# Patient Record
Sex: MALE | Race: BLACK OR AFRICAN AMERICAN | Employment: PART TIME | URBAN - METROPOLITAN AREA
[De-identification: names, ages, dates, MRNs, and addresses within clinical notes are randomized per-mention and may not be internally consistent; named-entity substitution may affect disease eponyms.]

---

## 2020-06-10 ENCOUNTER — HOSPITAL ENCOUNTER (OUTPATIENT)
Dept: PHYSICAL THERAPY | Age: 50
Discharge: HOME | End: 2020-06-10
Payer: MEDICAID

## 2020-06-10 VITALS — HEIGHT: 75 IN | WEIGHT: 315 LBS | BODY MASS INDEX: 39.17 KG/M2

## 2020-06-10 PROCEDURE — 97140 MANUAL THERAPY 1/> REGIONS: CPT

## 2020-06-10 PROCEDURE — 97110 THERAPEUTIC EXERCISES: CPT

## 2020-06-10 PROCEDURE — 97162 PT EVAL MOD COMPLEX 30 MIN: CPT

## 2020-06-10 NOTE — PROGRESS NOTES
[facility]  Lymphedema Clinic  [address] [place] [address], [address], [place] [address].    EVALUATION    NAME: Bryan Rowe AGE: 54 y.o. GENDER: male  DATE: 6/10/2020  REFERRING PHYSICIAN: Dr. Makayla Szymanski:   Primary Diagnosis:  · Lymphedema, not elsewhere classified (L LE) (I89.0)  Other Treatment Diagnoses:  · Abnormality of gait (R26.9)  · Swelling not relieved by elevation (R60.9)  · Congestive heart failure (CHF) (HCC) (I50.9)  · Chronic kidney disease (N18.9)  · Morbid obesity (E66.9)  Date of Onset: 5/28/20  Present Symptoms and Functional Limitations: Patient reports noticing swelling in the L lower leg and ankle approximately one year ago. The swelling has progressed and he is now experiencing swelling in the R lower leg. He had venous dopplers in Feb and May 2020 which he reports were negative. Patient had a CVA in January 2020 with residual numbness and mild weakness in the R lower leg but he remains independent and continues to drive a motorcycle. He is followed by a Cardiologist on a regular basis and his next appointment is at the end of June. [facility] Lymphedema Assessment Scale: Patient scores a 11 out of 20    Past Medical History: Per patient, his PMH also includes: CVA 1/2020, CHF, cardiac arrhythmia, back issues, CKD, respiratory disease, SLE, NEELAM with CPAP. Past Medical History:   Diagnosis Date    CAD (coronary artery disease)     MI 09    Hypertension     Other ill-defined conditions      Past Surgical History:   Procedure Laterality Date    HX GYN      left hand, right shoulder, left ankle      Current Medications:    Per patient, his current medications include: Clonidine 0.2 mg, Coreg 25 mg, Furosemide 80 mg, spironolactone 25 mg, Prednisone 5 mg, Allopurinol 100 mg, Amlodipine 10 mg,   Allergies:    Allergies   Allergen Reactions    Lisinopril Swelling    Sulfa (Sulfonamide Antibiotics) Other (comments)     Had sores all over body        Prior Level of Function/Social/Work History/Personal factors and/or comorbidities impacting plan of care: Patient works in [email or web address] for the [employer]. He does not participate in an exercise program and has been rather sedentary for the past 3 months due to the COVID 19 quarantine. Co-morbidities, include:  CVA 1/2020, CHF, cardiac arrhythmia, back issues, CKD, respiratory disease, SLE, HTN, CKD  Living Situation: Patient lives in a townhouse with his significant other. His bedroom is on the second floor. Trainable Caregiver?: Yes  Mobility: Independent gait without any assistive device for community distances  Sleeping Arrangement:  in bed  Adaptive Equipment Owned: None   Other: Patient arrives to his visit wearing a compression sleeve on the L lower leg which is too tight and appears to have increased swelling in the ankle and foot. Recommended that patient defer use of the product at this time. Previous Therapy:  None    SUBJECTIVE:   Patient reports the onset of L LE swelling approximately one year ago and swelling in the R lower leg more recently. He purchased a compression sleeve for the L LE on [employer] but feels that it may be too tight. His pants and shoes do not fit well. He has a pressure ulcer on the R heel medially and is being followed by a Podiatrist. His sensation in the R lower leg is still abnormal following a CVA in January. Patient's goals for therapy: decrease swelling     OBJECTIVE DATA SUMMARY:   EXAMINATION/PRESENTATION/DECISION MAKING:   Pain:   Patient complains of chronic B lower leg pain. Pain is 7/10 usually with activity. Self-Care and ADLs: Patient is independent with bathing and dressing.      Skin and Tissue Assessment:  Dermal Status: Tenuous,  Dry, Flaky on the lower legs bilaterally          Texture/Consistency: Brawny R LE and Fibrotic/Woody L LE    Pigmentation/Color Change: Hyperpigmented, Hemosiderin and Fibrotic L LE    Anomalies: See pictures above. Circulatory: Vascular studies ruled out DVT in February and May 2020 per patient. Nails: Fungus and Discolored    Stemmers Sign: positive bilaterally  Temperature: 98.7 degrees  Height:  Height: 6' 3\" (190.5 cm)  Weight:  Weight: (!) 166.5 kg (367 lb)   BMI:  BMI (calculated): 45.9  (36 or greater: adversely affecting lymphedema)  Wound/Ulcer:  R medial heel. No drainage. Healing. See picture above. Wound Pain:   none  Volumetric Measurements:   Right:  13,210.43 mL Left:  15,251.95 mL   % Difference: 15.45% L LE > R LE Dominance: left   (See scanned graph)  Range of Motion: Futon [place] for bilateral lower extremities and WFL for bilateral upper extremities  Strength: WFL except R shoulder and R hip 4 to 4+/5  Sensation:  Impaired Patient able to feel light touch on the R foot and lower leg. Reports numbness R LE below the knee s/p CVA. Mobility:    Bed/Chair Mobility: Independent  Transfers: Independent  Gait: Independent for community distances  Endurance: Patient works and is able to ambulate community distances. Reports shortness of breath with prolonged activity. Safety:  Patient is alert and oriented: Yes  Safety awareness: appears intact  Fall risk?: yes due to previous CVA, impaired sensation R lower leg, and LE swelling. No recent falls. Patient given written fall prevention handout: Yes    Based on the above components, the patient evaluation is determined to be of the following complexity level: MEDIUM    Evaluation Time: 9:30 - 10:00 am    TREATMENT PROVIDED:   1. Treatment description:  Therapeutic Exercise and Procedure: Patient instructed in activity restrictions and exercise guidelines. Therapist demonstrated deep abdominal breathing and a remedial lymphedema range of motion exercise program to be done in sitting.   Patient was able to complete the routine times 5 reps and deep abdominal breathing with good performance. Patient has been asked to complete breathing exercises and the decongestive exercise routine daily. Patient does not participate in either a walking or other exercise program.   Treatment time:  10:50 - 11:00 am  Minutes: 10    2. Treatment description:  Manual Therapy: Patient instructed in skin care principles and anatomy of the lymphatic system. Therapist able to demonstrate manual lymphatic drainage techniques for the drainage of L LE > R LE and skin care protocol: Patient was educated in daily skin care with a low Ph lotion. MLD techniques were demonstrated during skin care in the clinic today which included washing with [facility] and applying Remedy lotion. Discussed drying between the toes with a tissue to prevent maceration. Educated patient in the prevention and treatment of skin injuries and signs and symptoms of cellulitis. Discussed the importance of lying in bed for 15 minute intervals throughout the day and elevating the legs for management of swelling. Discussed the role and benefit of multi-layer compression bandaging (MLB), the time commitment, and cost of bandaging supplies. Patient voiced interest in pursuing intensive treatment and was instructed to bring pants and full coverage tennis shoes that will accommodate the bandaging to the next appointment. Provided patient with samples and initiated discussion on  compression options, including flat-knit and velcro products for management of swelling during the restorative phase of care. Treatment time: 10:00 - 10:50 am   Minutes: 50  ASSESSMENT:   Socorro Hall is a 54 y.o. male who presents with L LE > R LE secondary lymphedema with skin changes and healing wound on the medial R heel which is being followed by his Podiatrist.  Patient is motivated to improve his condition. Patient's condition is complicated by multiple co-morbidities, obesity, and a sedentary lifestyle.   Patient will benefit from complete decongestive therapy (CDT) including: Manual lymphatic drainage (MLD) technique, Short-stretch textile bandages/compression system to decongest limb and Kinesiotaping as appropriate. This care is medically necessary due to the infection risk with lymphedema and to improve functional activities. CDT is necessary to resolve swelling to allow patient to return to wearing normal clothes/footwear and prevent worsening of symptoms, such as infections and/or hospitalizations. Patient will be independent with home program strategies to allow improved ADL ability and mobility and to allow patient to return to greatest functional independence. Rehabilitation potential is considered to be Fair. Factors which may influence rehabilitation potential include multiple co-morbidities, obesity, and sedentary lifestyle. Patient will benefit from 6 to 10 physical therapy visits over 12 weeks to optimize improvement in these areas. PLAN OF CARE:   Recommendations and Planned Interventions: Manual lymph drainage/decongestive therapy, Multi-layer compression bandaging (short-stretch), Compression garment fitting/provision, Lymphedema therapeutic exercise,  Education in skin care and lymphedema precautions, Self-MLD education per home program, Self-bandaging education per home program, Caregiver education as needed and Wound care as needed. GOALS  Short term goals  Time frame: to be met by 7/22/2020  1. Patient will demonstrate knowledge of signs/symptoms of infections/cellulitis and be independent in skin care to prevent cellulitis. 2.  Patient will demonstrate independence in lymphedema home program of therapeutic exercises to improve circulation and decongest limb to improve ADLs. 3.  Patient will tolerate multi-layer bandages (MLB) and show measureable decrease in limb volume to allow ordering of home compression system (daytime, nighttime garments and pump as needed). Long term goals  Time frame: to be met by 9/5/2020  1. Patient will be independent with don/doff of compression system and use in order to prevent reaccumulation of fluid at discharge. 3.  Pt will be independent in self-MLD and show stable limb volumes showing decongestion and pt. ready for transition to independent restorative phase of lymphedema therapy. Patient has participated in goal setting and agrees to work toward plan of care. Patient was instructed to call if questions or concerns arise. Thank you for this referral.  Miah Gould, PT, CLT   Time Calculation: 90 mins    TREATMENT PLAN EFFECTIVE DATES:   6/10/2020 TO 9/5/2020  I have read the above plan of care for Duarte Delarosa. I certify the above prescribed services are required by this patient and are medically necessary.   The above plan of care has been developed in conjunction with the lymphedema/physical therapist.       Physician Signature: ____________________________________Date:______________     Dr. Alla Feng

## 2020-06-24 ENCOUNTER — HOSPITAL ENCOUNTER (OUTPATIENT)
Dept: PHYSICAL THERAPY | Age: 50
Discharge: HOME | End: 2020-06-24
Payer: MEDICAID

## 2020-06-24 PROCEDURE — 97140 MANUAL THERAPY 1/> REGIONS: CPT

## 2020-06-24 PROCEDURE — 97110 THERAPEUTIC EXERCISES: CPT

## 2020-06-24 NOTE — PROGRESS NOTES
[address], [address] [place] [address]    LYMPHEDEMA THERAPY  VISIT: 2    [x]                  Daily note               []                 30 day/10th visit progress note    NAME: Marilu Perry  DATE: 6/24/2020    GOALS  Short term goals  Time frame: to be met by 7/22/2020  1. Patient will demonstrate knowledge of signs/symptoms of infections/cellulitis and be independent in skin care to prevent cellulitis. Continued education. Progressing toward goal.  2.  Patient will demonstrate independence in lymphedema home program of therapeutic exercises to improve circulation and decongest limb to improve ADLs. Continued education in basic range of motion routine. Progressing toward goal.  3.  Patient will tolerate multi-layer bandages (MLB) and show measureable decrease in limb volume to allow ordering of home compression system (daytime, nighttime garments and pump as needed). Patient able to receive first MLB to L LE below the knee today. Will try to maintain until next visit as tolerated and bring in a caregiver to learn how to assist and don bandage on his next visit. Progressing toward goals.      Long term goals  Time frame: to be met by 9/5/2020  1. Patient will be independent with don/doff of compression system and use in order to prevent reaccumulation of fluid at discharge. 3.  Pt will be independent in self-MLD and show stable limb volumes showing decongestion and pt. ready for transition to independent restorative phase of lymphedema therapy. SUBJECTIVE REPORT: Patient arrived and was ready to begin bandaging today. Patient did not have adequate foot wear for bandage so a cast shoe had to be issued to him for safe ambulation. Patient will go to [facility] to purchase a shoe today. Patient also will need to purchase wider width pant legs to accommodate bandaging for future visits.     Pain:0/10    Gait:  Independent gait without any assistive device for community distances    ADLs:  Independent for most, but will need to have assist for bandaging. Patient there is someone who can assist him. Treatment Response:  Patient reviewed packet received at evaluation. Patient did not obtain shoes to wear with bandaging as instructed. Patient was eager to begin bandaging today so able to issue a cast shoe as not to delay the start of his treatment as he did not have the requested foot wear to accommodate MLB. 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 [facility] Lymphedema Assessment Scale: Deferred   TREATMENT AND OBJECTIVE DATA SUMMARY:     Therapeutic Activity 0 minutes   Treatment time: 0  Functional Mobility: Transfers:   Independent    Gait:   Independent    Fall risk: moderate (hx CVA has some issues with R LE sensation at times) Bandaged     Therapeutic Exercise/Procedure 10 minutes    Treatment time: 11:05am-11:15am  Walking program N/A   Joyce ball exercise program: N/A   Free exercises/ROM: Reviewed remedial lymphedema home exercise program with patient. Patient requires cueing/assistance to complete remedial home exercise program.  Handout provided. Home program: Patient to perform daily to BID:  Skin care, Exercise routine, Rest in supine, Compression bandage and Purchase necessary supplies for bandaging (Patient able to obtain bandage supplies today through [employer], but needs to get adequate shoes and pants for upcoming visits. Rationale: Exercise will increase the lymph angiomotoricity and tissue pressure of the skin and thus decrease swelling. Modalities 0 minutes   Treatment time: 0  Vasopneumatic pump: N/A     Manual Lymphatic Drainage (MLD) 75 minutes   Treatment time: 9:50am-11:05am  Patient/family education provided in self MLD. Began education in basic Self MLD concepts with skin care.     Area to decongest: B LEs   Sequence used and effectiveness: Began education on basic techniques to use with skin care. Skin/wound care/debridement: Reviewed skin care principles   Skin care products  Hygiene  Prevention of cellulitis   Applied multi-layer compression bandaging to: left  lower extremity: below knee    The following multi-layer bandages were applied:  Tricofix      Padding layer:  Fleece    Foam:  15 cm roll    Short stretch bandages:   8 cm  10 cm  12 cm    Educated patient in multi-layer bandaging donning principles, precautions, supply ordering and types of bandaging. Handout provided. Patient purchased bandages today. Needs better foot wear to accommodate bandage. Left clinic in cast shoe and he plans to purchase shoes before next visit. Upper/Lower extremity compression: N/A     Kinesiotaping: Deferred    Girth/Volume measurement: Reviewed results of volumetric measurements taken on evaluation. Will take volumes of L LE next visit. ASSESSMENT:   Patient able to return for follow up today to begin intensive CDT treatments and focus today was on skin care, bandaging and exercise. Patient tolerated the application of bandage well. Patient will try to maintain the bandage until he returns on Friday for follow up. Patient remains motivated and agreeable with home program recommendations. Patient is eager to learn and improve on condition. Patient is making gains towards goals. PLAN OF CARE:   Continue with plan of care as established on evaluation. Changes to the plan of care are as follows: Began bandaging L LE below the knee today. Frequency: 2 times a week   Next session will address: Effectiveness of bandage and his home program progress and patient plan to bring in someone to learn MLB application that can assist in the home. Other: Vendor contacted. bandage supplies ordered.       TOTAL TREATMENT 85 mins     Meet [place], PTA, CLT

## 2020-06-26 ENCOUNTER — HOSPITAL ENCOUNTER (OUTPATIENT)
Dept: PHYSICAL THERAPY | Age: 50
Discharge: HOME | End: 2020-06-26
Payer: MEDICAID

## 2020-06-26 PROCEDURE — 97140 MANUAL THERAPY 1/> REGIONS: CPT

## 2020-06-26 NOTE — PROGRESS NOTES
[address], [address] [place] [address]    LYMPHEDEMA THERAPY  VISIT: 3    [x]                  Daily note               []                 30 day/10th visit progress note    NAME: Rebeca Holcomb  DATE: 6/26/2020    GOALS  Short term goals  Time frame: to be met by 7/22/2020  1. Patient will demonstrate knowledge of signs/symptoms of infections/cellulitis and be independent in skin care to prevent cellulitis. Continued education. Progressing toward goal.  2.  Patient will demonstrate independence in lymphedema home program of therapeutic exercises to improve circulation and decongest limb to improve ADLs. Continued education in basic range of motion routine. Progressing toward goal.  3.  Patient will tolerate multi-layer bandages (MLB) and show measureable decrease in limb volume to allow ordering of home compression system (daytime, nighttime garments and pump as needed). Patient tolerated  first MLB to L LE below the knee. Friend/Caregiver came today to learn MLB and did well and will work on donning MLB in the home prior to next visit so we can assess in the clinic how they did in the home setting. Also added Comperm G to the R LE as tolerated for day time use. Progressing toward goals.      Long term goals  Time frame: to be met by 9/5/2020  1. Patient will be independent with don/doff of compression system and use in order to prevent reaccumulation of fluid at discharge. 3.  Pt will be independent in self-MLD and show stable limb volumes showing decongestion and pt. ready for transition to independent restorative phase of lymphedema therapy. Volumes on the L LE have decreased 102 ml since evaluation on 6/10/2020. Will continue to monitor. SUBJECTIVE REPORT: Patient arrived today with his friend/cargiver who will assist him in the home setting.  Patient's only complaint about MLB was his leg felt \"Itchy\", so switched to silver liner today. Pain:0/10    Gait: Independent gait without any assistive device for community distances    ADLs:  Independent for most. Friend came in today for learning how to apply multi-layer bandage in the home setting. Treatment Response: Patient did not obtain shoes to wear with bandaging or clothing as instructed. Continues with cast shoe on the L LE and his sweat pants he asked to have cut to accommodate the bandage today. Patient brought in his friend to have education and hands on training in applying MLB and this was successful in clinic setting. Will have to reassess when he returns how they did in the home setting. 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 [facility] Lymphedema Assessment Scale: Deferred   TREATMENT AND OBJECTIVE DATA SUMMARY:     Therapeutic Activity 0 minutes   Treatment time: 0  Functional Mobility: Transfers:   Independent    Gait:   Independent    Fall risk: moderate (hx CVA,has some issues with R LE sensation at times) Bandaged     Therapeutic Exercise/Procedure 0 minutes    Treatment time: 0  Walking program N/A   [employer] exercise program: N/A   Free exercises/ROM: Reviewed remedial lymphedema home exercise program with patient. Patient requires cueing/assistance to complete remedial home exercise program.  Handout provided. Home program: Patient to perform daily to BID:  Skin care, Exercise routine, Rest in supine, Compression bandage and Purchase necessary supplies for bandaging (Patient able to obtain bandage suppliesthrough [employer], but needs to get adequate shoes and pants for upcoming visits. Rationale: Exercise will increase the lymph angiomotoricity and tissue pressure of the skin and thus decrease swelling.      Modalities 0 minutes   Treatment time: 0  Vasopneumatic pump: N/A     Manual Lymphatic Drainage (MLD) 75 minutes   Treatment time: 12:10pm-1:25pm  Patient/family education provided in self MLD. Began education in basic Self MLD concepts with skin care. Area to decongest: B LEs   Sequence used and effectiveness: Began education on basic techniques to use with skin care. Skin/wound care/debridement: Reviewed skin care principles   Skin care products  Hygiene  Prevention of cellulitis   Patient has area of sensitivity on the ankle crease so after cleansing and before lotion application a Mepilex foam pad was applied for protection. Applied multi-layer compression bandaging to: left  lower extremity: below knee    The following multi-layer bandages were applied:  Tricofix   changed to Silver liner today as he complained of itching with tricofix liner. Padding layer:  Fleece    Foam:  15 cm roll    Short stretch bandages:   8 cm  10 cm  12 cm     Added Comperm G to the R LE below the knee to use as tolerated during the day. Educated patient in multi-layer bandaging donning principles, precautions, supply ordering and types of bandaging. Handout provided. Caregiver able to watch MLB applied, have hands on training in application and then record process so that she can use this in the home for reference. Overall first bandage was adequate with cues as needed. Caregiver bandage removed and therapist bandage applied for patient to leave the clinic. Needs better foot wear to accommodate bandage. Left clinic in cast shoe. Encouraged to purchase proper foot wear and find clothing that better accommodates MLB, patient reports that he does not like to wear shorts which would be ideal.    Upper/Lower extremity compression: N/A   Kinesiotaping: Deferred    Girth/Volume measurement: Volumes on the L LE have decreased 102 ml since evaluation on 6/10/2020. ASSESSMENT:   Patient able to maintain MLB that was applied last visit and there is noticeable improvements in the L LE with skin texture and swelling.  Patient able to have a friend/caregiver come in today and learn MLB to assist between visits as needed. Patient to return next visit with MLB that friend applied so that we can see effectiveness of MLB donned in the home. Patient and caregiver are motivated to work towards goals together. Patient remains motivated and agreeable with home program recommendations. Patient is eager to learn and improve on condition. Patient is making gains towards goals. PLAN OF CARE:   Continue with plan of care as established on evaluation. Changes to the plan of care are as follows: Continue bandaging L LE below the knee today. Frequency: 2 times a week   Next session will address: Effectiveness of bandage that caregiver donned in the home and continue education in MLD.     Other:      TOTAL TREATMENT 75 mins     Meet C Patch, PTA, CLT

## 2020-06-30 ENCOUNTER — HOSPITAL ENCOUNTER (OUTPATIENT)
Dept: PHYSICAL THERAPY | Age: 50
Discharge: HOME | End: 2020-06-30
Payer: MEDICAID

## 2020-06-30 VITALS — BODY MASS INDEX: 39.17 KG/M2 | WEIGHT: 315 LBS | HEIGHT: 75 IN

## 2020-06-30 PROCEDURE — 97140 MANUAL THERAPY 1/> REGIONS: CPT

## 2020-06-30 NOTE — PROGRESS NOTES
[address], [address] [place] [address] 22.    LYMPHEDEMA THERAPY  VISIT: 4    [x]                  Daily note               []                 30 day/10th visit progress note    NAME: Sabina Doe  DATE: 6/30/2020    GOALS  Short term goals  Time frame: to be met by 7/22/2020  1. Patient will demonstrate knowledge of signs/symptoms of infections/cellulitis and be independent in skin care to prevent cellulitis. Continued education in skin care. Progressing toward goal.  2.  Patient will demonstrate independence in lymphedema home program of therapeutic exercises to improve circulation and decongest limb to improve ADLs. Continued education in basic range of motion routine. Progressing toward goal.  3.  Patient will tolerate multi-layer bandages (MLB) and show measureable decrease in limb volume to allow ordering of home compression system (daytime, nighttime garments and pump as needed). Patient tolerating MLB to L LE below the knee. Friend/Caregiver bandaged patient since last visit and overall the MLB was adequate. Should improve with more practice in the home setting. Tolerating Comperm G to the R LE as tolerated for day time use. Progressing toward goals.      Long term goals  Time frame: to be met by 9/5/2020  1. Patient will be independent with don/doff of compression system and use in order to prevent reaccumulation of fluid at discharge. 3.  Pt will be independent in self-MLD and show stable limb volumes showing decongestion and pt. ready for transition to independent restorative phase of lymphedema therapy. Volumes on the L LE have decreased 102 ml since evaluation on 6/10/2020. Will continue to monitor and reassess full volumes next visit. SUBJECTIVE REPORT: Patient arrived today with MLB to the L LE that was donned in the home setting.  Patient received phone call from MD during visit and patient reports that they will be setting him up to start dialysis soon. Patient expressed that he is hoping to get back to riding his motorcycle soon as that is his hobby and social network of friends. Pain:0/10 (reports soreness and heaviness in the L LE and soreness and numbness in the R LE. Patient has had issues with numbness in R LE since his CVA. Gait: Independent gait without any assistive device for community distances    ADLs:  Independent for most. Patient now has assistance for applying multi-layer bandage in the home setting. Treatment Response: Patient tolerating MLB to the L LE. Patient can see improvement in his swelling. 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 [facility] Lymphedema Assessment Scale: Deferred    Weight: 359.6 lbs (with bandage and clothing on) down from 367.0 lbs on evaluation. Temperature: 98.4  TREATMENT AND OBJECTIVE DATA SUMMARY:     Therapeutic Activity 0 minutes   Treatment time: 0  Functional Mobility: Transfers:   Independent    Gait:   Independent    Fall risk: moderate (hx CVA,has some issues with R LE sensation at times) Bandaged     Therapeutic Exercise/Procedure 0 minutes    Treatment time: 0  Walking program N/A   [employer] exercise program: N/A   Free exercises/ROM: Reviewed remedial lymphedema home exercise program with patient. Patient requires cueing/assistance to complete remedial home exercise program.  Handout provided. Home program: Patient to perform daily to BID:  Skin care, Exercise routine, Rest in supine, Compression bandage and Purchase necessary supplies for bandaging. Patient able to obtain bandage supplies through [employer]. Patient wore in appropriate pants for bandaging, but has not found adequate foot wear to date. Continues with cast shoe. Rationale: Exercise will increase the lymph angiomotoricity and tissue pressure of the skin and thus decrease swelling.      Modalities 0 minutes   Treatment time: 0  Vasopneumatic pump: N/A     Manual Lymphatic Drainage (MLD) 85 minutes   Treatment time: 10:10am-11:35am  Patient/family education provided in self MLD. Education in basic Self MLD and Self MLD packet issued. Area to decongest: B LEs and Trunk   Sequence used and effectiveness: Secondary sequence today with omission of lateral neck. Focus on trunk and L LE. Skin/wound care/debridement: Reviewed skin care principles   Skin care products  Hygiene  Prevention of cellulitis   Patient has area of sensitivity on the ankle crease so after cleansing and before lotion application a Mepilex foam pad was applied for protection as this helped after last treatment. Applied multi-layer compression bandaging to: left  lower extremity: below knee    The following multi-layer bandages were applied:  Silver liner    Padding layer:  Fleece    Foam:  15 cm roll    Short stretch bandages:   8 cm  10 cm  12 cm (added second 12 cm bandage)    Continued with Comperm G to the R LE below the knee to use as tolerated during the day. Educated patient in multi-layer bandaging donning principles, precautions, supply ordering and types of bandaging. Handout provided. Caregiver able to watch MLB applied, have hands on training in application and then record process so that she can use this in the home for reference. Needs better foot wear to accommodate bandage. Left clinic in cast shoe on the left foot. Upper/Lower extremity compression: Began discussions of the types of day and night compression garments that can be utilized. Will measure when appropriate. Kinesiotaping: Deferred    Girth/Volume measurement: Will assess full volumes B LE next visit. ASSESSMENT:   Patient continues to be compliant with maintaining MLB, skin care, exercises as instructed. Today focused on MLD treatment and education in self MLD.   Patient to continue to work on his home program and return on Thursday for follow up to assess volumes. Will need to begin thinking about Compression garment options when volumes are stable. Patient remains motivated and agreeable with home program recommendations. Patient is eager to learn and improve on condition. Patient is making gains towards goals. PLAN OF CARE:   Continue with plan of care as established on evaluation. Changes to the plan of care are as follows: Added MLD. Frequency: 2 times a week   Next session will address: Home program and assess volumes to prepare for ordering compression when appropriate.     Other:      TOTAL TREATMENT 85 mins     Meet Alston, PTA, CLT

## 2020-07-02 ENCOUNTER — HOSPITAL ENCOUNTER (OUTPATIENT)
Dept: PHYSICAL THERAPY | Age: 50
Discharge: HOME | End: 2020-07-02
Payer: MEDICAID

## 2020-07-02 PROCEDURE — 97140 MANUAL THERAPY 1/> REGIONS: CPT

## 2020-07-02 NOTE — PROGRESS NOTES
[address], [address] [place] [address]    LYMPHEDEMA THERAPY  VISIT: 5    [x]                  Daily note               []                 30 day/10th visit progress note    NAME: Rae Urbano  DATE: 7/2/2020    GOALS  Short term goals  Time frame: to be met by 7/22/2020  1. Patient will demonstrate knowledge of signs/symptoms of infections/cellulitis and be independent in skin care to prevent cellulitis. Continued education in skin care. Progressing toward goal.  2.  Patient will demonstrate independence in lymphedema home program of therapeutic exercises to improve circulation and decongest limb to improve ADLs. Continued education in basic range of motion routine. Progressing toward goal.  3.  Patient will tolerate multi-layer bandages (MLB) and show measureable decrease in limb volume to allow ordering of home compression system (daytime, nighttime garments and pump as needed). Patient tolerating MLB to L LE below the knee. Able to have assist with bandaging in the home as needed. Patient did reports some ankle discomfort on the R LE so will hold use of  Comperm G to the R LE for now. Volumes down to 7.85% compared to 15.45% on evaluation. Patient able to be measured for velcro compression lower leg garments today. Order will be sent today. Progressing toward goals.      Long term goals  Time frame: to be met by 9/5/2020  1. Patient will be independent with don/doff of compression system and use in order to prevent reaccumulation of fluid at discharge. Will assess when products arrive. 3.  Pt will be independent in self-MLD and show stable limb volumes showing decongestion and pt. ready for transition to independent restorative phase of lymphedema therapy. Patient has been educated in Self MLD packet and is following recommendations to perform daily in the home.  Today full volumes were taken to reveal that patient has lost 1758 ml on the L LE and 698 ml on the R LE since treatment began. Progressing towards goal.       SUBJECTIVE REPORT: Patient reports that he goes next Tuesday to begin dialysis. Discussed that he would need to let the clinic know how he is feeling post dialysis as we may need change his treatment visit dates if needed. Patient understood that if he could not make it in to the clinic to continue to stay in [facility] with frequent changes until he returned to the clinic for follow up as tolerated. Pain:0/10 Patient has issues with numbness in R LE since his CVA. Today reports he has had soreness with R ankle. (He was unsure if it was from walking with MLB and cast shoe on the L LE, comperm on the R LE or something else. He will monitor. Gait: Independent gait without any assistive device for community distances    ADLs:  Independent for most. Patient now has assistance for applying multi-layer bandage in the home setting. Treatment Response: Patient tolerating MLB to the L LE and today swelling greatly reduced and patient able to be measured for velcro compression garments. Patient can see the  Improvement in how his legs look and feel. 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 [facility] Lymphedema Assessment Scale: Deferred    Weight: 352.6 lbs today compared to last visit weight of  359.6 lbs (with bandage and clothing on) down from 367.0 lbs on evaluation. Temperature: 98.6  TREATMENT AND OBJECTIVE DATA SUMMARY:     Therapeutic Activity 0 minutes   Treatment time: 0  Functional Mobility: Transfers:   Independent    Gait:   Independent    Fall risk: moderate (hx CVA,has some issues with R LE sensation at times) Bandaged     Therapeutic Exercise/Procedure 0 minutes    Treatment time: 0  Walking program N/A   [employer]ce ball exercise program: N/A   Free exercises/ROM: Reviewed remedial lymphedema home exercise program with patient.   Patient requires cueing/assistance to complete remedial home exercise program.  Handout provided. Home program: Patient to perform daily to BID:  Skin care, Exercise routine, Rest in supine, Compression bandage and Purchase necessary supplies for bandaging. Patient able to obtain bandage supplies through [employer]. Patient wore in appropriate pants for bandaging, but has not found adequate foot wear to date. Continues with cast shoe on the L LE. Discussed getting proper shoes to wear when his velcro products arrive. Rationale: Exercise will increase the lymph angiomotoricity and tissue pressure of the skin and thus decrease swelling. Modalities 0 minutes   Treatment time: 0  Vasopneumatic pump: N/A     Manual Lymphatic Drainage (MLD) 75 minutes   Treatment time: 75:68MR-08:79GM  Patient/family education provided in self MLD. Education in basic Self MLD and Self MLD packet issued. Area to decongest: B LEs and Trunk   Sequence used and effectiveness: Secondary sequence today with omission of lateral neck. Focus on trunk and L LE. Skin/wound care/debridement: Reviewed skin care principles   Skin care products  Hygiene  Prevention of cellulitis   Patient has area of sensitivity on the L ankle crease so after cleansing and before lotion application a Mepilex foam pad was applied for protection as this helped after last treatment. Applied multi-layer compression bandaging to: left  lower extremity: below knee    The following multi-layer bandages were applied:  Silver liner    Padding layer:  Fleece    Foam:  15 cm roll    Short stretch bandages:   8 cm  10 cm  12 cm (added second 12 cm bandage last visit and this improved reduction in swelling and patient tolerated so continued with second 12 cm)    Patient tried Comperm G to the R LE below the knee to use as tolerated during the day, but has had some ankle soreness so will hold for now.      Educated patient in multi-layer bandaging donning principles, precautions, supply ordering and types of bandaging. Handout provided. Caregiver able to watch MLB applied, have hands on training in application and then record process so that she can use this in the home for reference. Needs better foot wear to accommodate bandage. Left clinic in cast shoe on the left foot. Upper/Lower extremity compression: Swelling is much improved and with patient scheduled to begin dialysis next week proceeded with ordering his compression garments. Patient choose velcro options for lower leg/foot/ankle with additional compression liner socks to use as needed. Patient was measured for JuxtaFit Premium lower leg garments in XL Tall for bilateral legs, Farrow Classic Foot Pieces in Medium/ Long, additional silver closed toe liner socks and also Circaid compression liner socks. Patient will do best in velcro systems as this time as they will be easier for him to manage and allow for fluctuations in swelling that may occur because of changes that may occur from starting dialysis. Educated patient on how the garments worked and will continue education when he receives his products. Kinesiotaping: Deferred    Girth/Volume measurement: L LE 13,493.90 ml today compared to 15,251.95 ml on evaluation 6/10/2020    R LE 12,512.30 ml today compared to 13,210.43 ml on evaluation. Percent difference today 7.85% compared to 15.45% on evaluation. ASSESSMENT:   Patient continues to be compliant with maintaining MLB, skin care, self MLD,and exercises as instructed. Volumes are down bilaterally with percent difference in legs now 7.85% compared to evaluation percentage of 15.45%. Skin texture improving and patient was able to be measured for his compression garments today. Patient to continue to work on his home program and return next week for follow up.  Patient is concerned of how he may feel next week with starting dialysis so he will contact the clinic if he is having any concerns and needs to change his appointments. Patient remains motivated and agreeable with home program recommendations. Patient is eager to learn and improve on condition. Patient is making gains towards goals. PLAN OF CARE:   Continue with plan of care as established on evaluation. Changes to the plan of care are as follows: Ordered compression garments and will assess them for fit when they arrive. Frequency: Once a week and caregiver will bandage in between sessions. Next session will address: Assess home program and progression and assess garments when they arrive.     Other: Compression order sent to [facility]     TOTAL TREATMENT 75 mins     Meet Alston, PTA, CLT

## 2020-07-08 ENCOUNTER — HOSPITAL ENCOUNTER (OUTPATIENT)
Dept: PHYSICAL THERAPY | Age: 50
Discharge: HOME | End: 2020-07-08
Payer: MEDICAID

## 2020-07-08 VITALS — HEIGHT: 75 IN | BODY MASS INDEX: 39.17 KG/M2 | WEIGHT: 315 LBS

## 2020-07-08 PROCEDURE — 97140 MANUAL THERAPY 1/> REGIONS: CPT

## 2020-07-08 NOTE — PROGRESS NOTES
[facility]  Lymphedema Clinic  [address] [facility], [address] [place] [address] 22.    LYMPHEDEMA THERAPY  VISIT: 6    []                  Daily note               [x]                 30 day Reassessment/Progress note    NAME: Malik Cooper  DATE: 7/8/2020    GOALS  Short term goals  Time frame: to be met by 7/22/2020  1. Patient will demonstrate knowledge of signs/symptoms of infections/cellulitis and be independent in skin care to prevent cellulitis. Continued education in skin care. Progressing toward goal.  2.  Patient will demonstrate independence in lymphedema home program of therapeutic exercises to improve circulation and decongest limb to improve ADLs. Continued education in basic range of motion routine. Progressing toward goal.  3.  Patient will tolerate multi-layer bandages (MLB) and show measureable decrease in limb volume to allow ordering of home compression system (daytime, nighttime garments and pump as needed). Patient tolerating MLB to L LE below the knee. Able to have assist with bandaging in the home as needed. Using Comperm G to R LE as tolerated. No longer with reports of discomfort. Patient able to be measured for velcro compression lower leg garments last visit. Order send and being processed. Patient to continue with MLB to the L LE as tolerated until garments arrive. Progressing toward goals.      Long term goals  Time frame: to be met by 9/5/2020  1. Patient will be independent with don/doff of compression system and use in order to prevent reaccumulation of fluid at discharge. Will assess when products arrive. 3.  Pt will be independent in self-MLD and show stable limb volumes showing decongestion and pt. ready for transition to independent restorative phase of lymphedema therapy. Patient has been educated in Self MLD packet and is following recommendations to perform daily in the home.  Last visit full volumes were taken to reveal that patient has lost 1758 ml on the L LE and 698 ml on the R LE since treatment began 6/10/2020. Progressing towards goal.       SUBJECTIVE REPORT: Patient started dialysis yesterday and reports that he was fatigued, but overall tolerated it well. He does have some soreness from port that was placed yesterday. Patient states the he will be on a Tuesday and Thursday schedule for his dialysis. Pain:0/10 Soreness from having port placed for dialysis yesterday. Gait: Independent gait without any assistive device for community distances    ADLs:  Independent for most. Patient now has assistance for applying multi-layer bandage in the home setting. Treatment Response: Patient tolerating MLB to the L LE and was measured for velcro compression garments last visit. Awaiting authorization of the products. Patient can see the  improvement in how his legs look and feel. 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 [facility] Lymphedema Assessment Scale: Deferred    Weight: 352.2 lbs today down from 352.6 lbs last visit and down from 367.0 lbs on evaluation    Temperature: Thermometer was not reading accurately today. Patient with no Covid 19 symptoms. TREATMENT AND OBJECTIVE DATA SUMMARY:     Therapeutic Activity 0 minutes   Treatment time: 0  Functional Mobility: Transfers:   Independent    Gait:   Independent    Fall risk: moderate (hx CVA,has some issues with R LE sensation at times) Bandaged     Therapeutic Exercise/Procedure 0 minutes    Treatment time: 0  Walking program N/A   Joyce ball exercise program: N/A   Free exercises/ROM: Patient reports performing his exercises daily as tolerated. Reviewed remedial lymphedema home exercise program with patient. Patient requires cueing/assistance to complete remedial home exercise program.  Handout provided.     Home program: Patient to perform daily to BID:  Skin care, Exercise routine, Rest in supine, Compression bandage and Purchase necessary supplies for bandaging. Patient able to obtain bandage supplies through [employer]. Patient wore in appropriate pants for bandaging, but has not found adequate foot wear to date. Continues with cast shoe on the L LE. Discussed getting proper shoes to wear when his velcro products arrive. Rationale: Exercise will increase the lymph angiomotoricity and tissue pressure of the skin and thus decrease swelling. Modalities 0 minutes   Treatment time: 0  Vasopneumatic pump: N/A     Manual Lymphatic Drainage (MLD) 80 minutes   Treatment time: 10:00am-11:20am  Patient/family education provided in self MLD. Education in basic Self MLD and Self MLD packet issued. Area to decongest: B LEs and Trunk   Sequence used and effectiveness: Secondary sequence today with omission of lateral neck and avoided all techniques on the R side of trunk and he had port placed yesterday. Focus on Left lateral trunk and L LE. Fibrotic techniques performed to left lower leg. Skin/wound care/debridement: Reviewed skin care principles   Skin care products  Hygiene  Prevention of cellulitis   Dove, Aquaphor and Eucrin used during treatment. Skin texture improving. Continues with dryness L > R. L LE with fibrotic/woody texture from mid calf down to ankle. Patient has R medial heel wound that is closed, but very dry and has hardened skin around perimeter. Patient also with some slight open areas from maceration between toes so recommended antifungal foot spray to use in between toes daily. Applied multi-layer compression bandaging to: Arrived wearing MLB to the L LE, but only wearing 3 of the 4 bandages and the fleece and foam were applied out of order. Patient reports that it was late when they re-bandaged. Patient understood the importance of layering and will have caregiver refer back to recording if needed for reminder.      Left  lower extremity: below knee    The following multi-layer bandages were applied:  Silver liner    Padding layer:  Fleece    Foam:  15 cm roll    Short stretch bandages:   8 cm  10 cm  12 cm x 2    Has Comperm G to use as needed on the R LE as tolerated. Educated patient in multi-layer bandaging donning principles, precautions, supply ordering and types of bandaging. Handout provided. Caregiver able to watch MLB applied, have hands on training in application and then record process so that she can use this in the home for reference. Needs better foot wear to accommodate bandage. Left clinic in cast shoe on the left foot. Upper/Lower extremity compression: Patient choose and was measured for velcro garments for lower leg/foot/ankle with additional compression liner socks to use as needed last visit. Patient was measured for JuxtaFit Premium lower leg garments in XL Tall for bilateral legs, [employer]arrow Classic Foot Pieces in Medium/ Long, additional silver closed toe liner socks and also Circaid compression liner socks. Patient will do best in velcro systems as this time as they will be easier for him to manage and allow for fluctuations in swelling that may occur because of changes. Educated patient on how the garments worked and will continue education when he receives his products. Kinesiotaping: Deferred    Girth/Volume measurement: L LE 13,493.90 ml last visit compared to 15,251.95 ml on evaluation 6/10/2020    R LE 12,512.30 ml last visit compared to 13,210.43 ml on evaluation 6/10/2020     Percent difference now 7.85% compared to 15.45% on evaluation. Girths taken today:  Affected Side: B LE ( L > R)   Left (cm) Right (cm)   5th Tuberosity 28.2    27.4      Ankle 33.6    30.5      Calf 51    47      Mid Knee 49.2    47.5             ASSESSMENT:   Patient continues to be compliant with maintaining MLB, skin care, self MLD,and exercises as instructed.  Volumes are down bilaterally with percent difference in legs now 7.85% compared to evaluation percentage of 15.45%. Skin texture improving, but continues with fibrotic areas on lower leg. Patient was able to be measured for his compression garments last visit. These items will be fitted for patient when they arrive. Patient began dialysis yesterday and he will be on a Tuesday/Thursday schedule. Patient will follow up on next Wednesday for assessment. Patient remains motivated and agreeable with home program recommendations. Patient is eager to learn and improve on condition. Patient is making gains towards goals. PLAN OF CARE:   Continue with plan of care as established on evaluation. Changes to the plan of care are as follows: Continue with current plan of care   Frequency: Once a week and caregiver will bandage in between sessions. Next session will address: Assess home program and progression and assess garments when they arrive.     Other:      TOTAL TREATMENT 80 mins     Meet Alston, PTA, CLT  Deya Carrillo, PT, CLT

## 2020-07-15 ENCOUNTER — HOSPITAL ENCOUNTER (OUTPATIENT)
Dept: PHYSICAL THERAPY | Age: 50
Discharge: HOME | End: 2020-07-15
Payer: MEDICAID

## 2020-07-15 PROCEDURE — 97140 MANUAL THERAPY 1/> REGIONS: CPT

## 2020-07-15 PROCEDURE — 97116 GAIT TRAINING THERAPY: CPT

## 2020-07-15 NOTE — PROGRESS NOTES
[facility]  Lymphedema Clinic  [address] [place] [address], [address].    LYMPHEDEMA THERAPY  VISIT: 7    [x]                  Daily note               []                 30 day Reassessment/Progress note    NAME: Orlin Musa  DATE: 7/15/2020    GOALS  Short term goals  Time frame: to be met by 7/22/2020  1. Patient will demonstrate knowledge of signs/symptoms of infections/cellulitis and be independent in skin care to prevent cellulitis. Continued education in daily skin care with a low ph lotion and demonstrated MLD techniques this visit. Progressing toward goal.  2.  Patient will demonstrate independence in lymphedema home program of therapeutic exercises to improve circulation and decongest limb to improve ADLs. Patient has been educated in the basic range of motion routine and was educated in the [employer] routine this visit. Progressing toward goal.   3.  Patient will tolerate multi-layer bandages (MLB) and show measureable decrease in limb volume to allow ordering of home compression system (daytime, nighttime garments and pump as needed). Patient tolerating MLB to L LE below the knee. Able to have assist with bandaging in the home as needed. Using Comperm G to R LE as tolerated. Velcro compression lower leg garments have been ordered and patient will bring the garments to the clinic for fitting. Patient to continue with MLB to the L LE as tolerated until garments arrive. Progressing toward goal.      Long term goals  Time frame: to be met by 9/5/2020  1. Patient will be independent with don/doff of compression system and use in order to prevent reaccumulation of fluid at discharge. Will assess when products arrive. 3.  Pt will be independent in self-MLD and show stable limb volumes showing decongestion and pt. ready for transition to independent restorative phase of lymphedema therapy.   Patient has been educated in Self MLD packet and is following recommendations to perform daily in the home. Full LE volumetric measurements were taken today. See measurements below. Progressing towards goal.       SUBJECTIVE REPORT: Patient's dialysis schedule has been changed to M-W-F and he is tolerating dialysis well, except for fatigue. His L LE has not been bandaged since Friday since his girlfriend injured her back in a car accident and was unable to provide assistance. She should be ready to help with MLB at home later this week. Pain: No complaints of pain this visit. Gait: Independent gait without any assistive device for community distances    ADLs:  Independent for most. Patient has assistance for applying multi-layer bandage in the home setting. Treatment Response: Patient continues to lose volume in both legs despite the L LE not being bandaged since Friday due to lack of caregiver assistance. His girlfriend should be able to assist with bandaging this week. Patient continues to walk daily and performs at least part of an exercise routine each day. 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 [facility] Lymphedema Assessment Scale: Deferred    Weight: 349.8 lbs today down from 352.2 lbs last visit and down from 367.0 lbs on evaluation    Temperature: 98.6 degrees. Patient with no Covid 19 symptoms. TREATMENT AND OBJECTIVE DATA SUMMARY:     Therapeutic Activity 0 minutes   Treatment time: N/A  Functional Mobility: Transfers:   Independent    Gait:   Independent    Fall risk: moderate (hx CVA,has some issues with R LE sensation at times) Bandaged     Therapeutic Exercise/Procedure 15 minutes    Treatment time: 11:30 - 11:45 am  Walking program N/A   Joyce ball exercise program: Patient was able to perform 5 reps of the supine and sitting Ej Mccauley routine in the clinic today. Free exercises/ROM: Patient reports performing his exercises daily as tolerated.    Reviewed remedial lymphedema home exercise program with patient. Patient requires cueing/assistance to complete remedial home exercise program.  Handout provided. Home program: Patient to perform daily to BID:  Skin care, Exercise routine, Rest in supine, Compression bandage and Purchase necessary supplies for bandaging. Patient able to obtain bandage supplies through [employer]. Patient has not found adequate foot wear to date. Continues with cast shoe on the L LE. Discussed getting proper shoes to wear when his velcro products arrive. Rationale: Exercise will increase the lymph angiomotoricity and tissue pressure of the skin and thus decrease swelling. Modalities 0 minutes   Treatment time: 0  Vasopneumatic pump: N/A     Manual Lymphatic Drainage (MLD) 60 minutes   Treatment time: 10:30 am -11:30 am  Patient/family education provided in self MLD. Continued education in self MLD with bathing and skin care. Patient has the written instructions to follow. Area to decongest: B LEs and Trunk   Sequence used and effectiveness: Deferred   Skin/wound care/debridement: Reviewed skin care principles   Skin care products  Hygiene  Prevention of cellulitis   Dove, Aquaphor and Eucerin used during treatment. Skin texture improving. Continues with dryness L > R. L LE with fibrotic/woody texture from mid calf down to ankle. Patient has R medial heel wound that is closed, but very dry and has hardened skin around perimeter. Patient also with some slight open areas from maceration between toes so recommended antifungal foot spray to use in between toes daily. Applied multi-layer compression bandaging to: Arrived to the clinic without MLB in place. Left  lower extremity: below knee    The following multi-layer bandages were applied:  Silver liner    Padding layer:  Fleece    Foam:  15 cm roll    Short stretch bandages:   8 cm  10 cm  12 cm x 2    Has Comperm G to use as needed on the R LE as tolerated. Educated patient in multi-layer bandaging donning principles, precautions, supply ordering and types of bandaging. Handout provided. Caregiver has been educated in [facility] technique, including hands on training in application. Caregiver has recorded the process to use in the home for reference. Needs better foot wear to accommodate bandage. Left clinic in cast shoe on the left foot. Upper/Lower extremity compression: Patient has selected and been measured for velcro garments for lower leg/foot/ankle with additional compression liner socks to use as needed last visit. Patient was measured for JuxtaFit Premium lower leg garments in XL Tall for bilateral legs, Farrow Classic Foot Pieces in Medium/ Long, additional silver closed toe liner socks and also Circaid compression liner socks. Patient will do best in velcro systems as this time as they will be easier for him to manage and allow for fluctuations in swelling that may occur because of changes. Educated patient on how the garments worked and will continue education when he receives his products. Kinesiotaping: Deferred    Girth/Volume measurement: L LE 13,422.65 ml this visit compared to 15,251.95 ml on evaluation 6/10/2020    R LE 11,431. 54 ml this visit compared to 13,210.43 ml on evaluation 6/10/2020     Percent difference now 17% compared to 15.45% on evaluation. The L lower leg has not been bandaged for the past 5 days due to lack of caregiver assistance. Patient's caregiver should be able to provide assistance this week. ASSESSMENT:   Full LE volumetric measurements taken today reveal a loss of volume bilaterally despite patient not having the L LE bandaged for the past 5 days. His girlfriend was unable to assist with MLB application last week but will provide assistance this week.  Velcro products have been ordered for patient but insurance authorization is still pending and may take another 2 to 3 weeks for delivery per the vendor, [employer].   Patient's dialysis schedule has been changed to Mon/Wed/Fri. Patient prefers to keep his appointments in the Lymphedema Clinic on Wednesday mornings for convenience despite having dialysis in the afternoons. Patient remains motivated and agreeable with home program recommendations. Patient is eager to learn and improve on condition. Patient is making gains towards goals. PLAN OF CARE:   Continue with plan of care as established on evaluation. Changes to the plan of care are as follows: Continue with current plan of care   Frequency: Once a week and caregiver will bandage in between sessions. Next session will address: Assess home program and progression and assess garments when they arrive.     Other: Vendor: [employer]      TOTAL TREATMENT 75 mins     Mercedez Harkins PT, CLT

## 2020-07-23 ENCOUNTER — HOSPITAL ENCOUNTER (OUTPATIENT)
Dept: PHYSICAL THERAPY | Age: 50
Discharge: HOME | End: 2020-07-23
Payer: MEDICAID

## 2020-07-23 VITALS — HEIGHT: 75 IN | BODY MASS INDEX: 39.17 KG/M2 | WEIGHT: 315 LBS

## 2020-07-23 PROCEDURE — 97140 MANUAL THERAPY 1/> REGIONS: CPT

## 2020-07-23 NOTE — PROGRESS NOTES
[facility]  Lymphedema Clinic  [address] [place] [address], [address] [place] [address], [facility] [address].    LYMPHEDEMA THERAPY  VISIT: 8    [x]                  Daily note               []                 30 day Reassessment/Progress note    NAME: Rae Urbano  DATE: 7/23/2020     GOALS  Short term goals  Time frame: to be met by 7/22/2020  1. Patient will demonstrate knowledge of signs/symptoms of infections/cellulitis and be independent in skin care to prevent cellulitis. Patient has been educated on signs and symptoms of infection/cellulitis and has been performing skin care recommendations in the home setting. Goal Met 7/23/2020  2. Patient will demonstrate independence in lymphedema home program of therapeutic exercises to improve circulation and decongest limb to improve ADLs. Patient has been educated in the basic range of motion routine and Asenath Rajas routines to date. Patient is independent with handouts. Goal Met 7/23/2020  3. Patient will tolerate multi-layer bandages (MLB) and show measureable decrease in limb volume to allow ordering of home compression system (daytime, nighttime garments and pump as needed). Patient received his compression order since last visit. Patient was able to be fitted with all products and will begin using his Juxta-fit products up to 23 hours a day as tolerated. Will continue to assess for any other compression needs that he may need prior to discharge to restorative phase of care. Extend goal to 9/5/2020     Long term goals  Time frame: to be met by 9/5/2020  1. Patient will be independent with don/doff of compression system and use in order to prevent reaccumulation of fluid at discharge. Patient able to show today in the clinic that he could don/doff items with verbal cues. Patient does have assistance in the home setting as well. Patient to return next visit with his products in place to assess how he did in the home setting.  Patient has handouts to follow as well. Progressing towards goal.  3.  Pt will be independent in self-MLD and show stable limb volumes showing decongestion and pt. ready for transition to independent restorative phase of lymphedema therapy. Patient has been educated in Self MLD packet and is following recommendations to perform daily in the home. Will assess full LE volumetric measurements next visit after he has been able to work with his new products. Progressing towards goal.       SUBJECTIVE REPORT: Patient received his new compression products and brought them in today for assessment. Pain: No complaints of pain this visit. Gait: Independent gait without any assistive device for community distances    ADLs:  Independent for most. Patient has assistance for applying multi-layer bandage in the home setting. Treatment Response: Patient continues to lose volume in both legs and weight. Patient has received new compression garments and they were assessed today and patient likes the fit of the garments and eager to begin use. 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 [facility] Lymphedema Assessment Scale: Deferred    Weight: 346.6 lbs today down from 349.8 lbs last visit and down from 367.0 lbs on evaluation    Temperature: 97.0 degrees. Patient with no Covid 19 symptoms. TREATMENT AND OBJECTIVE DATA SUMMARY:     Therapeutic Activity 0 minutes   Treatment time: N/A  Functional Mobility: Transfers:   Independent    Gait:   Independent    Fall risk: moderate (hx CVA,has some issues with R LE sensation at times) Bandaged     Therapeutic Exercise/Procedure 0minutes    Treatment time: 0  Walking program N/A   [employer] ball exercise program: Patient previously educated in the [employer] routine. Free exercises/ROM: Patient reports performing his exercises daily as tolerated. Reviewed remedial lymphedema home exercise program with patient.   Patient requires cueing/assistance to complete remedial home exercise program.  Handout provided. Home program: Patient to perform daily to BID:  Skin care, Deep abdominal breathing, Exercise routine, Rest in supine, Compression bandage, Compression garments, Self manual lymph draining (MLD) and Bring supplies to each therapy visit. Patient has not found adequate foot wear to date. Wearing CROCS currently. Discussed getting proper shoes to wear now that his velcro products have arrived. Rationale: Exercise will increase the lymph angiomotoricity and tissue pressure of the skin and thus decrease swelling. Modalities 0 minutes   Treatment time: 0  Vasopneumatic pump: N/A     Manual Lymphatic Drainage (MLD) 55  minutes   Treatment time: 10:45am-11:40am  Patient/family education provided in self MLD. Continued education in self MLD with bathing and skin care. Patient has the written instructions to follow. Area to decongest: B LEs and Trunk   Sequence used and effectiveness: Deferred   Skin/wound care/debridement: Reviewed skin care principles   Skin care products  Hygiene  Prevention of cellulitis   Skin texture improving. Continues with dryness L > R. L LE with fibrotic/woody texture from mid calf down to ankle. Patient has R medial heel wound that is closed, but very dry and has hardened skin around perimeter. Patient also with some slight open areas from maceration between toes so recommended antifungal foot spray to use in between toes daily. Applied multi-layer compression bandaging to: Patient has all bandage supplies and can continue to bandage in the home setting as needed. Upper/Lower extremity compression: Patient received his JuxtaFit Premium lower leg garments in XL Tall for bilateral legs, Farrow Classic Foot Pieces in Medium/ Long, additional silver closed toe liner socks and also Circaid compression liner socks.  Patient will do best in velcro systems as this time as they will be easier for him to manage and allow for fluctuations in swelling that may occur because of changes. Patient was educated in how to properly use all of his products. Patient educated in the wear and care of all of his products and verbalized and demonstrated understanding of all prior to leaving the clinic today. Patient to remain at the 20 mmHg for his Juxtafit products until he returns next week for follow up. Patient able to leave the clinic with his compression liner socks, Juxtafit Lower leg garments, and his Farrow Classic Foot pieces. Patient able to don his CROCS, but encouraged him to find more supportive tie up shoe that will be more supportive. Kinesiotaping: Deferred    Girth/Volume measurement: Deferred today will reassess next visit after he has worked with his products consistently over the next week. ASSESSMENT:   Patient was able to receive his compression products and brought them in today for fitting and to learn how to use all of the items. Patient is pleased with the fit and feel of his products and will work with them in the home as recommended and return next week for follow up. Expect that if his volumes are stabilizing and patient returns with no concerns that we can reduce his frequency. Patient is aware to contact the clinic if he has any questions prior to returning next week for follow up. Patient currently going to dialysis three days a week so plan is to reduce visits here in the clinic if he returns next week and is doing well with his new products. Patient remains motivated and agreeable with home program recommendations and to begin use of his new products. Patient is making gains towards goals and should be ready to work towards transition to the restorative phase of care once volumes stabilize and all needed compression products have been obtained. PLAN OF CARE:   Continue with plan of care as established on evaluation.  Changes to the plan of care are as follows: Continue with current plan of care   Frequency: Once a week and reduce frequency next week if appropriate. Next session will address: Assess how patient did with his new products and assess volumes.     Other: Vendor: [employer]      TOTAL TREATMENT 55 mins     Meet ARROYO Patch, PTA, CLT

## 2020-07-29 ENCOUNTER — HOSPITAL ENCOUNTER (OUTPATIENT)
Dept: PHYSICAL THERAPY | Age: 50
Discharge: HOME | End: 2020-07-29
Payer: MEDICAID

## 2020-07-29 PROCEDURE — 97140 MANUAL THERAPY 1/> REGIONS: CPT

## 2020-07-29 NOTE — PROGRESS NOTES
[facility]  Lymphedema Clinic  [address], [address] [address]    LYMPHEDEMA THERAPY  VISIT: 9    [x]                  Daily note               []                 30 day Reassessment/Progress note    NAME: Cira Costello  DATE: 7/29/2020     GOALS  Short term goals  Time frame: to be met by 7/22/2020  1. Patient will demonstrate knowledge of signs/symptoms of infections/cellulitis and be independent in skin care to prevent cellulitis. Patient has been educated on signs and symptoms of infection/cellulitis and has been performing skin care recommendations in the home setting. Goal Met 7/23/2020  2. Patient will demonstrate independence in lymphedema home program of therapeutic exercises to improve circulation and decongest limb to improve ADLs. Patient has been educated in the basic range of motion routine and [employer] routines to date. Patient is independent with handouts. Goal Met 7/23/2020    Short term goal-Extended to 9/5/2020  3. Patient will tolerate multi-layer bandages (MLB) and show measureable decrease in limb volume to allow ordering of home compression system (daytime, nighttime garments and pump as needed). Patient has been making gains with his use of his compression products Juxta-fit/Farrow products up to 23 hours a day as tolerated. Will continue to assess for any other compression needs that he may need prior to discharge to restorative phase of care. Patient would benefit from assessment of custom flat knit knee highs prior to discharge in order to have all of the needed products for the restorative phase of care. Will continue to hold use of vaso-pneumatic pump at this time as patient is on dialysis and has chronic kidney disease and history of Congestive Heart Failure. Progressing towards goal.     Long term goals  Time frame: to be met by 9/5/2020  1.   Patient will be independent with don/doff of compression system and use in order to prevent reaccumulation of fluid at discharge. Patient is independent with the donning and doffing of his compression garments and has assist in the home if needed. Goal Met 7/29/2020. 3.  Pt will be independent in self-MLD and show stable limb volumes showing decongestion and pt. ready for transition to independent restorative phase of lymphedema therapy. Patient has been educated in Self MLD packet and is following recommendations to perform daily in the home. Full volumes assessed today to reveal that patient has lost significant volumes bilaterally since last volumes taken on 7/15/2020 prior to use of new compression products. Patient also has lost considerable volume since evaluation. Will continue to monitor and measure for custom flat knit knee highs when appropriate to prepare for discharge to the restorative phase of care. Progressing towards goal.       SUBJECTIVE REPORT:Patient arrived today with his compression in place that he had donned himself in the home setting. Patient reports that he has been doing well with his home program and consistently using his compression garments as recommended. Pain: No complaints of pain this visit. Gait: Independent gait without any assistive device for community distances    ADLs:  Independent for most. Patient has assistance for applying multi-layer bandage in the home setting. Treatment Response:  Patient reports that he has been doing well with his home program and consistently using his compression garments as recommended. Patient is pleased with the progress that he has made so far with treatment. 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 [facility] Lymphedema Assessment Scale: Deferred    Weight: 352.4 lbs up today from last visit  and down from 367.0 lbs on evaluation    Temperature: 98.6 degrees. Patient with no Covid 19 symptoms.     TREATMENT AND OBJECTIVE DATA SUMMARY: Therapeutic Activity 0 minutes   Treatment time: N/A  Functional Mobility: Transfers:   Independent    Gait:   Independent    Fall risk: low (hx CVA,has some issues with R LE sensation at times)      Therapeutic Exercise/Procedure 0minutes    Treatment time: 0  Walking program N/A   Joyce ball exercise program: Patient previously educated in the [employer] routine. Free exercises/ROM: Patient reports performing his exercises daily as tolerated. Reviewed remedial lymphedema home exercise program with patient. Handout provided. Home program: Patient to perform daily to BID:  Skin care, Deep abdominal breathing, Exercise routine, Rest in supine, Compression bandage, Compression garments, Self manual lymph draining (MLD) and Bring supplies to each therapy visit. Patient has not found adequate foot wear to date. Wearing CROCS currently. Discussed getting proper shoes to wear now that he is wearing his  velcro products    Rationale: Exercise will increase the lymph angiomotoricity and tissue pressure of the skin and thus decrease swelling. Modalities 0 minutes   Treatment time: 0  Vasopneumatic pump: N/A     Manual Lymphatic Drainage (MLD) 40 minutes   Treatment time: 10:50am-11:30am  Patient/family education provided in self MLD. Continued education in self MLD with bathing and skin care. Patient has the written instructions to follow. Area to decongest: B LEs and Trunk   Sequence used and effectiveness: Patient is working on Self MLD in the home setting    Skin/wound care/debridement: Reviewed skin care principles   Skin care products  Hygiene  Prevention of cellulitis   Skin texture improving. Continues with dryness L > R. L LE with fibrotic/woody texture from mid calf down to ankle. Patient has R medial heel wound that is closed, but very dry and has hardened skin around perimeter.     Pictures:  B LEs      L LE        L LE      R LE      R heel      Applied multi-layer compression bandaging to: Patient has all bandage supplies and can continue to bandage in the home setting as needed. Upper/Lower extremity compression: Patient wearing his JuxtaFit Premium lower leg garments in XL Tall for bilateral legs, Farrow Classic Foot Pieces in Medium/ Long, additional silver closed toe liner socks and also Circaid compression liner socks. Patient reports that he has not had any issues with use of the products and will continue to use daily as instructed until follow up. Patient will be reassessed and when appropriate and volumes stabilized will be measured for custom flat knit knee highs. Patient was educated in how to properly use all of his products. Patient educated in the wear and care of all of his products and verbalized and demonstrated understanding of all prior to leaving the clinic today. Patient to remain at the 20 mmHg for his Juxtafit products and can increase L LE to 30 mmHg if needed/tolerated. Patient able to leave the clinic with his compression liner socks, Juxtafit Lower leg garments, and his Farrow Classic Foot pieces. Patient able to don his CROCS, but encouraged him to find more supportive tie up shoe that will be more supportive. Kinesiotaping: Deferred    Girth/Volume measurement: Full volumes taken today to reveal that patient has lost 3244 ml on the L LE and 2444 ml on the R LE since evaluation on 6/10/2020. 500 ml = 1 pound. L LE 12,007.96 ml  today compared to 15,251.95 ml 7/15/2020  R LE 10,766. 59 ml today compared to 13,210.43 ml 7/15/2020  Percent difference today is 11.53% compared to 15.45% on evaluation. ASSESSMENT:   Patient is doing well adjusting to his new velcro products from [facility]. All products are fitting well and comfortable to patient. Patient's volumes are continuing to decrease and patient is close to meeting all of his goals.    Patient will continue with his home program and follow up in one month to reassess his volumes and to see if he is ready to be measured for flat knit custom knee highs prior to discharge to the restorative phase of care. Patient is aware to contact the clinic if he has any questions prior to returning follow up. PLAN OF CARE:   Continue with plan of care as established on evaluation. Changes to the plan of care are as follows: Continue with current plan of care   Frequency: Follow up in one month.     Next session will address: Assess volumes and prepare for discharge    Other: Vendor: [facility]      TOTAL TREATMENT 40 mins     Meet ARROYO Patch, PTA, CLT

## 2020-08-05 ENCOUNTER — APPOINTMENT (OUTPATIENT)
Dept: PHYSICAL THERAPY | Age: 50
End: 2020-08-05
Payer: MEDICAID

## 2020-08-19 ENCOUNTER — HOSPITAL ENCOUNTER (OUTPATIENT)
Dept: PHYSICAL THERAPY | Age: 50
Discharge: HOME | End: 2020-08-19
Payer: MEDICAID

## 2020-08-19 PROCEDURE — 97140 MANUAL THERAPY 1/> REGIONS: CPT

## 2020-08-19 NOTE — PROGRESS NOTES
[facility]  Lymphedema Clinic  [address], [address], [place]  [address].    LYMPHEDEMA THERAPY  VISIT: 10    []                  Daily note               [x]                 90 day Reassessment with Updated Plan of Care/Progress note    NAME: Eleonora Torres  DATE: 8/19/2020     GOALS  Short term goals  Time frame: to be met by 7/22/2020  1. Patient will demonstrate knowledge of signs/symptoms of infections/cellulitis and be independent in skin care to prevent cellulitis. Patient has been educated on signs and symptoms of infection/cellulitis and has been performing skin care recommendations in the home setting. Goal Met 7/23/2020  2. Patient will demonstrate independence in lymphedema home program of therapeutic exercises to improve circulation and decongest limb to improve ADLs. Patient has been educated in the basic range of motion routine and [employer] routines to date. Patient is independent with handouts. Goal Met 7/23/2020    Short term goal-Extended to 9/5/2020  3. Patient will tolerate multi-layer bandages (MLB) and show measureable decrease in limb volume to allow ordering of home compression system (daytime, nighttime garments and pump as needed). Patient continues with daily use of his compression products Juxta-fit/Farrow products. He was encouraged to use up to 23 hours a day as tolerated. Vendor notified us that he will not be able to receive more compression for 6 months so will have patient measured for custom flat knit knee highs at that time if volumes are stable. Will continue to hold use of vaso-pneumatic pump at this time as patient is on dialysis and has chronic kidney disease and history of Congestive Heart Failure. Goal Met 8/19/2020     Long term goals  Time frame: to be met by 9/5/2020  1. Patient will be independent with don/doff of compression system and use in order to prevent reaccumulation of fluid at discharge. Patient is independent with the donning and doffing of his compression garments and has assist in the home if needed. Goal Met 7/29/2020. 3.  Pt will be independent in self-MLD and show stable limb volumes showing decongestion and pt. ready for transition to independent restorative phase of lymphedema therapy. Patient has been educated in Self MLD packet and is following recommendations to perform daily in the home. Full volumes assessed today to reveal that patient has gained 967 ml on the L LE and lost 72 ml on the R LE since volumes were last taken on 7/29/2020. Since evaluation 6/10/2020 he has lost 2277 ml on the L LE and 2561 ml on the R LE. This totals approximately 10 lb loss in B LEs since evaluation. Will continue to monitor and assess as patient continues with use of her products prior to discharge. Progressing towards goal.       Idris Ricardo continues to wear his compression as recommended. Patient stated that his caregiver is not able to bandage his R LE due to back issues so he has been noticing increased swelling of the R lower leg/ankle recently. Patient reports that he is still going to dialysis several days a week. Pain: No complaints of pain this visit. Gait: Independent gait without any assistive device for community distances    ADLs:  Independent for most.     Treatment Response:  Patient reports that he has been doing well with his home program and consistently using his compression garments as recommended. Patient reports that he has had some additional swelling in the R LE since he is not able to bandage regularly. Educated on techniques to improve swelling in R ankle and lower leg today with improved time spent in compression garments. Also encouraged to get more appropriate foot wear which will assist with compression in the ankle and foot. 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      [facility] Lymphedema Assessment Scale: Deferred    Weight: 355.4 lbs today up from  352.4 lbs last visit and down from 367.0 lbs on evaluation    Temperature: 97.5 degrees. Patient with no Covid 19 symptoms. TREATMENT AND OBJECTIVE DATA SUMMARY:     Therapeutic Activity 0 minutes   Treatment time: N/A  Functional Mobility: Transfers:   Independent    Gait:   Independent    Fall risk: low (hx CVA,has some issues with R LE sensation at times)      Therapeutic Exercise/Procedure 0 minutes    Treatment time: 0  Walking program N/A   [place] exercise program: Patient previously educated in the [employer] routine. Free exercises/ROM: Patient reports performing his exercises daily as tolerated. Reviewed remedial lymphedema home exercise program with patient. Handout provided. Home program: Patient to perform daily to BID:  Skin care, Deep abdominal breathing, Exercise routine, Rest in supine, Compression bandage, Compression garments, Self manual lymph draining (MLD) and Bring supplies to each therapy visit. Patient has not found adequate foot wear to date. Wearing CROCS currently. Discussed getting proper shoes to wear now that he is wearing his  velcro products    Rationale: Exercise will increase the lymph angiomotoricity and tissue pressure of the skin and thus decrease swelling. Modalities 0 minutes   Treatment time: 0  Vasopneumatic pump: N/A     Manual Lymphatic Drainage (MLD) 75 minutes   Treatment time: 10:45m-12:00pm   Patient/family education provided in self MLD. Continued education in self MLD with bathing and skin care. Patient has the written instructions to follow. Area to decongest: B LEs and Trunk   Sequence used and effectiveness: Secondary sequence performed today for trunk and R LE while patient was in supine. Focus on lower leg and ankle.    Patient is working on Self MLD in the home setting    Skin/wound care/debridement: Reviewed skin care principles   Skin care products  Hygiene  Prevention of cellulitis   Cleansed with Dove soap and Eucerin lotion applied using MLD principles. Skin texture improving. Continues with dryness L > R. L LE with fibrotic/woody texture from mid calf down to ankle. Patient has R medial heel improved with less dryness noted today. Applied multi-layer compression bandaging to: Patient has all bandage supplies and can continue to bandage in the home setting as needed once caregiver is able to assist.     Upper/Lower extremity compression: Patient wearing his JuxtaFit Premium lower leg garments in XL Tall for bilateral legs, Farrow Classic Foot Pieces in Medium/ Long, additional silver closed toe liner socks and also Circaid compression liner socks. Patient reports that he has not had any issues with use of the products and will continue to use daily as instructed until follow up. Patient will be reassessed and when appropriate and volumes stabilized will be measured for custom flat knit knee highs and vendor reports that insurance would cover them in 6 months from last visit. Patient requested to send order to [employer] to be able to obtain more compression liner socks as he would like to order some on his own to have more available to use. Patient was educated in how to properly use all of his products. Patient educated in the wear and care of all of his products and verbalized and demonstrated understanding of all prior to leaving the clinic today. Patient to remain at the 20 mmHg for his Juxtafit products and encouraged him on L LE to be at 30 mmHg as tolerated. Patient able to leave the clinic with his liner socks, Juxtafit Lower leg garments, and his Farrow Classic Foot pieces. Patient able to don his CROCS, but encouraged him to find more supportive tie up shoe that will be more supportive. Kinesiotaping: Placed a piece of tape \"Y \" from medial ankle to medial upper calf.    Educated on tape and safe removal.    Girth/Volume measurement: Reviewed results of volumetric measurements taken on evaluation. -left lower 12,975.36 ml today compared to 12,007.96  ml on last visit 7/29/2020. On evaluation (6/10/2020) volumes in L LE were 15,251.95 ml     -right lower  10,694.18 ml today compared to 10,766. 59 ml on last visit 7/29/2020. On evaluation (6/10/2020) volumes in R LE were 13,210.43       ASSESSMENT:   Patient continues to follow his home program and is using his products daily as recommended. He has not been able to bandage his R LE, due to caregiver's back injury. Planned to bandage today, but patient did not have appropriate foot wear/cast shoe to use. Patient encouraged to wear his Juxtafit products up to 23 hours a day if needed when his swelling appears to be worsening. Patient had increase in the R LE volumes  today so will continue to monitor him over time and work towards goal of stabilizing volumes to be measured for custom flat knit knee highs when appropriate. Patient will continue with his home program and follow up in November or sooner if needed  to reassess his volumes and to see if he is ready to be discharged to the restorative phase of care or need to extend treatments. Patient is aware to contact the clinic if he has any questions prior to returning follow up. PLAN OF CARE:   Continue with plan of care as established on evaluation. Changes to the plan of care are as follows: Patient will need extended plan of care to continue to work towards goal of stable volumes in B LEs. Current plan of care will end on 9/5/2020 and patient does not plan to return to the clinic until November    Frequency: Follow up in November or sooner if needed. Next session will address: Assess volumes and prepare for discharge or extend treatments to be able to order custom flat knit garments. Other: Vendor: [employer] for insurance covered products  [employer] for self pay items.        TOTAL TREATMENT 75 mins     Meet ARROYO Gamaliel, PTA, CLT  Morelia Dodd, PT, CLT    TREATMENT PLAN EFFECTIVE DATES:   8/19/2020 TO 11/14/2020  I have read the above plan of care for Anthony Craig. I certify the above prescribed services are required by this patient and are medically necessary.   The above plan of care has been developed in conjunction with the lymphedema/physical therapist.   Physician Signature: ____________________________________________________      Dr. Jerry Wilson   Date: _____________

## 2020-09-04 PROCEDURE — 96366 THER/PROPH/DIAG IV INF ADDON: CPT

## 2020-09-04 PROCEDURE — 99283 EMERGENCY DEPT VISIT LOW MDM: CPT

## 2020-09-04 PROCEDURE — 96365 THER/PROPH/DIAG IV INF INIT: CPT

## 2020-09-05 ENCOUNTER — HOSPITAL ENCOUNTER (EMERGENCY)
Age: 50
Discharge: HOME | End: 2020-09-05
Attending: EMERGENCY MEDICINE | Admitting: EMERGENCY MEDICINE
Payer: MEDICAID

## 2020-09-05 VITALS
TEMPERATURE: 98.9 F | OXYGEN SATURATION: 99 % | HEIGHT: 76 IN | HEART RATE: 78 BPM | WEIGHT: 315 LBS | RESPIRATION RATE: 19 BRPM | SYSTOLIC BLOOD PRESSURE: 152 MMHG | DIASTOLIC BLOOD PRESSURE: 88 MMHG | BODY MASS INDEX: 38.36 KG/M2

## 2020-09-05 DIAGNOSIS — R50.9 FEVER, UNSPECIFIED FEVER CAUSE: ICD-10-CM

## 2020-09-05 DIAGNOSIS — Z99.2 DIALYSIS PATIENT (HCC): Primary | ICD-10-CM

## 2020-09-05 LAB
ALBUMIN SERPL-MCNC: 2.9 G/DL (ref 3.5–5)
ALBUMIN/GLOB SERPL: 0.5 {RATIO} (ref 1.1–2.2)
ALP SERPL-CCNC: 46 U/L (ref 45–117)
ALT SERPL-CCNC: 21 U/L (ref 12–78)
ANION GAP SERPL CALC-SCNC: 7 MMOL/L (ref 5–15)
AST SERPL-CCNC: 34 U/L (ref 15–37)
BASOPHILS # BLD: 0.1 K/UL (ref 0–0.1)
BASOPHILS NFR BLD: 1 % (ref 0–1)
BILIRUB SERPL-MCNC: 0.4 MG/DL (ref 0.2–1)
BUN SERPL-MCNC: 31 MG/DL (ref 6–20)
BUN/CREAT SERPL: 4 (ref 12–20)
CALCIUM SERPL-MCNC: 8.7 MG/DL (ref 8.5–10.1)
CHLORIDE SERPL-SCNC: 103 MMOL/L (ref 97–108)
CO2 SERPL-SCNC: 26 MMOL/L (ref 21–32)
COMMENT, HOLDF: NORMAL
CREAT SERPL-MCNC: 7.58 MG/DL (ref 0.7–1.3)
DIFFERENTIAL METHOD BLD: ABNORMAL
EOSINOPHIL # BLD: 0.2 K/UL (ref 0–0.4)
EOSINOPHIL NFR BLD: 3 % (ref 0–7)
ERYTHROCYTE [DISTWIDTH] IN BLOOD BY AUTOMATED COUNT: 16 % (ref 11.5–14.5)
GLOBULIN SER CALC-MCNC: 6.2 G/DL (ref 2–4)
GLUCOSE SERPL-MCNC: 103 MG/DL (ref 65–100)
HCT VFR BLD AUTO: 34.3 % (ref 36.6–50.3)
HGB BLD-MCNC: 10.7 G/DL (ref 12.1–17)
IMM GRANULOCYTES # BLD AUTO: 0 K/UL
IMM GRANULOCYTES NFR BLD AUTO: 0 %
LYMPHOCYTES # BLD: 1.2 K/UL (ref 0.8–3.5)
LYMPHOCYTES NFR BLD: 17 % (ref 12–49)
MCH RBC QN AUTO: 29.1 PG (ref 26–34)
MCHC RBC AUTO-ENTMCNC: 31.2 G/DL (ref 30–36.5)
MCV RBC AUTO: 93.2 FL (ref 80–99)
MONOCYTES # BLD: 0.8 K/UL (ref 0–1)
MONOCYTES NFR BLD: 12 % (ref 5–13)
MYELOCYTES NFR BLD MANUAL: 2 %
NEUTS BAND NFR BLD MANUAL: 1 % (ref 0–6)
NEUTS SEG # BLD: 4.4 K/UL (ref 1.8–8)
NEUTS SEG NFR BLD: 64 % (ref 32–75)
NRBC # BLD: 0.05 K/UL (ref 0–0.01)
NRBC BLD-RTO: 0.7 PER 100 WBC
PLATELET # BLD AUTO: 221 K/UL (ref 150–400)
PMV BLD AUTO: 10.9 FL (ref 8.9–12.9)
POTASSIUM SERPL-SCNC: 4.7 MMOL/L (ref 3.5–5.1)
PROT SERPL-MCNC: 9.1 G/DL (ref 6.4–8.2)
RBC # BLD AUTO: 3.68 M/UL (ref 4.1–5.7)
RBC MORPH BLD: ABNORMAL
SAMPLES BEING HELD,HOLD: NORMAL
SODIUM SERPL-SCNC: 136 MMOL/L (ref 136–145)
WBC # BLD AUTO: 6.8 K/UL (ref 4.1–11.1)

## 2020-09-05 PROCEDURE — 80053 COMPREHEN METABOLIC PANEL: CPT

## 2020-09-05 PROCEDURE — 36415 COLL VENOUS BLD VENIPUNCTURE: CPT

## 2020-09-05 PROCEDURE — 74011250636 HC RX REV CODE- 250/636: Performed by: EMERGENCY MEDICINE

## 2020-09-05 PROCEDURE — 85025 COMPLETE CBC W/AUTO DIFF WBC: CPT

## 2020-09-05 RX ORDER — VANCOMYCIN 2 GRAM/500 ML IN 0.9 % SODIUM CHLORIDE INTRAVENOUS
2000 ONCE
Status: COMPLETED | OUTPATIENT
Start: 2020-09-05 | End: 2020-09-05

## 2020-09-05 RX ADMIN — VANCOMYCIN HYDROCHLORIDE 2000 MG: 10 INJECTION, POWDER, LYOPHILIZED, FOR SOLUTION INTRAVENOUS at 03:39

## 2020-09-05 NOTE — ED PROVIDER NOTES
Date of Service:  9/4/2020    Patient:  Nafisa Kiran    Chief Complaint:  Fever       HPI:  Nafisa Kiran is a 54 y.o.  male who presents for evaluation of fever. Was at [facility] last week following dialysis and chills. He was found to have a port infection. The catheter was removed, placed on vancomycin, had a new catheter placed and went to dialysis 3 days ago but did not get vancomycin afterwards. The same thing happened today. When he went home, he took his temperature and it read 100F and at the time you felt tired. No cp/sob/abdominal. Voiding well. Past Medical History:   Diagnosis Date    CAD (coronary artery disease)     MI 09    Hypertension     Other ill-defined conditions(859.89)        Past Surgical History:   Procedure Laterality Date    HX GYN      left hand, right shoulder, left ankle          History reviewed. No pertinent family history.     Social History     Socioeconomic History    Marital status: SINGLE     Spouse name: Not on file    Number of children: Not on file    Years of education: Not on file    Highest education level: Not on file   Occupational History    Not on file   Social Needs    Financial resource strain: Not on file    Food insecurity     Worry: Not on file     Inability: Not on file    Transportation needs     Medical: Not on file     Non-medical: Not on file   Tobacco Use    Smoking status: Never Smoker    Smokeless tobacco: Never Used   Substance and Sexual Activity    Alcohol use: Not Currently     Comment: pt states occassionally    Drug use: No    Sexual activity: Not on file   Lifestyle    Physical activity     Days per week: Not on file     Minutes per session: Not on file    Stress: Not on file   Relationships    Social connections     Talks on phone: Not on file     Gets together: Not on file     Attends [employer] service: Not on file     Active member of club or organization: Not on file     Attends meetings of clubs or organizations: Not on file     Relationship status: Not on file    Intimate partner violence     Fear of current or ex partner: Not on file     Emotionally abused: Not on file     Physically abused: Not on file     Forced sexual activity: Not on file   Other Topics Concern    Not on file   Social History Narrative    Not on file         ALLERGIES: Lisinopril and Sulfa (sulfonamide antibiotics)    Review of Systems   Constitutional: Positive for fatigue. Negative for fever. HENT: Negative for hearing loss. Eyes: Negative for visual disturbance. Respiratory: Negative for shortness of breath. Cardiovascular: Negative for chest pain. Gastrointestinal: Negative for abdominal pain. Genitourinary: Negative for flank pain. Musculoskeletal: Negative for back pain. Skin: Negative for rash. Neurological: Negative for dizziness and light-headedness. Psychiatric/Behavioral: Negative for confusion. Vitals:    09/04/20 2209   BP: (!) 208/124   Pulse: (!) 101   Resp: 16   Temp: 99 °F (37.2 °C)   SpO2: 99%            Physical Exam  Vitals signs and nursing note reviewed. Constitutional:       Appearance: Normal appearance. HENT:      Head: Normocephalic and atraumatic. Nose: Nose normal.      Mouth/Throat:      Mouth: Mucous membranes are moist.   Eyes:      General: No scleral icterus. Conjunctiva/sclera: Conjunctivae normal.   Cardiovascular:      Rate and Rhythm: Normal rate. Heart sounds: No murmur. Pulmonary:      Effort: Pulmonary effort is normal. No respiratory distress. Abdominal:      General: Abdomen is flat. There is no distension. Musculoskeletal: Normal range of motion. Skin:     General: Skin is warm. Capillary Refill: Capillary refill takes less than 2 seconds. Neurological:      Mental Status: He is alert and oriented to person, place, and time.    Psychiatric:         Mood and Affect: Mood normal.          MDM  Number of Diagnoses or Management Options  Dialysis patient [facility]):   Fever, unspecified fever cause:        VITAL SIGNS:  Patient Vitals for the past 4 hrs:   BP SpO2   09/05/20 0407 -- 99 %   09/05/20 0200 (!) 165/96 --         LABS:  Recent Results (from the past 6 hour(s))   SAMPLES BEING HELD    Collection Time: 09/05/20 12:12 AM   Result Value Ref Range    SAMPLES BEING HELD 1RED,1BLU,DAVID AND PURPLE BC     COMMENT        Add-on orders for these samples will be processed based on acceptable specimen integrity and analyte stability, which may vary by analyte. CBC WITH AUTOMATED DIFF    Collection Time: 09/05/20 12:12 AM   Result Value Ref Range    WBC 6.8 4.1 - 11.1 K/uL    RBC 3.68 (L) 4.10 - 5.70 M/uL    HGB 10.7 (L) 12.1 - 17.0 g/dL    HCT 34.3 (L) 36.6 - 50.3 %    MCV 93.2 80.0 - 99.0 FL    MCH 29.1 26.0 - 34.0 PG    MCHC 31.2 30.0 - 36.5 g/dL    RDW 16.0 (H) 11.5 - 14.5 %    PLATELET 902 272 - 091 K/uL    MPV 10.9 8.9 - 12.9 FL    NRBC 0.7 (H) 0  WBC    ABSOLUTE NRBC 0.05 (H) 0.00 - 0.01 K/uL    NEUTROPHILS 64 32 - 75 %    BAND NEUTROPHILS 1 0 - 6 %    LYMPHOCYTES 17 12 - 49 %    MONOCYTES 12 5 - 13 %    EOSINOPHILS 3 0 - 7 %    BASOPHILS 1 0 - 1 %    MYELOCYTES 2 (H) 0 %    IMMATURE GRANULOCYTES 0 %    ABS. NEUTROPHILS 4.4 1.8 - 8.0 K/UL    ABS. LYMPHOCYTES 1.2 0.8 - 3.5 K/UL    ABS. MONOCYTES 0.8 0.0 - 1.0 K/UL    ABS. EOSINOPHILS 0.2 0.0 - 0.4 K/UL    ABS. BASOPHILS 0.1 0.0 - 0.1 K/UL    ABS. IMM.  GRANS. 0.0 K/UL    DF MANUAL      RBC COMMENTS ANISOCYTOSIS  1+        RBC COMMENTS POLYCHROMASIA  1+        RBC COMMENTS OVALOCYTES  PRESENT        RBC COMMENTS TEARDROP CELLS  1+       METABOLIC PANEL, COMPREHENSIVE    Collection Time: 09/05/20 12:12 AM   Result Value Ref Range    Sodium 136 136 - 145 mmol/L    Potassium 4.7 3.5 - 5.1 mmol/L    Chloride 103 97 - 108 mmol/L    CO2 26 21 - 32 mmol/L    Anion gap 7 5 - 15 mmol/L    Glucose 103 (H) 65 - 100 mg/dL    BUN 31 (H) 6 - 20 MG/DL    Creatinine 7.58 (H) 0.70 - 1.30 MG/DL    BUN/Creatinine ratio 4 (L) 12 - 20      GFR est AA 9 (L) >60 ml/min/1.73m2    GFR est non-AA 8 (L) >60 ml/min/1.73m2    Calcium 8.7 8.5 - 10.1 MG/DL    Bilirubin, total 0.4 0.2 - 1.0 MG/DL    ALT (SGPT) 21 12 - 78 U/L    AST (SGOT) 34 15 - 37 U/L    Alk. phosphatase 46 45 - 117 U/L    Protein, total 9.1 (H) 6.4 - 8.2 g/dL    Albumin 2.9 (L) 3.5 - 5.0 g/dL    Globulin 6.2 (H) 2.0 - 4.0 g/dL    A-G Ratio 0.5 (L) 1.1 - 2.2          IMAGING:  No orders to display         Medications During Visit:  Medications   vancomycin (VANCOCIN) 2000 mg in  ml infusion (2,000 mg IntraVENous New Bag 9/5/20 0339)         DECISION MAKING:  Vanessa Coleman is a 54 y.o. male who comes in as above. Ear, patient has a reported fever but does not have one here despite not taking Tylenol Motrin. His main concern is the missed doses of vancomycin over the last week. At this time vancomycin was ordered provided and the patient is to be discharged home. He shows no signs of systemic infection. Patient states that he will be calling his provider first thing Tuesday morning for orders for vancomycin to be given as directed previously. Patient feels comfortable with being discharged home. All questions answered. IMPRESSION:  1. Dialysis patient ([address]    2. Fever, unspecified fever cause        DISPOSITION:  Discharged      Current Discharge Medication List           Follow-up Information     Follow up With Specialties Details Why Contact Info    Maria Luz Triplett MD Internal Medicine Schedule an appointment as soon as possible for a visit   [address] [place] [address] [address] [id number] [phone]      Your Specialist  Schedule an appointment as soon as possible for a visit               The patient is asked to follow-up with their primary care provider in the next several days. They are to call tomorrow for an appointment. The patient is asked to return promptly for any increased concerns or worsening of symptoms.   They can return to this emergency department or any other emergency department.     Procedures

## 2020-09-05 NOTE — ED TRIAGE NOTES
Arrives from home, reporting fever, and generalized weakness. Pt reports having known infection in his dialysis port, was seen at [facility] and also had his port replaced. Pt reports consistent fever, 100.3.     Pt dialysis M,W,F    Hypertensive upon arrival. Denies SOB

## 2020-09-08 VITALS
TEMPERATURE: 98.6 F | OXYGEN SATURATION: 98 % | BODY MASS INDEX: 39.17 KG/M2 | SYSTOLIC BLOOD PRESSURE: 142 MMHG | WEIGHT: 315 LBS | HEART RATE: 101 BPM | HEIGHT: 75 IN | DIASTOLIC BLOOD PRESSURE: 102 MMHG

## 2020-09-08 RX ORDER — DICLOFENAC SODIUM 10 MG/G
GEL TOPICAL 4 TIMES DAILY
COMMUNITY
End: 2021-09-28

## 2020-09-08 RX ORDER — LANOLIN ALCOHOL/MO/W.PET/CERES
CREAM (GRAM) TOPICAL
COMMUNITY
End: 2020-11-05

## 2020-09-08 RX ORDER — TORSEMIDE 100 MG/1
TABLET ORAL DAILY
COMMUNITY
End: 2020-11-05

## 2020-09-08 RX ORDER — TERBINAFINE HYDROCHLORIDE 250 MG/1
250 TABLET ORAL DAILY
COMMUNITY
End: 2020-11-05

## 2020-09-08 RX ORDER — SPIRONOLACTONE 25 MG/1
TABLET ORAL DAILY
COMMUNITY
End: 2022-09-22

## 2020-09-08 RX ORDER — PREDNISONE 5 MG/1
TABLET ORAL EVERY OTHER DAY
COMMUNITY

## 2020-09-08 RX ORDER — CLONIDINE 0.2 MG/24H
1 PATCH, EXTENDED RELEASE TRANSDERMAL
COMMUNITY
End: 2020-10-14 | Stop reason: SDUPTHER

## 2020-09-08 RX ORDER — AMLODIPINE BESYLATE 10 MG/1
TABLET ORAL DAILY
COMMUNITY
End: 2020-10-14 | Stop reason: SDUPTHER

## 2020-09-08 RX ORDER — CARVEDILOL 25 MG/1
25 TABLET ORAL 2 TIMES DAILY WITH MEALS
COMMUNITY
End: 2022-09-22

## 2020-09-08 RX ORDER — GABAPENTIN 100 MG/1
CAPSULE ORAL DAILY
COMMUNITY
End: 2021-09-28

## 2020-09-08 RX ORDER — CYCLOBENZAPRINE HCL 5 MG
5 TABLET ORAL
COMMUNITY
End: 2021-09-28

## 2020-09-08 RX ORDER — CLONIDINE 0.3 MG/24H
1 PATCH, EXTENDED RELEASE TRANSDERMAL
COMMUNITY
End: 2020-10-14 | Stop reason: SDUPTHER

## 2020-09-08 RX ORDER — FUROSEMIDE 80 MG/1
TABLET ORAL DAILY
COMMUNITY
End: 2020-11-05

## 2020-09-08 RX ORDER — ALLOPURINOL 100 MG/1
100 TABLET ORAL
COMMUNITY

## 2020-10-14 ENCOUNTER — OFFICE VISIT (OUTPATIENT)
Dept: PODIATRY | Age: 50
End: 2020-10-14
Payer: MEDICAID

## 2020-10-14 VITALS
BODY MASS INDEX: 39.17 KG/M2 | TEMPERATURE: 97.5 F | WEIGHT: 315 LBS | SYSTOLIC BLOOD PRESSURE: 153 MMHG | DIASTOLIC BLOOD PRESSURE: 83 MMHG | HEIGHT: 75 IN | OXYGEN SATURATION: 98 % | HEART RATE: 83 BPM

## 2020-10-14 DIAGNOSIS — B35.1 ONYCHOMYCOSIS: Primary | ICD-10-CM

## 2020-10-14 PROCEDURE — 99213 OFFICE O/P EST LOW 20 MIN: CPT | Performed by: PODIATRIST

## 2020-10-14 RX ORDER — CICLOPIROX 80 MG/ML
1 SOLUTION TOPICAL
Qty: 6.6 ML | Refills: 3 | Status: SHIPPED | OUTPATIENT
Start: 2020-10-14 | End: 2020-11-05

## 2020-10-21 NOTE — PROGRESS NOTES
[facility]    Subjective:         Patient is a 54 y.o. male who is being seen as a returning patient with continued pain to both of his big toes. Patient states he was previously diagnosed with onychomycosis and has been utilizing topical antifungal nail lacquer as instructed. States the nails continue to be thick/discolored and he also states he needs a prescription refill for the topical antifungal lacquer. Denies any recent trauma. Denies any systemic signs of infection. He states the pedal pain rises to the level of 5 out of 10. He denies any other lower extremity complaints    Past Medical History:   Diagnosis Date    CAD (coronary artery disease)     MI 09    [facility]     Hypertension     Other ill-defined conditions(799.89)     St[facility] [address]      Past Surgical History:   Procedure Laterality Date    CARDIAC SURG PROCEDURE UNLIST      HX GYN      left hand, right shoulder, left ankle        No family history on file. Social History     Tobacco Use    Smoking status: Current Every Day Smoker    Smokeless tobacco: Never Used   Substance Use Topics    Alcohol use: Not Currently     Comment: unknown      Allergies   Allergen Reactions    Lisinopril Swelling    Sulfa (Sulfonamide Antibiotics) Other (comments)     Had sores all over body    Sulfa (Sulfonamide Antibiotics) Hives     Prior to Admission medications    Medication Sig Start Date End Date Taking? Authorizing Provider   ciclopirox (PENLAC) 8 % solution Apply 1 mL to affected area nightly for 90 days. 10/14/20 1/12/21 Yes Natalia Tadeo DPM   allopurinoL (ZYLOPRIM) 100 mg tablet Take  by mouth daily. Yes Provider, Historical   carvediloL (Coreg) 25 mg tablet Take 25 mg by mouth two (2) times daily (with meals). Yes Provider, Historical   cyclobenzaprine (FLEXERIL) 5 mg tablet Take 5 mg by mouth.    Yes Provider, Historical   cyanocobalamin/salcaprozat sod (ELIGEN B12 PO) Take by mouth. Yes Provider, Historical   ERGOCALCIFEROL, VITAMIN D2, PO Take  by mouth. Yes Provider, Historical   ferrous sulfate 325 mg (65 mg iron) tablet Take  by mouth Daily (before breakfast). Yes Provider, Historical   furosemide (LASIX) 80 mg tablet Take  by mouth daily. Yes Provider, Historical   gabapentin (NEURONTIN) 100 mg capsule Take  by mouth three (3) times daily. Yes Provider, Historical   spironolactone (ALDACTONE) 25 mg tablet Take  by mouth daily. Yes Provider, Historical   torsemide (DEMADEX) 100 mg tablet Take  by mouth daily. Yes Provider, Historical   diclofenac (Voltaren) 1 % gel Apply  to affected area four (4) times daily. Yes Provider, Historical   apixaban (Eliquis) 5 mg tablet Take 5 mg by mouth two (2) times a day. Started 30 days ago. Yes Other, MD Lea   amLODIPine (NORVASC) 10 mg tablet Take 1 Tab by mouth daily. 11/16/11  Yes Elvi Monet MD   aspirin 81 mg chewable tablet Take 1 Tab by mouth daily. 11/16/11  Yes Elvi Monet MD   hydrochlorothiazide (HYDRODIURIL) 25 mg tablet Take 25 mg by mouth daily. Yes Other, MD Lea   clonidine (CATAPRESS) 0.2 mg tablet Take 1 Tab by mouth two (2) times a day. 6/14/11  Yes Napoleon Burciaga MD   predniSONE (DELTASONE) 5 mg tablet Take  by mouth. Provider, Historical   terbinafine HCL (LAMISIL) 250 mg tablet Take 250 mg by mouth daily. Provider, Historical       Review of Systems   Constitutional: Negative. Eyes: Negative. Respiratory: Negative. Cardiovascular: Positive for leg swelling. Endocrine: Negative. Genitourinary: Negative. Musculoskeletal: Positive for myalgias. Allergic/Immunologic: Positive for immunocompromised state. Neurological: Positive for numbness. Hematological: Bruises/bleeds easily. Psychiatric/Behavioral: Negative. All other systems reviewed and are negative.       Objective:     Visit Vitals  BP (!) 153/83 (BP 1 Location: Left arm, BP Patient Position: Sitting) Pulse 83   Temp 97.5 °F (36.4 °C) (Temporal)   Ht 6' 3\" (1.905 m)   Wt 347 lb 9.6 oz (157.7 kg)   SpO2 98%   BMI 43.45 kg/m²       Physical Exam  Vitals signs reviewed. Constitutional:       Appearance: He is morbidly obese. Cardiovascular:      Pulses:           Dorsalis pedis pulses are 2+ on the right side and 2+ on the left side. Posterior tibial pulses are 2+ on the right side and 2+ on the left side. Pulmonary:      Effort: Pulmonary effort is normal.   Musculoskeletal:      Right lower leg: Edema present. Left lower leg: Edema present. Right foot: Decreased range of motion. No deformity or bunion. Left foot: Decreased range of motion. No deformity or bunion. Feet:      Right foot:      Protective Sensation: 10 sites tested. 0 sites sensed. Skin integrity: Skin integrity normal.      Toenail Condition: Right toenails are abnormally thick. Fungal disease present. Left foot:      Protective Sensation: 10 sites tested. 0 sites sensed. Skin integrity: Skin integrity normal.      Toenail Condition: Left toenails are abnormally thick. Fungal disease present. Lymphadenopathy:      Lower Body: No right inguinal adenopathy. No left inguinal adenopathy. Skin:     General: Skin is warm. Capillary Refill: Capillary refill takes 2 to 3 seconds. Neurological:      Mental Status: He is alert and oriented to person, place, and time. Psychiatric:         Mood and Affect: Mood and affect normal.         Behavior: Behavior is cooperative. Data Review: No results found for this or any previous visit (from the past 24 hour(s)). Impression:       ICD-10-CM ICD-9-CM    1. Onychomycosis  B35.1 110.1          Recommendation:     Patient seen and evaluated in the office  Discussed and educated patient regarding his current medical condition  Gave a refill prescription for the Penlac 8% topical antifungal lacquer.   Patient to apply as directed to all affected toenails

## 2020-11-03 ENCOUNTER — TRANSCRIBE ORDER (OUTPATIENT)
Dept: REGISTRATION | Age: 50
End: 2020-11-03

## 2020-11-03 DIAGNOSIS — Z01.812 PRE-PROCEDURE LAB EXAM: Primary | ICD-10-CM

## 2020-11-04 ENCOUNTER — APPOINTMENT (OUTPATIENT)
Dept: PHYSICAL THERAPY | Age: 50
End: 2020-11-04
Payer: MEDICAID

## 2020-11-05 ENCOUNTER — HOSPITAL ENCOUNTER (OUTPATIENT)
Dept: PREADMISSION TESTING | Age: 50
Discharge: HOME | End: 2020-11-05
Payer: MEDICAID

## 2020-11-05 ENCOUNTER — HOSPITAL ENCOUNTER (OUTPATIENT)
Dept: GENERAL RADIOLOGY | Age: 50
Discharge: HOME | End: 2020-11-05
Payer: MEDICAID

## 2020-11-05 VITALS
SYSTOLIC BLOOD PRESSURE: 125 MMHG | BODY MASS INDEX: 39.17 KG/M2 | DIASTOLIC BLOOD PRESSURE: 90 MMHG | TEMPERATURE: 97.8 F | HEART RATE: 89 BPM | WEIGHT: 315 LBS | HEIGHT: 75 IN

## 2020-11-05 LAB
ALBUMIN SERPL-MCNC: 3.3 G/DL (ref 3.5–5)
ALBUMIN/GLOB SERPL: 0.7 {RATIO} (ref 1.1–2.2)
ALP SERPL-CCNC: 40 U/L (ref 45–117)
ALT SERPL-CCNC: 31 U/L (ref 12–78)
ANION GAP SERPL CALC-SCNC: 5 MMOL/L (ref 5–15)
APTT PPP: 30.2 SEC (ref 22.1–32)
AST SERPL-CCNC: 23 U/L (ref 15–37)
BASOPHILS # BLD: 0.1 K/UL (ref 0–0.1)
BASOPHILS NFR BLD: 1 % (ref 0–1)
BILIRUB SERPL-MCNC: 0.3 MG/DL (ref 0.2–1)
BUN SERPL-MCNC: 40 MG/DL (ref 6–20)
BUN/CREAT SERPL: 4 (ref 12–20)
CALCIUM SERPL-MCNC: 8.6 MG/DL (ref 8.5–10.1)
CHLORIDE SERPL-SCNC: 105 MMOL/L (ref 97–108)
CO2 SERPL-SCNC: 29 MMOL/L (ref 21–32)
CREAT SERPL-MCNC: 9.09 MG/DL (ref 0.7–1.3)
DIFFERENTIAL METHOD BLD: ABNORMAL
EOSINOPHIL # BLD: 0.3 K/UL (ref 0–0.4)
EOSINOPHIL NFR BLD: 5 % (ref 0–7)
ERYTHROCYTE [DISTWIDTH] IN BLOOD BY AUTOMATED COUNT: 16 % (ref 11.5–14.5)
GLOBULIN SER CALC-MCNC: 4.5 G/DL (ref 2–4)
GLUCOSE SERPL-MCNC: 107 MG/DL (ref 65–100)
HCT VFR BLD AUTO: 38.4 % (ref 36.6–50.3)
HGB BLD-MCNC: 11.6 G/DL (ref 12.1–17)
IMM GRANULOCYTES # BLD AUTO: 0 K/UL (ref 0–0.04)
IMM GRANULOCYTES NFR BLD AUTO: 1 % (ref 0–0.5)
INR PPP: 1 (ref 0.9–1.1)
LYMPHOCYTES # BLD: 0.9 K/UL (ref 0.8–3.5)
LYMPHOCYTES NFR BLD: 17 % (ref 12–49)
MCH RBC QN AUTO: 29.6 PG (ref 26–34)
MCHC RBC AUTO-ENTMCNC: 30.2 G/DL (ref 30–36.5)
MCV RBC AUTO: 98 FL (ref 80–99)
MONOCYTES # BLD: 0.6 K/UL (ref 0–1)
MONOCYTES NFR BLD: 11 % (ref 5–13)
NEUTS SEG # BLD: 3.2 K/UL (ref 1.8–8)
NEUTS SEG NFR BLD: 65 % (ref 32–75)
NRBC # BLD: 0 K/UL (ref 0–0.01)
NRBC BLD-RTO: 0 PER 100 WBC
PLATELET # BLD AUTO: 141 K/UL (ref 150–400)
PMV BLD AUTO: 11.4 FL (ref 8.9–12.9)
POTASSIUM SERPL-SCNC: 4 MMOL/L (ref 3.5–5.1)
PROT SERPL-MCNC: 7.8 G/DL (ref 6.4–8.2)
PROTHROMBIN TIME: 10.9 SEC (ref 9–11.1)
RBC # BLD AUTO: 3.92 M/UL (ref 4.1–5.7)
SODIUM SERPL-SCNC: 139 MMOL/L (ref 136–145)
THERAPEUTIC RANGE,PTTT: NORMAL SECS (ref 58–77)
WBC # BLD AUTO: 4.9 K/UL (ref 4.1–11.1)

## 2020-11-05 PROCEDURE — 85025 COMPLETE CBC W/AUTO DIFF WBC: CPT

## 2020-11-05 PROCEDURE — 71046 X-RAY EXAM CHEST 2 VIEWS: CPT

## 2020-11-05 PROCEDURE — 85610 PROTHROMBIN TIME: CPT

## 2020-11-05 PROCEDURE — 93005 ELECTROCARDIOGRAM TRACING: CPT

## 2020-11-05 PROCEDURE — 80053 COMPREHEN METABOLIC PANEL: CPT

## 2020-11-05 PROCEDURE — 85730 THROMBOPLASTIN TIME PARTIAL: CPT

## 2020-11-05 PROCEDURE — 36415 COLL VENOUS BLD VENIPUNCTURE: CPT

## 2020-11-05 RX ORDER — CLONIDINE 0.1 MG/24H
1 PATCH, EXTENDED RELEASE TRANSDERMAL
COMMUNITY
End: 2022-09-22

## 2020-11-05 NOTE — PERIOP NOTES
PATIENT GIVEN WRITTEN INSTRUCTIONS TO GO TO THE IMAGING CENTER/CANCER CENTER [address] [facility] [place] [address] TO HAVE CHEST XRAY COMPLETED. PATIENT MADE AWARE OF NEED FOR COVID-19 TESTING WITHIN 96 HOURS OF SURGERY. PATIENT INSTRUCTED TO EXPECT A CALL TO SCHEDULE APPT FOR TEST. PATIENT INSTRUCTED TO SELF QUARANTINE BETWEEN TESTING AND ARRIVAL TIME DAY OF SURGERY. Patient verbalizes understanding of preoperative instructions:  Given skin prep chlorhexidine wipes-given written and verbal instructions on use. Pre-Operative Instructions    DO NOT EAT OR DRINK ANYTHING AFTER MIDNIGHT THE NIGHT BEFORE SURGERY. Patient given surgical site infection FAQs handout and hand hygiene tips sheet. Pre-operative instructions reviewed and patient verbalizes understanding of instructions. Patient has been given the opportunity to ask additional questions.     OR NOTIFIED OF WEIGHT 348LBS

## 2020-11-06 NOTE — PERIOP NOTES
11/6/20 @ 6225 - LEFT MESSAGE FOR CHRISTIANO CAREY NURSE FOR DR. CARVER TO RETURN CALL REGARDING TO PT'S ABNORMAL LABS. LAB FAXED TO PT'S PCP.

## 2020-11-08 ENCOUNTER — HOSPITAL ENCOUNTER (OUTPATIENT)
Dept: PREADMISSION TESTING | Age: 50
Discharge: HOME | End: 2020-11-08
Payer: MEDICAID

## 2020-11-08 DIAGNOSIS — Z01.812 PRE-PROCEDURE LAB EXAM: ICD-10-CM

## 2020-11-08 LAB
ATRIAL RATE: 73 BPM
CALCULATED R AXIS, ECG10: 61 DEGREES
CALCULATED T AXIS, ECG11: -141 DEGREES
DIAGNOSIS, 93000: NORMAL
Q-T INTERVAL, ECG07: 438 MS
QRS DURATION, ECG06: 76 MS
QTC CALCULATION (BEZET), ECG08: 492 MS
VENTRICULAR RATE, ECG03: 76 BPM

## 2020-11-08 PROCEDURE — 87635 SARS-COV-2 COVID-19 AMP PRB: CPT

## 2020-11-09 LAB — SARS-COV-2, COV2NT: NOT DETECTED

## 2020-11-11 ENCOUNTER — ANESTHESIA EVENT (OUTPATIENT)
Dept: SURGERY | Age: 50
End: 2020-11-11
Payer: MEDICAID

## 2020-11-12 ENCOUNTER — HOSPITAL ENCOUNTER (OUTPATIENT)
Age: 50
Setting detail: OUTPATIENT SURGERY
Discharge: HOME | End: 2020-11-12
Payer: MEDICAID

## 2020-11-12 ENCOUNTER — ANESTHESIA (OUTPATIENT)
Dept: SURGERY | Age: 50
End: 2020-11-12
Payer: MEDICAID

## 2020-11-12 VITALS
TEMPERATURE: 97.6 F | WEIGHT: 315 LBS | BODY MASS INDEX: 39.17 KG/M2 | HEIGHT: 75 IN | RESPIRATION RATE: 18 BRPM | HEART RATE: 64 BPM | DIASTOLIC BLOOD PRESSURE: 79 MMHG | SYSTOLIC BLOOD PRESSURE: 115 MMHG | OXYGEN SATURATION: 96 %

## 2020-11-12 DIAGNOSIS — Z99.2 ESRD (END STAGE RENAL DISEASE) ON DIALYSIS (HCC): Primary | ICD-10-CM

## 2020-11-12 DIAGNOSIS — N18.6 ESRD (END STAGE RENAL DISEASE) ON DIALYSIS (HCC): Primary | ICD-10-CM

## 2020-11-12 PROCEDURE — 76210000016 HC OR PH I REC 1 TO 1.5 HR

## 2020-11-12 PROCEDURE — 2709999900 HC NON-CHARGEABLE SUPPLY

## 2020-11-12 PROCEDURE — 77030002986 HC SUT PROL J&J -A

## 2020-11-12 PROCEDURE — 74011250636 HC RX REV CODE- 250/636: Performed by: ANESTHESIOLOGY

## 2020-11-12 PROCEDURE — 77030010507 HC ADH SKN DERMBND J&J -B

## 2020-11-12 PROCEDURE — 74011250636 HC RX REV CODE- 250/636

## 2020-11-12 PROCEDURE — 76010000149 HC OR TIME 1 TO 1.5 HR

## 2020-11-12 PROCEDURE — 77030008684 HC TU ET CUF COVD -B: Performed by: ANESTHESIOLOGY

## 2020-11-12 PROCEDURE — 77030026438 HC STYL ET INTUB CARD -A: Performed by: ANESTHESIOLOGY

## 2020-11-12 PROCEDURE — 77030013079 HC BLNKT BAIR HGGR 3M -A: Performed by: ANESTHESIOLOGY

## 2020-11-12 PROCEDURE — 74011000250 HC RX REV CODE- 250

## 2020-11-12 PROCEDURE — 74011250636 HC RX REV CODE- 250/636: Performed by: NURSE ANESTHETIST, CERTIFIED REGISTERED

## 2020-11-12 PROCEDURE — 77030031139 HC SUT VCRL2 J&J -A

## 2020-11-12 PROCEDURE — 77030040361 HC SLV COMPR DVT MDII -B

## 2020-11-12 PROCEDURE — 77030004495 HC ADPT PERI DLYS BAXT -C

## 2020-11-12 PROCEDURE — 74011250637 HC RX REV CODE- 250/637: Performed by: ANESTHESIOLOGY

## 2020-11-12 PROCEDURE — 77030040922 HC BLNKT HYPOTHRM STRY -A

## 2020-11-12 PROCEDURE — 74011000250 HC RX REV CODE- 250: Performed by: NURSE ANESTHETIST, CERTIFIED REGISTERED

## 2020-11-12 PROCEDURE — 76210000021 HC REC RM PH II 0.5 TO 1 HR

## 2020-11-12 PROCEDURE — 76060000033 HC ANESTHESIA 1 TO 1.5 HR

## 2020-11-12 PROCEDURE — 77030003578 HC NDL INSUF VERES AMR -B

## 2020-11-12 PROCEDURE — C1750 CATH, HEMODIALYSIS,LONG-TERM: HCPCS

## 2020-11-12 RX ORDER — PHENYLEPHRINE HCL IN 0.9% NACL 0.4MG/10ML
SYRINGE (ML) INTRAVENOUS AS NEEDED
Status: DISCONTINUED | OUTPATIENT
Start: 2020-11-12 | End: 2020-11-12 | Stop reason: HOSPADM

## 2020-11-12 RX ORDER — HYDROCODONE BITARTRATE AND ACETAMINOPHEN 7.5; 325 MG/1; MG/1
1 TABLET ORAL
Qty: 30 TAB | Refills: 0 | Status: SHIPPED | OUTPATIENT
Start: 2020-11-12 | End: 2020-11-17

## 2020-11-12 RX ORDER — LIDOCAINE HYDROCHLORIDE 10 MG/ML
0.5 INJECTION, SOLUTION EPIDURAL; INFILTRATION; INTRACAUDAL; PERINEURAL AS NEEDED
Status: DISCONTINUED | OUTPATIENT
Start: 2020-11-12 | End: 2020-11-12 | Stop reason: HOSPADM

## 2020-11-12 RX ORDER — DEXTROSE, SODIUM CHLORIDE, SODIUM LACTATE, POTASSIUM CHLORIDE, AND CALCIUM CHLORIDE 5; .6; .31; .03; .02 G/100ML; G/100ML; G/100ML; G/100ML; G/100ML
125 INJECTION, SOLUTION INTRAVENOUS CONTINUOUS
Status: DISCONTINUED | OUTPATIENT
Start: 2020-11-12 | End: 2020-11-12 | Stop reason: HOSPADM

## 2020-11-12 RX ORDER — MIDAZOLAM HYDROCHLORIDE 1 MG/ML
1 INJECTION, SOLUTION INTRAMUSCULAR; INTRAVENOUS AS NEEDED
Status: DISCONTINUED | OUTPATIENT
Start: 2020-11-12 | End: 2020-11-12 | Stop reason: HOSPADM

## 2020-11-12 RX ORDER — LIDOCAINE HYDROCHLORIDE 20 MG/ML
INJECTION, SOLUTION EPIDURAL; INFILTRATION; INTRACAUDAL; PERINEURAL AS NEEDED
Status: DISCONTINUED | OUTPATIENT
Start: 2020-11-12 | End: 2020-11-12 | Stop reason: HOSPADM

## 2020-11-12 RX ORDER — SODIUM CHLORIDE 0.9 % (FLUSH) 0.9 %
5-40 SYRINGE (ML) INJECTION AS NEEDED
Status: DISCONTINUED | OUTPATIENT
Start: 2020-11-12 | End: 2020-11-12 | Stop reason: HOSPADM

## 2020-11-12 RX ORDER — BUPIVACAINE HYDROCHLORIDE AND EPINEPHRINE 5; 5 MG/ML; UG/ML
INJECTION, SOLUTION EPIDURAL; INTRACAUDAL; PERINEURAL AS NEEDED
Status: DISCONTINUED | OUTPATIENT
Start: 2020-11-12 | End: 2020-11-12 | Stop reason: HOSPADM

## 2020-11-12 RX ORDER — ONDANSETRON 2 MG/ML
4 INJECTION INTRAMUSCULAR; INTRAVENOUS AS NEEDED
Status: DISCONTINUED | OUTPATIENT
Start: 2020-11-12 | End: 2020-11-12 | Stop reason: HOSPADM

## 2020-11-12 RX ORDER — FENTANYL CITRATE 50 UG/ML
INJECTION, SOLUTION INTRAMUSCULAR; INTRAVENOUS
Status: COMPLETED
Start: 2020-11-12 | End: 2020-11-12

## 2020-11-12 RX ORDER — ROCURONIUM BROMIDE 10 MG/ML
INJECTION, SOLUTION INTRAVENOUS AS NEEDED
Status: DISCONTINUED | OUTPATIENT
Start: 2020-11-12 | End: 2020-11-12 | Stop reason: HOSPADM

## 2020-11-12 RX ORDER — CEFAZOLIN SODIUM/WATER 2 G/20 ML
2 SYRINGE (ML) INTRAVENOUS
Status: COMPLETED | OUTPATIENT
Start: 2020-11-12 | End: 2020-11-12

## 2020-11-12 RX ORDER — SODIUM CHLORIDE, SODIUM LACTATE, POTASSIUM CHLORIDE, CALCIUM CHLORIDE 600; 310; 30; 20 MG/100ML; MG/100ML; MG/100ML; MG/100ML
125 INJECTION, SOLUTION INTRAVENOUS CONTINUOUS
Status: DISCONTINUED | OUTPATIENT
Start: 2020-11-12 | End: 2020-11-12 | Stop reason: HOSPADM

## 2020-11-12 RX ORDER — MIDAZOLAM HYDROCHLORIDE 1 MG/ML
1 INJECTION, SOLUTION INTRAMUSCULAR; INTRAVENOUS
Status: DISCONTINUED | OUTPATIENT
Start: 2020-11-12 | End: 2020-11-12 | Stop reason: HOSPADM

## 2020-11-12 RX ORDER — FENTANYL CITRATE 50 UG/ML
25 INJECTION, SOLUTION INTRAMUSCULAR; INTRAVENOUS
Status: COMPLETED | OUTPATIENT
Start: 2020-11-12 | End: 2020-11-12

## 2020-11-12 RX ORDER — MIDAZOLAM HYDROCHLORIDE 1 MG/ML
INJECTION, SOLUTION INTRAMUSCULAR; INTRAVENOUS AS NEEDED
Status: DISCONTINUED | OUTPATIENT
Start: 2020-11-12 | End: 2020-11-12 | Stop reason: HOSPADM

## 2020-11-12 RX ORDER — DIPHENHYDRAMINE HYDROCHLORIDE 50 MG/ML
12.5 INJECTION, SOLUTION INTRAMUSCULAR; INTRAVENOUS AS NEEDED
Status: DISCONTINUED | OUTPATIENT
Start: 2020-11-12 | End: 2020-11-12 | Stop reason: HOSPADM

## 2020-11-12 RX ORDER — GLYCOPYRROLATE 0.2 MG/ML
INJECTION INTRAMUSCULAR; INTRAVENOUS AS NEEDED
Status: DISCONTINUED | OUTPATIENT
Start: 2020-11-12 | End: 2020-11-12 | Stop reason: HOSPADM

## 2020-11-12 RX ORDER — SODIUM CHLORIDE 9 MG/ML
INJECTION, SOLUTION INTRAVENOUS
Status: DISCONTINUED | OUTPATIENT
Start: 2020-11-12 | End: 2020-11-12 | Stop reason: HOSPADM

## 2020-11-12 RX ORDER — SODIUM CHLORIDE 0.9 % (FLUSH) 0.9 %
5-40 SYRINGE (ML) INJECTION EVERY 8 HOURS
Status: DISCONTINUED | OUTPATIENT
Start: 2020-11-12 | End: 2020-11-12 | Stop reason: HOSPADM

## 2020-11-12 RX ORDER — FENTANYL CITRATE 50 UG/ML
50 INJECTION, SOLUTION INTRAMUSCULAR; INTRAVENOUS AS NEEDED
Status: DISCONTINUED | OUTPATIENT
Start: 2020-11-12 | End: 2020-11-12 | Stop reason: HOSPADM

## 2020-11-12 RX ORDER — ONDANSETRON 2 MG/ML
INJECTION INTRAMUSCULAR; INTRAVENOUS AS NEEDED
Status: DISCONTINUED | OUTPATIENT
Start: 2020-11-12 | End: 2020-11-12 | Stop reason: HOSPADM

## 2020-11-12 RX ORDER — ACETAMINOPHEN 325 MG/1
650 TABLET ORAL ONCE
Status: COMPLETED | OUTPATIENT
Start: 2020-11-12 | End: 2020-11-12

## 2020-11-12 RX ORDER — OXYCODONE HYDROCHLORIDE 5 MG/1
5 TABLET ORAL AS NEEDED
Status: DISCONTINUED | OUTPATIENT
Start: 2020-11-12 | End: 2020-11-12 | Stop reason: HOSPADM

## 2020-11-12 RX ORDER — NEOSTIGMINE METHYLSULFATE 1 MG/ML
INJECTION, SOLUTION INTRAVENOUS AS NEEDED
Status: DISCONTINUED | OUTPATIENT
Start: 2020-11-12 | End: 2020-11-12 | Stop reason: HOSPADM

## 2020-11-12 RX ORDER — SUCCINYLCHOLINE CHLORIDE 20 MG/ML
INJECTION INTRAMUSCULAR; INTRAVENOUS AS NEEDED
Status: DISCONTINUED | OUTPATIENT
Start: 2020-11-12 | End: 2020-11-12 | Stop reason: HOSPADM

## 2020-11-12 RX ORDER — PROPOFOL 10 MG/ML
INJECTION, EMULSION INTRAVENOUS AS NEEDED
Status: DISCONTINUED | OUTPATIENT
Start: 2020-11-12 | End: 2020-11-12 | Stop reason: HOSPADM

## 2020-11-12 RX ORDER — FENTANYL CITRATE 50 UG/ML
INJECTION, SOLUTION INTRAMUSCULAR; INTRAVENOUS AS NEEDED
Status: DISCONTINUED | OUTPATIENT
Start: 2020-11-12 | End: 2020-11-12 | Stop reason: HOSPADM

## 2020-11-12 RX ORDER — DEXMEDETOMIDINE HYDROCHLORIDE 100 UG/ML
INJECTION, SOLUTION INTRAVENOUS AS NEEDED
Status: DISCONTINUED | OUTPATIENT
Start: 2020-11-12 | End: 2020-11-12 | Stop reason: HOSPADM

## 2020-11-12 RX ORDER — DEXAMETHASONE SODIUM PHOSPHATE 4 MG/ML
INJECTION, SOLUTION INTRA-ARTICULAR; INTRALESIONAL; INTRAMUSCULAR; INTRAVENOUS; SOFT TISSUE AS NEEDED
Status: DISCONTINUED | OUTPATIENT
Start: 2020-11-12 | End: 2020-11-12 | Stop reason: HOSPADM

## 2020-11-12 RX ORDER — MORPHINE SULFATE 10 MG/ML
2 INJECTION, SOLUTION INTRAMUSCULAR; INTRAVENOUS
Status: DISCONTINUED | OUTPATIENT
Start: 2020-11-12 | End: 2020-11-12 | Stop reason: HOSPADM

## 2020-11-12 RX ADMIN — Medication 2 G: at 08:00

## 2020-11-12 RX ADMIN — MORPHINE SULFATE 2 MG: 10 INJECTION INTRAVENOUS at 10:00

## 2020-11-12 RX ADMIN — PROPOFOL 150 MG: 10 INJECTION, EMULSION INTRAVENOUS at 07:51

## 2020-11-12 RX ADMIN — ROCURONIUM BROMIDE 10 MG: 10 SOLUTION INTRAVENOUS at 07:51

## 2020-11-12 RX ADMIN — MORPHINE SULFATE 2 MG: 10 INJECTION INTRAVENOUS at 09:39

## 2020-11-12 RX ADMIN — Medication 3 MG: at 08:21

## 2020-11-12 RX ADMIN — MORPHINE SULFATE 2 MG: 10 INJECTION INTRAVENOUS at 09:29

## 2020-11-12 RX ADMIN — FENTANYL CITRATE 25 MCG: 50 INJECTION, SOLUTION INTRAMUSCULAR; INTRAVENOUS at 09:00

## 2020-11-12 RX ADMIN — Medication 80 MCG: at 07:51

## 2020-11-12 RX ADMIN — ACETAMINOPHEN 650 MG: 325 TABLET ORAL at 06:39

## 2020-11-12 RX ADMIN — Medication 80 MCG: at 08:09

## 2020-11-12 RX ADMIN — ROCURONIUM BROMIDE 30 MG: 10 SOLUTION INTRAVENOUS at 07:56

## 2020-11-12 RX ADMIN — Medication 80 MCG: at 08:27

## 2020-11-12 RX ADMIN — DEXMEDETOMIDINE HYDROCHLORIDE 10 MCG: 100 INJECTION, SOLUTION, CONCENTRATE INTRAVENOUS at 07:48

## 2020-11-12 RX ADMIN — MORPHINE SULFATE 2 MG: 10 INJECTION INTRAVENOUS at 09:34

## 2020-11-12 RX ADMIN — SUCCINYLCHOLINE CHLORIDE 140 MG: 20 INJECTION, SOLUTION INTRAMUSCULAR; INTRAVENOUS at 07:52

## 2020-11-12 RX ADMIN — ONDANSETRON HYDROCHLORIDE 4 MG: 2 INJECTION, SOLUTION INTRAMUSCULAR; INTRAVENOUS at 08:00

## 2020-11-12 RX ADMIN — FENTANYL CITRATE 25 MCG: 50 INJECTION, SOLUTION INTRAMUSCULAR; INTRAVENOUS at 09:15

## 2020-11-12 RX ADMIN — LIDOCAINE HYDROCHLORIDE 100 MG: 20 INJECTION, SOLUTION EPIDURAL; INFILTRATION; INTRACAUDAL; PERINEURAL at 07:51

## 2020-11-12 RX ADMIN — FENTANYL CITRATE 25 MCG: 50 INJECTION, SOLUTION INTRAMUSCULAR; INTRAVENOUS at 09:10

## 2020-11-12 RX ADMIN — Medication 80 MCG: at 08:24

## 2020-11-12 RX ADMIN — OXYCODONE 5 MG: 5 TABLET ORAL at 09:54

## 2020-11-12 RX ADMIN — FENTANYL CITRATE 50 MCG: 50 INJECTION, SOLUTION INTRAMUSCULAR; INTRAVENOUS at 07:50

## 2020-11-12 RX ADMIN — SODIUM CHLORIDE: 900 INJECTION, SOLUTION INTRAVENOUS at 07:05

## 2020-11-12 RX ADMIN — FENTANYL CITRATE 25 MCG: 50 INJECTION, SOLUTION INTRAMUSCULAR; INTRAVENOUS at 08:47

## 2020-11-12 RX ADMIN — GLYCOPYRROLATE 0.4 MG: 0.2 INJECTION, SOLUTION INTRAMUSCULAR; INTRAVENOUS at 08:21

## 2020-11-12 RX ADMIN — FENTANYL CITRATE 25 MCG: 50 INJECTION, SOLUTION INTRAMUSCULAR; INTRAVENOUS at 09:05

## 2020-11-12 RX ADMIN — MORPHINE SULFATE 2 MG: 10 INJECTION INTRAVENOUS at 09:44

## 2020-11-12 RX ADMIN — MIDAZOLAM 2 MG: 1 INJECTION INTRAMUSCULAR; INTRAVENOUS at 07:26

## 2020-11-12 RX ADMIN — DEXAMETHASONE SODIUM PHOSPHATE 4 MG: 4 INJECTION, SOLUTION INTRAMUSCULAR; INTRAVENOUS at 08:00

## 2020-11-12 NOTE — DISCHARGE INSTRUCTIONS
Patient Discharge Instructions    Bhavana Paramjit / [id number] : 1970    Admitted 2020 Discharged: 2020     Take Home Medications     . · It is important that you take the medication exactly as they are prescribed. · Keep your medication in the bottles provided by the pharmacist and keep a list of the medication names, dosages, and times to be taken in your wallet. · Do not take other medications without consulting your doctor. What to do at Home    Recommended diet: Renal Diet,     Recommended activity: Activity as tolerated,     Additional Instructions: leave dressing dry and intact, arrrange follow up with peritoneal dialysis nurse through Dr Palomo Cardona with Dr Louis Reyes in 1 week, call [phone] for appt        Information obtained by :  I understand that if any problems occur once I am at home I am to contact my physician. I understand and acknowledge receipt of the instructions indicated above. Physician's or R.N.'s Signature                                                                  Date/Time                                                                                                                                              Patient or Representative Signature                                                          Date/Time        ROXICODONE 5MG GIVEN AT 9:54 AM FOR PAIN          DISCHARGE SUMMARY from Nurse    PATIENT INSTRUCTIONS:    After general anesthesia or intravenous sedation, for 24 hours or while taking prescription Narcotics:  · Limit your activities  · Do not drive and operate hazardous machinery  · Do not make important personal or business decisions  · Do  not drink alcoholic beverages  · If you have not urinated within 8 hours after discharge, please contact your surgeon on call.     Report the following to your surgeon:  · Excessive pain, swelling, redness or odor of or around the surgical area  · Temperature over 100.5  · Nausea and vomiting lasting longer than 4 hours or if unable to take medications  · Any signs of decreased circulation or nerve impairment to extremity: change in color, persistent  numbness, tingling, coldness or increase pain  · Any question    Patient Education        Peritoneal Dialysis Catheter Care: Care Instructions  Your Care Instructions     Dialysis does the work of your kidneys when you have kidney failure. It filters wastes and removes extra fluid. It also keeps the right balance of chemicals in your blood. Peritoneal dialysis uses the lining of your belly to filter your blood. Before you start dialysis, your doctor creates a dialysis access. This is the place where the dialysis solution can flow into and out of your body. To make the access, the doctor places a soft tube in your belly. This tube is called a catheter. When you do dialysis, the solution flows into your belly and stays there for several hours. Then you remove it through the catheter. It is important to take care of the catheter and the access area to prevent infection. Follow-up care is a key part of your treatment and safety. Be sure to make and go to all appointments, and call your doctor if you are having problems. It's also a good idea to know your test results and keep a list of the medicines you take. How can you care for yourself at home? Care of the catheter and access  · After the doctor creates your access, keep the bandage dry and clean. Change a dirty or bloody bandage. · Keep your access area clean and dry. Check it every day for signs of infection. · Always clean and dry your catheter and access area right away after you get wet. · Always wash your hands before you touch the catheter. · Fasten or tape the catheter to your body to keep it from catching on your clothes.   · Never use scissors or other sharp objects around your catheter. · Do not use unapproved clamps on your catheter. · Store your dialysis supplies in a cool, dry place. Activity when you have an access  · Do not lift heavy objects. · Do not swim or take a bath unless your doctor tells you it is okay. When should you call for help? Call your doctor now or seek immediate medical care if:    · You have signs of infection, such as:  ? Increased pain, swelling, warmth, or redness. ? Red streaks leading from the access area. ? Pus draining from the access area. ? A fever.     · You have belly pain.     · You have nausea or vomiting.     · The dialysis fluid looks cloudy or is a different color.     · Fluid does not flow through the catheter. Watch closely for changes in your health, and be sure to contact your doctor if you have any problems. Where can you learn more? Go to http://www.gray.com/  Enter R310 in the search box to learn more about \"Peritoneal Dialysis Catheter Care: Care Instructions. \"  Current as of: April 15, 2020               Content Version: 12.6  © 0393-1581 [employer]. Care instructions adapted under license by [employer] (which disclaims liability or warranty for this information). If you have questions about a medical condition or this instruction, always ask your healthcare professional. [address] any warranty or liability for your use of this information.

## 2020-11-12 NOTE — OP NOTES
[address]   OPERATIVE REPORT    Name:  Sudarshan Snider  MR#:  [id number]  :  1970  ACCOUNT #:  [de-identified]  DATE OF SERVICE:  2020      PREOPERATIVE DIAGNOSIS:  Renal failure. POSTOPERATIVE DIAGNOSIS:  Renal failure. PROCEDURE PERFORMED:  Laparoscopic insertion of peritoneal dialysis catheter. SURGEON:  Sintia Barboza MD    ASSISTANT:  Carlie Wells SA    ANESTHESIA:  General.    COMPLICATIONS:  None. SPECIMENS REMOVED:  None. IMPLANTS:  Peritoneal dialysis catheter. ESTIMATED BLOOD LOSS:  Minimal.    INDICATIONS:  The patient is a 63-year-old male with renal failure, on dialysis. He will have a PD catheter placed for peritoneal dialysis access. PROCEDURE:  The patient's abdomen was prepped and draped. A small puncture incision was made in the left upper quadrant and an insufflation needle was inserted. Pneumoperitoneum was created. A 5-mm trocar was then inserted followed by laparoscopic camera which was confirmed to be in intraperitoneal position. There were no significant intraperitoneal adhesions noted. A transverse incision was made just the left of the umbilicus. The incision was carried down to the anterior rectus sheath which was opened transversely. The rectus muscle was split and a 2-0 Prolene pursestring suture placed in the posterior rectus sheath. The peritoneal cavity was entered and a swan-neck curl tip double cuff catheter was inserted over a stylet and directed into the pelvis. The pursestring suture was secured and the catheter was brought through a small opening in the rectus sheath superior to the previous opening. The rectus sheath was closed with running 0 Prolene. The catheter was brought out through an inferior skin exit site. Laparoscopic camera was re-inserted. The catheter was confirmed to be in good position in the midline pelvis. The pneumoperitoneum was then released and the trocar removed.   The incisions were closed with Vicryl subcutaneous suture and Vicryl subcuticular skin closure. Dressings were applied and the patient was returned to the recovery room in stable condition.         Kwesi Neil MD      [id number]  D:  11/12/2020 8:38  T:  11/12/2020 14:58  JOB #:  [id number]

## 2020-11-12 NOTE — ANESTHESIA POSTPROCEDURE EVALUATION
Procedure(s):  LAPAROSCOPIC INSERTION PERITONEAL DIALYSIS CATHETER. general    Anesthesia Post Evaluation        Patient location during evaluation: PACU  Patient participation: complete - patient participated  Level of consciousness: awake and alert  Pain management: adequate  Airway patency: patent  Anesthetic complications: no  Cardiovascular status: acceptable  Respiratory status: acceptable  Hydration status: acceptable  Comments: I have seen and evaluated the patient and is ready for discharge. Shanelle Siegel MD    Post anesthesia nausea and vomiting:  none      INITIAL Post-op Vital signs:   Vitals Value Taken Time   /103 11/12/2020  9:15 AM   Temp 36.6 °C (97.8 °F) 11/12/2020  8:49 AM   Pulse 68 11/12/2020  9:27 AM   Resp 6 11/12/2020  9:26 AM   SpO2 96 % 11/12/2020  9:27 AM   Vitals shown include unvalidated device data.

## 2020-11-12 NOTE — ANESTHESIA PREPROCEDURE EVALUATION
Relevant Problems   No relevant active problems       Anesthetic History   No history of anesthetic complications            Review of Systems / Medical History  Patient summary reviewed, nursing notes reviewed and pertinent labs reviewed    Pulmonary  Within defined limits                 Neuro/Psych   Within defined limits    CVA       Cardiovascular  Within defined limits  Hypertension        Dysrhythmias : atrial fibrillation  CAD         GI/Hepatic/Renal  Within defined limits       Renal disease: ESRD       Endo/Other  Within defined limits           Other Findings              Physical Exam    Airway  Mallampati: II  TM Distance: > 6 cm  Neck ROM: normal range of motion   Mouth opening: Normal     Cardiovascular  Regular rate and rhythm,  S1 and S2 normal,  no murmur, click, rub, or gallop             Dental  No notable dental hx       Pulmonary  Breath sounds clear to auscultation               Abdominal  GI exam deferred       Other Findings            Anesthetic Plan    ASA: 3

## 2020-11-12 NOTE — BRIEF OP NOTE
Brief Postoperative Note    Patient: Mady Klein  YOB: 1970  MRN: [id number]    Date of Procedure: 11/12/2020     Pre-Op Diagnosis: ESRD    Post-Op Diagnosis: Same as preoperative diagnosis.       Procedure(s):  LAPAROSCOPIC INSERTION PERITONEAL DIALYSIS CATHETER    Surgeon(s):  Joshua Gomez MD    Surgical Assistant: Surg Asst-1: Qing Garcia    Anesthesia: General     Estimated Blood Loss (mL): Minimal    Complications: None    Specimens: * No specimens in log *     Implants: * No implants in log *    Drains: * No LDAs found *    Findings: none    Electronically Signed by Jes Palafox MD on 11/12/2020 at 8:34 AM

## 2020-11-12 NOTE — ROUTINE PROCESS
Patient: Mady Klein MRN: [id number]  SSN: [id number]   YOB: 1970  Age: 54 y.o. Sex: male     Patient is status post Procedure(s):  LAPAROSCOPIC INSERTION PERITONEAL DIALYSIS CATHETER.     Surgeon(s) and Role:     * Joshua Gomez MD - Primary    Local/Dose/Irrigation:  SEE MAR                                         Dressing/Packing:       Splint/Cast:  ]    Other:

## 2020-11-19 ENCOUNTER — APPOINTMENT (OUTPATIENT)
Dept: PHYSICAL THERAPY | Age: 50
End: 2020-11-19
Payer: MEDICAID

## 2020-11-24 ENCOUNTER — HOSPITAL ENCOUNTER (OUTPATIENT)
Dept: PHYSICAL THERAPY | Age: 50
Discharge: HOME | End: 2020-11-24
Payer: MEDICAID

## 2020-11-24 PROCEDURE — 97140 MANUAL THERAPY 1/> REGIONS: CPT

## 2020-11-24 NOTE — PROGRESS NOTES
[facility]  Lymphedema Clinic  [address] [place], [address], [place]  [address].    LYMPHEDEMA THERAPY  VISIT: 11    []                  Daily note               [x]                 90 day Reassessment with Updated Plan of Care/Progress note    NAME: Arsenio Flowers  DATE: 11/24/2020     GOALS  Short term goals  Time frame: to be met by 7/22/2020  1. Patient will demonstrate knowledge of signs/symptoms of infections/cellulitis and be independent in skin care to prevent cellulitis. Patient has been educated on signs and symptoms of infection/cellulitis and has been performing skin care recommendations in the home setting. Goal Met 7/23/2020  2. Patient will demonstrate independence in lymphedema home program of therapeutic exercises to improve circulation and decongest limb to improve ADLs. Patient has been educated in the basic range of motion routine and Laurell Peguero routines to date. Patient is independent with handouts. Goal Met 7/23/2020    Short term goal-Extended to 9/5/2020  3. Patient will tolerate multi-layer bandages (MLB) and show measureable decrease in limb volume to allow ordering of home compression system (daytime, nighttime garments and pump as needed). Patient continues with daily use of his compression products Juxta-fit/Farrow products. He was encouraged to use up to 23 hours a day as tolerated. Vendor notified us that he will not be able to receive more compression for 6 months so will have patient measured for custom flat knit knee highs at that time if volumes are stable. Will continue to hold use of vaso-pneumatic pump at this time as patient is on dialysis and has chronic kidney disease and history of Congestive Heart Failure. Goal Met 8/19/2020     Long term goals  Time frame: to be met by 9/5/2020, Goals will be extended to be met by 2/19/2021  1.   Patient will be independent with don/doff of compression system and use in order to prevent reaccumulation of fluid at discharge. Patient is independent with the donning and doffing of his compression garments and has assist in the home if needed. Goal Met 7/29/2020.  2.  Pt will be independent in self-MLD and show stable limb volumes showing decongestion and pt. ready for transition to independent restorative phase of lymphedema therapy. Patient has been educated in Self MLD packet and is following recommendations to perform daily in the home. Full volumes assessed today revealed that patient has gained 216 ml in the R LE and lost 915 ml in the L LE since volumes were last taken on 8/19/2020. Progressing toward goal.     New Goal to be met by 2/19/2021  3. Patient will be measured and fitted for custom knee highs and the fit and comfort will be deemed good prior to discharge to the restorative phase of care. SUBJECTIVE REPORT: Patient is pleased with the velcro products for the lower legs and is wearing the velcro product on the L lower leg each day and the R lower leg every other day. Patient has noticed an improvement in swelling in the legs. Patient reports having a diaylsis port infection in September and a surgical procedure for placement of a peritoneal dialysis catheter in November. Pain: No complaints of pain this visit. Gait: Independent gait without any assistive device for community distances    ADLs:  Independent for most.     Treatment Response:  Patient reports that he is doing well with his home program and is wearing his compression products for the L LE each day and the R LE every other day. Patient is interested in obtaining an additional set of compression products for the lower legs prior to discharge to the restorative phase of care. Called the vendor and patient will have insurance coverage for an additional set of garments in January.   Patient has purchased a pair of full coverage and supportive tennis shoes for safe ambulation and compression on the foot    Function: Independent for most activities. Drives. Works in [employer] system. [address] Lymphedema Assessment Scale: Deferred    Weight: 354.8 lb today - down from 367.0 lbs on evaluation    Temperature: 97.5 degrees. Patient with no Covid 19 symptoms. TREATMENT AND OBJECTIVE DATA SUMMARY:     Therapeutic Activity 0 minutes   Treatment time: N/A  Functional Mobility: Transfers:   Independent    Gait:   Independent    Fall risk: low (hx CVA,has some issues with R LE sensation at times)      Therapeutic Exercise/Procedure 0 minutes    Treatment time: N/A  Walking program Patient is participating in a daily walking program.    Aerospike exercise program: Patient previously educated in the [employer] routine. Free exercises/ROM: Patient has been educated in the Active ROM routine and has the written instructions to follow. Patient is performing a daily exercise routine. Home program: Patient to perform daily to BID:  Skin care, Deep abdominal breathing, Exercise routine, Rest in supine, Compression bandage, Compression garments, Self manual lymph draining (MLD) and Bring supplies to each therapy visit. Patient has purchased adequate foot wear to wear with his lower leg velcro products. Rationale: Exercise will increase the lymph angiomotoricity and tissue pressure of the skin and thus decrease swelling. Modalities 0 minutes   Treatment time: 0  Vasopneumatic pump: N/A     Manual Lymphatic Drainage (MLD) 60 minutes   Treatment time: 11:00am-12:00pm   Patient/family education provided in self MLD. Continued education in self MLD with bathing and skin care. Patient has the written instructions to follow. Area to decongest: B LEs and Trunk   Sequence used and effectiveness: Secondary sequence for trunk and R LE - deferred today   Patient is working on Self MLD in the home setting.    Skin/wound care/debridement: Reviewed skin care principles   Skin care products  Hygiene  Prevention of cellulitis   Patient continues with L > R dryness of the legs and feet. L LE with fibrotic/woody texture and hemosiderin discoloration from mid calf down to ankle. Applied Remedy lotion to the legs bilaterally with Remedy antifundal lotion applied to the feet bilaterally with continued education in MLD techniques. Applied multi-layer compression bandaging to: Patient has all bandage supplies and can continue to bandage in the home setting as needed once caregiver is able to assist.     Upper/Lower extremity compression: Patient wearing his JuxtaFit Premium lower leg garments in XL Tall for bilateral legs (L LE daily and the R LE every other day), Farrow Classic Foot Pieces in Medium/ Long, additional silver closed toe liner socks and also Circaid compression liner socks. Patient reports that he has not had any issues with use of the products and was instructed to wear the garments daily as instructed until follow up. Patient should be ready to be measured for custom flat knit knee highs In January since the vendor reports that insurance should cover the cost of the garments at that time. Patient was educated in how to properly use all of his products. Patient educated in the laundering instructions of all of his products as his velcro products have yet to be washed and are covered in pet hair. Patient verbalized and demonstrated understanding of all prior to leaving the clinic today. Patient to remain at the 20 mmHg for his Juxtafit products and encouraged him on L LE to be at 30 mmHg as tolerated. Kinesiotaping: Deferred   Girth/Volume measurement: Reviewed results of volumetric measurements taken on evaluation. -left lower extremity: 12,060.47 ml today compared to 12,975.36  ml on last visit 8/19/2020. On evaluation (6/10/2020) volumes in L LE were 15,251.95 ml     -right lower extremity: 10,910.62 ml today compared to 10,694.18 ml on last visit 8/19/2020.  On evaluation (6/10/2020) volumes in R LE were 13,210.43 ml. ASSESSMENT:   Patient continues to follow his home program. The skin on the lower legs is intact but remain dry and flaky. Full volumes taken today reveal a loss of 915 ml in the involved (L LE) extremity and a gain of 216 ml in the uninvolved (R LE) extremity since last visit in August. Continued education in the role and benefit of daily use of compression garments as he is wearing the compression garments on the L LE each day but the R LE every other day. Patient will continue with his home program and follow up in January or sooner if needed  to reassess his volumes and to obtain an additional set of garments prior to discharge to the restorative phase of care. Patient is aware to contact the clinic if he has any questions prior to returning follow up. PLAN OF CARE:   Continue with plan of care as established on evaluation. Changes to the plan of care are as follows: Plan of care will need to be extended to 2/19/2021 for patient to return to the clinic to be measured for custom knee highs for management of swelling during the restorative phase of care. Frequency: Follow up in January or sooner if needed. Next session will address: Assess volumes  Continue education on self MLD  Measure for custom knee highs  Kinesiotape   Other: Vendor: [employer] for insurance covered products  [employer] for self pay items. TOTAL TREATMENT 60 mins     Gina Burnette PT, CLT    TREATMENT PLAN EFFECTIVE DATES:   11/24/2020 to 2/19/2021  I have read the above plan of care for Carey Bolden. I certify the above prescribed services are required by this patient and are medically necessary.   The above plan of care has been developed in conjunction with the lymphedema/physical therapist.   Physician Signature: ____________________________________________________      Dr. Fátima Ambrose   Date: _____________

## 2021-01-12 ENCOUNTER — APPOINTMENT (OUTPATIENT)
Dept: PHYSICAL THERAPY | Age: 51
End: 2021-01-12

## 2021-03-09 ENCOUNTER — HOSPITAL ENCOUNTER (OUTPATIENT)
Dept: PHYSICAL THERAPY | Age: 51
Discharge: HOME | End: 2021-03-09
Payer: MEDICAID

## 2021-03-09 VITALS — HEIGHT: 75 IN | BODY MASS INDEX: 39.17 KG/M2 | WEIGHT: 315 LBS

## 2021-03-09 PROCEDURE — 97140 MANUAL THERAPY 1/> REGIONS: CPT

## 2021-05-05 ENCOUNTER — HOSPITAL ENCOUNTER (OUTPATIENT)
Dept: PHYSICAL THERAPY | Age: 51
Discharge: HOME | End: 2021-05-05
Payer: MEDICAID

## 2021-05-05 VITALS — BODY MASS INDEX: 39.17 KG/M2 | HEIGHT: 75 IN | WEIGHT: 315 LBS

## 2021-05-05 PROCEDURE — 97140 MANUAL THERAPY 1/> REGIONS: CPT

## 2021-06-02 ENCOUNTER — APPOINTMENT (OUTPATIENT)
Dept: PHYSICAL THERAPY | Age: 51
End: 2021-06-02

## 2021-09-28 ENCOUNTER — OFFICE VISIT (OUTPATIENT)
Dept: PODIATRY | Age: 51
End: 2021-09-28
Payer: MEDICAID

## 2021-09-28 VITALS
SYSTOLIC BLOOD PRESSURE: 191 MMHG | OXYGEN SATURATION: 98 % | BODY MASS INDEX: 39.17 KG/M2 | WEIGHT: 315 LBS | DIASTOLIC BLOOD PRESSURE: 105 MMHG | TEMPERATURE: 98 F | HEART RATE: 71 BPM | HEIGHT: 75 IN

## 2021-09-28 DIAGNOSIS — B35.1 ONYCHOMYCOSIS: Primary | ICD-10-CM

## 2021-09-28 PROCEDURE — 99213 OFFICE O/P EST LOW 20 MIN: CPT | Performed by: PODIATRIST

## 2021-09-28 RX ORDER — AMITRIPTYLINE HYDROCHLORIDE 25 MG/1
25 TABLET, FILM COATED ORAL
Qty: 30 TABLET | Refills: 1 | Status: SHIPPED | OUTPATIENT
Start: 2021-09-28 | End: 2021-11-23 | Stop reason: SDUPTHER

## 2021-09-28 NOTE — PROGRESS NOTES
Chief Complaint   Patient presents with    Foot Pain     pt states he had a stroke on his R side staates pain statrts at knee down to foot but on L side it starts from calf; states his joints ache     1. Have you been to the ER, urgent care clinic since your last visit? Hospitalized since your last visit? No    2. Have you seen or consulted any other health care providers outside of the [address] [place] [address] since your last visit? Include any pap smears or colon screening.  No  PCP-Dr Paresh Powell

## 2021-09-29 NOTE — PROGRESS NOTES
[facility]    Subjective:         Patient is a 52 y.o. male who is being seen as a returning patient with complaint of burning/tingling in both of his feet. Patient states he has tried gabapentin 100 mg in the past without relief. He states the pain was level of 6 out of 10 and is worse in the evenings. He describes the pain as an intense ache with burning/tingling in his feet and legs. He denies any recent trauma. He denies any breaks in the skin. He denies any systemic signs of infection. He denies any changes in his activity level or shoe gear.   He denies any other pedal complaints    Past Medical History:   Diagnosis Date    Arrhythmia     A-FIB    CAD (coronary artery disease)     MI 28    CH[facility]congestive heart failure) ([facility])     Chronic kidney disease     END STAGE KIDNEY DISEASE    Hypertension     Other ill-defined conditions(799.89)     Sleep apnea     WEARS CPAP    Stroke ([facility] [address] 01/2020    NUMBNESS IN RIGHT LEG AND FOOT    Thromboembolus ([facility] [address] 2018    PE     Past Surgical History:   Procedure Laterality Date    CARDIAC SURG PROCEDURE UNLIST      CARDIAC CATHX2-NO STENTS    HX ORTHOPAEDIC      LEFT HAND, RIGHT ANKLE, AND RIGHT SHOULDER    HX TONSILLECTOMY      HX VASCULAR ACCESS      UPPER RIGHT CHEST       Family History   Problem Relation Age of Onset    Stroke Mother     Heart Disease Mother     Diabetes Mother     Kidney Disease Mother     Heart Disease Sister     Lung Disease Brother     No Known Problems Sister     Deep Vein Thrombosis Brother     Anesth Problems Neg Hx       Social History     Tobacco Use    Smoking status: Former Smoker    Smokeless tobacco: Never Used    Tobacco comment: CIGARS ONCE PER MONTH-NOTHING SINCE 2018   Substance Use Topics    Alcohol use: Never     Comment: NOT FOR 3 YEARS     Allergies   Allergen Reactions    Lisinopril Swelling    Sulfa (Sulfonamide Antibiotics) Other (comments)     Had sores all over body    Sulfa (Sulfonamide Antibiotics) Hives     Prior to Admission medications    Medication Sig Start Date End Date Taking? Authorizing Provider   amitriptyline (ELAVIL) 25 mg tablet Take 1 Tablet by mouth nightly. 9/28/21  Yes Amber Tadeo DPM   cloNIDine (CATAPRES) 0.1 mg/24 hr ptwk 1 Patch by TransDERmal route every seven (7) days. Indications: CHANGES ON SUNDAY    Provider, Historical   allopurinoL (ZYLOPRIM) 100 mg tablet Take 50 mg by mouth. TAKES ON Monday AND Thursday    Provider, Historical   carvediloL (Coreg) 25 mg tablet Take 25 mg by mouth two (2) times daily (with meals). Provider, Historical   cyanocobalamin/salcaprozat sod (ELIGEN B12 PO) Take  by mouth every fourty-eight (48) hours. Provider, Historical   ERGOCALCIFEROL, VITAMIN D2, PO Take  by mouth every seven (7) days. Indications: ON SUNDAY    Provider, Historical   predniSONE (DELTASONE) 5 mg tablet Take  by mouth. Provider, Historical   spironolactone (ALDACTONE) 25 mg tablet Take  by mouth daily. Provider, Historical   apixaban (Eliquis) 5 mg tablet Take 5 mg by mouth two (2) times a day. Started 30 days ago. Other, MD Lea   amLODIPine (NORVASC) 10 mg tablet Take 1 Tab by mouth daily. 11/16/11   Darby Knott MD       Review of Systems   Constitutional: Negative. Eyes: Negative. Respiratory: Negative. Cardiovascular: Positive for leg swelling. Endocrine: Negative. Genitourinary: Negative. Musculoskeletal: Positive for myalgias. Allergic/Immunologic: Positive for immunocompromised state. Neurological: Positive for numbness. Hematological: Bruises/bleeds easily. Psychiatric/Behavioral: Negative. All other systems reviewed and are negative.       Objective:     Visit Vitals  BP (!) 191/105 (BP 1 Location: Left lower arm, BP Patient Position: Sitting, BP Cuff Size: Large adult)   Pulse 71   Temp 98 °F (36.7 °C) (Temporal)   Ht 6' 3\" (1.905 m)   Wt (!) 359 lb (162.8 kg)   SpO2 98%   BMI 44.87 kg/m² Physical Exam  Vitals reviewed. Constitutional:       Appearance: He is morbidly obese. Cardiovascular:      Pulses:           Dorsalis pedis pulses are 2+ on the right side and 2+ on the left side. Posterior tibial pulses are 2+ on the right side and 2+ on the left side. Pulmonary:      Effort: Pulmonary effort is normal.   Musculoskeletal:      Right lower leg: Edema present. Left lower leg: Edema present. Right foot: Decreased range of motion. No deformity or bunion. Left foot: Decreased range of motion. No deformity or bunion. Feet:      Right foot:      Protective Sensation: 10 sites tested. 0 sites sensed. Skin integrity: Skin integrity normal.      Toenail Condition: Right toenails are abnormally thick. Fungal disease present. Left foot:      Protective Sensation: 10 sites tested. 0 sites sensed. Skin integrity: Skin integrity normal.      Toenail Condition: Left toenails are abnormally thick. Fungal disease present. Lymphadenopathy:      Lower Body: No right inguinal adenopathy. No left inguinal adenopathy. Skin:     General: Skin is warm. Capillary Refill: Capillary refill takes 2 to 3 seconds. Neurological:      Mental Status: He is alert and oriented to person, place, and time. Comments: Pain/tingling noted to the bilateral lower extremities   Psychiatric:         Mood and Affect: Mood and affect normal.         Behavior: Behavior is cooperative. Data Review: No results found for this or any previous visit (from the past 24 hour(s)). Impression:       ICD-10-CM ICD-9-CM    1. Onychomycosis  B35.1 110.1        Recommendation:     Patient seen and evaluated in the office  Discussed and educated patient regarding his current medical condition  Patient was given for amitriptyline 25 mg to be taken nightly for symptomatic relief. Instructed patient that dosing changes may be needed in the future to achieve full symptomatic relief. Patient verbalized understanding        Sarai Villa.  Gerline Ormond, [address] [facility], [address] [place] [address] and [facility]  [address] [place] [address]  O: [phone]  F: [phone]  C: [phone]

## 2021-10-07 ENCOUNTER — APPOINTMENT (OUTPATIENT)
Dept: GENERAL RADIOLOGY | Age: 51
DRG: 194 | End: 2021-10-07
Attending: EMERGENCY MEDICINE
Payer: MEDICAID

## 2021-10-07 ENCOUNTER — HOSPITAL ENCOUNTER (INPATIENT)
Age: 51
LOS: 6 days | Discharge: HOME | DRG: 194 | End: 2021-10-13
Attending: EMERGENCY MEDICINE | Admitting: INTERNAL MEDICINE
Payer: MEDICAID

## 2021-10-07 DIAGNOSIS — I48.91 ATRIAL FIBRILLATION WITH RVR (HCC): Primary | ICD-10-CM

## 2021-10-07 DIAGNOSIS — M79.671 PAIN IN BOTH FEET: ICD-10-CM

## 2021-10-07 DIAGNOSIS — N18.9 CHRONIC RENAL FAILURE, UNSPECIFIED CKD STAGE: ICD-10-CM

## 2021-10-07 DIAGNOSIS — M79.672 PAIN IN BOTH FEET: ICD-10-CM

## 2021-10-07 LAB
ALBUMIN SERPL-MCNC: 2.6 G/DL (ref 3.5–5)
ALBUMIN/GLOB SERPL: 0.4 {RATIO} (ref 1.1–2.2)
ALP SERPL-CCNC: 59 U/L (ref 45–117)
ALT SERPL-CCNC: 32 U/L (ref 12–78)
ANION GAP SERPL CALC-SCNC: 13 MMOL/L (ref 5–15)
APTT PPP: 39.1 SEC (ref 21.2–34.1)
APTT PPP: 41.9 SEC (ref 21.2–34.1)
AST SERPL W P-5'-P-CCNC: 32 U/L (ref 15–37)
ATRIAL RATE: 156 BPM
BASOPHILS # BLD: 0.1 K/UL (ref 0–0.1)
BASOPHILS NFR BLD: 1 % (ref 0–1)
BILIRUB SERPL-MCNC: 0.4 MG/DL (ref 0.2–1)
BNP SERPL-MCNC: ABNORMAL PG/ML
BUN SERPL-MCNC: 61 MG/DL (ref 6–20)
BUN/CREAT SERPL: 3 (ref 12–20)
CA-I BLD-MCNC: 7.9 MG/DL (ref 8.5–10.1)
CALCULATED R AXIS, ECG10: 40 DEGREES
CALCULATED T AXIS, ECG11: -178 DEGREES
CHLORIDE SERPL-SCNC: 98 MMOL/L (ref 97–108)
CO2 SERPL-SCNC: 28 MMOL/L (ref 21–32)
CREAT SERPL-MCNC: 24 MG/DL (ref 0.7–1.3)
DIAGNOSIS, 93000: NORMAL
DIFFERENTIAL METHOD BLD: ABNORMAL
EOSINOPHIL # BLD: 0.3 K/UL (ref 0–0.4)
EOSINOPHIL NFR BLD: 4 % (ref 0–7)
ERYTHROCYTE [DISTWIDTH] IN BLOOD BY AUTOMATED COUNT: 16.2 % (ref 11.5–14.5)
GLOBULIN SER CALC-MCNC: 5.9 G/DL (ref 2–4)
GLUCOSE SERPL-MCNC: 95 MG/DL (ref 65–100)
HCT VFR BLD AUTO: 31.3 % (ref 36.6–50.3)
HGB BLD-MCNC: 9.6 G/DL (ref 12.1–17)
IMM GRANULOCYTES # BLD AUTO: 0.1 K/UL (ref 0–0.04)
IMM GRANULOCYTES NFR BLD AUTO: 1 % (ref 0–0.5)
INR PPP: 1.4 (ref 0.9–1.1)
LYMPHOCYTES # BLD: 0.7 K/UL (ref 0.8–3.5)
LYMPHOCYTES NFR BLD: 8 % (ref 12–49)
MCH RBC QN AUTO: 31.5 PG (ref 26–34)
MCHC RBC AUTO-ENTMCNC: 30.7 G/DL (ref 30–36.5)
MCV RBC AUTO: 102.6 FL (ref 80–99)
MONOCYTES # BLD: 1.1 K/UL (ref 0–1)
MONOCYTES NFR BLD: 13 % (ref 5–13)
NEUTS SEG # BLD: 6.1 K/UL (ref 1.8–8)
NEUTS SEG NFR BLD: 73 % (ref 32–75)
NRBC # BLD: 0 K/UL (ref 0–0.01)
NRBC BLD-RTO: 0 PER 100 WBC
PLATELET # BLD AUTO: 246 K/UL (ref 150–400)
PMV BLD AUTO: 11.4 FL (ref 8.9–12.9)
POTASSIUM SERPL-SCNC: 3.6 MMOL/L (ref 3.5–5.1)
PROT SERPL-MCNC: 8.5 G/DL (ref 6.4–8.2)
PROTHROMBIN TIME: 16.9 SEC (ref 11.9–14.7)
Q-T INTERVAL, ECG07: 318 MS
QRS DURATION, ECG06: 78 MS
QTC CALCULATION (BEZET), ECG08: 495 MS
RBC # BLD AUTO: 3.05 M/UL (ref 4.1–5.7)
SODIUM SERPL-SCNC: 139 MMOL/L (ref 136–145)
THERAPEUTIC RANGE,PTTT: ABNORMAL SEC (ref 82–109)
THERAPEUTIC RANGE,PTTT: ABNORMAL SEC (ref 82–109)
TROPONIN-HIGH SENSITIVITY: 112 NG/L (ref 0–76)
TROPONIN-HIGH SENSITIVITY: 91 NG/L (ref 0–76)
VENTRICULAR RATE, ECG03: 146 BPM
WBC # BLD AUTO: 8.3 K/UL (ref 4.1–11.1)

## 2021-10-07 PROCEDURE — 83880 ASSAY OF NATRIURETIC PEPTIDE: CPT

## 2021-10-07 PROCEDURE — 93005 ELECTROCARDIOGRAM TRACING: CPT

## 2021-10-07 PROCEDURE — 74011250636 HC RX REV CODE- 250/636: Performed by: INTERNAL MEDICINE

## 2021-10-07 PROCEDURE — 74011250637 HC RX REV CODE- 250/637: Performed by: INTERNAL MEDICINE

## 2021-10-07 PROCEDURE — 84484 ASSAY OF TROPONIN QUANT: CPT

## 2021-10-07 PROCEDURE — 85025 COMPLETE CBC W/AUTO DIFF WBC: CPT

## 2021-10-07 PROCEDURE — 80053 COMPREHEN METABOLIC PANEL: CPT

## 2021-10-07 PROCEDURE — 85610 PROTHROMBIN TIME: CPT

## 2021-10-07 PROCEDURE — 65270000029 HC RM PRIVATE

## 2021-10-07 PROCEDURE — 74011000250 HC RX REV CODE- 250: Performed by: EMERGENCY MEDICINE

## 2021-10-07 PROCEDURE — 85730 THROMBOPLASTIN TIME PARTIAL: CPT

## 2021-10-07 PROCEDURE — 74011250636 HC RX REV CODE- 250/636: Performed by: EMERGENCY MEDICINE

## 2021-10-07 PROCEDURE — 99285 EMERGENCY DEPT VISIT HI MDM: CPT

## 2021-10-07 PROCEDURE — 96374 THER/PROPH/DIAG INJ IV PUSH: CPT

## 2021-10-07 PROCEDURE — 71045 X-RAY EXAM CHEST 1 VIEW: CPT

## 2021-10-07 PROCEDURE — 36415 COLL VENOUS BLD VENIPUNCTURE: CPT

## 2021-10-07 PROCEDURE — 96375 TX/PRO/DX INJ NEW DRUG ADDON: CPT

## 2021-10-07 RX ORDER — BUMETANIDE 1 MG/1
2 TABLET ORAL 3 TIMES DAILY
Status: DISCONTINUED | OUTPATIENT
Start: 2021-10-07 | End: 2021-10-07

## 2021-10-07 RX ORDER — DILTIAZEM HCL/D5W 125 MG/125
0-15 PLASTIC BAG, INJECTION (ML) INTRAVENOUS
Status: DISCONTINUED | OUTPATIENT
Start: 2021-10-07 | End: 2021-10-10

## 2021-10-07 RX ORDER — SPIRONOLACTONE 25 MG/1
25 TABLET ORAL DAILY
Status: DISCONTINUED | OUTPATIENT
Start: 2021-10-08 | End: 2021-10-13 | Stop reason: HOSPADM

## 2021-10-07 RX ORDER — HEPARIN SODIUM 10000 [USP'U]/100ML
12-25 INJECTION, SOLUTION INTRAVENOUS
Status: DISCONTINUED | OUTPATIENT
Start: 2021-10-07 | End: 2021-10-07 | Stop reason: SDUPTHER

## 2021-10-07 RX ORDER — ACETAMINOPHEN 650 MG/1
650 SUPPOSITORY RECTAL
Status: DISCONTINUED | OUTPATIENT
Start: 2021-10-07 | End: 2021-10-13 | Stop reason: HOSPADM

## 2021-10-07 RX ORDER — SEVELAMER CARBONATE 800 MG/1
800 TABLET, FILM COATED ORAL
COMMUNITY

## 2021-10-07 RX ORDER — DILTIAZEM HYDROCHLORIDE 5 MG/ML
10 INJECTION INTRAVENOUS
Status: COMPLETED | OUTPATIENT
Start: 2021-10-07 | End: 2021-10-07

## 2021-10-07 RX ORDER — AMITRIPTYLINE HYDROCHLORIDE 50 MG/1
25 TABLET, FILM COATED ORAL
Status: DISCONTINUED | OUTPATIENT
Start: 2021-10-07 | End: 2021-10-13 | Stop reason: HOSPADM

## 2021-10-07 RX ORDER — PREDNISONE 5 MG/1
5 TABLET ORAL
Status: DISCONTINUED | OUTPATIENT
Start: 2021-10-08 | End: 2021-10-13 | Stop reason: HOSPADM

## 2021-10-07 RX ORDER — HEPARIN SODIUM 1000 [USP'U]/ML
4000 INJECTION, SOLUTION INTRAVENOUS; SUBCUTANEOUS AS NEEDED
Status: DISCONTINUED | OUTPATIENT
Start: 2021-10-07 | End: 2021-10-13 | Stop reason: ALTCHOICE

## 2021-10-07 RX ORDER — ACETAMINOPHEN 325 MG/1
650 TABLET ORAL
Status: DISCONTINUED | OUTPATIENT
Start: 2021-10-07 | End: 2021-10-13 | Stop reason: HOSPADM

## 2021-10-07 RX ORDER — MONTELUKAST SODIUM 10 MG/1
10 TABLET ORAL DAILY
COMMUNITY
Start: 2020-11-25

## 2021-10-07 RX ORDER — HEPARIN SODIUM 1000 [USP'U]/ML
2000 INJECTION, SOLUTION INTRAVENOUS; SUBCUTANEOUS AS NEEDED
Status: DISCONTINUED | OUTPATIENT
Start: 2021-10-07 | End: 2021-10-13 | Stop reason: ALTCHOICE

## 2021-10-07 RX ORDER — CLONIDINE 0.1 MG/24H
1 PATCH, EXTENDED RELEASE TRANSDERMAL
Status: DISCONTINUED | OUTPATIENT
Start: 2021-10-07 | End: 2021-10-10

## 2021-10-07 RX ORDER — HYDROMORPHONE HYDROCHLORIDE 1 MG/ML
1 INJECTION, SOLUTION INTRAMUSCULAR; INTRAVENOUS; SUBCUTANEOUS ONCE
Status: COMPLETED | OUTPATIENT
Start: 2021-10-07 | End: 2021-10-07

## 2021-10-07 RX ORDER — SEVELAMER CARBONATE 800 MG/1
800 TABLET, FILM COATED ORAL
Status: DISCONTINUED | OUTPATIENT
Start: 2021-10-07 | End: 2021-10-13 | Stop reason: HOSPADM

## 2021-10-07 RX ORDER — BUMETANIDE 2 MG/1
2 TABLET ORAL 3 TIMES DAILY
COMMUNITY

## 2021-10-07 RX ORDER — SODIUM CHLORIDE 0.9 % (FLUSH) 0.9 %
5-40 SYRINGE (ML) INJECTION AS NEEDED
Status: DISCONTINUED | OUTPATIENT
Start: 2021-10-07 | End: 2021-10-13

## 2021-10-07 RX ORDER — CALCIUM ACETATE 667 MG/1
CAPSULE ORAL
COMMUNITY
Start: 2021-07-23 | End: 2021-10-07

## 2021-10-07 RX ORDER — ONDANSETRON 2 MG/ML
4 INJECTION INTRAMUSCULAR; INTRAVENOUS
Status: DISCONTINUED | OUTPATIENT
Start: 2021-10-07 | End: 2021-10-08

## 2021-10-07 RX ORDER — AMLODIPINE BESYLATE 5 MG/1
10 TABLET ORAL DAILY
Status: DISCONTINUED | OUTPATIENT
Start: 2021-10-08 | End: 2021-10-10

## 2021-10-07 RX ORDER — SODIUM CHLORIDE 0.9 % (FLUSH) 0.9 %
5-40 SYRINGE (ML) INJECTION EVERY 8 HOURS
Status: DISCONTINUED | OUTPATIENT
Start: 2021-10-07 | End: 2021-10-13

## 2021-10-07 RX ORDER — WARFARIN SODIUM 5 MG/1
5 TABLET ORAL DAILY
COMMUNITY
Start: 2021-03-25 | End: 2021-10-07

## 2021-10-07 RX ORDER — CARVEDILOL 12.5 MG/1
25 TABLET ORAL 2 TIMES DAILY WITH MEALS
Status: DISCONTINUED | OUTPATIENT
Start: 2021-10-07 | End: 2021-10-13 | Stop reason: HOSPADM

## 2021-10-07 RX ORDER — HEPARIN SODIUM 10000 [USP'U]/100ML
12-25 INJECTION, SOLUTION INTRAVENOUS
Status: DISCONTINUED | OUTPATIENT
Start: 2021-10-07 | End: 2021-10-12

## 2021-10-07 RX ORDER — ONDANSETRON 4 MG/1
4 TABLET, ORALLY DISINTEGRATING ORAL
Status: DISCONTINUED | OUTPATIENT
Start: 2021-10-07 | End: 2021-10-13 | Stop reason: HOSPADM

## 2021-10-07 RX ORDER — HYDROMORPHONE HYDROCHLORIDE 1 MG/ML
1 INJECTION, SOLUTION INTRAMUSCULAR; INTRAVENOUS; SUBCUTANEOUS
Status: DISCONTINUED | OUTPATIENT
Start: 2021-10-07 | End: 2021-10-13 | Stop reason: HOSPADM

## 2021-10-07 RX ORDER — POLYETHYLENE GLYCOL 3350 17 G/17G
17 POWDER, FOR SOLUTION ORAL DAILY PRN
Status: DISCONTINUED | OUTPATIENT
Start: 2021-10-07 | End: 2021-10-13 | Stop reason: HOSPADM

## 2021-10-07 RX ORDER — WARFARIN SODIUM 5 MG/1
5 TABLET ORAL DAILY
Status: DISCONTINUED | OUTPATIENT
Start: 2021-10-08 | End: 2021-10-13 | Stop reason: HOSPADM

## 2021-10-07 RX ORDER — HEPARIN SODIUM 5000 [USP'U]/ML
5000 INJECTION, SOLUTION INTRAVENOUS; SUBCUTANEOUS EVERY 8 HOURS
Status: DISCONTINUED | OUTPATIENT
Start: 2021-10-07 | End: 2021-10-07

## 2021-10-07 RX ORDER — FUROSEMIDE 10 MG/ML
80 INJECTION INTRAMUSCULAR; INTRAVENOUS EVERY 12 HOURS
Status: DISCONTINUED | OUTPATIENT
Start: 2021-10-07 | End: 2021-10-12

## 2021-10-07 RX ORDER — HYDROCODONE BITARTRATE AND ACETAMINOPHEN 5; 325 MG/1; MG/1
1 TABLET ORAL
Status: DISCONTINUED | OUTPATIENT
Start: 2021-10-07 | End: 2021-10-13 | Stop reason: HOSPADM

## 2021-10-07 RX ADMIN — DILTIAZEM HYDROCHLORIDE 10 MG: 5 INJECTION INTRAVENOUS at 12:09

## 2021-10-07 RX ADMIN — HEPARIN SODIUM AND DEXTROSE 12 UNITS/KG/HR: 10000; 5 INJECTION INTRAVENOUS at 16:02

## 2021-10-07 RX ADMIN — HYDROMORPHONE HYDROCHLORIDE 1 MG: 1 INJECTION, SOLUTION INTRAMUSCULAR; INTRAVENOUS; SUBCUTANEOUS at 12:09

## 2021-10-07 RX ADMIN — SEVELAMER CARBONATE 800 MG: 800 TABLET, FILM COATED ORAL at 15:51

## 2021-10-07 RX ADMIN — HYDROCODONE BITARTRATE AND ACETAMINOPHEN 1 TABLET: 5; 325 TABLET ORAL at 20:21

## 2021-10-07 RX ADMIN — HYDROCODONE BITARTRATE AND ACETAMINOPHEN 1 TABLET: 5; 325 TABLET ORAL at 16:05

## 2021-10-07 RX ADMIN — AMITRIPTYLINE HYDROCHLORIDE 25 MG: 50 TABLET, FILM COATED ORAL at 21:57

## 2021-10-07 RX ADMIN — CARVEDILOL 25 MG: 12.5 TABLET, FILM COATED ORAL at 15:44

## 2021-10-07 RX ADMIN — FUROSEMIDE 80 MG: 10 INJECTION, SOLUTION INTRAMUSCULAR; INTRAVENOUS at 15:44

## 2021-10-07 RX ADMIN — HEPARIN SODIUM 2000 UNITS: 1000 INJECTION, SOLUTION INTRAVENOUS; SUBCUTANEOUS at 23:41

## 2021-10-07 RX ADMIN — HEPARIN SODIUM AND DEXTROSE 14 UNITS/KG/HR: 10000; 5 INJECTION INTRAVENOUS at 23:42

## 2021-10-07 RX ADMIN — Medication 10 MG/HR: at 15:43

## 2021-10-07 RX ADMIN — Medication 5 MG/HR: at 12:10

## 2021-10-07 NOTE — ED NOTES
Bedside and Verbal shift change report given to Anabel Mccall RN (oncoming nurse) by Iron Ibarra RN (offgoing nurse). Report included the following information SBAR, Kardex, ED Summary, MAR, Recent Results and Cardiac Rhythm afib. 1930: pt placed in hospital bed, lights dim    2252: hospitalist paged at this time    2306: Verbal shift change report given to Shira Forman RN (oncoming nurse) by Anabel Mccall RN (offgoing nurse). Report included the following information SBAR, Kardex, ED Summary, MAR, Recent Results and Cardiac Rhythm Afib RVR.

## 2021-10-07 NOTE — PROGRESS NOTES
Reason for Admission:  Afib                     RUR Score:  18%                   Plan for utilizing home health: None/uses no DME. PCP: First and Last name:  Taylor Andre MD     Name of Practice:    Are you a current patient: Yes/No: Yes   Approximate date of last visit: 2 mos ago. Can you participate in a virtual visit with your PCP: yes/Call/has cell phone. Current Advanced Directive/Advance Care Plan: Full Code      Healthcare Decision Maker:                Primary Decision Maker: Antonia Richard - Girlfriend - [phone]                  Transition of Care Plan: D/C Plan is home with (GF) & a family member will transport home upon discharge.

## 2021-10-07 NOTE — H&P
History & Physical    Primary Care Provider: Vineet Ivy MD  Source of Information: Patient     History of Presenting Illness:   Monae Elizabeth is a 52 y.o. male who presents with complaints of bilateral leg swelling for the last several days with increasing shortness of breath at home. At baseline, he has end-stage renal disease on peritoneal dialysis at home. Nephrologist has been trying to switch him to hemodialysis. Follows up with Dr. Quentin Daley of [facility] doctors. Denies chest pain, cough or productive sputum. No fevers or chills. Noted to be in atrial fibrillation with rapid ventricular response and started on Cardizem drip. Potassium noted to be 3.6 with a troponin of 112 and BNP of greater than 35,000. Will be admitted for further evaluation and treatment. Review of Systems:  A comprehensive review of systems was negative except for that written in the History of Present Illness. Past Medical History:   Diagnosis Date    Arrhythmia     A-FIB    CAD (coronary artery disease)     MI 26    CHF (congestive heart failure) (HCC)     Chronic kidney disease     END STAGE KIDNEY DISEASE    Hypertension     Other ill-defined conditions(799.89)     Sleep apnea     WEARS CPAP    Stroke ([facility] [address] 01/2020    NUMBNESS IN RIGHT LEG AND FOOT    Thromboembolus ([facility] [address] 2018    PE      Past Surgical History:   Procedure Laterality Date    CARDIAC SURG PROCEDURE UNLIST      CARDIAC CATHX2-NO STENTS    HX ORTHOPAEDIC      LEFT HAND, RIGHT ANKLE, AND RIGHT SHOULDER    HX TONSILLECTOMY      HX VASCULAR ACCESS      UPPER RIGHT CHEST     Prior to Admission medications    Medication Sig Start Date End Date Taking? Authorizing Provider   warfarin (Jantoven) 5 mg tablet Take 5 mg by mouth daily. 3/25/21  Yes Other, MD Lea   montelukast (SINGULAIR) 10 mg tablet Take 10 mg by mouth daily.  11/25/20  Yes Other, MD Lea   amitriptyline (ELAVIL) 25 mg tablet Take 1 Tablet by mouth nightly. 9/28/21  Yes Mason Tadeo DPM   cloNIDine (CATAPRES) 0.1 mg/24 hr ptwk 1 Patch by TransDERmal route every seven (7) days. Indications: CHANGES ON SUNDAY   Yes Provider, Historical   allopurinoL (ZYLOPRIM) 100 mg tablet Take 50 mg by mouth. TAKES ON Monday AND Thursday   Yes Provider, Historical   carvediloL (Coreg) 25 mg tablet Take 25 mg by mouth two (2) times daily (with meals). Yes Provider, Historical   cyanocobalamin/salcaprozat sod (ELIGEN B12 PO) Take  by mouth every fourty-eight (48) hours. Yes Provider, Historical   ERGOCALCIFEROL, VITAMIN D2, PO Take  by mouth every seven (7) days. Indications: ON SUNDAY   Yes Provider, Historical   predniSONE (DELTASONE) 5 mg tablet Take  by mouth. Yes Provider, Historical   amLODIPine (NORVASC) 10 mg tablet Take 1 Tab by mouth daily. 11/16/11  Yes Eugenio Valerio MD   sevelamer carbonate (RENVELA) 800 mg tab tab Take 800 mg by mouth Before breakfast, lunch, and dinner. Other, MD Lea   bumetanide (BUMEX) 2 mg tablet Take 2 mg by mouth three (3) times daily. Other, MD Lea   spironolactone (ALDACTONE) 25 mg tablet Take  by mouth daily.   Patient not taking: Reported on 10/7/2021    Provider, Historical     Allergies   Allergen Reactions    Lisinopril Swelling    Sulfa (Sulfonamide Antibiotics) Other (comments)     Had sores all over body    Sulfa (Sulfonamide Antibiotics) Hives      Family History   Problem Relation Age of Onset    Stroke Mother     Heart Disease Mother     Diabetes Mother     Kidney Disease Mother     Heart Disease Sister     Lung Disease Brother     No Known Problems Sister     Deep Vein Thrombosis Brother     Anesth Problems Neg Hx         SOCIAL HISTORY:  Patient resides:  Independently    Assisted Living    SNF    With family care x      Smoking history:   None x   Former    Chronic      Alcohol history:   None x   Social    Chronic      Ambulates:   Independently x   w/cane    w/walker    w/wc CODE STATUS:  DNR    Full x   Other      Objective:     Physical Exam:     Visit Vitals  BP (!) 145/110   Pulse (!) 124   Temp 99 °F (37.2 °C)   Resp 13   Ht 6' 3\" (1.905 m)   Wt 158.8 kg (350 lb)   SpO2 99%   BMI 43.75 kg/m²      O2 Device: None (Room air)    General:  Alert, cooperative, no distress, appears stated age. Head:  Normocephalic, without obvious abnormality, atraumatic. Eyes:  Conjunctivae/corneas clear. PERRL, EOMs intact. Nose: Nares normal. Septum midline. Mucosa normal. No drainage or sinus tenderness. Throat: Lips, mucosa, and tongue normal. Teeth and gums normal.   Neck: Supple, symmetrical, trachea midline, no adenopathy, thyroid: no enlargement/tenderness/nodules, no carotid bruit and no JVD. Back:   Symmetric, no curvature. ROM normal. No CVA tenderness. Lungs:   Clear to auscultation bilaterally. Chest wall:  No tenderness or deformity. Heart:  IRRegular rate and rhythm, S1, S2 normal, no murmur, click, rub or gallop. Abdomen:   Soft, non-tender. Bowel sounds normal. No masses,  No organomegaly. Extremities: Extremities normal, +3 pitting edema   Pulses: 2+ and symmetric all extremities. Skin: Skin color, texture, turgor normal. No rashes or lesions   Neurologic: CNII-XII intact. No motor or sensory deficits. EKG:  nonspecific ST and T waves changes. Data Review:     Recent Days:  Recent Labs     10/07/21  1115   WBC 8.3   HGB 9.6*   HCT 31.3*        Recent Labs     10/07/21  1130 10/07/21  1115   NA  --  139   K  --  3.6   CL  --  98   CO2  --  28   GLU  --  95   BUN  --  61*   CREA  --  24.00*   CA  --  7.9*   ALB  --  2.6*   TBILI  --  0.4   ALT  --  32   INR 1.4*  --      No results for input(s): PH, PCO2, PO2, HCO3, FIO2 in the last 72 hours.     24 Hour Results:  Recent Results (from the past 24 hour(s))   CBC WITH AUTOMATED DIFF    Collection Time: 10/07/21 11:15 AM   Result Value Ref Range    WBC 8.3 4.1 - 11.1 K/uL    RBC 3.05 (L) 4.10 - 5.70 M/uL    HGB 9.6 (L) 12.1 - 17.0 g/dL    HCT 31.3 (L) 36.6 - 50.3 %    .6 (H) 80.0 - 99.0 FL    MCH 31.5 26.0 - 34.0 PG    MCHC 30.7 30.0 - 36.5 g/dL    RDW 16.2 (H) 11.5 - 14.5 %    PLATELET 246 364 - 302 K/uL    MPV 11.4 8.9 - 12.9 FL    NRBC 0.0 0.0  WBC    ABSOLUTE NRBC 0.00 0.00 - 0.01 K/uL    NEUTROPHILS 73 32 - 75 %    LYMPHOCYTES 8 (L) 12 - 49 %    MONOCYTES 13 5 - 13 %    EOSINOPHILS 4 0 - 7 %    BASOPHILS 1 0 - 1 %    IMMATURE GRANULOCYTES 1 (H) 0 - 0.5 %    ABS. NEUTROPHILS 6.1 1.8 - 8.0 K/UL    ABS. LYMPHOCYTES 0.7 (L) 0.8 - 3.5 K/UL    ABS. MONOCYTES 1.1 (H) 0.0 - 1.0 K/UL    ABS. EOSINOPHILS 0.3 0.0 - 0.4 K/UL    ABS. BASOPHILS 0.1 0.0 - 0.1 K/UL    ABS. IMM. GRANS. 0.1 (H) 0.00 - 0.04 K/UL    DF AUTOMATED     METABOLIC PANEL, COMPREHENSIVE    Collection Time: 10/07/21 11:15 AM   Result Value Ref Range    Sodium 139 136 - 145 mmol/L    Potassium 3.6 3.5 - 5.1 mmol/L    Chloride 98 97 - 108 mmol/L    CO2 28 21 - 32 mmol/L    Anion gap 13 5 - 15 mmol/L    Glucose 95 65 - 100 mg/dL    BUN 61 (H) 6 - 20 mg/dL    Creatinine 24.00 (H) 0.70 - 1.30 mg/dL    BUN/Creatinine ratio 3 (L) 12 - 20      GFR est AA 2 (L) >60 ml/min/1.73m2    GFR est non-AA 2 (L) >60 ml/min/1.73m2    Calcium 7.9 (L) 8.5 - 10.1 mg/dL    Bilirubin, total 0.4 0.2 - 1.0 mg/dL    AST (SGOT) 32 15 - 37 U/L    ALT (SGPT) 32 12 - 78 U/L    Alk.  phosphatase 59 45 - 117 U/L    Protein, total 8.5 (H) 6.4 - 8.2 g/dL    Albumin 2.6 (L) 3.5 - 5.0 g/dL    Globulin 5.9 (H) 2.0 - 4.0 g/dL    A-G Ratio 0.4 (L) 1.1 - 2.2     TROPONIN-HIGH SENSITIVITY    Collection Time: 10/07/21 11:15 AM   Result Value Ref Range    Troponin-High Sensitivity 112 (HH) 0 - 76 ng/L   BNP    Collection Time: 10/07/21 11:15 AM   Result Value Ref Range    NT pro-BNP >35,000 (H) <125 pg/mL   EKG, 12 LEAD, INITIAL    Collection Time: 10/07/21 11:15 AM   Result Value Ref Range    Ventricular Rate 146 BPM    Atrial Rate 156 BPM    QRS Duration 78 ms    Q-T Interval 318 ms    QTC Calculation (Bezet) 495 ms    Calculated R Axis 40 degrees    Calculated T Axis -178 degrees    Diagnosis       Atrial fibrillation with rapid ventricular response  T wave abnormality, consider lateral ischemia  Abnormal ECG  No previous ECGs available  Confirmed by Kennedy Mi MD, Colt Pandya ([id number]) on 10/7/2021 11:36:39 AM     PROTHROMBIN TIME + INR    Collection Time: 10/07/21 11:30 AM   Result Value Ref Range    Prothrombin time 16.9 (H) 11.9 - 14.7 sec    INR 1.4 (H) 0.9 - 1.1           Imaging:   XR CHEST PORT   Final Result            Assessment:     Atrial fibrillation with rapid ventricular response  Non-ST elevation myocardial infarction  End-stage renal disease on peritoneal dialysis  Marked morbid obesity  Essential hypertension  Chronic anticoagulation for atrial fibrillation  Fluid overload      Plan:     Admit to medical telemetry floor inpatient  In IV Cardizem/IV heparin for ACS protocol  Troponin every 3 hours x 2 set  Consult cardiologist/nephrologist  Obtain 2D echocardiogram  Case discussed with nephrologist.  Patient will need a permacatheter for initiation of hemodialysis  Patient has failed peritoneal dialysis.  D/W Dr Glenard Romberg at [facility] doctors who is primary on patient  Hold Coumadin for now  GI prophylaxis  He is full code      Signed By: Carissa Manrique MD     October 7, 2021

## 2021-10-07 NOTE — ACP (ADVANCE CARE PLANNING)
Advance Care Planning   Healthcare Decision Maker:       Primary Decision Maker: Chilo Hunt - Girlfriend - [phone]

## 2021-10-07 NOTE — Clinical Note
Venogram completed and permacath placed in right IJ, biopatch and tegaderm applied to site.  Pt tolerated procedure well

## 2021-10-07 NOTE — ED TRIAGE NOTES
Bilateral foot pain. Reports being dx with neuropathy last week. No pain improvement. Pt also reports needing permacath placed due to inadequate dialysis with PD at home. Sent by dialysis for labs. Pt denies CP or SOB during triage.

## 2021-10-07 NOTE — ED PROVIDER NOTES
EMERGENCY DEPARTMENT HISTORY AND PHYSICAL EXAM        Date: 10/7/2021  Patient Name: Xu Hensley    History of Presenting Illness     Chief Complaint   Patient presents with    Foot Pain       History Provided By: Patient    HPI: Xu Hensley, 52 y.o. male with history of atrial fibrillation, hypertension, end-stage chronic kidney disease on peritoneal dialysis, CVA, pulmonary embolism, and coronary artery disease who presents with feet pain. States symptoms have been present for about 10 days. Pain has been constant, severe, bilateral, and radiate up his legs into his middle back. Denies any fevers, saddle anesthesia, weakness, bowel or bladder problems, IV drug use. He does do peritoneal dialysis at home. PCP: Elliot Krishna MD    Current Facility-Administered Medications   Medication Dose Route Frequency Provider Last Rate Last Admin    dilTIAZem (CARDIZEM) 125 mg/125 mL (1 mg/mL) dextrose 5% infusion  0-15 mg/hr IntraVENous TITRATE Kin Artemio ARROYO, DO 5 mL/hr at 10/07/21 1210 5 mg/hr at 10/07/21 1210     Current Outpatient Medications   Medication Sig Dispense Refill    warfarin (Jantoven) 5 mg tablet Take 5 mg by mouth daily.  montelukast (SINGULAIR) 10 mg tablet Take 10 mg by mouth daily.  amitriptyline (ELAVIL) 25 mg tablet Take 1 Tablet by mouth nightly. 30 Tablet 1    cloNIDine (CATAPRES) 0.1 mg/24 hr ptwk 1 Patch by TransDERmal route every seven (7) days. Indications: CHANGES ON SUNDAY      allopurinoL (ZYLOPRIM) 100 mg tablet Take 50 mg by mouth. TAKES ON Monday AND Thursday      carvediloL (Coreg) 25 mg tablet Take 25 mg by mouth two (2) times daily (with meals).  cyanocobalamin/salcaprozat sod (ELIGEN B12 PO) Take  by mouth every fourty-eight (48) hours.  ERGOCALCIFEROL, VITAMIN D2, PO Take  by mouth every seven (7) days. Indications: ON SUNDAY      predniSONE (DELTASONE) 5 mg tablet Take  by mouth.       amLODIPine (NORVASC) 10 mg tablet Take 1 Tab by mouth daily. 30 Tab 12    sevelamer carbonate (RENVELA) 800 mg tab tab Take 800 mg by mouth Before breakfast, lunch, and dinner.  bumetanide (BUMEX) 2 mg tablet Take 2 mg by mouth three (3) times daily.  spironolactone (ALDACTONE) 25 mg tablet Take  by mouth daily. (Patient not taking: Reported on 10/7/2021)         Past History     Past Medical History:  Past Medical History:   Diagnosis Date    Arrhythmia     A-FIB    CAD (coronary artery disease)     MI 26    CHF (congestive heart failure) (HCC)     Chronic kidney disease     END STAGE KIDNEY DISEASE    Hypertension     Other ill-defined conditions(799.89)     Sleep apnea     WEARS CPAP    Stroke ([facility] [address] 01/2020    NUMBNESS IN RIGHT LEG AND FOOT    Thromboembolus ([facility] [address] 2018    PE       Past Surgical History:  Past Surgical History:   Procedure Laterality Date    CARDIAC SURG PROCEDURE UNLIST      CARDIAC CATHX2-NO STENTS    HX ORTHOPAEDIC      LEFT HAND, RIGHT ANKLE, AND RIGHT SHOULDER    HX TONSILLECTOMY      HX VASCULAR ACCESS      UPPER RIGHT CHEST       Family History:  Family History   Problem Relation Age of Onset    Stroke Mother     Heart Disease Mother     Diabetes Mother     Kidney Disease Mother     Heart Disease Sister     Lung Disease Brother     No Known Problems Sister     Deep Vein Thrombosis Brother     Anesth Problems Neg Hx        Social History:  Social History     Tobacco Use    Smoking status: Former Smoker    Smokeless tobacco: Never Used    Tobacco comment: CIGARS ONCE PER MONTH-NOTHING SINCE 2018   Substance Use Topics    Alcohol use: Never     Comment: NOT FOR 3 YEARS    Drug use: No       Allergies: Allergies   Allergen Reactions    Lisinopril Swelling    Sulfa (Sulfonamide Antibiotics) Other (comments)     Had sores all over body    Sulfa (Sulfonamide Antibiotics) Hives         Review of Systems   Review of Systems   Constitutional: Negative for fever. HENT: Negative for congestion.     Eyes: Negative for visual disturbance. Respiratory: Negative for shortness of breath. Cardiovascular: Negative for chest pain. Gastrointestinal: Negative for abdominal pain. Genitourinary: Negative for dysuria. Musculoskeletal: Negative for arthralgias. Positive for back pain and feet pain   Skin: Negative for rash. Neurological: Negative for headaches. Physical Exam   Constitutional: No acute distress. Well-nourished. Skin: No rash. ENT: No rhinorrhea. No cough. Head is normocephalic and atraumatic. Eye: No proptosis or conjunctival injections. Respiratory: No apparent respiratory distress. Lung sounds are clear. Cardiovascular: Tachycardic with irregular rhythm. No murmurs. Gastrointestinal: Nondistended. Abdomen is nontender. Musculoskeletal: No obvious bony deformities. Tenderness to the feet bilaterally. Psychiatric: Cooperative. Appropriate mood and affect. Diagnostic Study Results     Labs -     Recent Results (from the past 24 hour(s))   CBC WITH AUTOMATED DIFF    Collection Time: 10/07/21 11:15 AM   Result Value Ref Range    WBC 8.3 4.1 - 11.1 K/uL    RBC 3.05 (L) 4.10 - 5.70 M/uL    HGB 9.6 (L) 12.1 - 17.0 g/dL    HCT 31.3 (L) 36.6 - 50.3 %    .6 (H) 80.0 - 99.0 FL    MCH 31.5 26.0 - 34.0 PG    MCHC 30.7 30.0 - 36.5 g/dL    RDW 16.2 (H) 11.5 - 14.5 %    PLATELET 101 849 - 954 K/uL    MPV 11.4 8.9 - 12.9 FL    NRBC 0.0 0.0  WBC    ABSOLUTE NRBC 0.00 0.00 - 0.01 K/uL    NEUTROPHILS 73 32 - 75 %    LYMPHOCYTES 8 (L) 12 - 49 %    MONOCYTES 13 5 - 13 %    EOSINOPHILS 4 0 - 7 %    BASOPHILS 1 0 - 1 %    IMMATURE GRANULOCYTES 1 (H) 0 - 0.5 %    ABS. NEUTROPHILS 6.1 1.8 - 8.0 K/UL    ABS. LYMPHOCYTES 0.7 (L) 0.8 - 3.5 K/UL    ABS. MONOCYTES 1.1 (H) 0.0 - 1.0 K/UL    ABS. EOSINOPHILS 0.3 0.0 - 0.4 K/UL    ABS. BASOPHILS 0.1 0.0 - 0.1 K/UL    ABS. IMM.  GRANS. 0.1 (H) 0.00 - 0.04 K/UL    DF AUTOMATED     METABOLIC PANEL, COMPREHENSIVE    Collection Time: 10/07/21 11:15 AM   Result Value Ref Range    Sodium 139 136 - 145 mmol/L    Potassium 3.6 3.5 - 5.1 mmol/L    Chloride 98 97 - 108 mmol/L    CO2 28 21 - 32 mmol/L    Anion gap 13 5 - 15 mmol/L    Glucose 95 65 - 100 mg/dL    BUN 61 (H) 6 - 20 mg/dL    Creatinine 24.00 (H) 0.70 - 1.30 mg/dL    BUN/Creatinine ratio 3 (L) 12 - 20      GFR est AA 2 (L) >60 ml/min/1.73m2    GFR est non-AA 2 (L) >60 ml/min/1.73m2    Calcium 7.9 (L) 8.5 - 10.1 mg/dL    Bilirubin, total 0.4 0.2 - 1.0 mg/dL    AST (SGOT) 32 15 - 37 U/L    ALT (SGPT) 32 12 - 78 U/L    Alk. phosphatase 59 45 - 117 U/L    Protein, total 8.5 (H) 6.4 - 8.2 g/dL    Albumin 2.6 (L) 3.5 - 5.0 g/dL    Globulin 5.9 (H) 2.0 - 4.0 g/dL    A-G Ratio 0.4 (L) 1.1 - 2.2     TROPONIN-HIGH SENSITIVITY    Collection Time: 10/07/21 11:15 AM   Result Value Ref Range    Troponin-High Sensitivity 112 (HH) 0 - 76 ng/L   BNP    Collection Time: 10/07/21 11:15 AM   Result Value Ref Range    NT pro-BNP >35,000 (H) <125 pg/mL   EKG, 12 LEAD, INITIAL    Collection Time: 10/07/21 11:15 AM   Result Value Ref Range    Ventricular Rate 146 BPM    Atrial Rate 156 BPM    QRS Duration 78 ms    Q-T Interval 318 ms    QTC Calculation (Bezet) 495 ms    Calculated R Axis 40 degrees    Calculated T Axis -178 degrees    Diagnosis       Atrial fibrillation with rapid ventricular response  T wave abnormality, consider lateral ischemia  Abnormal ECG  No previous ECGs available  Confirmed by Dallas Bowling MD ([id number]) on 10/7/2021 11:36:39 AM     PROTHROMBIN TIME + INR    Collection Time: 10/07/21 11:30 AM   Result Value Ref Range    Prothrombin time 16.9 (H) 11.9 - 14.7 sec    INR 1.4 (H) 0.9 - 1.1         Radiologic Studies -   XR CHEST PORT   Final Result        CT Results  (Last 48 hours)    None        CXR Results  (Last 48 hours)               10/07/21 1147  XR CHEST PORT Final result    Narrative:  Chest single view. Comparison chest series November 5, 2020.    Right-sided permacath has been removed in the interval between comparison   studies. Unchanged appearance for the lungs; no gross interstitial or alveolar pulmonary   edema. Cardiac and mediastinal structures unchanged. No pneumothorax or pleural   effusion. Medical Decision Making and ED Course     I reviewed the available vital signs, nursing notes, past medical history, past surgical history, family history, and social history. Vital Signs - Reviewed the patient's vital signs. Patient Vitals for the past 12 hrs:   Temp Pulse Resp BP SpO2   10/07/21 1222 -- (!) 124 -- (!) 145/110 --   10/07/21 1215 -- (!) 139 13 (!) 153/125 99 %   10/07/21 1112 99 °F (37.2 °C) -- -- -- --   10/07/21 1111 99 °F (37.2 °C) 77 16 (!) 201/135 98 %       EKG interpretation: Obtained on 10/7/2021 at 1115. Read at 1120. Atrial fibrillation with RVR at rate of 146 bpm.  OR interval indistinguishable. Normal QRS duration and QTc interval.  Normal axis. No ST segment abnormalities. Medical Decision Making:   Presented with foot pain. The differential diagnosis is arrhythmia, SVT, atrial fibrillation, atrial flutter, electrolyte abnormality, acute on chronic renal failure, neuropathy, nerve impingement, herniated disc. Patient has atrial fibrillation with RVR. He was started on diltiazem infusion for this. He was given a bolus as well. He does have what appears to be worsening renal failure. Will admit for renal failure and atrial fibrillation with RVR. Procedures   Critical Care Note:   11:31 AM  Amount of critical care time: 45 minutes  Impending deterioration: Cardiovascular  Associated risk factors: Dysrhythmia  Management: Bedside Assessment and Supervision of Care  Interpretation: ECG  Interventions: Diltiazem infusion  Case review: hospitalist  Treatment response: improving  Performed by: Self  Notes: I have spent critical care time involved in lab review, decision making, bedside attention, and documentation.  This time excludes time spent in any separate billed procedures. During this entire length of time I was immediately available to the patient. Layne Nieves DO    Disposition     Admitted      Diagnosis     Clinical impression:   1. Atrial fibrillation with RVR (HCC)    2. Pain in both feet    3. Chronic renal failure, unspecified CKD stage           Attestation:  Please note that this dictation was completed with [employer], the computer voice recognition software. Quite often unanticipated grammatical, syntax, homophones, and other interpretive errors are inadvertently transcribed by the computer software. Please disregard these errors. Please excuse any errors that have escaped final proofreading. Thank you.   Layne Nieves DO

## 2021-10-07 NOTE — PROGRESS NOTES
10/7/21. PCP is Dr. Mau Diallo. D/C Plan is home with (GF) Matt Rivers @ [phone] & a family member will transport pt home upon discharge. Pt uses no DME/no home health.

## 2021-10-08 ENCOUNTER — APPOINTMENT (OUTPATIENT)
Dept: NON INVASIVE DIAGNOSTICS | Age: 51
DRG: 194 | End: 2021-10-08
Attending: INTERNAL MEDICINE
Payer: MEDICAID

## 2021-10-08 ENCOUNTER — APPOINTMENT (OUTPATIENT)
Dept: INTERVENTIONAL RADIOLOGY/VASCULAR | Age: 51
DRG: 194 | End: 2021-10-08
Attending: INTERNAL MEDICINE
Payer: MEDICAID

## 2021-10-08 LAB
ANION GAP SERPL CALC-SCNC: 13 MMOL/L (ref 5–15)
APTT PPP: 106.6 SEC (ref 21.2–34.1)
APTT PPP: 135.4 SEC (ref 21.2–34.1)
APTT PPP: 60.9 SEC (ref 21.2–34.1)
APTT PPP: 93.2 SEC (ref 21.2–34.1)
BUN SERPL-MCNC: 68 MG/DL (ref 6–20)
BUN/CREAT SERPL: 3 (ref 12–20)
CA-I BLD-MCNC: 8.1 MG/DL (ref 8.5–10.1)
CHLORIDE SERPL-SCNC: 99 MMOL/L (ref 97–108)
CO2 SERPL-SCNC: 28 MMOL/L (ref 21–32)
CREAT SERPL-MCNC: 25.2 MG/DL (ref 0.7–1.3)
ECHO AV AREA PEAK VELOCITY: 2.98 CM2
ECHO AV AREA VTI: 3.35 CM2
ECHO AV AREA/BSA PEAK VELOCITY: 1.1 CM2/M2
ECHO AV AREA/BSA VTI: 1.2 CM2/M2
ECHO AV MEAN GRADIENT: 4 MMHG
ECHO AV MEAN VELOCITY: 92.2 CM/S
ECHO AV PEAK GRADIENT: 7 MMHG
ECHO AV PEAK VELOCITY: 130 CM/S
ECHO AV VTI: 20.9 CM
ECHO EST RA PRESSURE: 8 MMHG
ECHO LA AREA 4C: 31.9 CM2
ECHO LA MAJOR AXIS: 5 CM
ECHO LA MINOR AXIS: 1.8 CM
ECHO LV E' SEPTAL VELOCITY: 10.3 CM/S
ECHO LV EDV A2C: 90.5 CM3
ECHO LV EDV A4C: 179 CM3
ECHO LV ESV A2C: 88.7 CM3
ECHO LV ESV A4C: 85 CM3
ECHO LV INTERNAL DIMENSION DIASTOLIC: 4.49 CM (ref 4.2–5.9)
ECHO LV INTERNAL DIMENSION SYSTOLIC: 4.46 CM
ECHO LV IVSD: 1.77 CM (ref 0.6–1)
ECHO LV MASS 2D: 290.5 G (ref 88–224)
ECHO LV MASS INDEX 2D: 104.5 G/M2 (ref 49–115)
ECHO LV POSTERIOR WALL DIASTOLIC: 1.34 CM (ref 0.6–1)
ECHO LVOT DIAM: 2.6 CM
ECHO LVOT PEAK GRADIENT: 2 MMHG
ECHO LVOT PEAK VELOCITY: 72.9 CM/S
ECHO LVOT SV: 70 CM3
ECHO LVOT VTI: 12.4 CM
ECHO MV A VELOCITY: 59.8 CM/S
ECHO MV AREA PHT: 8.8 CM2
ECHO MV E VELOCITY: 104 CM/S
ECHO MV E/A RATIO: 1.74
ECHO MV E/E' SEPTAL: 10.1
ECHO MV PRESSURE HALF TIME (PHT): 25 MS
ECHO MV REGURGITANT VTIA: 90.8 CM
ECHO PV MAX VELOCITY: 85.2 CM/S
ECHO PV MEAN GRADIENT: 1 MMHG
ECHO PV PEAK INSTANTANEOUS GRADIENT SYSTOLIC: 3 MMHG
ECHO PV VTI: 15.3 CM
ECHO RIGHT VENTRICULAR SYSTOLIC PRESSURE (RVSP): 31 MMHG
ECHO TV MAX VELOCITY: 242 CM/S
ECHO TV MEAN GRADIENT: 25 MMHG
ECHO TV REGURGITANT PEAK GRADIENT: 23 MMHG
ERYTHROCYTE [DISTWIDTH] IN BLOOD BY AUTOMATED COUNT: 15.9 % (ref 11.5–14.5)
GLUCOSE SERPL-MCNC: 92 MG/DL (ref 65–100)
HCT VFR BLD AUTO: 27.5 % (ref 36.6–50.3)
HGB BLD-MCNC: 8.4 G/DL (ref 12.1–17)
LA VOL DISK BP: 129 CM3 (ref 18–58)
LVOT MG: 1 MMHG
MCH RBC QN AUTO: 31.5 PG (ref 26–34)
MCHC RBC AUTO-ENTMCNC: 30.5 G/DL (ref 30–36.5)
MCV RBC AUTO: 103 FL (ref 80–99)
MV DEC SLOPE: 7890 MM/S2
MV DEC SLOPE: ABNORMAL MM/S2
MV DEC SLOPE: ABNORMAL MM/S2
NRBC # BLD: 0.02 K/UL (ref 0–0.01)
NRBC BLD-RTO: 0.3 PER 100 WBC
PLATELET # BLD AUTO: 212 K/UL (ref 150–400)
PMV BLD AUTO: 11.3 FL (ref 8.9–12.9)
POTASSIUM SERPL-SCNC: 3.7 MMOL/L (ref 3.5–5.1)
RBC # BLD AUTO: 2.67 M/UL (ref 4.1–5.7)
SODIUM SERPL-SCNC: 140 MMOL/L (ref 136–145)
THERAPEUTIC RANGE,PTTT: ABNORMAL SEC
THERAPEUTIC RANGE,PTTT: ABNORMAL SEC (ref 82–109)
TROPONIN-HIGH SENSITIVITY: 60 NG/L (ref 0–76)
TROPONIN-HIGH SENSITIVITY: 74 NG/L (ref 0–76)
TROPONIN-HIGH SENSITIVITY: 92 NG/L (ref 0–76)
WBC # BLD AUTO: 7.3 K/UL (ref 4.1–11.1)

## 2021-10-08 PROCEDURE — 80048 BASIC METABOLIC PNL TOTAL CA: CPT

## 2021-10-08 PROCEDURE — 86803 HEPATITIS C AB TEST: CPT

## 2021-10-08 PROCEDURE — 74011000250 HC RX REV CODE- 250: Performed by: EMERGENCY MEDICINE

## 2021-10-08 PROCEDURE — 90935 HEMODIALYSIS ONE EVALUATION: CPT

## 2021-10-08 PROCEDURE — 5A1D70Z PERFORMANCE OF URINARY FILTRATION, INTERMITTENT, LESS THAN 6 HOURS PER DAY: ICD-10-PCS | Performed by: INTERNAL MEDICINE

## 2021-10-08 PROCEDURE — 87340 HEPATITIS B SURFACE AG IA: CPT

## 2021-10-08 PROCEDURE — 76937 US GUIDE VASCULAR ACCESS: CPT

## 2021-10-08 PROCEDURE — 74011636637 HC RX REV CODE- 636/637: Performed by: INTERNAL MEDICINE

## 2021-10-08 PROCEDURE — 74011250636 HC RX REV CODE- 250/636: Performed by: INTERNAL MEDICINE

## 2021-10-08 PROCEDURE — 77002 NEEDLE LOCALIZATION BY XRAY: CPT

## 2021-10-08 PROCEDURE — 36415 COLL VENOUS BLD VENIPUNCTURE: CPT

## 2021-10-08 PROCEDURE — 85027 COMPLETE CBC AUTOMATED: CPT

## 2021-10-08 PROCEDURE — 86704 HEP B CORE ANTIBODY TOTAL: CPT

## 2021-10-08 PROCEDURE — 84484 ASSAY OF TROPONIN QUANT: CPT

## 2021-10-08 PROCEDURE — 02H633Z INSERTION OF INFUSION DEVICE INTO RIGHT ATRIUM, PERCUTANEOUS APPROACH: ICD-10-PCS | Performed by: INTERNAL MEDICINE

## 2021-10-08 PROCEDURE — 74011250637 HC RX REV CODE- 250/637: Performed by: INTERNAL MEDICINE

## 2021-10-08 PROCEDURE — 74011250636 HC RX REV CODE- 250/636: Performed by: RADIOLOGY

## 2021-10-08 PROCEDURE — C8929 TTE W OR WO FOL WCON,DOPPLER: HCPCS

## 2021-10-08 PROCEDURE — 86706 HEP B SURFACE ANTIBODY: CPT

## 2021-10-08 PROCEDURE — 85730 THROMBOPLASTIN TIME PARTIAL: CPT

## 2021-10-08 PROCEDURE — C1769 GUIDE WIRE: HCPCS

## 2021-10-08 PROCEDURE — 36005 INJECTION EXT VENOGRAPHY: CPT

## 2021-10-08 PROCEDURE — 74011000636 HC RX REV CODE- 636: Performed by: INTERNAL MEDICINE

## 2021-10-08 PROCEDURE — 65270000029 HC RM PRIVATE

## 2021-10-08 RX ORDER — HEPARIN SODIUM 1000 [USP'U]/ML
3600 INJECTION, SOLUTION INTRAVENOUS; SUBCUTANEOUS
Status: DISCONTINUED | OUTPATIENT
Start: 2021-10-08 | End: 2021-10-13

## 2021-10-08 RX ORDER — CEFAZOLIN SODIUM 1 G/3ML
2 INJECTION, POWDER, FOR SOLUTION INTRAMUSCULAR; INTRAVENOUS ONCE
Status: COMPLETED | OUTPATIENT
Start: 2021-10-08 | End: 2021-10-08

## 2021-10-08 RX ORDER — MIDAZOLAM HYDROCHLORIDE 1 MG/ML
.5-2 INJECTION, SOLUTION INTRAMUSCULAR; INTRAVENOUS
Status: DISCONTINUED | OUTPATIENT
Start: 2021-10-08 | End: 2021-10-08

## 2021-10-08 RX ORDER — HEPARIN SODIUM 1000 [USP'U]/ML
INJECTION, SOLUTION INTRAVENOUS; SUBCUTANEOUS
Status: DISPENSED
Start: 2021-10-08 | End: 2021-10-09

## 2021-10-08 RX ORDER — FENTANYL CITRATE 50 UG/ML
25-100 INJECTION, SOLUTION INTRAMUSCULAR; INTRAVENOUS
Status: DISCONTINUED | OUTPATIENT
Start: 2021-10-08 | End: 2021-10-08

## 2021-10-08 RX ADMIN — Medication 10 ML: at 08:23

## 2021-10-08 RX ADMIN — SEVELAMER CARBONATE 800 MG: 800 TABLET, FILM COATED ORAL at 08:18

## 2021-10-08 RX ADMIN — AMITRIPTYLINE HYDROCHLORIDE 25 MG: 50 TABLET, FILM COATED ORAL at 21:51

## 2021-10-08 RX ADMIN — CARVEDILOL 25 MG: 12.5 TABLET, FILM COATED ORAL at 08:19

## 2021-10-08 RX ADMIN — Medication 15 MG/HR: at 11:32

## 2021-10-08 RX ADMIN — SEVELAMER CARBONATE 800 MG: 800 TABLET, FILM COATED ORAL at 17:57

## 2021-10-08 RX ADMIN — MIDAZOLAM HYDROCHLORIDE 1 MG: 1 INJECTION, SOLUTION INTRAMUSCULAR; INTRAVENOUS at 12:43

## 2021-10-08 RX ADMIN — Medication 15 MG/HR: at 18:05

## 2021-10-08 RX ADMIN — HYDROCODONE BITARTRATE AND ACETAMINOPHEN 1 TABLET: 5; 325 TABLET ORAL at 21:51

## 2021-10-08 RX ADMIN — HEPARIN SODIUM AND DEXTROSE 16 UNITS/KG/HR: 10000; 5 INJECTION INTRAVENOUS at 18:20

## 2021-10-08 RX ADMIN — PREDNISONE 5 MG: 5 TABLET ORAL at 08:19

## 2021-10-08 RX ADMIN — HYDROCODONE BITARTRATE AND ACETAMINOPHEN 1 TABLET: 5; 325 TABLET ORAL at 08:22

## 2021-10-08 RX ADMIN — AMLODIPINE BESYLATE 10 MG: 5 TABLET ORAL at 08:18

## 2021-10-08 RX ADMIN — FUROSEMIDE 80 MG: 10 INJECTION, SOLUTION INTRAMUSCULAR; INTRAVENOUS at 17:57

## 2021-10-08 RX ADMIN — CEFAZOLIN 2 G: 1 INJECTION, POWDER, FOR SOLUTION INTRAMUSCULAR; INTRAVENOUS at 12:50

## 2021-10-08 RX ADMIN — FENTANYL CITRATE 50 MCG: 0.05 INJECTION, SOLUTION INTRAMUSCULAR; INTRAVENOUS at 12:43

## 2021-10-08 RX ADMIN — HYDROMORPHONE HYDROCHLORIDE 1 MG: 1 INJECTION, SOLUTION INTRAMUSCULAR; INTRAVENOUS; SUBCUTANEOUS at 18:12

## 2021-10-08 RX ADMIN — IOPAMIDOL 75 ML: 755 INJECTION, SOLUTION INTRAVENOUS at 14:06

## 2021-10-08 RX ADMIN — CARVEDILOL 25 MG: 12.5 TABLET, FILM COATED ORAL at 17:58

## 2021-10-08 RX ADMIN — Medication 10 ML: at 18:08

## 2021-10-08 RX ADMIN — HEPARIN SODIUM 2000 UNITS: 1000 INJECTION, SOLUTION INTRAVENOUS; SUBCUTANEOUS at 09:52

## 2021-10-08 RX ADMIN — HEPARIN SODIUM 3600 UNITS: 1000 INJECTION, SOLUTION INTRAVENOUS; SUBCUTANEOUS at 16:15

## 2021-10-08 NOTE — PROGRESS NOTES
Hospitalist Progress Note    Subjective:   Daily Progress Note: 10/8/2021 1:48 PM    Hospital Course:  72-year-old -American man with a history of chronic atrial fibrillation, coronary artery disease, status post myocardial infarction in 2009, congestive heart failure, hypertension, PE and end-stage renal disease on PD presented to emergency room with complaints of increasing bilateral leg edema, shortness of breath and increasing weight gain of past few days duration. Nephrologist has been trying to switch him to hemodialysis. Patient was found to be in Afib with RVR. Plan is to initiate HD on this admission. Subjective:  Patient reports that he is feeling better. All questions and concern answered.      Current Facility-Administered Medications   Medication Dose Route Frequency    perflutren lipid microspheres (DEFINITY) 1.1 mg/mL contrast injection        peritoneal dialysis DEXTROSE 2.5% (2.5 mEq/L low calcium) 2,000 mL with heparin (porcine) 4,000 Units   IntraPERitoneal ONCE    dilTIAZem (CARDIZEM) 125 mg/125 mL (1 mg/mL) dextrose 5% infusion  0-15 mg/hr IntraVENous TITRATE    sodium chloride (NS) flush 5-40 mL  5-40 mL IntraVENous Q8H    sodium chloride (NS) flush 5-40 mL  5-40 mL IntraVENous PRN    acetaminophen (TYLENOL) tablet 650 mg  650 mg Oral Q6H PRN    Or    acetaminophen (TYLENOL) suppository 650 mg  650 mg Rectal Q6H PRN    polyethylene glycol (MIRALAX) packet 17 g  17 g Oral DAILY PRN    ondansetron (ZOFRAN ODT) tablet 4 mg  4 mg Oral Q8H PRN    Or    ondansetron (ZOFRAN) injection 4 mg  4 mg IntraVENous Q6H PRN    amitriptyline (ELAVIL) tablet 25 mg  25 mg Oral QHS    amLODIPine (NORVASC) tablet 10 mg  10 mg Oral DAILY    carvediloL (COREG) tablet 25 mg  25 mg Oral BID WITH MEALS    cloNIDine (CATAPRES) 0.1 mg/24 hr patch 1 Patch  1 Patch TransDERmal Q7D    predniSONE (DELTASONE) tablet 5 mg  5 mg Oral DAILY WITH BREAKFAST    sevelamer carbonate (RENVELA) tab 800 mg  800 mg Oral TIDAC    spironolactone (ALDACTONE) tablet 25 mg  25 mg Oral DAILY    [Held by provider] warfarin (COUMADIN) tablet 5 mg  5 mg Oral DAILY    furosemide (LASIX) injection 80 mg  80 mg IntraVENous Q12H    HYDROmorphone (DILAUDID) injection 1 mg  1 mg IntraVENous Q6H PRN    HYDROcodone-acetaminophen (NORCO) 5-325 mg per tablet 1 Tablet  1 Tablet Oral Q4H PRN    heparin 25,000 units in D5W 250 ml infusion  12-25 Units/kg/hr (Adjusted) IntraVENous TITRATE    heparin (porcine) 1,000 unit/mL injection 4,000 Units  4,000 Units IntraVENous PRN    Or    heparin (porcine) 1,000 unit/mL injection 2,000 Units  2,000 Units IntraVENous PRN     Current Outpatient Medications   Medication Sig    montelukast (SINGULAIR) 10 mg tablet Take 10 mg by mouth daily.  amitriptyline (ELAVIL) 25 mg tablet Take 1 Tablet by mouth nightly.  cloNIDine (CATAPRES) 0.1 mg/24 hr ptwk 1 Patch by TransDERmal route every seven (7) days. Indications: CHANGES ON SUNDAY    allopurinoL (ZYLOPRIM) 100 mg tablet Take 50 mg by mouth. TAKES ON Monday AND Thursday    carvediloL (Coreg) 25 mg tablet Take 25 mg by mouth two (2) times daily (with meals).  cyanocobalamin/salcaprozat sod (ELIGEN B12 PO) Take  by mouth every fourty-eight (48) hours.  ERGOCALCIFEROL, VITAMIN D2, PO Take  by mouth every seven (7) days. Indications: ON SUNDAY    predniSONE (DELTASONE) 5 mg tablet Take  by mouth.  amLODIPine (NORVASC) 10 mg tablet Take 1 Tab by mouth daily.  sevelamer carbonate (RENVELA) 800 mg tab tab Take 800 mg by mouth Before breakfast, lunch, and dinner.  bumetanide (BUMEX) 2 mg tablet Take 2 mg by mouth three (3) times daily.  spironolactone (ALDACTONE) 25 mg tablet Take  by mouth daily.  (Patient not taking: Reported on 10/7/2021)        Review of Systems  Constitutional: No fevers, No chills, No sweats, No fatigue, No Weakness  Eyes: No redness  Ears, nose, mouth, throat, and face: No nasal congestion, No sore throat, No voice change  Respiratory: No Shortness of Breath, No cough, No wheezing  Cardiovascular: No chest pain, No palpitations, No extremity edema  Gastrointestinal: No nausea, No vomiting, No diarrhea, No abdominal pain  Genitourinary: No frequency, No dysuria, No hematuria  Integument/breast: No skin lesion(s)   Neurological: No Confusion, No headaches, No dizziness      Objective:     Visit Vitals  /89   Pulse (!) 108   Temp 98.7 °F (37.1 °C)   Resp 20   Ht 6' 3\" (1.905 m)   Wt 158.8 kg (350 lb)   SpO2 99%   BMI 43.75 kg/m²      O2 Device: None (Room air)    Temp (24hrs), Av.4 °F (36.9 °C), Min:98.1 °F (36.7 °C), Max:98.7 °F (37.1 °C)      No intake/output data recorded. No intake/output data recorded. PHYSICAL EXAM:  Constitutional: No acute distress  Skin: Extremities and face reveal no rashes. HEENT: Sclerae anicteric. Extra-occular muscles are intact. No oral ulcers. The neck is supple and no masses. Cardiovascular: Regular rate and rhythm. Respiratory:  Clear breath sounds bilaterally with no wheezes, rales, or rhonchi. GI: Abdomen nondistended, soft, and nontender. Normal active bowel sounds. Musculoskeletal: No pitting edema of the lower legs. Able to move all ext  Neurological:  Patient is alert and oriented.  Cranial nerves II-XII grossly intact  Psychiatric: Mood appears appropriate       Data Review    Recent Results (from the past 24 hour(s))   PTT    Collection Time: 10/07/21  4:00 PM   Result Value Ref Range    aPTT 39.1 (H) 21.2 - 34.1 sec    aPTT, therapeutic range   82 - 109 sec   TROPONIN-HIGH SENSITIVITY    Collection Time: 10/07/21 10:00 PM   Result Value Ref Range    Troponin-High Sensitivity 91 (HH) 0 - 76 ng/L   PTT    Collection Time: 10/07/21 10:00 PM   Result Value Ref Range    aPTT 41.9 (H) 21.2 - 34.1 sec    aPTT, therapeutic range   82 - 109 sec   PTT    Collection Time: 10/08/21  7:02 AM   Result Value Ref Range    aPTT 135.4 (HH) 21.2 - 34.1 sec    aPTT, therapeutic range   sec   METABOLIC PANEL, BASIC    Collection Time: 10/08/21  8:00 AM   Result Value Ref Range    Sodium 140 136 - 145 mmol/L    Potassium 3.7 3.5 - 5.1 mmol/L    Chloride 99 97 - 108 mmol/L    CO2 28 21 - 32 mmol/L    Anion gap 13 5 - 15 mmol/L    Glucose 92 65 - 100 mg/dL    BUN 68 (H) 6 - 20 mg/dL    Creatinine 25.20 (H) 0.70 - 1.30 mg/dL    BUN/Creatinine ratio 3 (L) 12 - 20      GFR est AA 2 (L) >60 ml/min/1.73m2    GFR est non-AA 2 (L) >60 ml/min/1.73m2    Calcium 8.1 (L) 8.5 - 10.1 mg/dL   CBC W/O DIFF    Collection Time: 10/08/21  8:00 AM   Result Value Ref Range    WBC 7.3 4.1 - 11.1 K/uL    RBC 2.67 (L) 4.10 - 5.70 M/uL    HGB 8.4 (L) 12.1 - 17.0 g/dL    HCT 27.5 (L) 36.6 - 50.3 %    .0 (H) 80.0 - 99.0 FL    MCH 31.5 26.0 - 34.0 PG    MCHC 30.5 30.0 - 36.5 g/dL    RDW 15.9 (H) 11.5 - 14.5 %    PLATELET 403 917 - 564 K/uL    MPV 11.3 8.9 - 12.9 FL    NRBC 0.3 (H) 0.0  WBC    ABSOLUTE NRBC 0.02 (H) 0.00 - 0.01 K/uL   TROPONIN-HIGH SENSITIVITY    Collection Time: 10/08/21  8:00 AM   Result Value Ref Range    Troponin-High Sensitivity 92 (HH) 0 - 76 ng/L   PTT    Collection Time: 10/08/21  8:45 AM   Result Value Ref Range    aPTT 60.9 (H) 21.2 - 34.1 sec    aPTT, therapeutic range   82 - 109 sec   ECHO ADULT COMPLETE    Collection Time: 10/08/21 12:03 PM   Result Value Ref Range    LV ED Vol A4C 179.00 cm3    LV ED Vol A2C 90.50 cm3    LV ES Vol A4C 85.00 cm3    LV ES Vol A2C 88.70 cm3    IVSd 1.77 (A) 0.60 - 1.00 cm    LVIDd 4.49 4.20 - 5.90 cm    LVIDs 4.46 cm    LVOT d 2.60 cm    Left Ventricular Outflow Tract Mean Gradient 1.00 mmHg    LVOT VTI 12.40 cm    LVOT Peak Velocity 72.90 cm/s    LVOT Peak Gradient 2.00 mmHg    LVPWd 1.34 (A) 0.60 - 1.00 cm    LV E' Septal Velocity 10.30 cm/s    E/E' septal 10.10     LVOT SV 70.0 cm3    Left Atrium Major [place] 5.00 cm    LA Area 4C 31.90 cm2    LA Volume DISK .00 18.0 - 58.0 cm3    Aortic Valve Systolic Mean Gradient 5.39 mmHg    AoV VTI 20.90 cm    Aortic valve mean velocity 92.20 cm/s    Aortic Valve Systolic Peak Velocity 607.81 cm/s    AoV PG 7.00 mmHg    Aortic Valve Area by Continuity of VTI 3.35 cm2    Aortic Valve Area by Continuity of Peak Velocity 2.98 cm2    TV MG 25.00 mmHg    Mitral Regurgitant Velocity Time Integral 90.80 cm    Mitral Valve Deceleration [place] 7,890.00 mm/s2    Mitral Valve Deceleration [place] 21,430.00 mm/s2    Mitral Valve Deceleration [place] 14,660.00 mm/s2    Mitral Valve Pressure Half-time 25.00 ms    MV A Berry 59.80 cm/s    MV E Berry 104.00 cm/s    MVA (PHT) 8.80 cm2    MV E/A 1.74     Pulmonic Valve Systolic Mean Gradient 6.37 mmHg    Pulmonic Valve Systolic Velocity Time Integral 15.30 cm    Pulmonic Valve Max Velocity 85.20 cm/s    Pulmonic Valve Systolic Peak Instantaneous Gradient 3.00 mmHg    Tricuspid Valve Max Velocity 242.00 cm/s    Triscuspid Valve Regurgitation Peak Gradient 23.00 mmHg    LV Mass .5 88.0 - 224.0 g    LV Mass AL Index 104.5 49.0 - 115.0 g/m2    RVSP 31.0 mmHg    Est. RA Pressure 8.0 mmHg    Left Atrium Minor Axis 1.80 cm    KUSH/BSA Pk Berry 1.1 cm2/m2    KUSH/BSA VTI 1.2 cm2/m2       Radiology review: y    Assessment / Plan:    53-year-old -American man with a history of chronic atrial fibrillation, coronary artery disease, status post myocardial infarction in 2009, congestive heart failure, hypertension, PE and end-stage renal disease on PD presented to emergency room with complaints of increasing bilateral leg edema, shortness of breath and increasing weight gain of past few days duration. Nephrologist has been trying to switch him to hemodialysis. Patient was found to be in Afib with RVR. Plan is to initiate HD on this admission. Afib with RVR  Continue diltiazem drip  Continue heparin drip  Hold coumadin in anticipation of multiple procedure while he is in hospital.    ESRD: has failed PD  Plan is to start patient on HD on this admission.   Plan for perm cath placement today  HD as per nephrology  Appreciate nephrology consult    NSTEMI:LIkely type II MI      HTN:  Continue clonidine, spironolactone, coreg, and amlodipine      40 or above Morbid obesity / Body mass index is 43.75 kg/m². Code status: Full  Prophylaxis: heparin  Recommended Disposition: Home w/Family     Care Plan discussed with: Patient/Family/RN/Case Management        Total time spent with patient: 35 minutes.

## 2021-10-08 NOTE — CONSULTS
53 yo man with ESRD due to hypertension. Was on PD but clearances inadequate and needs to switch to hemodialysis. He is left handed. He had a left permacath for a few months. No hx of right catheters or pacemakers. On exam has good pulses bilat. I would like to put fistula in right side. Will get right central venogram and have permacath placed on left if right central veins are okay. Then can work up left arm for a fistula as an outpatient. I have him on the OR schedule for Monday at noon to remove the PD cath. Please cont to hold the coumadin until after surgery. He can restart it on Monday. Thank you.

## 2021-10-08 NOTE — CONSULTS
Consult Date: 10/8/2021    IP CONSULT TO NEPHROLOGY  Consult performed by: Meche Knight MD  Consult ordered by: Jayda Bolden MD          Subjective   HISTORY OF PRESENTING ILLNESS :  Patient is a 57-year-old -American male is a history of ESRD on hemodialysis, hypertension, NEELMA on CPAP, morbid obesity, chronic A. fib, coronary artery disease who is being on peritoneal dialysis for the past 8 months. His nephrologist is Dr. Richard Rm at [facility]. Apparently he has not been tolerating peritoneal dialysis and is a plan to switch him to hemodialysis. He already has a chair at [address] [place] [address] for Wednesday today. His leg swelling has been progressively getting worse. He states he did an x-ray yesterday and fluid was not cloudy  He denies abdominal pain, nausea and vomiting.       Past Medical History:   Diagnosis Date    Arrhythmia     A-FIB    CAD (coronary artery disease)     MI 26    CHF (congestive heart failure) (HCC)     Chronic kidney disease     END STAGE KIDNEY DISEASE    Hypertension     Other ill-defined conditions(799.89)     Sleep apnea     WEARS CPAP    Stroke ([facility] 01/2020    NUMBNESS IN RIGHT LEG AND FOOT    Thromboembolus ([facility] [address] 2018    PE      Past Surgical History:   Procedure Laterality Date    CARDIAC SURG PROCEDURE UNLIST      CARDIAC CATHX2-NO STENTS    HX ORTHOPAEDIC      LEFT HAND, RIGHT ANKLE, AND RIGHT SHOULDER    HX TONSILLECTOMY      HX VASCULAR ACCESS      UPPER RIGHT CHEST     Family History   Problem Relation Age of Onset    Stroke Mother     Heart Disease Mother     Diabetes Mother     Kidney Disease Mother     Heart Disease Sister     Lung Disease Brother     No Known Problems Sister     Deep Vein Thrombosis Brother     Anesth Problems Neg Hx       Social History     Tobacco Use    Smoking status: Former Smoker    Smokeless tobacco: Never Used    Tobacco comment: CIGARS ONCE PER MONTH-NOTHING SINCE 2018   Substance Use Topics    Alcohol use: Never Comment: NOT FOR 3 YEARS       Current Facility-Administered Medications   Medication Dose Route Frequency Provider Last Rate Last Admin    perflutren lipid microspheres (DEFINITY) 1.1 mg/mL contrast injection             dilTIAZem (CARDIZEM) 125 mg/125 mL (1 mg/mL) dextrose 5% infusion  0-15 mg/hr IntraVENous TITRATE Guille Browning DO 15 mL/hr at 10/07/21 2200 15 mg/hr at 10/07/21 2200    sodium chloride (NS) flush 5-40 mL  5-40 mL IntraVENous Q8H Jayda Bolden MD   10 mL at 10/08/21 0823    sodium chloride (NS) flush 5-40 mL  5-40 mL IntraVENous PRN Jayda Bolden MD        acetaminophen (TYLENOL) tablet 650 mg  650 mg Oral Q6H PRN Jayda Bolden MD        Or   [facility] acetaminophen (TYLENOL) suppository 650 mg  650 mg Rectal Q6H PRN Jayda Bolden MD        polyethylene glycol (MIRALAX) packet 17 g  17 g Oral DAILY PRN Jayda Bolden MD        ondansetron (ZOFRAN ODT) tablet 4 mg  4 mg Oral Q8H PRN Jayda Bolden MD        Or    [facility]ron [facility]) injection 4 mg  4 mg IntraVENous Q6H PRN Jayda Bolden MD        amitriptyline (ELAVIL) tablet 25 mg  25 mg Oral QHS Jayda Bolden MD   25 mg at 10/07/21 2157    amLODIPine (NORVASC) tablet 10 mg  10 mg Oral DAILY Jayda Bolden MD   10 mg at 10/08/21 0818    carvediloL (COREG) tablet 25 mg  25 mg Oral BID WITH MEALS Jayda Bolden MD   25 mg at 10/08/21 6342    cloNIDine (CATAPRES) 0.1 mg/24 hr patch 1 Patch  1 Patch TransDERmal Q7D Jayda Bolden MD        predniSONE (DELTASONE) tablet 5 mg  5 mg Oral DAILY WITH BREAKFAST Jayda Bolden MD   5 mg at 10/08/21 2110    sevelamer carbonate (RENVELA) tab 800 mg  800 mg Oral TIDAC Jayda Bolden MD   800 mg at 10/08/21 0818    spironolactone (ALDACTONE) tablet 25 mg  25 mg Oral DAILY Jayda Bolden MD        [Held by provider] warfarin (COUMADIN) tablet 5 mg  5 mg Oral DAILY Jayda Bolden MD        furosemide (LASIX) injection 80 mg  80 mg IntraVENous Q12H Tamala Dakins, MD   80 mg at 10/07/21 1544    HYDROmorphone (DILAUDID) injection 1 mg  1 mg IntraVENous Q6H PRN Tamala Dakins, MD        HYDROcodone-acetaminophen [facility]) 5-325 mg per tablet 1 Tablet  1 Tablet Oral Q4H PRN Tamala Dakins, MD   1 Tablet at 10/08/21 7082    heparin 25,000 units in D5W 250 ml infusion  12-25 Units/kg/hr (Adjusted) IntraVENous TITRATE Tamala Dakins, MD 18.3 mL/hr at 10/08/21 0951 16 Units/kg/hr at 10/08/21 0951    heparin (porcine) 1,000 unit/mL injection 4,000 Units  4,000 Units IntraVENous PRN Tamala Dakins, MD        Or    heparin (porcine) 1,000 unit/mL injection 2,000 Units  2,000 Units IntraVENous PRN Tamala Dakins, MD   2,000 Units at 10/08/21 0451     Current Outpatient Medications   Medication Sig Dispense Refill    montelukast (SINGULAIR) 10 mg tablet Take 10 mg by mouth daily.  amitriptyline (ELAVIL) 25 mg tablet Take 1 Tablet by mouth nightly. 30 Tablet 1    cloNIDine (CATAPRES) 0.1 mg/24 hr ptwk 1 Patch by TransDERmal route every seven (7) days. Indications: CHANGES ON SUNDAY      allopurinoL (ZYLOPRIM) 100 mg tablet Take 50 mg by mouth. TAKES ON Monday AND Thursday      carvediloL (Coreg) 25 mg tablet Take 25 mg by mouth two (2) times daily (with meals).  cyanocobalamin/salcaprozat sod (ELIGEN B12 PO) Take  by mouth every fourty-eight (48) hours.  ERGOCALCIFEROL, VITAMIN D2, PO Take  by mouth every seven (7) days. Indications: ON SUNDAY      predniSONE (DELTASONE) 5 mg tablet Take  by mouth.  amLODIPine (NORVASC) 10 mg tablet Take 1 Tab by mouth daily. 30 Tab 12    sevelamer carbonate (RENVELA) 800 mg tab tab Take 800 mg by mouth Before breakfast, lunch, and dinner.  bumetanide (BUMEX) 2 mg tablet Take 2 mg by mouth three (3) times daily.  spironolactone (ALDACTONE) 25 mg tablet Take  by mouth daily.  (Patient not taking: Reported on 10/7/2021)          Review of Systems   Constitutional: Positive for appetite change and fatigue. Negative for activity change, diaphoresis, fever and unexpected weight change. HENT: Negative for dental problem, ear discharge, ear pain, hearing loss, mouth sores and nosebleeds. Eyes: Negative for pain, discharge and redness. Respiratory: Positive for shortness of breath. Negative for cough, choking and chest tightness. Cardiovascular: Negative for chest pain, palpitations and leg swelling. Gastrointestinal: Positive for nausea and vomiting. Negative for abdominal distention, abdominal pain, blood in stool, constipation and diarrhea. Endocrine: Negative for cold intolerance and heat intolerance. Genitourinary: Positive for decreased urine volume. Negative for dysuria. Musculoskeletal: Negative for arthralgias, back pain and joint swelling. Skin: Negative for color change and pallor. Allergic/Immunologic: Negative for environmental allergies, food allergies and immunocompromised state. Neurological: Negative for dizziness, tremors, syncope and speech difficulty. Psychiatric/Behavioral: Negative for agitation, confusion, decreased concentration and dysphoric mood. All other systems reviewed and are negative. Objective     Vital signs for last 24 hours:  Visit Vitals  /86   Pulse 97   Temp 98.7 °F (37.1 °C)   Resp 16   Ht 6' 3\" (1.905 m)   Wt 158.8 kg (350 lb)   SpO2 100%   BMI 43.75 kg/m²         General-Well Developed, Well Nourished,   No Acute Distress,   Alert & Oriented x 3, appropriate affect  HEENT - atraumatic normocephalic  No pallor or icterus  Neck- supple, no JVD  CV- regular rate and rhythm   no MRG  Lung- clear bilaterally  Abd- soft, nontender, nondistended  Ext- no edema bilaterally.   Skin- warm and dry; rash  Access-  Urine apperance  Recent Results (from the past 24 hour(s))   CBC WITH AUTOMATED DIFF    Collection Time: 10/07/21 11:15 AM   Result Value Ref Range    WBC 8.3 4.1 - 11.1 K/uL    RBC 3.05 (L) 4.10 - 5.70 M/uL    HGB 9.6 (L) 12.1 - 17.0 g/dL    HCT 31.3 (L) 36.6 - 50.3 %    .6 (H) 80.0 - 99.0 FL    MCH 31.5 26.0 - 34.0 PG    MCHC 30.7 30.0 - 36.5 g/dL    RDW 16.2 (H) 11.5 - 14.5 %    PLATELET 574 663 - 236 K/uL    MPV 11.4 8.9 - 12.9 FL    NRBC 0.0 0.0  WBC    ABSOLUTE NRBC 0.00 0.00 - 0.01 K/uL    NEUTROPHILS 73 32 - 75 %    LYMPHOCYTES 8 (L) 12 - 49 %    MONOCYTES 13 5 - 13 %    EOSINOPHILS 4 0 - 7 %    BASOPHILS 1 0 - 1 %    IMMATURE GRANULOCYTES 1 (H) 0 - 0.5 %    ABS. NEUTROPHILS 6.1 1.8 - 8.0 K/UL    ABS. LYMPHOCYTES 0.7 (L) 0.8 - 3.5 K/UL    ABS. MONOCYTES 1.1 (H) 0.0 - 1.0 K/UL    ABS. EOSINOPHILS 0.3 0.0 - 0.4 K/UL    ABS. BASOPHILS 0.1 0.0 - 0.1 K/UL    ABS. IMM. GRANS. 0.1 (H) 0.00 - 0.04 K/UL    DF AUTOMATED     METABOLIC PANEL, COMPREHENSIVE    Collection Time: 10/07/21 11:15 AM   Result Value Ref Range    Sodium 139 136 - 145 mmol/L    Potassium 3.6 3.5 - 5.1 mmol/L    Chloride 98 97 - 108 mmol/L    CO2 28 21 - 32 mmol/L    Anion gap 13 5 - 15 mmol/L    Glucose 95 65 - 100 mg/dL    BUN 61 (H) 6 - 20 mg/dL    Creatinine 24.00 (H) 0.70 - 1.30 mg/dL    BUN/Creatinine ratio 3 (L) 12 - 20      GFR est AA 2 (L) >60 ml/min/1.73m2    GFR est non-AA 2 (L) >60 ml/min/1.73m2    Calcium 7.9 (L) 8.5 - 10.1 mg/dL    Bilirubin, total 0.4 0.2 - 1.0 mg/dL    AST (SGOT) 32 15 - 37 U/L    ALT (SGPT) 32 12 - 78 U/L    Alk.  phosphatase 59 45 - 117 U/L    Protein, total 8.5 (H) 6.4 - 8.2 g/dL    Albumin 2.6 (L) 3.5 - 5.0 g/dL    Globulin 5.9 (H) 2.0 - 4.0 g/dL    A-G Ratio 0.4 (L) 1.1 - 2.2     TROPONIN-HIGH SENSITIVITY    Collection Time: 10/07/21 11:15 AM   Result Value Ref Range    Troponin-High Sensitivity 112 (HH) 0 - 76 ng/L   BNP    Collection Time: 10/07/21 11:15 AM   Result Value Ref Range    NT pro-BNP >35,000 (H) <125 pg/mL   EKG, 12 LEAD, INITIAL    Collection Time: 10/07/21 11:15 AM   Result Value Ref Range    Ventricular Rate 146 BPM    Atrial Rate 156 BPM    QRS Duration 78 ms    Q-T Interval 318 ms    QTC Calculation (Bezet) 495 ms    Calculated R Axis 40 degrees    Calculated T Axis -178 degrees    Diagnosis       Atrial fibrillation with rapid ventricular response  T wave abnormality, consider lateral ischemia  Abnormal ECG  No previous ECGs available  Confirmed by Thompson Brennan MD, Jair Person ([id number]) on 10/7/2021 11:36:39 AM     PROTHROMBIN TIME + INR    Collection Time: 10/07/21 11:30 AM   Result Value Ref Range    Prothrombin time 16.9 (H) 11.9 - 14.7 sec    INR 1.4 (H) 0.9 - 1.1     PTT    Collection Time: 10/07/21  4:00 PM   Result Value Ref Range    aPTT 39.1 (H) 21.2 - 34.1 sec    aPTT, therapeutic range   82 - 109 sec   TROPONIN-HIGH SENSITIVITY    Collection Time: 10/07/21 10:00 PM   Result Value Ref Range    Troponin-High Sensitivity 91 (HH) 0 - 76 ng/L   PTT    Collection Time: 10/07/21 10:00 PM   Result Value Ref Range    aPTT 41.9 (H) 21.2 - 34.1 sec    aPTT, therapeutic range   82 - 109 sec   PTT    Collection Time: 10/08/21  7:02 AM   Result Value Ref Range    aPTT 135.4 (HH) 21.2 - 34.1 sec    aPTT, therapeutic range   sec   METABOLIC PANEL, BASIC    Collection Time: 10/08/21  8:00 AM   Result Value Ref Range    Sodium 140 136 - 145 mmol/L    Potassium 3.7 3.5 - 5.1 mmol/L    Chloride 99 97 - 108 mmol/L    CO2 28 21 - 32 mmol/L    Anion gap 13 5 - 15 mmol/L    Glucose 92 65 - 100 mg/dL    BUN 68 (H) 6 - 20 mg/dL    Creatinine 25.20 (H) 0.70 - 1.30 mg/dL    BUN/Creatinine ratio 3 (L) 12 - 20      GFR est AA 2 (L) >60 ml/min/1.73m2    GFR est non-AA 2 (L) >60 ml/min/1.73m2    Calcium 8.1 (L) 8.5 - 10.1 mg/dL   CBC W/O DIFF    Collection Time: 10/08/21  8:00 AM   Result Value Ref Range    WBC 7.3 4.1 - 11.1 K/uL    RBC 2.67 (L) 4.10 - 5.70 M/uL    HGB 8.4 (L) 12.1 - 17.0 g/dL    HCT 27.5 (L) 36.6 - 50.3 %    .0 (H) 80.0 - 99.0 FL    MCH 31.5 26.0 - 34.0 PG    MCHC 30.5 30.0 - 36.5 g/dL    RDW 15.9 (H) 11.5 - 14.5 %    PLATELET 236 850 - 523 K/uL    MPV 11.3 8.9 - 12.9 FL    NRBC 0.3 (H) 0.0 PER 100 WBC    ABSOLUTE NRBC 0.02 (H) 0.00 - 0.01 K/uL   TROPONIN-HIGH SENSITIVITY    Collection Time: 10/08/21  8:00 AM   Result Value Ref Range    Troponin-High Sensitivity 92 (HH) 0 - 76 ng/L   PTT    Collection Time: 10/08/21  8:45 AM   Result Value Ref Range    aPTT 60.9 (H) 21.2 - 34.1 sec    aPTT, therapeutic range   82 - 109 sec        No intake or output data in the 24 hours ending 10/08/21 1044   Current Shift: No intake/output data recorded. Last 3 Shifts: No intake/output data recorded. Physical Exam  Constitutional:       Appearance: He is obese. HENT:      Head: Normocephalic and atraumatic. Cardiovascular:      Rate and Rhythm: Normal rate. Rhythm irregular. Pulses: Normal pulses. Pulmonary:      Effort: Pulmonary effort is normal. No respiratory distress. Breath sounds: Normal breath sounds. Abdominal:      General: There is distension. Tenderness: There is no abdominal tenderness. Musculoskeletal:      Cervical back: No rigidity. Right lower leg: Edema present. Left lower leg: Edema present. Skin:     Findings: Bruising, lesion and rash present. Neurological:      Mental Status: He is alert.         Peritoneal dialysis catheter exit site with no discharge or trauma-but there is no dressing and exit site is exposed  Data Review:   Recent Results (from the past 24 hour(s))   CBC WITH AUTOMATED DIFF    Collection Time: 10/07/21 11:15 AM   Result Value Ref Range    WBC 8.3 4.1 - 11.1 K/uL    RBC 3.05 (L) 4.10 - 5.70 M/uL    HGB 9.6 (L) 12.1 - 17.0 g/dL    HCT 31.3 (L) 36.6 - 50.3 %    .6 (H) 80.0 - 99.0 FL    MCH 31.5 26.0 - 34.0 PG    MCHC 30.7 30.0 - 36.5 g/dL    RDW 16.2 (H) 11.5 - 14.5 %    PLATELET 672 838 - 446 K/uL    MPV 11.4 8.9 - 12.9 FL    NRBC 0.0 0.0  WBC    ABSOLUTE NRBC 0.00 0.00 - 0.01 K/uL    NEUTROPHILS 73 32 - 75 %    LYMPHOCYTES 8 (L) 12 - 49 %    MONOCYTES 13 5 - 13 %    EOSINOPHILS 4 0 - 7 %    BASOPHILS 1 0 - 1 %    IMMATURE GRANULOCYTES 1 (H) 0 - 0.5 %    ABS. NEUTROPHILS 6.1 1.8 - 8.0 K/UL    ABS. LYMPHOCYTES 0.7 (L) 0.8 - 3.5 K/UL    ABS. MONOCYTES 1.1 (H) 0.0 - 1.0 K/UL    ABS. EOSINOPHILS 0.3 0.0 - 0.4 K/UL    ABS. BASOPHILS 0.1 0.0 - 0.1 K/UL    ABS. IMM. GRANS. 0.1 (H) 0.00 - 0.04 K/UL    DF AUTOMATED     METABOLIC PANEL, COMPREHENSIVE    Collection Time: 10/07/21 11:15 AM   Result Value Ref Range    Sodium 139 136 - 145 mmol/L    Potassium 3.6 3.5 - 5.1 mmol/L    Chloride 98 97 - 108 mmol/L    CO2 28 21 - 32 mmol/L    Anion gap 13 5 - 15 mmol/L    Glucose 95 65 - 100 mg/dL    BUN 61 (H) 6 - 20 mg/dL    Creatinine 24.00 (H) 0.70 - 1.30 mg/dL    BUN/Creatinine ratio 3 (L) 12 - 20      GFR est AA 2 (L) >60 ml/min/1.73m2    GFR est non-AA 2 (L) >60 ml/min/1.73m2    Calcium 7.9 (L) 8.5 - 10.1 mg/dL    Bilirubin, total 0.4 0.2 - 1.0 mg/dL    AST (SGOT) 32 15 - 37 U/L    ALT (SGPT) 32 12 - 78 U/L    Alk.  phosphatase 59 45 - 117 U/L    Protein, total 8.5 (H) 6.4 - 8.2 g/dL    Albumin 2.6 (L) 3.5 - 5.0 g/dL    Globulin 5.9 (H) 2.0 - 4.0 g/dL    A-G Ratio 0.4 (L) 1.1 - 2.2     TROPONIN-HIGH SENSITIVITY    Collection Time: 10/07/21 11:15 AM   Result Value Ref Range    Troponin-High Sensitivity 112 (HH) 0 - 76 ng/L   BNP    Collection Time: 10/07/21 11:15 AM   Result Value Ref Range    NT pro-BNP >35,000 (H) <125 pg/mL   EKG, 12 LEAD, INITIAL    Collection Time: 10/07/21 11:15 AM   Result Value Ref Range    Ventricular Rate 146 BPM    Atrial Rate 156 BPM    QRS Duration 78 ms    Q-T Interval 318 ms    QTC Calculation (Bezet) 495 ms    Calculated R Axis 40 degrees    Calculated T Axis -178 degrees    Diagnosis       Atrial fibrillation with rapid ventricular response  T wave abnormality, consider lateral ischemia  Abnormal ECG  No previous ECGs available  Confirmed by Darcy Linton MD, Jack Sue ([id number]) on 10/7/2021 11:36:39 AM     PROTHROMBIN TIME + INR    Collection Time: 10/07/21 11:30 AM   Result Value Ref Range    Prothrombin time 16.9 (H) 11.9 - 14.7 sec    INR 1.4 (H) 0.9 - 1.1     PTT    Collection Time: 10/07/21  4:00 PM   Result Value Ref Range    aPTT 39.1 (H) 21.2 - 34.1 sec    aPTT, therapeutic range   82 - 109 sec   TROPONIN-HIGH SENSITIVITY    Collection Time: 10/07/21 10:00 PM   Result Value Ref Range    Troponin-High Sensitivity 91 (HH) 0 - 76 ng/L   PTT    Collection Time: 10/07/21 10:00 PM   Result Value Ref Range    aPTT 41.9 (H) 21.2 - 34.1 sec    aPTT, therapeutic range   82 - 109 sec   PTT    Collection Time: 10/08/21  7:02 AM   Result Value Ref Range    aPTT 135.4 (HH) 21.2 - 34.1 sec    aPTT, therapeutic range   sec   METABOLIC PANEL, BASIC    Collection Time: 10/08/21  8:00 AM   Result Value Ref Range    Sodium 140 136 - 145 mmol/L    Potassium 3.7 3.5 - 5.1 mmol/L    Chloride 99 97 - 108 mmol/L    CO2 28 21 - 32 mmol/L    Anion gap 13 5 - 15 mmol/L    Glucose 92 65 - 100 mg/dL    BUN 68 (H) 6 - 20 mg/dL    Creatinine 25.20 (H) 0.70 - 1.30 mg/dL    BUN/Creatinine ratio 3 (L) 12 - 20      GFR est AA 2 (L) >60 ml/min/1.73m2    GFR est non-AA 2 (L) >60 ml/min/1.73m2    Calcium 8.1 (L) 8.5 - 10.1 mg/dL   CBC W/O DIFF    Collection Time: 10/08/21  8:00 AM   Result Value Ref Range    WBC 7.3 4.1 - 11.1 K/uL    RBC 2.67 (L) 4.10 - 5.70 M/uL    HGB 8.4 (L) 12.1 - 17.0 g/dL    HCT 27.5 (L) 36.6 - 50.3 %    .0 (H) 80.0 - 99.0 FL    MCH 31.5 26.0 - 34.0 PG    MCHC 30.5 30.0 - 36.5 g/dL    RDW 15.9 (H) 11.5 - 14.5 %    PLATELET 589 421 - 766 K/uL    MPV 11.3 8.9 - 12.9 FL    NRBC 0.3 (H) 0.0  WBC    ABSOLUTE NRBC 0.02 (H) 0.00 - 0.01 K/uL   TROPONIN-HIGH SENSITIVITY    Collection Time: 10/08/21  8:00 AM   Result Value Ref Range    Troponin-High Sensitivity 92 (HH) 0 - 76 ng/L   PTT    Collection Time: 10/08/21  8:45 AM   Result Value Ref Range    aPTT 60.9 (H) 21.2 - 34.1 sec    aPTT, therapeutic range   82 - 109 sec         XR CHEST PORT   Final Result           Patient Active Problem List   Diagnosis Code    Malignant hypertension I10    Angioedema of lips T78. 3XXA    Adverse drug reaction-to lisinipril T50.905A    CKD (chronic kidney disease), stage III ([address] N18.30    Onychomycosis B35.1    Atrial fibrillation with RVR ([facility]) I48.91        DIAGNOSES:  1. ESRD on peritoneal dialysis  2. Hypertensive urgency, now improved  3. Anemia of chronic kidney disease  4. Chronic A. fib with RVR-now better controlled on Cardizem drip. 5. Atypical chest pain with elevated high sensitive troponin now on heparin drip  6. Chronic fluid overload  7. Uremia due to inadequate dialysis      DISCUSSION:   We will coordinate removal of PD catheter   We will place dialysis access and dialyze him    PLAN:   Place permacath and dialyze him today and tomorrow.  Short abbreviated treatments to avoid dialysis disequilibrium   Exchange of PD fluid 1 time to send dialysate for cell count culture, Gram stain and fungal culture to diagnose PD peritonitis if present and accordingly treat   I took the liberty of consulting Dr. Brandon Riojas to remove PD catheter    to follow-up with the availability of dialysis chair outpatient [facility] for him and coordinates with discharge   Would need to get hepatitis studies for him          Thanks for consulting me. Please don't hesitate to contact me if any questions arise of if I can assist in any manner. This dictation was done by dragon, computer voice recognition software. Often unanticipated grammatical, syntax, phones and other interpretive errors are inadvertently transcribed. Please excuse errors that have escaped final proofreading. Please contact me if you suspect dictation or transcription errors.   Dr Brooke Rosenberg  [address] [place] [address], [facility], [address] [facility]  Cell Phone: [phone]  Office phone: [phone]  Fax: [phone]

## 2021-10-08 NOTE — ED NOTES
Report called to floor at this time. Pt currently in dialysis, pt will be taken to floor from there. Pts tele box and belongings taken to dialysis by Kadeem Sparrow RN at this time.

## 2021-10-08 NOTE — ROUTINE PROCESS
.. TRANSFER - OUT REPORT:    Verbal report given to [facility] (name) on Hank Cherry  being transferred to [facility] (unit) for routine progression in care. Report consisted of patients Situation, Background, Assessment and   Recommendations(SBAR). Information from the following report(s) SBAR was reviewed with the receiving nurse. Lines:   Peripheral IV 10/07/21 Right Antecubital (Active)   Site Assessment Clean, dry, & intact 10/07/21 1136   Dressing Status Clean, dry, & intact 10/07/21 1136   Dressing Type Transparent 10/07/21 1136       Peripheral IV 10/07/21 Left Antecubital (Active)        Opportunity for questions and clarification was provided.       Patient transported with:   Registered Nurse

## 2021-10-08 NOTE — ED NOTES
Care assumed and bedside SBAR report endorsed on [facility]. Pt cardiac monitoring continued , spO2 Monitoring continued, IV reassed, call bell within reach, side rails up x2, resting comfortable but easily arousable, no signs of acute distress. , bed in lowest position, MAR reviewed, Labs reviewed, will continue to monitor

## 2021-10-08 NOTE — CONSULTS
Consult    NAME: Leonora Weaver   :  1970   MRN:  [id number]     Date/Time:  10/7/2021 8:43 PM    Patient PCP: Cortney Wells MD  ________________________________________________________________________    This is an inpatient cardiology consultation requested by Dr. Sherrie Buck for elevated troponin. Assessment:     1. Elevated troponin. Patient mildly elevated troponin in setting of his atrial fibrillation with rapid ventricular response, end-stage renal disease and congestive heart failure with peripheral edema is nonspecific for ischemic event and appears to be type II non-STEMI from oxygen supply and demand mismatch. 2. Congestive heart failure with peripheral edema which appears to be due to patient's fluid overload with ineffective PD.  3. Atrial fibrillation with rapid ventricle response. Rate now with Cardizem under control. Plan:     1. Agree with IV Lasix for diuresis with planned hemodialysis for fluid withdrawal and keeping patient euvolemic. 2. Continue IV Cardizem with beta-blocker for rate control. Agree with IV heparin for anticoagulation while patient in hospital.  Patient IV heparin at the time of discharge can be changed to [address] or Coumadin. 3. Echocardiogram for LV function and wall motion abnormalities. []        High complexity decision making was performed        Subjective:   CHIEF COMPLAINT: Leg edema and shortness of breath    HISTORY OF PRESENT ILLNESS:     Dariela Barton is a 52 y.o. is a 70-year-old -American man with a history of chronic atrial fibrillation, coronary artery disease, status post myocardial infarction in , congestive heart failure, hypertension, PE and end-stage renal disease with PD dependency who presented to emergency room with complaints of increasing bilateral leg edema, shortness of breath and increasing weight gain of past few days duration.   As per records, patient's peritoneal dialysis lately has been ineffective and his nephrologist has been trying to switch him from PD to hemodialysis. Patient on presentation was also noted for atrial fibrillation with rapid ventricular response with heart rate in 140s for which he has been started on IV Cardizem drip at 10 mg an hour with adequate rate control. Cardiology now has been consulted due to elevated troponin. Patient high sensitive troponin is 112. Patient on close questioning denies any complaints of chest pain, orthopnea, PND or palpitations/heart racing.   Past Medical History:   Diagnosis Date    Arrhythmia     A-FIB    CAD (coronary artery disease)     MI 26    CHF (congestive heart failure) (HCC)     Chronic kidney disease     END STAGE KIDNEY DISEASE    Hypertension     Other ill-defined conditions(799.89)     Sleep apnea     WEARS CPAP    Stroke ([facility] [address] 01/2020    NUMBNESS IN RIGHT LEG AND FOOT    Thromboembolus ([facility] [place] [address] 2018    PE      Past Surgical History:   Procedure Laterality Date    CARDIAC SURG PROCEDURE UNLIST      CARDIAC CATHX2-NO STENTS    HX ORTHOPAEDIC      LEFT HAND, RIGHT ANKLE, AND RIGHT SHOULDER    HX TONSILLECTOMY      HX VASCULAR ACCESS      UPPER RIGHT CHEST     Allergies   Allergen Reactions    Lisinopril Swelling    Sulfa (Sulfonamide Antibiotics) Other (comments)     Had sores all over body    Sulfa (Sulfonamide Antibiotics) Hives      Meds:  See below  Social History     Tobacco Use    Smoking status: Former Smoker    Smokeless tobacco: Never Used    Tobacco comment: CIGARS ONCE PER MONTH-NOTHING SINCE 2018   Substance Use Topics    Alcohol use: Never     Comment: NOT FOR 3 YEARS      Family History   Problem Relation Age of Onset    Stroke Mother     Heart Disease Mother     Diabetes Mother     Kidney Disease Mother     Heart Disease Sister     Lung Disease Brother     No Known Problems Sister     Deep Vein Thrombosis Brother     Anesth Problems Neg Hx        REVIEW OF SYSTEMS:     []         Unable to obtain  ROS due to ---   [x]         Total of 12 systems reviewed as follows:    Constitutional: negative fever, negative chills, negative weight loss  Eyes:   negative visual changes  ENT:   negative sore throat, tongue or lip swelling  Respiratory:  As per history of present illness  Cards: As per history of present illness  GI:   negative for nausea, vomiting, diarrhea, and abdominal pain  Genitourinary: negative for frequency, dysuria  Integument:  negative for rash   Hematologic:  negative for easy bruising and gum/nose bleeding  Musculoskel: negative for myalgias,  back pain  Neurological:  negative for headaches, dizziness, vertigo, weakness  Behavl/Psych: negative for feelings of anxiety, depression     Pertinent Positives include :    Objective:      Physical Exam:    Last 24hrs VS reviewed since prior progress note. Most recent are:    Visit Vitals  BP (!) 119/97   Pulse 97   Temp 98.1 °F (36.7 °C)   Resp 21   Ht 6' 3\" (1.905 m)   Wt 158.8 kg (350 lb)   SpO2 99%   BMI 43.75 kg/m²     No intake or output data in the 24 hours ending 10/07/21 2043     General Appearance: Well developed, well nourished, alert & oriented x 3,    no acute distress. Ears/Nose/Mouth/Throat: Pupils equal and round, Hearing grossly normal.  Neck: Supple. JVP within normal limits. Carotids good upstrokes, with no bruit. Chest: Lungs with crackles to auscultation at bases bilaterally. Cardiovascular: Irregularly irregular, S1S2 normal, no murmur, rubs, gallops. Abdomen: Soft, non-tender, bowel sounds are active. No organomegaly. Extremities: 3-4+ edema bilaterally. Femoral pulses +2, Distal Pulses +1. Skin: Warm and dry. Neuro: CN II-XII grossly intact, Strength and sensation grossly intact. []         Post-cath site without hematoma, bruit, tenderness, or thrill. Distal pulses intact. Data:      Telemetry: Atrial fibrillation with controlled ventricle response    EKG:  []  No new EKG for review.     XR CHEST PORT   Final Result Prior to Admission medications    Medication Sig Start Date End Date Taking? Authorizing Provider   montelukast (SINGULAIR) 10 mg tablet Take 10 mg by mouth daily. 11/25/20  Yes Torin, MD Lea   amitriptyline (ELAVIL) 25 mg tablet Take 1 Tablet by mouth nightly. 9/28/21  Yes Mariam Tadeo, KATY   cloNIDine (CATAPRES) 0.1 mg/24 hr ptwk 1 Patch by TransDERmal route every seven (7) days. Indications: CHANGES ON SUNDAY   Yes Provider, Historical   allopurinoL (ZYLOPRIM) 100 mg tablet Take 50 mg by mouth. TAKES ON Monday AND Thursday   Yes Provider, Historical   carvediloL (Coreg) 25 mg tablet Take 25 mg by mouth two (2) times daily (with meals). Yes Provider, Historical   cyanocobalamin/salcaprozat sod (ELIGEN B12 PO) Take  by mouth every fourty-eight (48) hours. Yes Provider, Historical   ERGOCALCIFEROL, VITAMIN D2, PO Take  by mouth every seven (7) days. Indications: ON SUNDAY   Yes Provider, Historical   predniSONE (DELTASONE) 5 mg tablet Take  by mouth. Yes Provider, Historical   amLODIPine (NORVASC) 10 mg tablet Take 1 Tab by mouth daily. 11/16/11  Yes Africa Roca MD   sevelamer carbonate (RENVELA) 800 mg tab tab Take 800 mg by mouth Before breakfast, lunch, and dinner. Other, MD eLa   bumetanide (BUMEX) 2 mg tablet Take 2 mg by mouth three (3) times daily. Other, MD Lea   spironolactone (ALDACTONE) 25 mg tablet Take  by mouth daily.   Patient not taking: Reported on 10/7/2021    Provider, Historical       Recent Results (from the past 24 hour(s))   CBC WITH AUTOMATED DIFF    Collection Time: 10/07/21 11:15 AM   Result Value Ref Range    WBC 8.3 4.1 - 11.1 K/uL    RBC 3.05 (L) 4.10 - 5.70 M/uL    HGB 9.6 (L) 12.1 - 17.0 g/dL    HCT 31.3 (L) 36.6 - 50.3 %    .6 (H) 80.0 - 99.0 FL    MCH 31.5 26.0 - 34.0 PG    MCHC 30.7 30.0 - 36.5 g/dL    RDW 16.2 (H) 11.5 - 14.5 %    PLATELET 556 684 - 031 K/uL    MPV 11.4 8.9 - 12.9 FL    NRBC 0.0 0.0  WBC    ABSOLUTE NRBC 0.00 0.00 - 0.01 K/uL    NEUTROPHILS 73 32 - 75 %    LYMPHOCYTES 8 (L) 12 - 49 %    MONOCYTES 13 5 - 13 %    EOSINOPHILS 4 0 - 7 %    BASOPHILS 1 0 - 1 %    IMMATURE GRANULOCYTES 1 (H) 0 - 0.5 %    ABS. NEUTROPHILS 6.1 1.8 - 8.0 K/UL    ABS. LYMPHOCYTES 0.7 (L) 0.8 - 3.5 K/UL    ABS. MONOCYTES 1.1 (H) 0.0 - 1.0 K/UL    ABS. EOSINOPHILS 0.3 0.0 - 0.4 K/UL    ABS. BASOPHILS 0.1 0.0 - 0.1 K/UL    ABS. IMM. GRANS. 0.1 (H) 0.00 - 0.04 K/UL    DF AUTOMATED     METABOLIC PANEL, COMPREHENSIVE    Collection Time: 10/07/21 11:15 AM   Result Value Ref Range    Sodium 139 136 - 145 mmol/L    Potassium 3.6 3.5 - 5.1 mmol/L    Chloride 98 97 - 108 mmol/L    CO2 28 21 - 32 mmol/L    Anion gap 13 5 - 15 mmol/L    Glucose 95 65 - 100 mg/dL    BUN 61 (H) 6 - 20 mg/dL    Creatinine 24.00 (H) 0.70 - 1.30 mg/dL    BUN/Creatinine ratio 3 (L) 12 - 20      GFR est AA 2 (L) >60 ml/min/1.73m2    GFR est non-AA 2 (L) >60 ml/min/1.73m2    Calcium 7.9 (L) 8.5 - 10.1 mg/dL    Bilirubin, total 0.4 0.2 - 1.0 mg/dL    AST (SGOT) 32 15 - 37 U/L    ALT (SGPT) 32 12 - 78 U/L    Alk.  phosphatase 59 45 - 117 U/L    Protein, total 8.5 (H) 6.4 - 8.2 g/dL    Albumin 2.6 (L) 3.5 - 5.0 g/dL    Globulin 5.9 (H) 2.0 - 4.0 g/dL    A-G Ratio 0.4 (L) 1.1 - 2.2     TROPONIN-HIGH SENSITIVITY    Collection Time: 10/07/21 11:15 AM   Result Value Ref Range    Troponin-High Sensitivity 112 (HH) 0 - 76 ng/L   BNP    Collection Time: 10/07/21 11:15 AM   Result Value Ref Range    NT pro-BNP >35,000 (H) <125 pg/mL   EKG, 12 LEAD, INITIAL    Collection Time: 10/07/21 11:15 AM   Result Value Ref Range    Ventricular Rate 146 BPM    Atrial Rate 156 BPM    QRS Duration 78 ms    Q-T Interval 318 ms    QTC Calculation (Bezet) 495 ms    Calculated R Axis 40 degrees    Calculated T Axis -178 degrees    Diagnosis       Atrial fibrillation with rapid ventricular response  T wave abnormality, consider lateral ischemia  Abnormal ECG  No previous ECGs available  Confirmed by Kayla Juarez MD, Kobe Fragoso ([id number]) on 10/7/2021 11:36:39 AM     PROTHROMBIN TIME + INR    Collection Time: 10/07/21 11:30 AM   Result Value Ref Range    Prothrombin time 16.9 (H) 11.9 - 14.7 sec    INR 1.4 (H) 0.9 - 1.1     PTT    Collection Time: 10/07/21  4:00 PM   Result Value Ref Range    aPTT 39.1 (H) 21.2 - 34.1 sec    aPTT, therapeutic range   82 - 109 sec        Echo:       Patient's  EKG and laboratory data were individually reviewed by me. Please send a copy of this dictation to above referring physician. Chata Medina MD    This dictation was done by Dragon computer voice recognition software. Occasionally grammatical, syntax and other interpretive errors escape final proof reading. Please feel free to call me for any clarification.

## 2021-10-09 LAB
APTT PPP: 112.5 SEC (ref 21.2–34.1)
APTT PPP: 78.1 SEC (ref 21.2–34.1)
HBV SURFACE AB SER QL: REACTIVE
HBV SURFACE AB SER-ACNC: 177.8 MIU/ML
HBV SURFACE AG SER QL: <0.1 INDEX
HBV SURFACE AG SER QL: NEGATIVE
HCV AB SER IA-ACNC: 0.13 INDEX
HCV AB SERPL QL IA: NONREACTIVE
THERAPEUTIC RANGE,PTTT: ABNORMAL SEC (ref 82–109)
THERAPEUTIC RANGE,PTTT: ABNORMAL SEC (ref 82–109)
TROPONIN-HIGH SENSITIVITY: 54 NG/L (ref 0–76)
TROPONIN-HIGH SENSITIVITY: 58 NG/L (ref 0–76)
TROPONIN-HIGH SENSITIVITY: 58 NG/L (ref 0–76)
TROPONIN-HIGH SENSITIVITY: 62 NG/L (ref 0–76)

## 2021-10-09 PROCEDURE — 90935 HEMODIALYSIS ONE EVALUATION: CPT

## 2021-10-09 PROCEDURE — 5A1D70Z PERFORMANCE OF URINARY FILTRATION, INTERMITTENT, LESS THAN 6 HOURS PER DAY: ICD-10-PCS | Performed by: LEGAL MEDICINE

## 2021-10-09 PROCEDURE — 74011636637 HC RX REV CODE- 636/637: Performed by: INTERNAL MEDICINE

## 2021-10-09 PROCEDURE — 65270000029 HC RM PRIVATE

## 2021-10-09 PROCEDURE — 74011250637 HC RX REV CODE- 250/637: Performed by: INTERNAL MEDICINE

## 2021-10-09 PROCEDURE — 85730 THROMBOPLASTIN TIME PARTIAL: CPT

## 2021-10-09 PROCEDURE — 36415 COLL VENOUS BLD VENIPUNCTURE: CPT

## 2021-10-09 PROCEDURE — 74011000250 HC RX REV CODE- 250: Performed by: EMERGENCY MEDICINE

## 2021-10-09 PROCEDURE — 84484 ASSAY OF TROPONIN QUANT: CPT

## 2021-10-09 PROCEDURE — 74011250636 HC RX REV CODE- 250/636

## 2021-10-09 PROCEDURE — 74011250636 HC RX REV CODE- 250/636: Performed by: INTERNAL MEDICINE

## 2021-10-09 RX ORDER — HEPARIN SODIUM 1000 [USP'U]/ML
INJECTION, SOLUTION INTRAVENOUS; SUBCUTANEOUS
Status: COMPLETED
Start: 2021-10-09 | End: 2021-10-09

## 2021-10-09 RX ADMIN — HEPARIN SODIUM AND DEXTROSE 16 UNITS/KG/HR: 10000; 5 INJECTION INTRAVENOUS at 17:18

## 2021-10-09 RX ADMIN — HEPARIN SODIUM AND DEXTROSE 14 UNITS/KG/HR: 10000; 5 INJECTION INTRAVENOUS at 08:10

## 2021-10-09 RX ADMIN — HEPARIN SODIUM 3600 UNITS: 1000 INJECTION, SOLUTION INTRAVENOUS; SUBCUTANEOUS at 15:07

## 2021-10-09 RX ADMIN — AMLODIPINE BESYLATE 10 MG: 5 TABLET ORAL at 08:42

## 2021-10-09 RX ADMIN — Medication 10 ML: at 21:48

## 2021-10-09 RX ADMIN — Medication 15 MG/HR: at 05:04

## 2021-10-09 RX ADMIN — HYDROMORPHONE HYDROCHLORIDE 1 MG: 1 INJECTION, SOLUTION INTRAMUSCULAR; INTRAVENOUS; SUBCUTANEOUS at 10:53

## 2021-10-09 RX ADMIN — SEVELAMER CARBONATE 800 MG: 800 TABLET, FILM COATED ORAL at 16:31

## 2021-10-09 RX ADMIN — CARVEDILOL 25 MG: 12.5 TABLET, FILM COATED ORAL at 16:32

## 2021-10-09 RX ADMIN — CARVEDILOL 25 MG: 12.5 TABLET, FILM COATED ORAL at 08:42

## 2021-10-09 RX ADMIN — FUROSEMIDE 80 MG: 10 INJECTION, SOLUTION INTRAMUSCULAR; INTRAVENOUS at 16:32

## 2021-10-09 RX ADMIN — HEPARIN SODIUM 2000 UNITS: 1000 INJECTION, SOLUTION INTRAVENOUS; SUBCUTANEOUS at 17:17

## 2021-10-09 RX ADMIN — SEVELAMER CARBONATE 800 MG: 800 TABLET, FILM COATED ORAL at 10:44

## 2021-10-09 RX ADMIN — Medication 10 ML: at 16:32

## 2021-10-09 RX ADMIN — SEVELAMER CARBONATE 800 MG: 800 TABLET, FILM COATED ORAL at 08:42

## 2021-10-09 RX ADMIN — PREDNISONE 5 MG: 5 TABLET ORAL at 08:42

## 2021-10-09 RX ADMIN — SPIRONOLACTONE 25 MG: 25 TABLET ORAL at 08:42

## 2021-10-09 RX ADMIN — HYDROCODONE BITARTRATE AND ACETAMINOPHEN 1 TABLET: 5; 325 TABLET ORAL at 20:36

## 2021-10-09 RX ADMIN — HYDROMORPHONE HYDROCHLORIDE 1 MG: 1 INJECTION, SOLUTION INTRAMUSCULAR; INTRAVENOUS; SUBCUTANEOUS at 01:21

## 2021-10-09 RX ADMIN — AMITRIPTYLINE HYDROCHLORIDE 25 MG: 50 TABLET, FILM COATED ORAL at 21:47

## 2021-10-09 RX ADMIN — HEPARIN SODIUM AND DEXTROSE 14 UNITS/KG/HR: 10000; 5 INJECTION INTRAVENOUS at 10:45

## 2021-10-09 RX ADMIN — Medication 15 MG/HR: at 17:27

## 2021-10-09 RX ADMIN — Medication 10 ML: at 06:08

## 2021-10-09 RX ADMIN — Medication 10 ML: at 00:56

## 2021-10-09 NOTE — PROGRESS NOTES
Renal Daily Progress Note    Admit Date: 10/7/2021      Subjective:   Subjective   HISTORY OF PRESENTING ILLNESS :  Patient is a 81-year-old -American male is a history of ESRD on hemodialysis, hypertension, NEELAM on CPAP, morbid obesity, chronic A. fib, coronary artery disease who is being on peritoneal dialysis for the past 8 months. His nephrologist is Dr. Valentine Godinez at [facility]. Apparently he has not been tolerating peritoneal dialysis and is a plan to switch him to hemodialysis.    patient denies having any shortness of breath, orthopnea but his only complaint seems to be significant swelling in lower extremities  Pt received HD for 2 hrs and 1 liter of fluid is removed   He still has significant swelling hence I ordered HD for fluid removal   B.P is running low hence  I canceled Rx  I have tried to contact HD staff several times but lines are busy    hence I went back to dialysis room to discuss the options  And I was informed that patient tolerated dialysis yesterday despite low blood pressure and they already have orders from yesterday for 2 hours of dialysis today which day are going to proceed with for fluid removal.    Current Facility-Administered Medications   Medication Dose Route Frequency    heparin (porcine) 1,000 unit/mL injection 3,600 Units  3,600 Units Hemodialysis DIALYSIS PRN    dilTIAZem (CARDIZEM) 125 mg/125 mL (1 mg/mL) dextrose 5% infusion  0-15 mg/hr IntraVENous TITRATE    sodium chloride (NS) flush 5-40 mL  5-40 mL IntraVENous Q8H    sodium chloride (NS) flush 5-40 mL  5-40 mL IntraVENous PRN    acetaminophen (TYLENOL) tablet 650 mg  650 mg Oral Q6H PRN    Or    acetaminophen (TYLENOL) suppository 650 mg  650 mg Rectal Q6H PRN    polyethylene glycol (MIRALAX) packet 17 g  17 g Oral DAILY PRN    ondansetron (ZOFRAN ODT) tablet 4 mg  4 mg Oral Q8H PRN    amitriptyline (ELAVIL) tablet 25 mg  25 mg Oral QHS    amLODIPine (NORVASC) tablet 10 mg  10 mg Oral DAILY    carvediloL (COREG) tablet 25 mg  25 mg Oral BID WITH MEALS    cloNIDine (CATAPRES) 0.1 mg/24 hr patch 1 Patch  1 Patch TransDERmal Q7D    predniSONE (DELTASONE) tablet 5 mg  5 mg Oral DAILY WITH BREAKFAST    sevelamer carbonate (RENVELA) tab 800 mg  800 mg Oral TIDAC    spironolactone (ALDACTONE) tablet 25 mg  25 mg Oral DAILY    [Held by provider] warfarin (COUMADIN) tablet 5 mg  5 mg Oral DAILY    furosemide (LASIX) injection 80 mg  80 mg IntraVENous Q12H    HYDROmorphone (DILAUDID) injection 1 mg  1 mg IntraVENous Q6H PRN    HYDROcodone-acetaminophen (NORCO) 5-325 mg per tablet 1 Tablet  1 Tablet Oral Q4H PRN    heparin 25,000 units in D5W 250 ml infusion  12-25 Units/kg/hr (Adjusted) IntraVENous TITRATE    heparin (porcine) 1,000 unit/mL injection 4,000 Units  4,000 Units IntraVENous PRN    Or    heparin (porcine) 1,000 unit/mL injection 2,000 Units  2,000 Units IntraVENous PRN        Review of Systems    Review of Systems   Constitutional: Negative for chills, fever and weight loss. HENT: Negative for hearing loss. Respiratory: Negative for cough, shortness of breath and wheezing. Cardiovascular: Negative for chest pain and palpitations. Gastrointestinal: Negative for nausea and vomiting. Neurological: Negative for dizziness and weakness. Objective:     Patient Vitals for the past 8 hrs:   BP Temp Pulse Resp SpO2 Weight   10/09/21 1135 96/63 98.1 °F (36.7 °C) 71 20 98 % --   10/09/21 0826 106/75 98.1 °F (36.7 °C) 98 20 99 % --   10/09/21 0451 (!) 96/59 98.7 °F (37.1 °C) 77 20 98 % (!) 162.3 kg (357 lb 12.9 oz)     10/09 0701 - 10/09 1900  In: 360 [P.O.:360]  Out: -   10/07 1901 - 10/09 0700  In: -   Out: 1000     Physical Exam:   Physical Exam  Constitutional:       General: He is not in acute distress. Appearance: He is obese. HENT:      Head: Normocephalic and atraumatic.       Mouth/Throat:      Mouth: Mucous membranes are moist.   Eyes:      Pupils: Pupils are equal, round, and reactive to light. Cardiovascular:      Rate and Rhythm: Normal rate. Heart sounds: No murmur heard. No friction rub. Comments: Extremities - 4+ edema  Pulmonary:      Effort: Pulmonary effort is normal.      Breath sounds: No rales. Abdominal:      General: There is no distension. Comments: PD catheter site without any evidence for infx   Musculoskeletal:         General: No swelling. Cervical back: No rigidity. Right lower leg: Edema present. Left lower leg: Edema present. Skin:     General: Skin is warm and dry. Neurological:      General: No focal deficit present. Mental Status: He is alert. IR INSERT TUNL CVC W/O PORT OVER 5 YR   Final Result   1. Relatively small caliber of the right subclavian vein/brachiocephalic   junction, possibly due to an element of thoracic inlet compression and evidence   of well-formed neck collaterals. 2.  Widely patent left subclavian vein and SVC. 3.  Successful placement of a right internal jugular approach PermCath. IR INJ VENOGRAM EXT   Final Result   1. Relatively small caliber of the right subclavian vein/brachiocephalic   junction, possibly due to an element of thoracic inlet compression and evidence   of well-formed neck collaterals. 2.  Widely patent left subclavian vein and SVC. 3.  Successful placement of a right internal jugular approach PermCath. IR US GUIDED VASCULAR ACCESS   Final Result   1. Relatively small caliber of the right subclavian vein/brachiocephalic   junction, possibly due to an element of thoracic inlet compression and evidence   of well-formed neck collaterals. 2.  Widely patent left subclavian vein and SVC. 3.  Successful placement of a right internal jugular approach PermCath. IR FLUORO GUIDED NEEDLE PLACEMENT   Final Result   1.   Relatively small caliber of the right subclavian vein/brachiocephalic   junction, possibly due to an element of thoracic inlet compression and evidence   of well-formed neck collaterals. 2.  Widely patent left subclavian vein and SVC. 3.  Successful placement of a right internal jugular approach PermCath. XR CHEST PORT   Final Result           Data Review   Recent Labs     10/08/21  0800 10/07/21  1115   WBC 7.3 8.3   HGB 8.4* 9.6*   HCT 27.5* 31.3*    246     Recent Labs     10/08/21  0800 10/07/21  1130 10/07/21  1115     --  139   K 3.7  --  3.6   CL 99  --  98   CO2 28  --  28   GLU 92  --  95   BUN 68*  --  61*   CREA 25.20*  --  24.00*   CA 8.1*  --  7.9*   ALB  --   --  2.6*   ALT  --   --  32   INR  --  1.4*  --      No components found for: GLPOC  No results for input(s): PH, PCO2, PO2, HCO3, FIO2 in the last 72 hours.   Recent Labs     10/07/21  1130   INR 1.4*           Assessment:     ESRD with failled PD due to UFR failure of membrane   Switched to HD - Need RX for fluid removal   we will make an attempt to remove fluid with dialysis again today if he does not tolerate the staff will send him back to floor   HTN-  On  Multiple antihypertensive medications which may be the cause for hypertension- need to be held on dialysis days to accommodate fluid removal   Fluid overload- need for to 4.5 kg of fluid removal with each dialysis   Anemia - due to CKD most likely- expect to see improvement with fluid removal    Active Problems:    Atrial fibrillation with RVR ([address] (10/7/2021)        Plan:      hold antihypertensive medications for systolic BP less than 968   discontinue clonidine patch   monitor hemoglobin and hematocrit   hemodialysis treatment on Monday for 3 hours- to remove  3.5-4 L of fluid   need fluid restriction 1200 mL for 24 hours   continue with Lasix IV

## 2021-10-09 NOTE — PROGRESS NOTES
Hospitalist Progress Note    Subjective:   Daily Progress Note: 10/9/2021 1:48 PM    Hospital Course:  46-year-old -American man with a history of chronic atrial fibrillation, coronary artery disease, status post myocardial infarction in 2009, congestive heart failure, hypertension, PE and end-stage renal disease on PD presented to emergency room with complaints of increasing bilateral leg edema, shortness of breath and increasing weight gain of past few days duration. Nephrologist has been trying to switch him to hemodialysis. Patient was found to be in Afib with RVR. Plan is to initiate HD on this admission. Subjective:  Patient reports that he is feeling better. All questions and concern answered.      Current Facility-Administered Medications   Medication Dose Route Frequency    heparin (porcine) 1,000 unit/mL injection 3,600 Units  3,600 Units Hemodialysis DIALYSIS PRN    dilTIAZem (CARDIZEM) 125 mg/125 mL (1 mg/mL) dextrose 5% infusion  0-15 mg/hr IntraVENous TITRATE    sodium chloride (NS) flush 5-40 mL  5-40 mL IntraVENous Q8H    sodium chloride (NS) flush 5-40 mL  5-40 mL IntraVENous PRN    acetaminophen (TYLENOL) tablet 650 mg  650 mg Oral Q6H PRN    Or    acetaminophen (TYLENOL) suppository 650 mg  650 mg Rectal Q6H PRN    polyethylene glycol (MIRALAX) packet 17 g  17 g Oral DAILY PRN    ondansetron (ZOFRAN ODT) tablet 4 mg  4 mg Oral Q8H PRN    amitriptyline (ELAVIL) tablet 25 mg  25 mg Oral QHS    amLODIPine (NORVASC) tablet 10 mg  10 mg Oral DAILY    carvediloL (COREG) tablet 25 mg  25 mg Oral BID WITH MEALS    cloNIDine (CATAPRES) 0.1 mg/24 hr patch 1 Patch  1 Patch TransDERmal Q7D    predniSONE (DELTASONE) tablet 5 mg  5 mg Oral DAILY WITH BREAKFAST    sevelamer carbonate (RENVELA) tab 800 mg  800 mg Oral TIDAC    spironolactone (ALDACTONE) tablet 25 mg  25 mg Oral DAILY    [Held by provider] warfarin (COUMADIN) tablet 5 mg  5 mg Oral DAILY    furosemide (LASIX) injection 80 mg  80 mg IntraVENous Q12H    HYDROmorphone (DILAUDID) injection 1 mg  1 mg IntraVENous Q6H PRN    HYDROcodone-acetaminophen (NORCO) 5-325 mg per tablet 1 Tablet  1 Tablet Oral Q4H PRN    heparin 25,000 units in D5W 250 ml infusion  12-25 Units/kg/hr (Adjusted) IntraVENous TITRATE    heparin (porcine) 1,000 unit/mL injection 4,000 Units  4,000 Units IntraVENous PRN    Or    heparin (porcine) 1,000 unit/mL injection 2,000 Units  2,000 Units IntraVENous PRN        Review of Systems  Constitutional: No fevers, No chills, No sweats, No fatigue, No Weakness  Eyes: No redness  Ears, nose, mouth, throat, and face: No nasal congestion, No sore throat, No voice change  Respiratory: No Shortness of Breath, No cough, No wheezing  Cardiovascular: No chest pain, No palpitations, No extremity edema  Gastrointestinal: No nausea, No vomiting, No diarrhea, No abdominal pain  Genitourinary: No frequency, No dysuria, No hematuria  Integument/breast: No skin lesion(s)   Neurological: No Confusion, No headaches, No dizziness      Objective:     Visit Vitals  BP 96/63 (BP Patient Position: At rest;Semi fowlers)   Pulse 71   Temp 98.1 °F (36.7 °C)   Resp 20   Ht 6' 3\" (1.905 m)   Wt (!) 162.3 kg (357 lb 12.9 oz)   SpO2 98%   BMI 44.72 kg/m²      O2 Device: None (Room air)    Temp (24hrs), Av.2 °F (36.8 °C), Min:97.7 °F (36.5 °C), Max:98.7 °F (37.1 °C)      10/09 0701 - 10/09 1900  In: 360 [P.O.:360]  Out: -   10/07 1901 - 10/09 0700  In: -   Out: 1000     PHYSICAL EXAM:  Constitutional: No acute distress  Skin: Extremities and face reveal no rashes. HEENT: Sclerae anicteric. Extra-occular muscles are intact. No oral ulcers. The neck is supple and no masses. Cardiovascular: Regular rate and rhythm. Respiratory:  Clear breath sounds bilaterally with no wheezes, rales, or rhonchi. GI: Abdomen nondistended, soft, and nontender. Normal active bowel sounds. Musculoskeletal: No pitting edema of the lower legs.  Able to move all ext  Neurological:  Patient is alert and oriented. Cranial nerves II-XII grossly intact  Psychiatric: Mood appears appropriate       Data Review    Recent Results (from the past 24 hour(s))   TROPONIN-HIGH SENSITIVITY    Collection Time: 10/08/21  4:00 PM   Result Value Ref Range    Troponin-High Sensitivity 74 0 - 76 ng/L   PTT    Collection Time: 10/08/21  5:40 PM   Result Value Ref Range    aPTT 106.6 (H) 21.2 - 34.1 sec    aPTT, therapeutic range   82 - 109 sec   PTT    Collection Time: 10/08/21 11:15 PM   Result Value Ref Range    aPTT 93.2 (H) 21.2 - 34.1 sec    aPTT, therapeutic range   82 - 109 sec   TROPONIN-HIGH SENSITIVITY    Collection Time: 10/08/21 11:15 PM   Result Value Ref Range    Troponin-High Sensitivity 60 0 - 76 ng/L   TROPONIN-HIGH SENSITIVITY    Collection Time: 10/09/21  6:10 AM   Result Value Ref Range    Troponin-High Sensitivity 54 0 - 76 ng/L   PTT    Collection Time: 10/09/21  6:10 AM   Result Value Ref Range    aPTT 112.5 (HH) 21.2 - 34.1 sec    aPTT, therapeutic range   82 - 109 sec       Radiology review: y    Assessment / Plan:    79-year-old -American man with a history of chronic atrial fibrillation, coronary artery disease, status post myocardial infarction in 2009, congestive heart failure, hypertension, PE and end-stage renal disease on PD presented to emergency room with complaints of increasing bilateral leg edema, shortness of breath and increasing weight gain of past few days duration. Nephrologist has been trying to switch him to hemodialysis. Patient was found to be in Afib with RVR. Plan is to initiate HD on this admission. Afib with RVR- on heparin gtt. Resolved on coreg 25 BID    ESRD: has failed PD, had HD yesterday, will need HD chair . NSTEMI:LIkely type II MI    HTN- Continue clonidine, spironolactone, coreg, and amlodipine      40 or above Morbid obesity / Body mass index is 44.72 kg/m².     Code status: Full  Prophylaxis: heparin  Recommended Disposition: Home w/Family     Care Plan discussed with: Patient/Family/RN/Case Management        Total time spent with patient: 35 minutes.

## 2021-10-10 LAB
APTT PPP: 109.9 SEC (ref 21.2–34.1)
APTT PPP: 128.2 SEC (ref 21.2–34.1)
APTT PPP: 93.6 SEC (ref 21.2–34.1)
ATRIAL RATE: 69 BPM
CALCULATED R AXIS, ECG10: 61 DEGREES
CALCULATED T AXIS, ECG11: 111 DEGREES
DIAGNOSIS, 93000: NORMAL
HBV CORE AB SERPL QL IA: NEGATIVE
Q-T INTERVAL, ECG07: 396 MS
QRS DURATION, ECG06: 78 MS
QTC CALCULATION (BEZET), ECG08: 442 MS
THERAPEUTIC RANGE,PTTT: ABNORMAL SEC (ref 82–109)
TROPONIN-HIGH SENSITIVITY: 49 NG/L (ref 0–76)
TROPONIN-HIGH SENSITIVITY: 58 NG/L (ref 0–76)
VENTRICULAR RATE, ECG03: 75 BPM

## 2021-10-10 PROCEDURE — 74011250637 HC RX REV CODE- 250/637: Performed by: INTERNAL MEDICINE

## 2021-10-10 PROCEDURE — 74011250636 HC RX REV CODE- 250/636: Performed by: INTERNAL MEDICINE

## 2021-10-10 PROCEDURE — 94762 N-INVAS EAR/PLS OXIMTRY CONT: CPT

## 2021-10-10 PROCEDURE — 93005 ELECTROCARDIOGRAM TRACING: CPT

## 2021-10-10 PROCEDURE — 85730 THROMBOPLASTIN TIME PARTIAL: CPT

## 2021-10-10 PROCEDURE — 74011000250 HC RX REV CODE- 250: Performed by: EMERGENCY MEDICINE

## 2021-10-10 PROCEDURE — 36415 COLL VENOUS BLD VENIPUNCTURE: CPT

## 2021-10-10 PROCEDURE — 65270000029 HC RM PRIVATE

## 2021-10-10 PROCEDURE — 74011636637 HC RX REV CODE- 636/637: Performed by: INTERNAL MEDICINE

## 2021-10-10 PROCEDURE — 84484 ASSAY OF TROPONIN QUANT: CPT

## 2021-10-10 RX ORDER — HYDROCODONE BITARTRATE AND ACETAMINOPHEN 5; 325 MG/1; MG/1
1 TABLET ORAL AS NEEDED
Status: CANCELLED | OUTPATIENT
Start: 2021-10-10

## 2021-10-10 RX ORDER — SODIUM CHLORIDE 0.9 % (FLUSH) 0.9 %
5-40 SYRINGE (ML) INJECTION EVERY 8 HOURS
Status: CANCELLED | OUTPATIENT
Start: 2021-10-10

## 2021-10-10 RX ORDER — SODIUM CHLORIDE 0.9 % (FLUSH) 0.9 %
5-40 SYRINGE (ML) INJECTION AS NEEDED
Status: CANCELLED | OUTPATIENT
Start: 2021-10-10

## 2021-10-10 RX ORDER — HYDROMORPHONE HYDROCHLORIDE 1 MG/ML
0.4 INJECTION, SOLUTION INTRAMUSCULAR; INTRAVENOUS; SUBCUTANEOUS
Status: CANCELLED | OUTPATIENT
Start: 2021-10-10

## 2021-10-10 RX ORDER — DILTIAZEM HYDROCHLORIDE 180 MG/1
180 CAPSULE, EXTENDED RELEASE ORAL DAILY
Status: DISCONTINUED | OUTPATIENT
Start: 2021-10-10 | End: 2021-10-13 | Stop reason: HOSPADM

## 2021-10-10 RX ORDER — ONDANSETRON 2 MG/ML
4 INJECTION INTRAMUSCULAR; INTRAVENOUS AS NEEDED
Status: CANCELLED | OUTPATIENT
Start: 2021-10-10

## 2021-10-10 RX ORDER — DIPHENHYDRAMINE HYDROCHLORIDE 50 MG/ML
12.5 INJECTION, SOLUTION INTRAMUSCULAR; INTRAVENOUS AS NEEDED
Status: CANCELLED | OUTPATIENT
Start: 2021-10-10 | End: 2021-10-10

## 2021-10-10 RX ORDER — LIDOCAINE HYDROCHLORIDE 10 MG/ML
0.1 INJECTION, SOLUTION EPIDURAL; INFILTRATION; INTRACAUDAL; PERINEURAL AS NEEDED
Status: CANCELLED | OUTPATIENT
Start: 2021-10-10

## 2021-10-10 RX ORDER — EPHEDRINE SULFATE/0.9% NACL/PF 50 MG/5 ML
5 SYRINGE (ML) INTRAVENOUS AS NEEDED
Status: CANCELLED | OUTPATIENT
Start: 2021-10-10

## 2021-10-10 RX ORDER — ASPIRIN 81 MG/1
81 TABLET ORAL DAILY
Status: DISCONTINUED | OUTPATIENT
Start: 2021-10-11 | End: 2021-10-13 | Stop reason: HOSPADM

## 2021-10-10 RX ADMIN — HEPARIN SODIUM AND DEXTROSE 14 UNITS/KG/HR: 10000; 5 INJECTION INTRAVENOUS at 16:44

## 2021-10-10 RX ADMIN — SEVELAMER CARBONATE 800 MG: 800 TABLET, FILM COATED ORAL at 08:38

## 2021-10-10 RX ADMIN — AMLODIPINE BESYLATE 10 MG: 5 TABLET ORAL at 08:38

## 2021-10-10 RX ADMIN — HEPARIN SODIUM AND DEXTROSE 16 UNITS/KG/HR: 10000; 5 INJECTION INTRAVENOUS at 01:50

## 2021-10-10 RX ADMIN — FUROSEMIDE 80 MG: 10 INJECTION, SOLUTION INTRAMUSCULAR; INTRAVENOUS at 16:39

## 2021-10-10 RX ADMIN — Medication 10 ML: at 22:08

## 2021-10-10 RX ADMIN — PREDNISONE 5 MG: 5 TABLET ORAL at 08:38

## 2021-10-10 RX ADMIN — HYDROMORPHONE HYDROCHLORIDE 1 MG: 1 INJECTION, SOLUTION INTRAMUSCULAR; INTRAVENOUS; SUBCUTANEOUS at 16:39

## 2021-10-10 RX ADMIN — SEVELAMER CARBONATE 800 MG: 800 TABLET, FILM COATED ORAL at 16:39

## 2021-10-10 RX ADMIN — Medication 10 ML: at 16:40

## 2021-10-10 RX ADMIN — SEVELAMER CARBONATE 800 MG: 800 TABLET, FILM COATED ORAL at 12:54

## 2021-10-10 RX ADMIN — HEPARIN SODIUM AND DEXTROSE 14 UNITS/KG/HR: 10000; 5 INJECTION INTRAVENOUS at 12:11

## 2021-10-10 RX ADMIN — CARVEDILOL 25 MG: 12.5 TABLET, FILM COATED ORAL at 08:38

## 2021-10-10 RX ADMIN — DILTIAZEM HYDROCHLORIDE 180 MG: 180 CAPSULE, EXTENDED RELEASE ORAL at 16:41

## 2021-10-10 RX ADMIN — CARVEDILOL 25 MG: 12.5 TABLET, FILM COATED ORAL at 16:39

## 2021-10-10 RX ADMIN — AMITRIPTYLINE HYDROCHLORIDE 25 MG: 50 TABLET, FILM COATED ORAL at 22:08

## 2021-10-10 RX ADMIN — HYDROMORPHONE HYDROCHLORIDE 1 MG: 1 INJECTION, SOLUTION INTRAMUSCULAR; INTRAVENOUS; SUBCUTANEOUS at 01:54

## 2021-10-10 RX ADMIN — SPIRONOLACTONE 25 MG: 25 TABLET ORAL at 08:38

## 2021-10-10 RX ADMIN — Medication 15 MG/HR: at 05:00

## 2021-10-10 NOTE — PROGRESS NOTES
Renal Daily Progress Note    Admit Date: 10/7/2021      Subjective:   Subjective   HISTORY OF PRESENTING ILLNESS :  Patient is a 57-year-old -American male is a history of ESRD on hemodialysis, hypertension, NEELAM on CPAP, morbid obesity, chronic A. fib, coronary artery disease who is being on peritoneal dialysis for the past 8 months. His nephrologist is Dr. Jewel Lefort at [facility] Apparently he has not been tolerating peritoneal dialysis and is a plan to switch him to hemodialysis.    patient denies having any shortness of breath, orthopnea but his only complaint seems to be significant swelling in lower extremities   patient received dialysis on Friday and Saturday- mostly for fluid removal   he may need a dialysis again tomorrow for fluid removal- to accommodate fluid removal I discontinued Catapres patch to see whether that would help his blood pressure on dialysis    Current Facility-Administered Medications   Medication Dose Route Frequency    [START ON 10/11/2021] aspirin delayed-release tablet 81 mg  81 mg Oral DAILY    dilTIAZem ER (DILACOR XR) capsule 180 mg  180 mg Oral DAILY    heparin (porcine) 1,000 unit/mL injection 3,600 Units  3,600 Units Hemodialysis DIALYSIS PRN    sodium chloride (NS) flush 5-40 mL  5-40 mL IntraVENous Q8H    sodium chloride (NS) flush 5-40 mL  5-40 mL IntraVENous PRN    acetaminophen (TYLENOL) tablet 650 mg  650 mg Oral Q6H PRN    Or    acetaminophen (TYLENOL) suppository 650 mg  650 mg Rectal Q6H PRN    polyethylene glycol (MIRALAX) packet 17 g  17 g Oral DAILY PRN    ondansetron (ZOFRAN ODT) tablet 4 mg  4 mg Oral Q8H PRN    amitriptyline (ELAVIL) tablet 25 mg  25 mg Oral QHS    carvediloL (COREG) tablet 25 mg  25 mg Oral BID WITH MEALS    predniSONE (DELTASONE) tablet 5 mg  5 mg Oral DAILY WITH BREAKFAST    sevelamer carbonate (RENVELA) tab 800 mg  800 mg Oral TIDAC    spironolactone (ALDACTONE) tablet 25 mg  25 mg Oral DAILY    [Held by provider] warfarin (COUMADIN) tablet 5 mg  5 mg Oral DAILY    furosemide (LASIX) injection 80 mg  80 mg IntraVENous Q12H    HYDROmorphone (DILAUDID) injection 1 mg  1 mg IntraVENous Q6H PRN    HYDROcodone-acetaminophen (NORCO) 5-325 mg per tablet 1 Tablet  1 Tablet Oral Q4H PRN    heparin 25,000 units in D5W 250 ml infusion  12-25 Units/kg/hr (Adjusted) IntraVENous TITRATE    heparin (porcine) 1,000 unit/mL injection 4,000 Units  4,000 Units IntraVENous PRN    Or    heparin (porcine) 1,000 unit/mL injection 2,000 Units  2,000 Units IntraVENous PRN        Review of Systems    Review of Systems   Constitutional: Negative for chills, fever and weight loss. HENT: Negative for hearing loss. Respiratory: Negative for cough, shortness of breath and wheezing. Cardiovascular: Negative for chest pain and palpitations. Gastrointestinal: Negative for nausea and vomiting. Neurological: Negative for dizziness and weakness. Objective:     Patient Vitals for the past 8 hrs:   BP Temp Pulse Resp SpO2   10/10/21 1556 128/76 98.4 °F (36.9 °C) 83 20 98 %   10/10/21 1133 108/66 98.5 °F (36.9 °C) 76 20 99 %     10/10 0701 - 10/10 1900  In: 480 [P.O.:480]  Out: -   10/08 1901 - 10/10 0700  In: 1680 [P.O.:1680]  Out: 2000     Physical Exam:   Physical Exam  Constitutional:       General: He is not in acute distress. Appearance: He is obese. HENT:      Head: Normocephalic and atraumatic. Mouth/Throat:      Mouth: Mucous membranes are moist.   Eyes:      Pupils: Pupils are equal, round, and reactive to light. Cardiovascular:      Rate and Rhythm: Normal rate. Heart sounds: No murmur heard. No friction rub. Comments: Extremities - 4+ edema  Pulmonary:      Effort: Pulmonary effort is normal.      Breath sounds: No rales. Abdominal:      General: There is no distension. Comments: PD catheter site without any evidence for infx   Musculoskeletal:         General: No swelling.       Cervical back: No rigidity. Right lower leg: Edema present. Left lower leg: Edema present. Skin:     General: Skin is warm and dry. Neurological:      General: No focal deficit present. Mental Status: He is alert. IR INSERT TUNL CVC W/O PORT OVER 5 YR   Final Result   1. Relatively small caliber of the right subclavian vein/brachiocephalic   junction, possibly due to an element of thoracic inlet compression and evidence   of well-formed neck collaterals. 2.  Widely patent left subclavian vein and SVC. 3.  Successful placement of a right internal jugular approach PermCath. IR INJ VENOGRAM EXT   Final Result   1. Relatively small caliber of the right subclavian vein/brachiocephalic   junction, possibly due to an element of thoracic inlet compression and evidence   of well-formed neck collaterals. 2.  Widely patent left subclavian vein and SVC. 3.  Successful placement of a right internal jugular approach PermCath. IR US GUIDED VASCULAR ACCESS   Final Result   1. Relatively small caliber of the right subclavian vein/brachiocephalic   junction, possibly due to an element of thoracic inlet compression and evidence   of well-formed neck collaterals. 2.  Widely patent left subclavian vein and SVC. 3.  Successful placement of a right internal jugular approach PermCath. IR FLUORO GUIDED NEEDLE PLACEMENT   Final Result   1. Relatively small caliber of the right subclavian vein/brachiocephalic   junction, possibly due to an element of thoracic inlet compression and evidence   of well-formed neck collaterals. 2.  Widely patent left subclavian vein and SVC. 3.  Successful placement of a right internal jugular approach PermCath.       XR CHEST PORT   Final Result           Data Review   Recent Labs     10/08/21  0800   WBC 7.3   HGB 8.4*   HCT 27.5*        Recent Labs     10/08/21  0800      K 3.7   CL 99   CO2 28   GLU 92   BUN 68*   CREA 25.20*   CA 8.1*     No components found for: [place]  No results for input(s): PH, PCO2, PO2, HCO3, FIO2 in the last 72 hours. No results for input(s): INR, INREXT, INREXT, INREXT in the last 72 hours.         Assessment:     ESRD with failled PD due to UFR failure of membrane   Switched to HD - Need RX for fluid removal   we will make an attempt to remove fluid with dialysis again today if he does not tolerate the staff will send him back to floor   HTN-  On  Multiple antihypertensive medications which may be the cause for hypertension- need to be held on dialysis days to accommodate fluid removal   Fluid overload- need for to 4.5 kg of fluid removal with each dialysis   Anemia - due to CKD most likely- expect to see improvement with fluid removal    Active Problems:    Atrial fibrillation with RVR ([address] (10/7/2021)        Plan:      need evaluation for HD for fluid removal in a.m.   clonidine patch is discontinued   hold BP Rx 3 dialysis- to enable fluid removal with dialysis   h

## 2021-10-10 NOTE — PROGRESS NOTES
Hospitalist Progress Note    Subjective:   Daily Progress Note: 10/10/2021 1:48 PM    Hospital Course:  53-year-old -American man with a history of chronic atrial fibrillation, coronary artery disease, status post myocardial infarction in 2009, congestive heart failure, hypertension, PE and end-stage renal disease on PD presented to emergency room with complaints of increasing bilateral leg edema, shortness of breath and increasing weight gain of past few days duration. Nephrologist has been trying to switch him to hemodialysis. Patient was found to be in Afib with RVR. Plan is to initiate HD on this admission. Subjective:  Patient reports that he is feeling better. All questions and concern answered.      Current Facility-Administered Medications   Medication Dose Route Frequency    heparin (porcine) 1,000 unit/mL injection 3,600 Units  3,600 Units Hemodialysis DIALYSIS PRN    dilTIAZem (CARDIZEM) 125 mg/125 mL (1 mg/mL) dextrose 5% infusion  0-15 mg/hr IntraVENous TITRATE    sodium chloride (NS) flush 5-40 mL  5-40 mL IntraVENous Q8H    sodium chloride (NS) flush 5-40 mL  5-40 mL IntraVENous PRN    acetaminophen (TYLENOL) tablet 650 mg  650 mg Oral Q6H PRN    Or    acetaminophen (TYLENOL) suppository 650 mg  650 mg Rectal Q6H PRN    polyethylene glycol (MIRALAX) packet 17 g  17 g Oral DAILY PRN    ondansetron (ZOFRAN ODT) tablet 4 mg  4 mg Oral Q8H PRN    amitriptyline (ELAVIL) tablet 25 mg  25 mg Oral QHS    amLODIPine (NORVASC) tablet 10 mg  10 mg Oral DAILY    carvediloL (COREG) tablet 25 mg  25 mg Oral BID WITH MEALS    predniSONE (DELTASONE) tablet 5 mg  5 mg Oral DAILY WITH BREAKFAST    sevelamer carbonate (RENVELA) tab 800 mg  800 mg Oral TIDAC    spironolactone (ALDACTONE) tablet 25 mg  25 mg Oral DAILY    [Held by provider] warfarin (COUMADIN) tablet 5 mg  5 mg Oral DAILY    furosemide (LASIX) injection 80 mg  80 mg IntraVENous Q12H    HYDROmorphone (DILAUDID) injection 1 mg  1 mg IntraVENous Q6H PRN    HYDROcodone-acetaminophen (NORCO) 5-325 mg per tablet 1 Tablet  1 Tablet Oral Q4H PRN    heparin 25,000 units in D5W 250 ml infusion  12-25 Units/kg/hr (Adjusted) IntraVENous TITRATE    heparin (porcine) 1,000 unit/mL injection 4,000 Units  4,000 Units IntraVENous PRN    Or    heparin (porcine) 1,000 unit/mL injection 2,000 Units  2,000 Units IntraVENous PRN        Review of Systems  Constitutional: No fevers, No chills, No sweats, No fatigue, No Weakness  Eyes: No redness  Ears, nose, mouth, throat, and face: No nasal congestion, No sore throat, No voice change  Respiratory: No Shortness of Breath, No cough, No wheezing  Cardiovascular: No chest pain, No palpitations, No extremity edema  Gastrointestinal: No nausea, No vomiting, No diarrhea, No abdominal pain  Genitourinary: No frequency, No dysuria, No hematuria  Integument/breast: No skin lesion(s)   Neurological: No Confusion, No headaches, No dizziness      Objective:     Visit Vitals  /66 (BP Patient Position: At rest;Lying)   Pulse 76   Temp 98.5 °F (36.9 °C)   Resp 20   Ht 6' 3\" (1.905 m)   Wt (!) 163.8 kg (361 lb 1.8 oz)   SpO2 99%   BMI 45.14 kg/m²      O2 Device: None (Room air)    Temp (24hrs), Av.4 °F (36.9 °C), Min:98.3 °F (36.8 °C), Max:98.5 °F (36.9 °C)      10/10 0701 - 10/10 1900  In: 480 [P.O.:480]  Out: -   10/08 1901 - 10/10 0700  In: 1680 [P.O.:1680]  Out:      PHYSICAL EXAM:  Constitutional: No acute distress  Skin: Extremities and face reveal no rashes. HEENT: Sclerae anicteric. Extra-occular muscles are intact. No oral ulcers. The neck is supple and no masses. Cardiovascular: Regular rate and rhythm. Respiratory:  Clear breath sounds bilaterally with no wheezes, rales, or rhonchi. GI: Abdomen nondistended, soft, and nontender. Normal active bowel sounds. Musculoskeletal: No pitting edema of the lower legs. Able to move all ext  Neurological:  Patient is alert and oriented.  Cranial nerves II-XII grossly intact  Psychiatric: Mood appears appropriate       Data Review    Recent Results (from the past 24 hour(s))   TROPONIN-HIGH SENSITIVITY    Collection Time: 10/09/21  3:00 PM   Result Value Ref Range    Troponin-High Sensitivity 62 0 - 76 ng/L   PTT    Collection Time: 10/09/21  3:00 PM   Result Value Ref Range    aPTT 78.1 (H) 21.2 - 34.1 sec    aPTT, therapeutic range   82 - 109 sec   TROPONIN-HIGH SENSITIVITY    Collection Time: 10/09/21  9:39 PM   Result Value Ref Range    Troponin-High Sensitivity 58 0 - 76 ng/L   PTT    Collection Time: 10/10/21 12:12 AM   Result Value Ref Range    aPTT 109.9 (HH) 21.2 - 34.1 sec    aPTT, therapeutic range   82 - 109 sec   PTT    Collection Time: 10/10/21 10:52 AM   Result Value Ref Range    aPTT 128.2 (HH) 21.2 - 34.1 sec    aPTT, therapeutic range   82 - 109 sec   TROPONIN-HIGH SENSITIVITY    Collection Time: 10/10/21 10:52 AM   Result Value Ref Range    Troponin-High Sensitivity 58 0 - 76 ng/L       Radiology review: y    Assessment / Plan:    61-year-old -American man with a history of chronic atrial fibrillation, coronary artery disease, status post myocardial infarction in 2009, congestive heart failure, hypertension, PE and end-stage renal disease on PD presented to emergency room with complaints of increasing bilateral leg edema, shortness of breath and increasing weight gain of past few days duration. Nephrologist has been trying to switch him to hemodialysis. Patient was found to be in Afib with RVR. Plan is to initiate HD on this admission. Afib with RVR- on heparin gtt. Resolved on coreg 25 BID    ESRD: has failed PD, had HD yesterday, will need HD chair . NSTEMI:LIkely type II MI    HTN- Continue clonidine, spironolactone, coreg, and amlodipine      40 or above Morbid obesity / Body mass index is 45.14 kg/m².     Code status: Full  Prophylaxis: heparin  Recommended Disposition: Home w/Family     Care Plan discussed with: Patient/Family/RN/Case Management        Total time spent with patient: 35 minutes.

## 2021-10-10 NOTE — PROGRESS NOTES
Problem: Falls - Risk of  Goal: *Absence of Falls  Description: Document [place] Fall Risk and appropriate interventions in the flowsheet. Outcome: Progressing Towards Goal  Note: Fall Risk Interventions:  Mobility Interventions: Patient to call before getting OOB         Medication Interventions: Bed/chair exit alarm, Patient to call before getting OOB, Teach patient to arise slowly                   Problem: Risk for Spread of Infection  Goal: Prevent transmission of infectious organism to others  Description: Prevent the transmission of infectious organisms to other patients, staff members, and visitors.   Outcome: Progressing Towards Goal

## 2021-10-10 NOTE — PROGRESS NOTES
Progress Note      10/10/2021 1:47 PM  NAME: Beth Dutton   MRN:  [id number]   Admit Diagnosis: Atrial fibrillation with RVR ([address] [I48.91]        Assessment/Plan:     1. Elevated troponin. Patient mildly elevated troponin in setting of his atrial fibrillation with rapid ventricular response, end-stage renal disease and congestive heart failure with peripheral edema is nonspecific for ischemic event and appears to be type II non-STEMI from oxygen supply and demand mismatch. Continue low-dose aspirin and beta-blocker for empiric antianginal coverage. 2. Congestive heart failure with peripheral edema which appears to be due to patient's fluid overload with ineffective PD. Patient echocardiogram during the course of this hospitalization shows well-preserved LV systolic function without any significant valvular heart disease. Agree with switching patient's PD  to hemodialysis for keeping him euvolemic. 3. Atrial fibrillation with rapid ventricle response. Rate now well controlled on combination of diltiazem and carvedilol. I will change patient's amlodipine to long-acting diltiazem to maintain adequate rate control. Continue heparin for anticoagulation which can be changed to warfarin or Xarelto upon discharge             []       High complexity decision making was performed in this patient at high risk for decompensation with multiple organ involvement. Subjective:     Beth Dutton denies chest pain, dyspnea. Discussed with RN events overnight. Review of Systems:    Symptom Y/N Comments  Symptom Y/N Comments   Fever/Chills N   Chest Pain N    Poor Appetite N   Edema N    Cough N   Abdominal Pain N    Sputum N   Joint Pain N    SOB/PETER N   Pruritis/Rash N    Nausea/vomit N   Tolerating PT/OT Y    Diarrhea N   Tolerating Diet Y    Constipation N   Other       Could NOT obtain due to:      Objective:      Physical Exam:    Last 24hrs VS reviewed since prior progress note.  Most recent are:    Visit Vitals  /66 (BP Patient Position: At rest;Lying)   Pulse 76   Temp 98.5 °F (36.9 °C)   Resp 20   Ht 6' 3\" (1.905 m)   Wt (!) 163.8 kg (361 lb 1.8 oz)   SpO2 99%   BMI 45.14 kg/m²       Intake/Output Summary (Last 24 hours) at 10/10/2021 1347  Last data filed at 10/10/2021 1133  Gross per 24 hour   Intake 840 ml   Output 2000 ml   Net -1160 ml        General Appearance: Well developed, well nourished, alert & oriented x 3,    no acute distress. Ears/Nose/Mouth/Throat: Hearing grossly normal.  Neck: Supple. Chest: Lungs clear to auscultation bilaterally. Cardiovascular: Irregularly irregular, S1S2 normal, no murmur. Abdomen: Soft, non-tender, bowel sounds are active. Extremities: 2+ pretibial edema bilaterally   Skin: Warm and dry. []         Post-cath site without hematoma, bruit, tenderness, or thrill. Distal pulses intact. PMH/SH reviewed - no change compared to H&P    Data Review    Telemetry: Atrial fibrillation with controlled rate      IR INSERT TUNL CVC W/O PORT OVER 5 YR   Final Result   1. Relatively small caliber of the right subclavian vein/brachiocephalic   junction, possibly due to an element of thoracic inlet compression and evidence   of well-formed neck collaterals. 2.  Widely patent left subclavian vein and SVC. 3.  Successful placement of a right internal jugular approach PermCath. IR INJ VENOGRAM EXT   Final Result   1. Relatively small caliber of the right subclavian vein/brachiocephalic   junction, possibly due to an element of thoracic inlet compression and evidence   of well-formed neck collaterals. 2.  Widely patent left subclavian vein and SVC. 3.  Successful placement of a right internal jugular approach PermCath. IR US GUIDED VASCULAR ACCESS   Final Result   1.   Relatively small caliber of the right subclavian vein/brachiocephalic   junction, possibly due to an element of thoracic inlet compression and evidence   of well-formed neck collaterals. 2.  Widely patent left subclavian vein and SVC. 3.  Successful placement of a right internal jugular approach PermCath. IR FLUORO GUIDED NEEDLE PLACEMENT   Final Result   1. Relatively small caliber of the right subclavian vein/brachiocephalic   junction, possibly due to an element of thoracic inlet compression and evidence   of well-formed neck collaterals. 2.  Widely patent left subclavian vein and SVC. 3.  Successful placement of a right internal jugular approach PermCath. XR CHEST PORT   Final Result           Recent Results (from the past 24 hour(s))   TROPONIN-HIGH SENSITIVITY    Collection Time: 10/09/21  3:00 PM   Result Value Ref Range    Troponin-High Sensitivity 62 0 - 76 ng/L   PTT    Collection Time: 10/09/21  3:00 PM   Result Value Ref Range    aPTT 78.1 (H) 21.2 - 34.1 sec    aPTT, therapeutic range   82 - 109 sec   TROPONIN-HIGH SENSITIVITY    Collection Time: 10/09/21  9:39 PM   Result Value Ref Range    Troponin-High Sensitivity 58 0 - 76 ng/L   PTT    Collection Time: 10/10/21 12:12 AM   Result Value Ref Range    aPTT 109.9 (HH) 21.2 - 34.1 sec    aPTT, therapeutic range   82 - 109 sec   PTT    Collection Time: 10/10/21 10:52 AM   Result Value Ref Range    aPTT 128.2 (HH) 21.2 - 34.1 sec    aPTT, therapeutic range   82 - 109 sec   TROPONIN-HIGH SENSITIVITY    Collection Time: 10/10/21 10:52 AM   Result Value Ref Range    Troponin-High Sensitivity 58 0 - 76 ng/L          Echo:   10/07/21    ECHO ADULT COMPLETE 10/08/2021 10/8/2021    Interpretation Summary  · LV: Estimated LVEF is 55 - 60%. Normal cavity size, systolic function (ejection fraction normal) and diastolic function. Mild concentric hypertrophy. · LA: Mildly dilated left atrium. Left Atrium volume index is 46.4 mL/m2. · MV: Mild mitral valve regurgitation is present.   · IVC: Severely elevated central venous pressure (15 mmHg); IVC diameter is larger than 21 mm and collapses less than 50% with respiration. · Contrast used: DEFINITY. · Image quality for this study was suboptimal.    Signed by: Pam Hutchins MD on 10/8/2021  2:12 PM      Patient's  EKG, laboratory data and echocardiogram were individually reviewed by me. Lab Data:    Recent Labs     10/08/21  0800   WBC 7.3   HGB 8.4*   HCT 27.5*        Recent Labs     10/10/21  1052 10/10/21  0012 10/09/21  1500   APTT 128.2* 109.9* 78.1*      Recent Labs     10/08/21  0800      K 3.7   CL 99   CO2 28   BUN 68*   CREA 25.20*   GLU 92   CA 8.1*     No results for input(s): CPK, CKNDX, TROIQ in the last 72 hours. No lab exists for component: CPKMB  Lab Results   Component Value Date/Time    Cholesterol, total 165 06/30/2011 03:00 PM    HDL Cholesterol 29 06/30/2011 03:00 PM    LDL, calculated 96.2 06/30/2011 03:00 PM    Triglyceride 199 (H) 06/30/2011 03:00 PM    CHOL/HDL Ratio 5.7 (H) 06/30/2011 03:00 PM       No results for input(s): AP, TBIL, TP, ALB, GLOB, GGT, AML, LPSE in the last 72 hours. No lab exists for component: SGOT, GPT, AMYP, HLPSE  No results for input(s): PH, PCO2, PO2 in the last 72 hours.     Medications Personally Reviewed:    Current Facility-Administered Medications   Medication Dose Route Frequency    heparin (porcine) 1,000 unit/mL injection 3,600 Units  3,600 Units Hemodialysis DIALYSIS PRN    dilTIAZem (CARDIZEM) 125 mg/125 mL (1 mg/mL) dextrose 5% infusion  0-15 mg/hr IntraVENous TITRATE    sodium chloride (NS) flush 5-40 mL  5-40 mL IntraVENous Q8H    sodium chloride (NS) flush 5-40 mL  5-40 mL IntraVENous PRN    acetaminophen (TYLENOL) tablet 650 mg  650 mg Oral Q6H PRN    Or    acetaminophen (TYLENOL) suppository 650 mg  650 mg Rectal Q6H PRN    polyethylene glycol (MIRALAX) packet 17 g  17 g Oral DAILY PRN    ondansetron (ZOFRAN ODT) tablet 4 mg  4 mg Oral Q8H PRN    amitriptyline (ELAVIL) tablet 25 mg  25 mg Oral QHS    amLODIPine (NORVASC) tablet 10 mg  10 mg Oral DAILY    carvediloL (COREG) tablet 25 mg  25 mg Oral BID WITH MEALS    predniSONE (DELTASONE) tablet 5 mg  5 mg Oral DAILY WITH BREAKFAST    sevelamer carbonate (RENVELA) tab 800 mg  800 mg Oral TIDAC    spironolactone (ALDACTONE) tablet 25 mg  25 mg Oral DAILY    [Held by provider] warfarin (COUMADIN) tablet 5 mg  5 mg Oral DAILY    furosemide (LASIX) injection 80 mg  80 mg IntraVENous Q12H    HYDROmorphone (DILAUDID) injection 1 mg  1 mg IntraVENous Q6H PRN    HYDROcodone-acetaminophen (NORCO) 5-325 mg per tablet 1 Tablet  1 Tablet Oral Q4H PRN    heparin 25,000 units in D5W 250 ml infusion  12-25 Units/kg/hr (Adjusted) IntraVENous TITRATE    heparin (porcine) 1,000 unit/mL injection 4,000 Units  4,000 Units IntraVENous PRN    Or    heparin (porcine) 1,000 unit/mL injection 2,000 Units  2,000 Units IntraVENous PRN         Prior to Admission medications    Medication Sig Start Date End Date Taking? Authorizing Provider   sevelamer carbonate (RENVELA) 800 mg tab tab Take 800 mg by mouth Before breakfast, lunch, and dinner. Yes Other, MD Lea   montelukast (SINGULAIR) 10 mg tablet Take 10 mg by mouth daily. 11/25/20  Yes Other, MD Lea   bumetanide (BUMEX) 2 mg tablet Take 2 mg by mouth three (3) times daily. Yes Other, MD Lea   amitriptyline (ELAVIL) 25 mg tablet Take 1 Tablet by mouth nightly. 9/28/21  Yes Demetrius Tadeo, KATY   cloNIDine (CATAPRES) 0.1 mg/24 hr ptwk 1 Patch by TransDERmal route every seven (7) days. Indications: CHANGES ON SUNDAY   Yes Provider, Historical   allopurinoL (ZYLOPRIM) 100 mg tablet Take 50 mg by mouth. TAKES ON Monday AND Thursday   Yes Provider, Historical   carvediloL (Coreg) 25 mg tablet Take 25 mg by mouth two (2) times daily (with meals). Yes Provider, Historical   cyanocobalamin/salcaprozat sod (ELIGEN B12 PO) Take  by mouth every fourty-eight (48) hours. Yes Provider, Historical   ERGOCALCIFEROL, VITAMIN D2, PO Take  by mouth every seven (7) days.  Indications: ON SUNDAY Yes Provider, Historical   predniSONE (DELTASONE) 5 mg tablet Take  by mouth. Yes Provider, Historical   amLODIPine (NORVASC) 10 mg tablet Take 1 Tab by mouth daily. 11  Yes Elisa Vieira MD   spironolactone (ALDACTONE) 25 mg tablet Take  by mouth daily.   Patient not taking: Reported on 10/7/2021    Provider, Historical       Clinical CARE time spent 30 minutes    Eliud MD Santana

## 2021-10-11 LAB
APTT PPP: 81.3 SEC (ref 21.2–34.1)
APTT PPP: 85.2 SEC (ref 21.2–34.1)
APTT PPP: 98.9 SEC (ref 21.2–34.1)
THERAPEUTIC RANGE,PTTT: ABNORMAL SEC (ref 82–109)

## 2021-10-11 PROCEDURE — 36415 COLL VENOUS BLD VENIPUNCTURE: CPT

## 2021-10-11 PROCEDURE — 5A1D70Z PERFORMANCE OF URINARY FILTRATION, INTERMITTENT, LESS THAN 6 HOURS PER DAY: ICD-10-PCS | Performed by: INTERNAL MEDICINE

## 2021-10-11 PROCEDURE — 65270000029 HC RM PRIVATE

## 2021-10-11 PROCEDURE — 90935 HEMODIALYSIS ONE EVALUATION: CPT

## 2021-10-11 PROCEDURE — 74011636637 HC RX REV CODE- 636/637: Performed by: INTERNAL MEDICINE

## 2021-10-11 PROCEDURE — 85730 THROMBOPLASTIN TIME PARTIAL: CPT

## 2021-10-11 PROCEDURE — 74011250637 HC RX REV CODE- 250/637: Performed by: INTERNAL MEDICINE

## 2021-10-11 PROCEDURE — 74011250637 HC RX REV CODE- 250/637

## 2021-10-11 PROCEDURE — 74011250636 HC RX REV CODE- 250/636

## 2021-10-11 PROCEDURE — 74011250636 HC RX REV CODE- 250/636: Performed by: INTERNAL MEDICINE

## 2021-10-11 RX ORDER — HYDROCODONE BITARTRATE AND ACETAMINOPHEN 5; 325 MG/1; MG/1
TABLET ORAL
Status: COMPLETED
Start: 2021-10-11 | End: 2021-10-11

## 2021-10-11 RX ORDER — HEPARIN SODIUM 1000 [USP'U]/ML
INJECTION, SOLUTION INTRAVENOUS; SUBCUTANEOUS
Status: COMPLETED
Start: 2021-10-11 | End: 2021-10-11

## 2021-10-11 RX ADMIN — Medication 10 ML: at 21:26

## 2021-10-11 RX ADMIN — HYDROCODONE BITARTRATE AND ACETAMINOPHEN 1 TABLET: 5; 325 TABLET ORAL at 09:30

## 2021-10-11 RX ADMIN — SEVELAMER CARBONATE 800 MG: 800 TABLET, FILM COATED ORAL at 17:29

## 2021-10-11 RX ADMIN — ASPIRIN 81 MG: 81 TABLET, COATED ORAL at 09:00

## 2021-10-11 RX ADMIN — HYDROCODONE BITARTRATE AND ACETAMINOPHEN 1 TABLET: 5; 325 TABLET ORAL at 21:22

## 2021-10-11 RX ADMIN — HEPARIN SODIUM 3600 UNITS: 1000 INJECTION, SOLUTION INTRAVENOUS; SUBCUTANEOUS at 11:02

## 2021-10-11 RX ADMIN — DILTIAZEM HYDROCHLORIDE 180 MG: 180 CAPSULE, EXTENDED RELEASE ORAL at 11:46

## 2021-10-11 RX ADMIN — SPIRONOLACTONE 25 MG: 25 TABLET ORAL at 11:46

## 2021-10-11 RX ADMIN — CARVEDILOL 25 MG: 12.5 TABLET, FILM COATED ORAL at 17:29

## 2021-10-11 RX ADMIN — FUROSEMIDE 80 MG: 10 INJECTION, SOLUTION INTRAMUSCULAR; INTRAVENOUS at 15:46

## 2021-10-11 RX ADMIN — SEVELAMER CARBONATE 800 MG: 800 TABLET, FILM COATED ORAL at 11:46

## 2021-10-11 RX ADMIN — HEPARIN SODIUM 2000 UNITS: 1000 INJECTION, SOLUTION INTRAVENOUS; SUBCUTANEOUS at 13:26

## 2021-10-11 RX ADMIN — CARVEDILOL 25 MG: 12.5 TABLET, FILM COATED ORAL at 11:45

## 2021-10-11 RX ADMIN — Medication 10 ML: at 05:12

## 2021-10-11 RX ADMIN — PREDNISONE 5 MG: 5 TABLET ORAL at 11:46

## 2021-10-11 RX ADMIN — HYDROCODONE BITARTRATE AND ACETAMINOPHEN 1 TABLET: 5; 325 TABLET ORAL at 17:29

## 2021-10-11 RX ADMIN — FUROSEMIDE 80 MG: 10 INJECTION, SOLUTION INTRAMUSCULAR; INTRAVENOUS at 04:29

## 2021-10-11 RX ADMIN — AMITRIPTYLINE HYDROCHLORIDE 25 MG: 50 TABLET, FILM COATED ORAL at 21:22

## 2021-10-11 RX ADMIN — HEPARIN SODIUM AND DEXTROSE 14 UNITS/KG/HR: 10000; 5 INJECTION INTRAVENOUS at 07:35

## 2021-10-11 RX ADMIN — Medication 10 ML: at 14:00

## 2021-10-11 NOTE — PROGRESS NOTES
Problem: General Medical Care Plan  Goal: *Skin integrity maintained  Outcome: Progressing Towards Goal  Goal: *Fluid volume balance  Outcome: Progressing Towards Goal  Goal: *Progressive mobility and function (eg: ADL's)  Outcome: Progressing Towards Goal

## 2021-10-11 NOTE — PROGRESS NOTES
Hospitalist Progress Note    Subjective:   Daily Progress Note: 10/11/2021 1:48 PM    Hospital Course:  72-year-old -American man with a history of chronic atrial fibrillation, coronary artery disease, status post myocardial infarction in 2009, congestive heart failure, hypertension, PE and end-stage renal disease on PD presented to emergency room with complaints of increasing bilateral leg edema, shortness of breath and increasing weight gain of past few days duration. Nephrologist has been trying to switch him to hemodialysis. Patient was found to be in Afib with RVR. Plan is to initiate HD on this admission. Subjective:  Patient reports that he is feeling better. All questions and concern answered.      Current Facility-Administered Medications   Medication Dose Route Frequency    epoetin jl-epbx (RETACRIT) injection 20,000 Units  20,000 Units SubCUTAneous ONCE    aspirin delayed-release tablet 81 mg  81 mg Oral DAILY    dilTIAZem ER (DILACOR XR) capsule 180 mg  180 mg Oral DAILY    heparin (porcine) 1,000 unit/mL injection 3,600 Units  3,600 Units Hemodialysis DIALYSIS PRN    sodium chloride (NS) flush 5-40 mL  5-40 mL IntraVENous Q8H    sodium chloride (NS) flush 5-40 mL  5-40 mL IntraVENous PRN    acetaminophen (TYLENOL) tablet 650 mg  650 mg Oral Q6H PRN    Or    acetaminophen (TYLENOL) suppository 650 mg  650 mg Rectal Q6H PRN    polyethylene glycol (MIRALAX) packet 17 g  17 g Oral DAILY PRN    ondansetron (ZOFRAN ODT) tablet 4 mg  4 mg Oral Q8H PRN    amitriptyline (ELAVIL) tablet 25 mg  25 mg Oral QHS    carvediloL (COREG) tablet 25 mg  25 mg Oral BID WITH MEALS    predniSONE (DELTASONE) tablet 5 mg  5 mg Oral DAILY WITH BREAKFAST    sevelamer carbonate (RENVELA) tab 800 mg  800 mg Oral TIDAC    spironolactone (ALDACTONE) tablet 25 mg  25 mg Oral DAILY    [Held by provider] warfarin (COUMADIN) tablet 5 mg  5 mg Oral DAILY    furosemide (LASIX) injection 80 mg  80 mg IntraVENous Q12H    HYDROmorphone (DILAUDID) injection 1 mg  1 mg IntraVENous Q6H PRN    HYDROcodone-acetaminophen (NORCO) 5-325 mg per tablet 1 Tablet  1 Tablet Oral Q4H PRN    heparin 25,000 units in D5W 250 ml infusion  12-25 Units/kg/hr (Adjusted) IntraVENous TITRATE    heparin (porcine) 1,000 unit/mL injection 4,000 Units  4,000 Units IntraVENous PRN    Or    heparin (porcine) 1,000 unit/mL injection 2,000 Units  2,000 Units IntraVENous PRN        Review of Systems  Constitutional: No fevers, No chills, No sweats, No fatigue, No Weakness  Eyes: No redness  Ears, nose, mouth, throat, and face: No nasal congestion, No sore throat, No voice change  Respiratory: No Shortness of Breath, No cough, No wheezing  Cardiovascular: No chest pain, No palpitations, No extremity edema  Gastrointestinal: No nausea, No vomiting, No diarrhea, No abdominal pain  Genitourinary: No frequency, No dysuria, No hematuria  Integument/breast: No skin lesion(s)   Neurological: No Confusion, No headaches, No dizziness      Objective:     Visit Vitals  /76   Pulse 80   Temp 98.2 °F (36.8 °C)   Resp 20   Ht 6' 3\" (1.905 m)   Wt (!) 163.8 kg (361 lb 1.8 oz)   SpO2 100%   BMI 45.14 kg/m²      O2 Device: None (Room air)    Temp (24hrs), Av.3 °F (36.8 °C), Min:97.8 °F (36.6 °C), Max:98.7 °F (37.1 °C)      10/11 0701 - 10/11 1900  In: -   Out: 2000   10/09 1901 - 10/11 0700  In: 960 [P.O.:960]  Out: -     PHYSICAL EXAM:  Constitutional: No acute distress  Skin: Extremities and face reveal no rashes. HEENT: Sclerae anicteric. Extra-occular muscles are intact. No oral ulcers. The neck is supple and no masses. Cardiovascular: Regular rate and rhythm. Respiratory:  Clear breath sounds bilaterally with no wheezes, rales, or rhonchi. GI: Abdomen nondistended, soft, and nontender. Normal active bowel sounds. Musculoskeletal: No pitting edema of the lower legs. Able to move all ext  Neurological:  Patient is alert and oriented.  Cranial nerves II-XII grossly intact  Psychiatric: Mood appears appropriate       Data Review    Recent Results (from the past 24 hour(s))   PTT    Collection Time: 10/10/21  6:22 PM   Result Value Ref Range    aPTT 93.6 (H) 21.2 - 34.1 sec    aPTT, therapeutic range   82 - 109 sec   TROPONIN-HIGH SENSITIVITY    Collection Time: 10/10/21  6:22 PM   Result Value Ref Range    Troponin-High Sensitivity 49 0 - 76 ng/L   EKG, 12 LEAD, SUBSEQUENT    Collection Time: 10/10/21  6:58 PM   Result Value Ref Range    Ventricular Rate 75 BPM    Atrial Rate 69 BPM    QRS Duration 78 ms    Q-T Interval 396 ms    QTC Calculation (Bezet) 442 ms    Calculated R Axis 61 degrees    Calculated T Axis 111 degrees    Diagnosis       Atrial fibrillation  Low voltage QRS  Nonspecific T wave abnormality  Abnormal ECG  When compared with ECG of 07-OCT-2021 11:15,  Vent. rate has decreased BY  71 BPM  Non-specific change in ST segment in Anterior leads  Nonspecific T wave abnormality has replaced inverted T waves in Lateral leads  Confirmed by Rob Topete MD, [facility] ([id number]) on 10/10/2021 10:26:38 PM     PTT    Collection Time: 10/11/21  1:33 AM   Result Value Ref Range    aPTT 85.2 (H) 21.2 - 34.1 sec    aPTT, therapeutic range   82 - 109 sec   PTT    Collection Time: 10/11/21 11:58 AM   Result Value Ref Range    aPTT 81.3 (H) 21.2 - 34.1 sec    aPTT, therapeutic range   82 - 109 sec       Radiology review: y    Assessment / Plan:    22-year-old -American man with a history of chronic atrial fibrillation, coronary artery disease, status post myocardial infarction in 2009, congestive heart failure, hypertension, PE and end-stage renal disease on PD presented to emergency room with complaints of increasing bilateral leg edema, shortness of breath and increasing weight gain of past few days duration. Nephrologist has been trying to switch him to hemodialysis. Patient was found to be in Afib with RVR. Plan is to initiate HD on this admission.     Afib with RVR- on heparin gtt. Resolved on coreg 25 BID    ESRD: has failed PD, had HD yesterday, will need HD chair . NSTEMI:LIkely type II MI    HTN- Continue clonidine, spironolactone, coreg, and amlodipine      40 or above Morbid obesity / Body mass index is 45.14 kg/m². Code status: Full  Prophylaxis: heparin  Recommended Disposition: Home w/Family     Care Plan discussed with: Patient/Family/RN/Case Management        Total time spent with patient: 35 minutes.

## 2021-10-11 NOTE — PROGRESS NOTES
Anesthesia cancelled surgery based on cardiology and nephrology notes. Diet reordered. If we can get patient dry enough and get cardiac clearance I can do his surgery on Wed, or possibly Thursday depending on OR schedule. Alternatively, I can remove PD cath when fistula is placed as an outpatient in a few weeks.

## 2021-10-11 NOTE — PROGRESS NOTES
Problem: Falls - Risk of  Goal: *Absence of Falls  Description: Document   Fall Risk and appropriate interventions in the flowsheet. Outcome: Progressing Towards Goal  Note: Fall Risk Interventions:  Mobility Interventions: Patient to call before getting OOB         Medication Interventions: Patient to call before getting OOB, Teach patient to arise slowly                   Problem: Patient Education: Go to Patient Education Activity  Goal: Patient/Family Education  Outcome: Progressing Towards Goal     Problem: Risk for Spread of Infection  Goal: Prevent transmission of infectious organism to others  Description: Prevent the transmission of infectious organisms to other patients, staff members, and visitors.   Outcome: Progressing Towards Goal     Problem: Patient Education:  Go to Education Activity  Goal: Patient/Family Education  Outcome: Progressing Towards Goal     Problem: General Medical Care Plan  Goal: *Skin integrity maintained  Outcome: Progressing Towards Goal  Goal: *Fluid volume balance  Outcome: Progressing Towards Goal  Goal: *Progressive mobility and function (eg: ADL's)  Outcome: Progressing Towards Goal

## 2021-10-11 NOTE — PROGRESS NOTES
Renal Daily Progress Note    Admit Date: 10/7/2021      Subjective:   He was seen on hemodialysis today. He denies chest pain or palpitation. He apparently has a good appetite. Denies abdominal pain. PD catheter removal was canceled today pending cardiology approval.  No cramps on dialysis.       Current Facility-Administered Medications   Medication Dose Route Frequency    aspirin delayed-release tablet 81 mg  81 mg Oral DAILY    dilTIAZem ER (DILACOR XR) capsule 180 mg  180 mg Oral DAILY    heparin (porcine) 1,000 unit/mL injection 3,600 Units  3,600 Units Hemodialysis DIALYSIS PRN    sodium chloride (NS) flush 5-40 mL  5-40 mL IntraVENous Q8H    sodium chloride (NS) flush 5-40 mL  5-40 mL IntraVENous PRN    acetaminophen (TYLENOL) tablet 650 mg  650 mg Oral Q6H PRN    Or    acetaminophen (TYLENOL) suppository 650 mg  650 mg Rectal Q6H PRN    polyethylene glycol (MIRALAX) packet 17 g  17 g Oral DAILY PRN    ondansetron (ZOFRAN ODT) tablet 4 mg  4 mg Oral Q8H PRN    amitriptyline (ELAVIL) tablet 25 mg  25 mg Oral QHS    carvediloL (COREG) tablet 25 mg  25 mg Oral BID WITH MEALS    predniSONE (DELTASONE) tablet 5 mg  5 mg Oral DAILY WITH BREAKFAST    sevelamer carbonate (RENVELA) tab 800 mg  800 mg Oral TIDAC    spironolactone (ALDACTONE) tablet 25 mg  25 mg Oral DAILY    [Held by provider] warfarin (COUMADIN) tablet 5 mg  5 mg Oral DAILY    furosemide (LASIX) injection 80 mg  80 mg IntraVENous Q12H    HYDROmorphone (DILAUDID) injection 1 mg  1 mg IntraVENous Q6H PRN    HYDROcodone-acetaminophen (NORCO) 5-325 mg per tablet 1 Tablet  1 Tablet Oral Q4H PRN    heparin 25,000 units in D5W 250 ml infusion  12-25 Units/kg/hr (Adjusted) IntraVENous TITRATE    heparin (porcine) 1,000 unit/mL injection 4,000 Units  4,000 Units IntraVENous PRN    Or    heparin (porcine) 1,000 unit/mL injection 2,000 Units  2,000 Units IntraVENous PRN        Review of Systems    Review of Systems   Constitutional: Negative for fever. Respiratory: Negative for cough and shortness of breath. Cardiovascular: Negative for chest pain and leg swelling. Gastrointestinal: Negative for abdominal pain and nausea. Neurological: Negative for dizziness and headaches. Objective:     Patient Vitals for the past 8 hrs:   BP Temp Pulse Resp   10/11/21 1130 127/85 97.8 °F (36.6 °C) 83 18   10/11/21 1100 (!) 142/87 -- 87 18   10/11/21 1030 (!) 138/95 -- 92 18   10/11/21 1000 (!) 136/93 -- 85 18   10/11/21 0930 (!) 156/85 -- 82 18   10/11/21 0900 (!) 144/87 -- 90 --   10/11/21 0830 134/85 -- 86 16   10/11/21 0800 (!) 141/84 98.4 °F (36.9 °C) 92 18   10/11/21 0429 126/75 -- -- --     10/11 0701 - 10/11 1900  In: -   Out: 2000   10/09 1901 - 10/11 0700  In: 960 [P.O.:960]  Out: -     Physical Exam:   Physical Exam  Cardiovascular:      Rate and Rhythm: Rhythm irregular. Pulmonary:      Breath sounds: Normal breath sounds. Abdominal:      General: Bowel sounds are normal.      Palpations: Abdomen is soft. Neurological:      General: No focal deficit present. Mental Status: He is alert. Right IJ tunneled dialysis catheter  Chronic edema in the lower extremities      IR INSERT TUNL CVC W/O PORT OVER 5 YR   Final Result   1. Relatively small caliber of the right subclavian vein/brachiocephalic   junction, possibly due to an element of thoracic inlet compression and evidence   of well-formed neck collaterals. 2.  Widely patent left subclavian vein and SVC. 3.  Successful placement of a right internal jugular approach PermCath. IR INJ VENOGRAM EXT   Final Result   1. Relatively small caliber of the right subclavian vein/brachiocephalic   junction, possibly due to an element of thoracic inlet compression and evidence   of well-formed neck collaterals. 2.  Widely patent left subclavian vein and SVC. 3.  Successful placement of a right internal jugular approach PermCath.       IR Parul Final Result   1. Relatively small caliber of the right subclavian vein/brachiocephalic   junction, possibly due to an element of thoracic inlet compression and evidence   of well-formed neck collaterals. 2.  Widely patent left subclavian vein and SVC. 3.  Successful placement of a right internal jugular approach PermCath. IR FLUORO GUIDED NEEDLE PLACEMENT   Final Result   1. Relatively small caliber of the right subclavian vein/brachiocephalic   junction, possibly due to an element of thoracic inlet compression and evidence   of well-formed neck collaterals. 2.  Widely patent left subclavian vein and SVC. 3.  Successful placement of a right internal jugular approach PermCath. XR CHEST PORT   Final Result           Data Review   No results for input(s): WBC, HGB, HGBEXT, HCT, HCTEXT, PLT, PLTEXT, HGBEXT, HCTEXT, PLTEXT in the last 72 hours. No results for input(s): NA, K, CL, CO2, GLU, BUN, CREA, CA, MG, PHOS, ALB, TBIL, ALT, INR, INREXT, INREXT in the last 72 hours. No lab exists for component: SGOT, PTHBB  No components found for: GLPOC  No results for input(s): PH, PCO2, PO2, HCO3, FIO2 in the last 72 hours. No results for input(s): INR, INREXT, INREXT in the last 72 hours. Assessment:           Active Problems:    Atrial fibrillation with RVR ([address] (10/7/2021)    1. ESRD hemodialysis dependent  2. Anemia macrocytic  3. Secondary hyperparathyroidism. 4.  Hypertension under good control  5. Atrial fibrillation. Chronic. 6.  Obstructive sleep apnea on CPAP  Plan:   Increase dialysis time to 4 hours  He is on IV heparin till PD catheter removal.  Will need Coumadin postoperatively. He is cleared for PD catheter removal from a renal standpoint.

## 2021-10-12 LAB
ALBUMIN SERPL-MCNC: 2 G/DL (ref 3.5–5)
ANION GAP SERPL CALC-SCNC: 9 MMOL/L (ref 5–15)
APTT PPP: 119.4 SEC (ref 21.2–34.1)
APTT PPP: 79.9 SEC (ref 21.2–34.1)
BUN SERPL-MCNC: 36 MG/DL (ref 6–20)
BUN/CREAT SERPL: 2 (ref 12–20)
CA-I BLD-MCNC: 7.7 MG/DL (ref 8.5–10.1)
CHLORIDE SERPL-SCNC: 99 MMOL/L (ref 97–108)
CO2 SERPL-SCNC: 28 MMOL/L (ref 21–32)
CREAT SERPL-MCNC: 14.9 MG/DL (ref 0.7–1.3)
ERYTHROCYTE [DISTWIDTH] IN BLOOD BY AUTOMATED COUNT: 16 % (ref 11.5–14.5)
GLUCOSE SERPL-MCNC: 104 MG/DL (ref 65–100)
HCT VFR BLD AUTO: 25.9 % (ref 36.6–50.3)
HGB BLD-MCNC: 7.8 G/DL (ref 12.1–17)
INR PPP: 1.2 (ref 0.9–1.1)
MCH RBC QN AUTO: 31.7 PG (ref 26–34)
MCHC RBC AUTO-ENTMCNC: 30.1 G/DL (ref 30–36.5)
MCV RBC AUTO: 105.3 FL (ref 80–99)
NRBC # BLD: 0.02 K/UL (ref 0–0.01)
NRBC BLD-RTO: 0.2 PER 100 WBC
PHOSPHATE SERPL-MCNC: 5.2 MG/DL (ref 2.6–4.7)
PLATELET # BLD AUTO: 234 K/UL (ref 150–400)
PMV BLD AUTO: 11.6 FL (ref 8.9–12.9)
POTASSIUM SERPL-SCNC: 3.6 MMOL/L (ref 3.5–5.1)
PROTHROMBIN TIME: 14.7 SEC (ref 11.9–14.7)
RBC # BLD AUTO: 2.46 M/UL (ref 4.1–5.7)
SODIUM SERPL-SCNC: 136 MMOL/L (ref 136–145)
THERAPEUTIC RANGE,PTTT: ABNORMAL SEC (ref 82–109)
THERAPEUTIC RANGE,PTTT: ABNORMAL SEC (ref 82–109)
WBC # BLD AUTO: 9.2 K/UL (ref 4.1–11.1)

## 2021-10-12 PROCEDURE — 80069 RENAL FUNCTION PANEL: CPT

## 2021-10-12 PROCEDURE — 74011250637 HC RX REV CODE- 250/637: Performed by: INTERNAL MEDICINE

## 2021-10-12 PROCEDURE — 74011636637 HC RX REV CODE- 636/637: Performed by: INTERNAL MEDICINE

## 2021-10-12 PROCEDURE — 85610 PROTHROMBIN TIME: CPT

## 2021-10-12 PROCEDURE — 85730 THROMBOPLASTIN TIME PARTIAL: CPT

## 2021-10-12 PROCEDURE — 65270000029 HC RM PRIVATE

## 2021-10-12 PROCEDURE — 36415 COLL VENOUS BLD VENIPUNCTURE: CPT

## 2021-10-12 PROCEDURE — 74011250636 HC RX REV CODE- 250/636: Performed by: INTERNAL MEDICINE

## 2021-10-12 PROCEDURE — 85027 COMPLETE CBC AUTOMATED: CPT

## 2021-10-12 PROCEDURE — 74011250636 HC RX REV CODE- 250/636: Performed by: HOSPITALIST

## 2021-10-12 RX ORDER — PREGABALIN 75 MG/1
75 CAPSULE ORAL 2 TIMES DAILY
Qty: 30 CAPSULE | Refills: 0 | Status: SHIPPED | OUTPATIENT
Start: 2021-10-12 | End: 2021-10-12

## 2021-10-12 RX ORDER — HEPARIN SODIUM 10000 [USP'U]/100ML
12-25 INJECTION, SOLUTION INTRAVENOUS
Status: DISCONTINUED | OUTPATIENT
Start: 2021-10-12 | End: 2021-10-13

## 2021-10-12 RX ORDER — PREGABALIN 75 MG/1
75 CAPSULE ORAL 2 TIMES DAILY
Qty: 30 CAPSULE | Refills: 0 | Status: SHIPPED | OUTPATIENT
Start: 2021-10-12

## 2021-10-12 RX ORDER — FUROSEMIDE 80 MG/1
TABLET ORAL
Qty: 30 TABLET | Refills: 0 | Status: SHIPPED | OUTPATIENT
Start: 2021-10-12 | End: 2022-09-22

## 2021-10-12 RX ORDER — PREGABALIN 75 MG/1
75 CAPSULE ORAL 2 TIMES DAILY
Status: DISCONTINUED | OUTPATIENT
Start: 2021-10-12 | End: 2021-10-13 | Stop reason: HOSPADM

## 2021-10-12 RX ORDER — DILTIAZEM HYDROCHLORIDE 180 MG/1
180 CAPSULE, EXTENDED RELEASE ORAL DAILY
Qty: 30 CAPSULE | Refills: 0 | Status: SHIPPED | OUTPATIENT
Start: 2021-10-13

## 2021-10-12 RX ORDER — WARFARIN SODIUM 5 MG/1
5 TABLET ORAL DAILY
Qty: 30 TABLET | Refills: 0 | Status: SHIPPED | OUTPATIENT
Start: 2021-10-12

## 2021-10-12 RX ORDER — FUROSEMIDE 40 MG/1
80 TABLET ORAL DAILY
Status: DISCONTINUED | OUTPATIENT
Start: 2021-10-12 | End: 2021-10-13 | Stop reason: HOSPADM

## 2021-10-12 RX ADMIN — SEVELAMER CARBONATE 800 MG: 800 TABLET, FILM COATED ORAL at 17:56

## 2021-10-12 RX ADMIN — Medication 10 ML: at 05:16

## 2021-10-12 RX ADMIN — SEVELAMER CARBONATE 800 MG: 800 TABLET, FILM COATED ORAL at 08:05

## 2021-10-12 RX ADMIN — PREDNISONE 5 MG: 5 TABLET ORAL at 08:05

## 2021-10-12 RX ADMIN — HEPARIN SODIUM AND DEXTROSE 16 UNITS/KG/HR: 10000; 5 INJECTION INTRAVENOUS at 05:16

## 2021-10-12 RX ADMIN — CARVEDILOL 25 MG: 12.5 TABLET, FILM COATED ORAL at 17:53

## 2021-10-12 RX ADMIN — PREGABALIN 75 MG: 75 CAPSULE ORAL at 21:07

## 2021-10-12 RX ADMIN — Medication 10 ML: at 15:18

## 2021-10-12 RX ADMIN — HEPARIN SODIUM AND DEXTROSE 18 UNITS/KG/HR: 10000; 5 INJECTION INTRAVENOUS at 15:04

## 2021-10-12 RX ADMIN — FUROSEMIDE 80 MG: 40 TABLET ORAL at 17:53

## 2021-10-12 RX ADMIN — DILTIAZEM HYDROCHLORIDE 180 MG: 180 CAPSULE, EXTENDED RELEASE ORAL at 08:04

## 2021-10-12 RX ADMIN — SPIRONOLACTONE 25 MG: 25 TABLET ORAL at 08:05

## 2021-10-12 RX ADMIN — ASPIRIN 81 MG: 81 TABLET, COATED ORAL at 08:05

## 2021-10-12 RX ADMIN — CARVEDILOL 25 MG: 12.5 TABLET, FILM COATED ORAL at 08:05

## 2021-10-12 RX ADMIN — HEPARIN SODIUM 2000 UNITS: 1000 INJECTION, SOLUTION INTRAVENOUS; SUBCUTANEOUS at 15:04

## 2021-10-12 RX ADMIN — HEPARIN SODIUM AND DEXTROSE 16 UNITS/KG/HR: 10000; 5 INJECTION INTRAVENOUS at 13:49

## 2021-10-12 RX ADMIN — AMITRIPTYLINE HYDROCHLORIDE 25 MG: 50 TABLET, FILM COATED ORAL at 21:07

## 2021-10-12 RX ADMIN — HYDROCODONE BITARTRATE AND ACETAMINOPHEN 1 TABLET: 5; 325 TABLET ORAL at 15:03

## 2021-10-12 RX ADMIN — SEVELAMER CARBONATE 800 MG: 800 TABLET, FILM COATED ORAL at 11:51

## 2021-10-12 RX ADMIN — FUROSEMIDE 80 MG: 10 INJECTION, SOLUTION INTRAMUSCULAR; INTRAVENOUS at 04:28

## 2021-10-12 NOTE — PROGRESS NOTES
Renal Daily Progress Note    Admit Date: 10/7/2021      Subjective:   He was seen today. He is complaining of numbness and pain in his feet. Current Facility-Administered Medications   Medication Dose Route Frequency    heparin 25,000 units in D5W 250 ml infusion  12-25 Units/kg/hr (Adjusted) IntraVENous TITRATE    pregabalin (LYRICA) capsule 75 mg  75 mg Oral BID    aspirin delayed-release tablet 81 mg  81 mg Oral DAILY    dilTIAZem ER (DILACOR XR) capsule 180 mg  180 mg Oral DAILY    heparin (porcine) 1,000 unit/mL injection 3,600 Units  3,600 Units Hemodialysis DIALYSIS PRN    sodium chloride (NS) flush 5-40 mL  5-40 mL IntraVENous Q8H    sodium chloride (NS) flush 5-40 mL  5-40 mL IntraVENous PRN    acetaminophen (TYLENOL) tablet 650 mg  650 mg Oral Q6H PRN    Or    acetaminophen (TYLENOL) suppository 650 mg  650 mg Rectal Q6H PRN    polyethylene glycol (MIRALAX) packet 17 g  17 g Oral DAILY PRN    ondansetron (ZOFRAN ODT) tablet 4 mg  4 mg Oral Q8H PRN    amitriptyline (ELAVIL) tablet 25 mg  25 mg Oral QHS    carvediloL (COREG) tablet 25 mg  25 mg Oral BID WITH MEALS    predniSONE (DELTASONE) tablet 5 mg  5 mg Oral DAILY WITH BREAKFAST    sevelamer carbonate (RENVELA) tab 800 mg  800 mg Oral TIDAC    spironolactone (ALDACTONE) tablet 25 mg  25 mg Oral DAILY    [Held by provider] warfarin (COUMADIN) tablet 5 mg  5 mg Oral DAILY    furosemide (LASIX) injection 80 mg  80 mg IntraVENous Q12H    HYDROmorphone (DILAUDID) injection 1 mg  1 mg IntraVENous Q6H PRN    HYDROcodone-acetaminophen (NORCO) 5-325 mg per tablet 1 Tablet  1 Tablet Oral Q4H PRN    heparin (porcine) 1,000 unit/mL injection 4,000 Units  4,000 Units IntraVENous PRN    Or    heparin (porcine) 1,000 unit/mL injection 2,000 Units  2,000 Units IntraVENous PRN        Review of Systems    Review of Systems   Constitutional: Negative for fever. Respiratory: Negative for cough and shortness of breath.     Cardiovascular: Negative for chest pain and leg swelling. Gastrointestinal: Negative for abdominal pain and nausea. Neurological: Negative for dizziness and headaches. Objective:     Patient Vitals for the past 8 hrs:   BP Temp Pulse Resp SpO2   10/12/21 1116 133/77 98 °F (36.7 °C) 83 20 98 %   10/12/21 0821 123/84 97.4 °F (36.3 °C) 82 20 98 %     No intake/output data recorded. 10/10 1901 - 10/12 0700  In: -   Out: 2000     Physical Exam:   Physical Exam  Cardiovascular:      Rate and Rhythm: Rhythm irregular. Pulmonary:      Breath sounds: Normal breath sounds. Abdominal:      General: Bowel sounds are normal.      Palpations: Abdomen is soft. Neurological:      General: No focal deficit present. Mental Status: He is alert. Right IJ tunneled dialysis catheter  Heart rate  irregular      IR INSERT TUNL CVC W/O PORT OVER 5 YR   Final Result   1. Relatively small caliber of the right subclavian vein/brachiocephalic   junction, possibly due to an element of thoracic inlet compression and evidence   of well-formed neck collaterals. 2.  Widely patent left subclavian vein and SVC. 3.  Successful placement of a right internal jugular approach PermCath. IR INJ VENOGRAM EXT   Final Result   1. Relatively small caliber of the right subclavian vein/brachiocephalic   junction, possibly due to an element of thoracic inlet compression and evidence   of well-formed neck collaterals. 2.  Widely patent left subclavian vein and SVC. 3.  Successful placement of a right internal jugular approach PermCath. IR US GUIDED VASCULAR ACCESS   Final Result   1. Relatively small caliber of the right subclavian vein/brachiocephalic   junction, possibly due to an element of thoracic inlet compression and evidence   of well-formed neck collaterals. 2.  Widely patent left subclavian vein and SVC. 3.  Successful placement of a right internal jugular approach PermCath.       IR FLUORO GUIDED NEEDLE PLACEMENT Final Result   1. Relatively small caliber of the right subclavian vein/brachiocephalic   junction, possibly due to an element of thoracic inlet compression and evidence   of well-formed neck collaterals. 2.  Widely patent left subclavian vein and SVC. 3.  Successful placement of a right internal jugular approach PermCath. XR CHEST PORT   Final Result           Data Review   Recent Labs     10/12/21  0306   WBC 9.2   HGB 7.8*   HCT 25.9*        Recent Labs     10/12/21  0306      K 3.6   CL 99   CO2 28   *   BUN 36*   CREA 14.90*   CA 7.7*   PHOS 5.2*   ALB 2.0*     No components found for: GLPOC  No results for input(s): PH, PCO2, PO2, HCO3, FIO2 in the last 72 hours. No results for input(s): INR, INREXT, INREXT, INREXT in the last 72 hours. Assessment:           Active Problems:    Atrial fibrillation with RVR ([address] (10/7/2021)    1. ESRD hemodialysis dependent  2. Anemia macrocytic  3. Secondary hyperparathyroidism. 4.  Hypertension under good control  5. Atrial fibrillation. Chronic. 6.  Obstructive sleep apnea on CPAP  Plan:   Hemodialysis tomorrow. He has an outpatient dialysis chair at [facility] dialysis unit  Needs removal of PD catheter. Dr. Matias Villalba notified that a catheter could be removed from a nephrology standpoint.

## 2021-10-12 NOTE — DISCHARGE SUMMARY
Physician Discharge Summary     Patient ID:    Declan Orozco  [id number]  79 y.o.  1970    Admit date: 10/7/2021    Discharge date : 10/12/2021    Chronic Diagnoses:    Problem List as of 10/12/2021 Date Reviewed: 9/29/2021        Codes Class Noted - Resolved    Atrial fibrillation with RVR ([facility].) ICD-10-CM: I48.91  ICD-9-CM: 427.31  10/7/2021 - Present        Onychomycosis ICD-10-CM: B35.1  ICD-9-CM: 110.1  10/14/2020 - Present        CKD (chronic kidney disease), stage III ([facility] 75.) ICD-10-CM: N18.30  ICD-9-CM: 585.3  11/16/2011 - Present        Malignant hypertension ICD-10-CM: I10  ICD-9-CM: 401.0  7/8/2010 - Present        Angioedema of lips ICD-10-CM: T78. 3XXA  ICD-9-CM: 995.1  7/8/2010 - Present        Adverse drug reaction-to lisinipril ICD-10-CM: T50.905A  ICD-9-CM: E947.9  7/8/2010 - Present        RESOLVED: HTN (hypertension) ICD-10-CM: I10  ICD-9-CM: 401.9  11/16/2011 - 8/3/2021          22    Final Diagnoses:   Atrial fibrillation with RVR (HCC) [I48.91]  End-stage renal disease, transitioning from PD to HD this admission     Paroxysmal atrial fibrillation with RVR     Fluid overload due to #1 -improved     Acute on chronic diastolic heart failure     Acute non-STEMI type II     Hypertension     Coronary artery disease with history of MI     Diabetic neuropathy    Reason for Hospitalization:  Patient is a 57-year-old male with a history of chronic atrial fibrillation, CAD, congestive heart failure, hypertension and end-stage renal disease on PD was admitted on 10/7 with worsening shortness of breath, weight gain and fluid overload. He was in rapid atrial fibrillation on admission. He was admitted with plans to transition to hemodialysis         Hospital Course:   Patient was admitted to cardiac telemetry.   Cardizem was added to his regimen for atrial fibrillation with improvement in his heart rate    He was seen by nephrology and was started on hemodialysis, underwent 3 sessions    He was set up for an outpatient slot    We were hoping to have his PD catheter removed but surgery could not do this until later in the week and this was not felt to be urgent    We will therefore discharge him home and have him resume his warfarin. Patient will come back for AV fistula placement and PD catheter removal by Dr. Ronna Fletcher    He was felt stable for discharge home on 10/12    I did start patient on low-dose Lyrica for his painful diabetic neuropathy              Discharge Medications:   Current Discharge Medication List      START taking these medications    Details   dilTIAZem ER (DILACOR XR) 180 mg capsule Take 1 Capsule by mouth daily. Qty: 30 Capsule, Refills: 0  Start date: 10/13/2021      furosemide (LASIX) 80 mg tablet 1 tab daily  Qty: 30 Tablet, Refills: 0  Start date: 10/12/2021      pregabalin (LYRICA) 75 mg capsule Take 1 Capsule by mouth two (2) times a day. Max Daily Amount: 150 mg.  Qty: 30 Capsule, Refills: 0  Start date: 10/12/2021    Associated Diagnoses: Pain in both feet         CONTINUE these medications which have CHANGED    Details   warfarin (Jantoven) 5 mg tablet Take 1 Tablet by mouth daily. Qty: 30 Tablet, Refills: 0  Start date: 10/12/2021         CONTINUE these medications which have NOT CHANGED    Details   sevelamer carbonate (RENVELA) 800 mg tab tab Take 800 mg by mouth Before breakfast, lunch, and dinner. montelukast (SINGULAIR) 10 mg tablet Take 10 mg by mouth daily. bumetanide (BUMEX) 2 mg tablet Take 2 mg by mouth three (3) times daily. amitriptyline (ELAVIL) 25 mg tablet Take 1 Tablet by mouth nightly. Qty: 30 Tablet, Refills: 1      cloNIDine (CATAPRES) 0.1 mg/24 hr ptwk 1 Patch by TransDERmal route every seven (7) days. Indications: CHANGES ON SUNDAY      allopurinoL (ZYLOPRIM) 100 mg tablet Take 50 mg by mouth. TAKES ON Monday AND Thursday      carvediloL (Coreg) 25 mg tablet Take 25 mg by mouth two (2) times daily (with meals). cyanocobalamin/salcaprozat sod (ELIGEN B12 PO) Take  by mouth every fourty-eight (48) hours. ERGOCALCIFEROL, VITAMIN D2, PO Take  by mouth every seven (7) days. Indications: ON SUNDAY      predniSONE (DELTASONE) 5 mg tablet Take  by mouth. amLODIPine (NORVASC) 10 mg tablet Take 1 Tab by mouth daily. Qty: 30 Tab, Refills: 12      spironolactone (ALDACTONE) 25 mg tablet Take  by mouth daily. Follow up Care:    1. Traci Gordon MD in 1-2 weeks. Please call to set up an appointment shortly after discharge. Diet:  Cardiac Diet    Disposition:  Home. Advanced Directive:   FULL    DNR      Discharge Exam:  General:  Alert, cooperative, no distress, appears stated age. Lungs:   Clear to auscultation bilaterally. Chest wall:  No tenderness or deformity. Heart:  Regular rate and rhythm, S1, S2 normal, no murmur, click, rub or gallop. Abdomen:   Soft, non-tender. Bowel sounds normal. No masses,  No organomegaly. Extremities: Extremities normal, atraumatic, no cyanosis or edema. Pulses: 2+ and symmetric all extremities. Skin: Skin color, texture, turgor normal. No rashes or lesions   Neurologic: CNII-XII intact. No gross sensory or motor deficits        CONSULTATIONS: Cardiology and nephrology    Significant Diagnostic Studies:   10/7/2021: BUN 61 mg/dL* (Ref range: 6 - 20 mg/dL); Calcium 7.9 mg/dL* (Ref range: 8.5 - 10.1 mg/dL); CO2 28 mmol/L (Ref range: 21 - 32 mmol/L); Creatinine 24.00 mg/dL* (Ref range: 0.70 - 1.30 mg/dL); Glucose 95 mg/dL (Ref range: 65 - 100 mg/dL); HCT 31.3 %* (Ref range: 36.6 - 50.3 %); HGB 9.6 g/dL* (Ref range: 12.1 - 17.0 g/dL); Potassium 3.6 mmol/L (Ref range: 3.5 - 5.1 mmol/L); Sodium 139 mmol/L (Ref range: 136 - 145 mmol/L)  10/8/2021: BUN 68 mg/dL* (Ref range: 6 - 20 mg/dL); Calcium 8.1 mg/dL* (Ref range: 8.5 - 10.1 mg/dL); CO2 28 mmol/L (Ref range: 21 - 32 mmol/L); Creatinine 25.20 mg/dL* (Ref range: 0.70 - 1.30 mg/dL);  Glucose 92 mg/dL (Ref range: 65 - 100 mg/dL); HCT 27.5 %* (Ref range: 36.6 - 50.3 %); HGB 8.4 g/dL* (Ref range: 12.1 - 17.0 g/dL); Potassium 3.7 mmol/L (Ref range: 3.5 - 5.1 mmol/L); Sodium 140 mmol/L (Ref range: 136 - 145 mmol/L)  Recent Labs     10/12/21  0306   WBC 9.2   HGB 7.8*   HCT 25.9*        Recent Labs     10/12/21  0306      K 3.6   CL 99   CO2 28   BUN 36*   CREA 14.90*   *   CA 7.7*   PHOS 5.2*     Recent Labs     10/12/21  0306   ALB 2.0*     Recent Labs     10/12/21  1305 10/12/21  0306 10/11/21  2017   APTT 79.9* 119.4* 98.9*      No results for input(s): FE, TIBC, PSAT, FERR in the last 72 hours. No results for input(s): PH, PCO2, PO2 in the last 72 hours. No results for input(s): CPK, CKMB in the last 72 hours.     No lab exists for component: TROPONINI  Lab Results   Component Value Date/Time    Glucose (POC) 87 11/15/2011 06:29 PM    Glucose (POC) 89 11/15/2011 06:17 PM    Glucose (POC) 80 07/22/2009 06:11 PM       Discharge time spent 35 minutes    Signed:  Angeles Claros MD  10/12/2021  3:43 PM

## 2021-10-12 NOTE — PROGRESS NOTES
He has been cleared to go ahead for surgery. However I cannot do his surgery tomorrow. I do have the OR time for 730 on Thursday but that is apparently too far away. Alternately I have asked him to come to the office next week so we can map him for a fistula. I can remove the PD catheter time we do the fistula and hopefully get that along with the next week or 2.

## 2021-10-12 NOTE — PROGRESS NOTES
Problem: Falls - Risk of  Goal: *Absence of Falls  Description: Document Mari Aguilar Fall Risk and appropriate interventions in the flowsheet. 10/12/2021 1559 by Tess Pickens RN  Outcome: Resolved/Met  Note: Fall Risk Interventions:  Mobility Interventions: Patient to call before getting OOB         Medication Interventions: Bed/chair exit alarm, Patient to call before getting OOB, Teach patient to arise slowly                10/12/2021 0759 by Tess Pickens RN  Outcome: Progressing Towards Goal  Note: Fall Risk Interventions:  Mobility Interventions: Patient to call before getting OOB         Medication Interventions: Bed/chair exit alarm, Patient to call before getting OOB, Teach patient to arise slowly                   Problem: Patient Education: Go to Patient Education Activity  Goal: Patient/Family Education  10/12/2021 1559 by Tess Pickens RN  Outcome: Resolved/Met  10/12/2021 0759 by Tess Pickens RN  Outcome: Progressing Towards Goal     Problem: Risk for Spread of Infection  Goal: Prevent transmission of infectious organism to others  Description: Prevent the transmission of infectious organisms to other patients, staff members, and visitors.   10/12/2021 1559 by Tess Pickens RN  Outcome: Resolved/Met  10/12/2021 0759 by Tess Pickens RN  Outcome: Progressing Towards Goal     Problem: Patient Education:  Go to Education Activity  Goal: Patient/Family Education  10/12/2021 1559 by Tess Pickens RN  Outcome: Resolved/Met  10/12/2021 0759 by Tess Pickens RN  Outcome: Progressing Towards Goal     Problem: General Medical Care Plan  Goal: *Skin integrity maintained  10/12/2021 1559 by Tess Pickens RN  Outcome: Resolved/Met  10/12/2021 0759 by Tess Pickens RN  Outcome: Progressing Towards Goal  Goal: *Fluid volume balance  10/12/2021 1559 by Tess Pickens RN  Outcome: Resolved/Met  10/12/2021 0759 by Michelle Rain RN  Outcome: Progressing Towards Goal  Goal: *Progressive mobility and function (eg: ADL's)  10/12/2021 1559 by Michelle Rain RN  Outcome: Resolved/Met  10/12/2021 0759 by Michelle Rain RN  Outcome: Progressing Towards Goal

## 2021-10-12 NOTE — PROGRESS NOTES
Hospitalist Progress Note               Daily Progress Note: 10/12/2021      Subjective:   Hospital course to date: Patient is a 73-year-old male with a history of chronic atrial fibrillation, CAD, congestive heart failure, hypertension and end-stage renal disease on PD was admitted on 10/7 with worsening shortness of breath, weight gain and fluid overload. He was in rapid atrial fibrillation on admission. He was admitted with plans to transition to hemodialysis    -----  Patient seen today for follow-up. Doing well overall. Complains of severe neuropathy pain which he says is the main reason he came to the hospital.  He has been on gabapentin in the past which did not work    Problem List:  Problem List as of 10/12/2021 Date Reviewed: 9/29/2021        Codes Class Noted - Resolved    Atrial fibrillation with RVR ([facility] ICD-10-CM: I48.91  ICD-9-CM: 427.31  10/7/2021 - Present        Onychomycosis ICD-10-CM: B35.1  ICD-9-CM: 110.1  10/14/2020 - Present        CKD (chronic kidney disease), stage III ([facility] ICD-10-CM: N18.30  ICD-9-CM: 585.3  11/16/2011 - Present        Malignant hypertension ICD-10-CM: I10  ICD-9-CM: 401.0  7/8/2010 - Present        Angioedema of lips ICD-10-CM: T78. 3XXA  ICD-9-CM: 995.1  7/8/2010 - Present        Adverse drug reaction-to lisinipril ICD-10-CM: T50.905A  ICD-9-CM: E947.9  7/8/2010 - Present        RESOLVED: HTN (hypertension) ICD-10-CM: I10  ICD-9-CM: 401.9  11/16/2011 - 8/3/2021              Medications reviewed  Current Facility-Administered Medications   Medication Dose Route Frequency    heparin 25,000 units in D5W 250 ml infusion  12-25 Units/kg/hr (Adjusted) IntraVENous TITRATE    aspirin delayed-release tablet 81 mg  81 mg Oral DAILY    dilTIAZem ER (DILACOR XR) capsule 180 mg  180 mg Oral DAILY    heparin (porcine) 1,000 unit/mL injection 3,600 Units  3,600 Units Hemodialysis DIALYSIS PRN    sodium chloride (NS) flush 5-40 mL  5-40 mL IntraVENous Q8H    sodium chloride (NS) flush 5-40 mL  5-40 mL IntraVENous PRN    acetaminophen (TYLENOL) tablet 650 mg  650 mg Oral Q6H PRN    Or    acetaminophen (TYLENOL) suppository 650 mg  650 mg Rectal Q6H PRN    polyethylene glycol (MIRALAX) packet 17 g  17 g Oral DAILY PRN    ondansetron (ZOFRAN ODT) tablet 4 mg  4 mg Oral Q8H PRN    amitriptyline (ELAVIL) tablet 25 mg  25 mg Oral QHS    carvediloL (COREG) tablet 25 mg  25 mg Oral BID WITH MEALS    predniSONE (DELTASONE) tablet 5 mg  5 mg Oral DAILY WITH BREAKFAST    sevelamer carbonate (RENVELA) tab 800 mg  800 mg Oral TIDAC    spironolactone (ALDACTONE) tablet 25 mg  25 mg Oral DAILY    [Held by provider] warfarin (COUMADIN) tablet 5 mg  5 mg Oral DAILY    furosemide (LASIX) injection 80 mg  80 mg IntraVENous Q12H    HYDROmorphone (DILAUDID) injection 1 mg  1 mg IntraVENous Q6H PRN    HYDROcodone-acetaminophen (NORCO) 5-325 mg per tablet 1 Tablet  1 Tablet Oral Q4H PRN    heparin (porcine) 1,000 unit/mL injection 4,000 Units  4,000 Units IntraVENous PRN    Or    heparin (porcine) 1,000 unit/mL injection 2,000 Units  2,000 Units IntraVENous PRN       Review of Systems:   A comprehensive review of systems was negative except for that written in the HPI. Objective:   Physical Exam:     Visit Vitals  /77 (BP Patient Position: At rest;Semi fowlers)   Pulse 83   Temp 98 °F (36.7 °C)   Resp 20   Ht 6' 3\" (1.905 m)   Wt (!) 163.8 kg (361 lb 1.8 oz)   SpO2 98%   BMI 45.14 kg/m²      O2 Device: None (Room air)    Temp (24hrs), Av.1 °F (36.7 °C), Min:97.4 °F (36.3 °C), Max:98.9 °F (37.2 °C)    No intake/output data recorded. 10/10 190 - 10/12 0700  In: -   Out:      General:   Awake and alert   Lungs:   Clear to auscultation bilaterally. Chest wall:  No tenderness or deformity. Heart:  Regular rate and rhythm, S1, S2 normal, no murmur, click, rub or gallop. Abdomen:   Soft, non-tender. Bowel sounds normal. No masses,  No organomegaly.    Extremities: Extremities normal, atraumatic, no cyanosis or edema. Pulses: 2+ and symmetric all extremities. Skin: Skin color, texture, turgor normal. No rashes or lesions   Neurologic: CNII-XII intact. No gross focal deficits         Data Review:       Recent Days:  Recent Labs     10/12/21  0306   WBC 9.2   HGB 7.8*   HCT 25.9*        Recent Labs     10/12/21  0306      K 3.6   CL 99   CO2 28   *   BUN 36*   CREA 14.90*   CA 7.7*   PHOS 5.2*   ALB 2.0*     No results for input(s): PH, PCO2, PO2, HCO3, FIO2 in the last 72 hours.     24 Hour Results:  Recent Results (from the past 24 hour(s))   PTT    Collection Time: 10/11/21  8:17 PM   Result Value Ref Range    aPTT 98.9 (H) 21.2 - 34.1 sec    aPTT, therapeutic range   82 - 109 sec   RENAL FUNCTION PANEL    Collection Time: 10/12/21  3:06 AM   Result Value Ref Range    Sodium 136 136 - 145 mmol/L    Potassium 3.6 3.5 - 5.1 mmol/L    Chloride 99 97 - 108 mmol/L    CO2 28 21 - 32 mmol/L    Anion gap 9 5 - 15 mmol/L    Glucose 104 (H) 65 - 100 mg/dL    BUN 36 (H) 6 - 20 mg/dL    Creatinine 14.90 (H) 0.70 - 1.30 mg/dL    BUN/Creatinine ratio 2 (L) 12 - 20      GFR est AA 4 (L) >60 ml/min/1.73m2    GFR est non-AA 4 (L) >60 ml/min/1.73m2    Calcium 7.7 (L) 8.5 - 10.1 mg/dL    Phosphorus 5.2 (H) 2.6 - 4.7 mg/dL    Albumin 2.0 (L) 3.5 - 5.0 g/dL   CBC W/O DIFF    Collection Time: 10/12/21  3:06 AM   Result Value Ref Range    WBC 9.2 4.1 - 11.1 K/uL    RBC 2.46 (L) 4.10 - 5.70 M/uL    HGB 7.8 (L) 12.1 - 17.0 g/dL    HCT 25.9 (L) 36.6 - 50.3 %    .3 (H) 80.0 - 99.0 FL    MCH 31.7 26.0 - 34.0 PG    MCHC 30.1 30.0 - 36.5 g/dL    RDW 16.0 (H) 11.5 - 14.5 %    PLATELET 379 157 - 134 K/uL    MPV 11.6 8.9 - 12.9 FL    NRBC 0.2 (H) 0.0  WBC    ABSOLUTE NRBC 0.02 (H) 0.00 - 0.01 K/uL   PTT    Collection Time: 10/12/21  3:06 AM   Result Value Ref Range    aPTT 119.4 (HH) 21.2 - 34.1 sec    aPTT, therapeutic range   82 - 109 sec       IR INSERT TUNL CVC W/O PORT OVER 5 YR   Final Result   1. Relatively small caliber of the right subclavian vein/brachiocephalic   junction, possibly due to an element of thoracic inlet compression and evidence   of well-formed neck collaterals. 2.  Widely patent left subclavian vein and SVC. 3.  Successful placement of a right internal jugular approach PermCath. IR INJ VENOGRAM EXT   Final Result   1. Relatively small caliber of the right subclavian vein/brachiocephalic   junction, possibly due to an element of thoracic inlet compression and evidence   of well-formed neck collaterals. 2.  Widely patent left subclavian vein and SVC. 3.  Successful placement of a right internal jugular approach PermCath. IR US GUIDED VASCULAR ACCESS   Final Result   1. Relatively small caliber of the right subclavian vein/brachiocephalic   junction, possibly due to an element of thoracic inlet compression and evidence   of well-formed neck collaterals. 2.  Widely patent left subclavian vein and SVC. 3.  Successful placement of a right internal jugular approach PermCath. IR FLUORO GUIDED NEEDLE PLACEMENT   Final Result   1. Relatively small caliber of the right subclavian vein/brachiocephalic   junction, possibly due to an element of thoracic inlet compression and evidence   of well-formed neck collaterals. 2.  Widely patent left subclavian vein and SVC. 3.  Successful placement of a right internal jugular approach PermCath.       XR CHEST PORT   Final Result           Assessment:  End-stage renal disease, transitioning from PD to HD this admission    Paroxysmal atrial fibrillation with RVR    Fluid overload due to #1 -improved    Acute on chronic diastolic heart failure    Acute non-STEMI type II    Hypertension    Coronary artery disease with history of MI    Diabetic neuropathy      Plan:  Continue with intermittent hemodialysis  Needs PD catheter removed, likely to be done on Wednesday  Resume warfarin following PD catheter removed (patient was previously on Eliquis, switch to warfarin by his cardiologist at [facility])    Try Lyrica for neuropathy    Care Plan discussed with: Patient/Family    Disposition: Continued inpatient stay 24 hr    Total time spent with patient: 30 minutes.     Mary Jo Hall MD

## 2021-10-12 NOTE — ROUTINE PROCESS
Received call from Dr Rufino Hill, [address] will not be discharged due to unable to confirm dialysis chair placement. Dr Veronica Bourgeois informed pts IV's have already been removed and confirms does not need IV access. Will plan for discharge in morning after confirmation of outpatient hemodialysis placement. Pt notified.  also discussed plan with patient. verbalized understanding. No needs at this time. Monitoring.

## 2021-10-12 NOTE — DISCHARGE INSTRUCTIONS
Patient Education        Deciding Between Electrical Cardioversion and Rate Control Medicines for Atrial Fibrillation  How can you decide between electrical cardioversion and rate control medicines for atrial fibrillation? What is atrial fibrillation? Atrial fibrillation (say \"AY-tree-chinmay mnf-slyk-RYO-shun\") is a kind of uneven heartbeat. It can make you feel lightheaded and dizzy. You may feel weak. It also can make you more likely to have a stroke. Electrical cardioversion can return your heart to a normal rhythm. First you'll get medicines to make you sleepy and control pain. Then your doctor will use patches to send an electric current to your heart. This resets the rhythm of your heart. Not everyone with atrial fibrillation needs this treatment. For some people, taking medicines may be better. Most people can live with an uneven heartbeat. It just has to be kept under control so the heart does not beat too fast.  Use this information to help you and your doctor decide which treatment to choose for atrial fibrillation. What are murillo points about this decision? · Electrical cardioversion can return your heart to a normal rhythm. But the problem can come back. The longer you have had atrial fibrillation, the more likely it is to come back after this treatment. · Cardioversion may not work as well when an uneven heartbeat is caused by another heart disease, such as heart failure. · If your symptoms bother you a lot, you may want to try cardioversion. But even if it works, you may still need to take blood thinners to prevent a stroke. · If you don't have symptoms, or if they don't bother you much, you can try medicines to slow your heart rate. And you can take blood thinners to prevent a stroke. · Cardioversion does have risks, such as stroke. Discuss the risks with your doctor. Make sure you understand them. · You may have more than one heart problem.  Cardioversion doesn't work as well if you have more than one heart problem. Why might you choose electrical cardioversion? · It restores the normal heart rhythm for most people. · The idea of having an electric shock does not bother you. · Your symptoms bother you a lot. · You have had atrial fibrillation just one time. · You do not have other heart problems. · You may not have to take as many medicines. Or you may not need to take them as long. Why might you choose rate-control medicines? · These medicines keep many people from having symptoms. · You prefer to take medicines rather than have an electric shock. · Your symptoms don't bother you much. · If these medicines don't work, you can still try electrical cardioversion. Your decision  Thinking about the facts and your feelings can help you make a decision that is right for you. Be sure you understand the benefits and risks of your options. And think about what else you need to do before you make the decision. Where can you learn more? Go to http://www.gray.com/  Enter M647 in the search box to learn more about \"Deciding Between Electrical Cardioversion and Rate Control Medicines for Atrial Fibrillation. \"  Current as of: April 29, 2021               Content Version: 13.0  © 5734-3379 [employer]. Care instructions adapted under license by [employer] (which disclaims liability or warranty for this information). If you have questions about a medical condition or this instruction, always ask your healthcare professional. [place] [address] any warranty or liability for your use of this information. Patient Education        Atrial Fibrillation: Care Instructions  Your Care Instructions     Atrial fibrillation is an irregular and often fast heartbeat. Treating this condition is important for several reasons. It can cause blood clots, which can travel from your heart to your brain and cause a stroke.  If you have a fast heartbeat, you may feel lightheaded, dizzy, and weak. An irregular heartbeat can also increase your risk for heart failure. Atrial fibrillation is often the result of another heart condition, such as high blood pressure or coronary artery disease. Making changes to improve your heart condition will help you stay healthy and active. Follow-up care is a key part of your treatment and safety. Be sure to make and go to all appointments, and call your doctor if you are having problems. It's also a good idea to know your test results and keep a list of the medicines you take. How can you care for yourself at home? Medicines    · Take your medicines exactly as prescribed. Call your doctor if you think you are having a problem with your medicine. You will get more details on the specific medicines your doctor prescribes.     · If your doctor has given you a blood thinner to prevent a stroke, be sure you get instructions about how to take your medicine safely. Blood thinners can cause serious bleeding problems.     · Do not take any vitamins, over-the-counter drugs, or herbal products without talking to your doctor first.   Lifestyle changes    · Do not smoke. Smoking can increase your chance of a stroke and heart attack. If you need help quitting, talk to your doctor about stop-smoking programs and medicines. These can increase your chances of quitting for good.     · Eat a heart-healthy diet.     · Stay at a healthy weight. Lose weight if you need to.     · Limit alcohol to 2 drinks a day for men and 1 drink a day for women. Too much alcohol can cause health problems.     · Avoid colds and flu. Get a pneumococcal vaccine shot. If you have had one before, ask your doctor whether you need another dose. Get a flu shot every year. If you must be around people with colds or flu, wash your hands often. Activity    · If your doctor recommends it, get more exercise. Walking is a good choice.  Bit by bit, increase the amount you walk every day. Try for at least 30 minutes on most days of the week. You also may want to swim, bike, or do other activities. Your doctor may suggest that you join a cardiac rehabilitation program so that you can have help increasing your physical activity safely.     · Start light exercise if your doctor says it is okay. Even a small amount will help you get stronger, have more energy, and manage stress. Walking is an easy way to get exercise. Start out by walking a little more than you did in the hospital. Gradually increase the amount you walk.     · When you exercise, watch for signs that your heart is working too hard. You are pushing too hard if you cannot talk while you are exercising. If you become short of breath or dizzy or have chest pain, sit down and rest immediately.     · Check your pulse regularly. Place two fingers on the artery at the palm side of your wrist, in line with your thumb. If your heartbeat seems uneven or fast, talk to your doctor. When should you call for help? Call 911 anytime you think you may need emergency care. For example, call if:    · You have symptoms of a heart attack. These may include:  ? Chest pain or pressure, or a strange feeling in the chest.  ? Sweating. ? Shortness of breath. ? Nausea or vomiting. ? Pain, pressure, or a strange feeling in the back, neck, jaw, or upper belly or in one or both shoulders or arms. ? Lightheadedness or sudden weakness. ? A fast or irregular heartbeat. After you call 911, the  may tell you to chew 1 adult-strength or 2 to 4 low-dose aspirin. Wait for an ambulance. Do not try to drive yourself.     · You have symptoms of a stroke. These may include:  ? Sudden numbness, tingling, weakness, or loss of movement in your face, arm, or leg, especially on only one side of your body. ? Sudden vision changes. ? Sudden trouble speaking. ? Sudden confusion or trouble understanding simple statements.   ? Sudden problems with walking or balance. ? A sudden, severe headache that is different from past headaches.     · You passed out (lost consciousness). Call your doctor now or seek immediate medical care if:    · You have new or increased shortness of breath.     · You feel dizzy or lightheaded, or you feel like you may faint.     · Your heart rate becomes irregular.     · You can feel your heart flutter in your chest or skip heartbeats. Tell your doctor if these symptoms are new or worse. Watch closely for changes in your health, and be sure to contact your doctor if you have any problems. Where can you learn more? Go to http://www.gray.com/  Enter U020 in the search box to learn more about \"Atrial Fibrillation: Care Instructions. \"  Current as of: April 29, 2021               Content Version: 13.0  © 9009-8553 [employer]. Care instructions adapted under license by [employer] (which disclaims liability or warranty for this information). If you have questions about a medical condition or this instruction, always ask your healthcare professional. [address] any warranty or liability for your use of this information.

## 2021-10-12 NOTE — PROGRESS NOTES
Problem: Falls - Risk of  Goal: *Absence of Falls  Description: Document Bard Issa Fall Risk and appropriate interventions in the flowsheet. Outcome: Progressing Towards Goal  Note: Fall Risk Interventions:  Mobility Interventions: Patient to call before getting OOB         Medication Interventions: Bed/chair exit alarm, Patient to call before getting OOB, Teach patient to arise slowly                   Problem: Patient Education: Go to Patient Education Activity  Goal: Patient/Family Education  Outcome: Progressing Towards Goal     Problem: Risk for Spread of Infection  Goal: Prevent transmission of infectious organism to others  Description: Prevent the transmission of infectious organisms to other patients, staff members, and visitors. Outcome: Progressing Towards Goal     Problem: Risk for Spread of Infection  Goal: Prevent transmission of infectious organism to others  Description: Prevent the transmission of infectious organisms to other patients, staff members, and visitors.   Outcome: Progressing Towards Goal     Problem: Patient Education:  Go to Education Activity  Goal: Patient/Family Education  Outcome: Progressing Towards Goal     Problem: General Medical Care Plan  Goal: *Skin integrity maintained  Outcome: Progressing Towards Goal  Goal: *Fluid volume balance  Outcome: Progressing Towards Goal  Goal: *Progressive mobility and function (eg: ADL's)  Outcome: Progressing Towards Goal

## 2021-10-13 VITALS
SYSTOLIC BLOOD PRESSURE: 142 MMHG | HEART RATE: 80 BPM | WEIGHT: 315 LBS | OXYGEN SATURATION: 98 % | TEMPERATURE: 98.3 F | BODY MASS INDEX: 39.17 KG/M2 | RESPIRATION RATE: 22 BRPM | HEIGHT: 75 IN | DIASTOLIC BLOOD PRESSURE: 98 MMHG

## 2021-10-13 LAB
INR PPP: 1 (ref 0.9–1.1)
PROTHROMBIN TIME: 13.2 SEC (ref 11.9–14.7)

## 2021-10-13 PROCEDURE — 90935 HEMODIALYSIS ONE EVALUATION: CPT

## 2021-10-13 PROCEDURE — 5A1D70Z PERFORMANCE OF URINARY FILTRATION, INTERMITTENT, LESS THAN 6 HOURS PER DAY: ICD-10-PCS | Performed by: INTERNAL MEDICINE

## 2021-10-13 PROCEDURE — 74011250636 HC RX REV CODE- 250/636

## 2021-10-13 PROCEDURE — 74011250637 HC RX REV CODE- 250/637: Performed by: INTERNAL MEDICINE

## 2021-10-13 PROCEDURE — 36415 COLL VENOUS BLD VENIPUNCTURE: CPT

## 2021-10-13 PROCEDURE — 85610 PROTHROMBIN TIME: CPT

## 2021-10-13 PROCEDURE — 74011636637 HC RX REV CODE- 636/637: Performed by: INTERNAL MEDICINE

## 2021-10-13 RX ORDER — GENTAMICIN SULFATE 1 MG/G
CREAM TOPICAL DAILY
Status: DISCONTINUED | OUTPATIENT
Start: 2021-10-13 | End: 2021-10-13 | Stop reason: HOSPADM

## 2021-10-13 RX ORDER — GENTAMICIN SULFATE 1 MG/G
CREAM TOPICAL 3 TIMES DAILY
Status: DISCONTINUED | OUTPATIENT
Start: 2021-10-13 | End: 2021-10-13

## 2021-10-13 RX ORDER — HEPARIN SODIUM 1000 [USP'U]/ML
INJECTION, SOLUTION INTRAVENOUS; SUBCUTANEOUS
Status: COMPLETED
Start: 2021-10-13 | End: 2021-10-13

## 2021-10-13 RX ADMIN — ASPIRIN 81 MG: 81 TABLET, COATED ORAL at 07:08

## 2021-10-13 RX ADMIN — PREGABALIN 75 MG: 75 CAPSULE ORAL at 07:08

## 2021-10-13 RX ADMIN — HEPARIN SODIUM 3600 UNITS: 1000 INJECTION, SOLUTION INTRAVENOUS; SUBCUTANEOUS at 12:32

## 2021-10-13 RX ADMIN — FUROSEMIDE 80 MG: 40 TABLET ORAL at 07:08

## 2021-10-13 RX ADMIN — ONDANSETRON 4 MG: 4 TABLET, ORALLY DISINTEGRATING ORAL at 07:08

## 2021-10-13 RX ADMIN — CARVEDILOL 25 MG: 12.5 TABLET, FILM COATED ORAL at 07:08

## 2021-10-13 RX ADMIN — HYDROCODONE BITARTRATE AND ACETAMINOPHEN 1 TABLET: 5; 325 TABLET ORAL at 13:37

## 2021-10-13 RX ADMIN — SEVELAMER CARBONATE 800 MG: 800 TABLET, FILM COATED ORAL at 07:08

## 2021-10-13 RX ADMIN — DILTIAZEM HYDROCHLORIDE 180 MG: 180 CAPSULE, EXTENDED RELEASE ORAL at 07:08

## 2021-10-13 RX ADMIN — SPIRONOLACTONE 25 MG: 25 TABLET ORAL at 07:08

## 2021-10-13 RX ADMIN — SEVELAMER CARBONATE 800 MG: 800 TABLET, FILM COATED ORAL at 12:00

## 2021-10-13 RX ADMIN — PREDNISONE 5 MG: 5 TABLET ORAL at 07:08

## 2021-10-13 NOTE — DISCHARGE SUMMARY
Physician Discharge Summary     Patient ID:    Baljeet Arzate  [id number]  89 y.o.  1970    Admit date: 10/7/2021    Discharge date : 10/13/2021    Chronic Diagnoses:    Problem List as of 10/13/2021 Date Reviewed: 9/29/2021        Codes Class Noted - Resolved    Atrial fibrillation with RVR ([facility] ICD-10-CM: I48.91  ICD-9-CM: 427.31  10/7/2021 - Present        Onychomycosis ICD-10-CM: B35.1  ICD-9-CM: 110.1  10/14/2020 - Present        CKD (chronic kidney disease), stage III ([facility] ICD-10-CM: N18.30  ICD-9-CM: 585.3  11/16/2011 - Present        Malignant hypertension ICD-10-CM: I10  ICD-9-CM: 401.0  7/8/2010 - Present        Angioedema of lips ICD-10-CM: T78. 3XXA  ICD-9-CM: 995.1  7/8/2010 - Present        Adverse drug reaction-to lisinipril ICD-10-CM: T50.905A  ICD-9-CM: E947.9  7/8/2010 - Present        RESOLVED: HTN (hypertension) ICD-10-CM: I10  ICD-9-CM: 401.9  11/16/2011 - 8/3/2021          22    Final Diagnoses:   Atrial fibrillation with RVR ([facility]) [I48.91]  End-stage renal disease, transitioning from PD to HD this admission     Paroxysmal atrial fibrillation with RVR     Fluid overload due to #1 -improved     Acute on chronic diastolic heart failure     Acute non-STEMI type II     Hypertension     Coronary artery disease with history of MI     Diabetic neuropathy    Reason for Hospitalization:  Patient is a 28-year-old male with a history of chronic atrial fibrillation, CAD, congestive heart failure, hypertension and end-stage renal disease on PD was admitted on 10/7 with worsening shortness of breath, weight gain and fluid overload. He was in rapid atrial fibrillation on admission. He was admitted with plans to transition to hemodialysis         Hospital Course:   Patient was admitted to cardiac telemetry.   Cardizem was added to his regimen for atrial fibrillation with improvement in his heart rate    He was seen by nephrology and was started on hemodialysis, underwent 3 sessions    He was set up for an outpatient slot    We were hoping to have his PD catheter removed but surgery could not do this until later in the week and this was not felt to be urgent    We will therefore discharge him home and have him resume his warfarin. Patient will come back for AV fistula placement and PD catheter removal by Dr. Sandra Barney    He was felt stable for discharge home on 10/12 but we are unable to confirm an outpatient dialysis slot until 10/13    I did start patient on low-dose Lyrica for his painful diabetic neuropathy              Discharge Medications:   Current Discharge Medication List      START taking these medications    Details   dilTIAZem ER (DILACOR XR) 180 mg capsule Take 1 Capsule by mouth daily. Qty: 30 Capsule, Refills: 0  Start date: 10/13/2021      furosemide (LASIX) 80 mg tablet 1 tab daily  Qty: 30 Tablet, Refills: 0  Start date: 10/12/2021      pregabalin (LYRICA) 75 mg capsule Take 1 Capsule by mouth two (2) times a day. Max Daily Amount: 150 mg.  Qty: 30 Capsule, Refills: 0  Start date: 10/12/2021    Associated Diagnoses: Pain in both feet         CONTINUE these medications which have CHANGED    Details   warfarin (Jantoven) 5 mg tablet Take 1 Tablet by mouth daily. Qty: 30 Tablet, Refills: 0  Start date: 10/12/2021         CONTINUE these medications which have NOT CHANGED    Details   sevelamer carbonate (RENVELA) 800 mg tab tab Take 800 mg by mouth Before breakfast, lunch, and dinner. montelukast (SINGULAIR) 10 mg tablet Take 10 mg by mouth daily. bumetanide (BUMEX) 2 mg tablet Take 2 mg by mouth three (3) times daily. amitriptyline (ELAVIL) 25 mg tablet Take 1 Tablet by mouth nightly. Qty: 30 Tablet, Refills: 1      cloNIDine (CATAPRES) 0.1 mg/24 hr ptwk 1 Patch by TransDERmal route every seven (7) days. Indications: CHANGES ON SUNDAY      allopurinoL (ZYLOPRIM) 100 mg tablet Take 50 mg by mouth.  TAKES ON Monday AND Thursday      carvediloL (Coreg) 25 mg tablet Take 25 mg by mouth two (2) times daily (with meals). cyanocobalamin/salcaprozat sod (ELIGEN B12 PO) Take  by mouth every fourty-eight (48) hours. ERGOCALCIFEROL, VITAMIN D2, PO Take  by mouth every seven (7) days. Indications: ON SUNDAY      predniSONE (DELTASONE) 5 mg tablet Take  by mouth. amLODIPine (NORVASC) 10 mg tablet Take 1 Tab by mouth daily. Qty: 30 Tab, Refills: 12      spironolactone (ALDACTONE) 25 mg tablet Take  by mouth daily. Follow up Care:    1. Osito Blakely MD in 1-2 weeks. Please call to set up an appointment shortly after discharge. Diet:  Cardiac Diet    Disposition:  Home. Advanced Directive:   FULL    DNR      Discharge Exam:  General:  Alert, cooperative, no distress, appears stated age. Lungs:   Clear to auscultation bilaterally. Chest wall:  No tenderness or deformity. Heart:  Regular rate and rhythm, S1, S2 normal, no murmur, click, rub or gallop. Abdomen:   Soft, non-tender. Bowel sounds normal. No masses,  No organomegaly. Extremities: Extremities normal, atraumatic, no cyanosis or edema. Pulses: 2+ and symmetric all extremities. Skin: Skin color, texture, turgor normal. No rashes or lesions   Neurologic: CNII-XII intact. No gross sensory or motor deficits        CONSULTATIONS: Cardiology and nephrology    Significant Diagnostic Studies:   10/7/2021: BUN 61 mg/dL* (Ref range: 6 - 20 mg/dL); Calcium 7.9 mg/dL* (Ref range: 8.5 - 10.1 mg/dL); CO2 28 mmol/L (Ref range: 21 - 32 mmol/L); Creatinine 24.00 mg/dL* (Ref range: 0.70 - 1.30 mg/dL); Glucose 95 mg/dL (Ref range: 65 - 100 mg/dL); HCT 31.3 %* (Ref range: 36.6 - 50.3 %); HGB 9.6 g/dL* (Ref range: 12.1 - 17.0 g/dL); Potassium 3.6 mmol/L (Ref range: 3.5 - 5.1 mmol/L); Sodium 139 mmol/L (Ref range: 136 - 145 mmol/L)  10/8/2021: BUN 68 mg/dL* (Ref range: 6 - 20 mg/dL);  Calcium 8.1 mg/dL* (Ref range: 8.5 - 10.1 mg/dL); CO2 28 mmol/L (Ref range: 21 - 32 mmol/L); Creatinine 25.20 mg/dL* (Ref range: 0.70 - 1.30 mg/dL); Glucose 92 mg/dL (Ref range: 65 - 100 mg/dL); HCT 27.5 %* (Ref range: 36.6 - 50.3 %); HGB 8.4 g/dL* (Ref range: 12.1 - 17.0 g/dL); Potassium 3.7 mmol/L (Ref range: 3.5 - 5.1 mmol/L); Sodium 140 mmol/L (Ref range: 136 - 145 mmol/L)  Recent Labs     10/12/21  0306   WBC 9.2   HGB 7.8*   HCT 25.9*        Recent Labs     10/12/21  0306      K 3.6   CL 99   CO2 28   BUN 36*   CREA 14.90*   *   CA 7.7*   PHOS 5.2*     Recent Labs     10/12/21  0306   ALB 2.0*     Recent Labs     10/13/21  0648 10/12/21  1305 10/12/21  0306 10/11/21  2017   INR 1.0 1.2*  --   --    PTP 13.2 14.7  --   --    APTT  --  79.9* 119.4* 98.9*      No results for input(s): FE, TIBC, PSAT, FERR in the last 72 hours. No results for input(s): PH, PCO2, PO2 in the last 72 hours. No results for input(s): CPK, CKMB in the last 72 hours.     No lab exists for component: TROPONINI  Lab Results   Component Value Date/Time    Glucose (POC) 87 11/15/2011 06:29 PM    Glucose (POC) 89 11/15/2011 06:17 PM    Glucose (POC) 80 07/22/2009 06:11 PM       Discharge time spent 35 minutes    Signed:  Maddy Krishna MD  10/13/2021  3:43 PM

## 2021-10-13 NOTE — PROGRESS NOTES
Renal Daily Progress Note    Admit Date: 10/7/2021      Subjective:   He was seen on hemodialysis today. He denies chest pain or shortness of breath. He denies palpitation. Appetite is reportedly good. Current Facility-Administered Medications   Medication Dose Route Frequency    heparin (porcine) 1,000 unit/mL injection        pregabalin (LYRICA) capsule 75 mg  75 mg Oral BID    furosemide (LASIX) tablet 80 mg  80 mg Oral DAILY    aspirin delayed-release tablet 81 mg  81 mg Oral DAILY    dilTIAZem ER (DILACOR XR) capsule 180 mg  180 mg Oral DAILY    heparin (porcine) 1,000 unit/mL injection 3,600 Units  3,600 Units Hemodialysis DIALYSIS PRN    sodium chloride (NS) flush 5-40 mL  5-40 mL IntraVENous Q8H    sodium chloride (NS) flush 5-40 mL  5-40 mL IntraVENous PRN    acetaminophen (TYLENOL) tablet 650 mg  650 mg Oral Q6H PRN    Or    acetaminophen (TYLENOL) suppository 650 mg  650 mg Rectal Q6H PRN    polyethylene glycol (MIRALAX) packet 17 g  17 g Oral DAILY PRN    ondansetron (ZOFRAN ODT) tablet 4 mg  4 mg Oral Q8H PRN    amitriptyline (ELAVIL) tablet 25 mg  25 mg Oral QHS    carvediloL (COREG) tablet 25 mg  25 mg Oral BID WITH MEALS    predniSONE (DELTASONE) tablet 5 mg  5 mg Oral DAILY WITH BREAKFAST    sevelamer carbonate (RENVELA) tab 800 mg  800 mg Oral TIDAC    spironolactone (ALDACTONE) tablet 25 mg  25 mg Oral DAILY    [Held by provider] warfarin (COUMADIN) tablet 5 mg  5 mg Oral DAILY    HYDROmorphone (DILAUDID) injection 1 mg  1 mg IntraVENous Q6H PRN    HYDROcodone-acetaminophen (NORCO) 5-325 mg per tablet 1 Tablet  1 Tablet Oral Q4H PRN        Review of Systems    Review of Systems   Constitutional: Negative for fever. Respiratory: Negative for cough and shortness of breath. Cardiovascular: Negative for chest pain and leg swelling. Gastrointestinal: Negative for abdominal pain and nausea. Neurological: Negative for dizziness and headaches.           Objective: Patient Vitals for the past 8 hrs:   BP Temp Temp src Pulse Resp SpO2   10/13/21 1150 (!) 157/101 -- -- 93 -- --   10/13/21 1120 (!) 136/93 -- -- 97 -- --   10/13/21 1050 (!) 149/101 -- -- 94 -- --   10/13/21 1020 (!) 147/97 -- -- 99 16 --   10/13/21 0950 (!) 151/105 -- -- 95 16 --   10/13/21 0920 (!) 149/101 -- -- 99 -- --   10/13/21 0850 (!) 159/91 98.3 °F (36.8 °C) Oral -- 16 --   10/13/21 0749 139/81 98 °F (36.7 °C) -- 82 18 95 %     No intake/output data recorded. No intake/output data recorded. Physical Exam:   Physical Exam  Cardiovascular:      Rate and Rhythm: Rhythm irregular. Pulmonary:      Breath sounds: Normal breath sounds. Abdominal:      General: Bowel sounds are normal.      Palpations: Abdomen is soft. Neurological:      General: No focal deficit present. Mental Status: He is alert. Right IJ tunneled dialysis catheter  PD catheter in place      IR INSERT TUNL CVC W/O PORT OVER 5 YR   Final Result   1. Relatively small caliber of the right subclavian vein/brachiocephalic   junction, possibly due to an element of thoracic inlet compression and evidence   of well-formed neck collaterals. 2.  Widely patent left subclavian vein and SVC. 3.  Successful placement of a right internal jugular approach PermCath. IR INJ VENOGRAM EXT   Final Result   1. Relatively small caliber of the right subclavian vein/brachiocephalic   junction, possibly due to an element of thoracic inlet compression and evidence   of well-formed neck collaterals. 2.  Widely patent left subclavian vein and SVC. 3.  Successful placement of a right internal jugular approach PermCath. IR US GUIDED VASCULAR ACCESS   Final Result   1. Relatively small caliber of the right subclavian vein/brachiocephalic   junction, possibly due to an element of thoracic inlet compression and evidence   of well-formed neck collaterals. 2.  Widely patent left subclavian vein and SVC.    3.  Successful placement of a right internal jugular approach PermCath. IR FLUORO GUIDED NEEDLE PLACEMENT   Final Result   1. Relatively small caliber of the right subclavian vein/brachiocephalic   junction, possibly due to an element of thoracic inlet compression and evidence   of well-formed neck collaterals. 2.  Widely patent left subclavian vein and SVC. 3.  Successful placement of a right internal jugular approach PermCath. XR CHEST PORT   Final Result           Data Review   Recent Labs     10/12/21  0306   WBC 9.2   HGB 7.8*   HCT 25.9*        Recent Labs     10/13/21  0648 10/12/21  1305 10/12/21  0306   NA  --   --  136   K  --   --  3.6   CL  --   --  99   CO2  --   --  28   GLU  --   --  104*   BUN  --   --  36*   CREA  --   --  14.90*   CA  --   --  7.7*   PHOS  --   --  5.2*   ALB  --   --  2.0*   INR 1.0 1.2*  --      No components found for: GLPOC  No results for input(s): PH, PCO2, PO2, HCO3, FIO2 in the last 72 hours. Recent Labs     10/13/21  0648 10/12/21  1305   INR 1.0 1.2*         Assessment:           Active Problems:    Atrial fibrillation with RVR ([address] (10/7/2021)    1. ESRD hemodialysis dependent  2. Anemia macrocytic  3. Secondary hyperparathyroidism. 4.  Hypertension under good control  5. Atrial fibrillation. Chronic. 6.  Obstructive sleep apnea on CPAP  Plan:   He is stable on hemodialysis. 2 kg fluid removal on dialysis today. He apparently did not have a chair at [address] [place] [address],[address] dialysis unit as the unit was full. Discharge planner arranging for outpatient dialysis chair. [facility]  Needs removal of PD catheter.

## 2021-10-13 NOTE — PROGRESS NOTES
1340 pm  Spoke to  at [facility]. Arrangements have already been made an dialysis center is planning to see him on Friday.  has already contacted patient and answered all of his questions. Patient will be discharged home this afternoon. 0915 am  Spoke to [place], , at [facility] dialysis in [facility]. Unaware at this time of patient needing hemodialysis. Referral information sent via fax through [employer] to [phone]. Awaiting dialysis chair at this time.

## 2021-10-13 NOTE — PROGRESS NOTES
Hospitalist Progress Note               Daily Progress Note: 10/13/2021      Subjective:   Hospital course to date: Patient is a 61-year-old male with a history of chronic atrial fibrillation, CAD, congestive heart failure, hypertension and end-stage renal disease on PD was admitted on 10/7 with worsening shortness of breath, weight gain and fluid overload. He was in rapid atrial fibrillation on admission. He was admitted with plans to transition to hemodialysis    -----  Patient seen today for follow-up. Patient's discharge yesterday was held as case management could not confirm an outpatient dialysis slot prior to discharge. She contacted them this morning and it turns out he was not in fact set up    Problem List:  Problem List as of 10/13/2021 Date Reviewed: 9/29/2021        Codes Class Noted - Resolved    Atrial fibrillation with RVR ([facility] ICD-10-CM: I48.91  ICD-9-CM: 427.31  10/7/2021 - Present        Onychomycosis ICD-10-CM: B35.1  ICD-9-CM: 110.1  10/14/2020 - Present        CKD (chronic kidney disease), stage III ([facility] ICD-10-CM: N18.30  ICD-9-CM: 585.3  11/16/2011 - Present        Malignant hypertension ICD-10-CM: I10  ICD-9-CM: 401.0  7/8/2010 - Present        Angioedema of lips ICD-10-CM: T78. 3XXA  ICD-9-CM: 995.1  7/8/2010 - Present        Adverse drug reaction-to lisinipril ICD-10-CM: T50.905A  ICD-9-CM: E947.9  7/8/2010 - Present        RESOLVED: HTN (hypertension) ICD-10-CM: I10  ICD-9-CM: 401.9  11/16/2011 - 8/3/2021              Medications reviewed  Current Facility-Administered Medications   Medication Dose Route Frequency    heparin (porcine) 1,000 unit/mL injection        pregabalin (LYRICA) capsule 75 mg  75 mg Oral BID    furosemide (LASIX) tablet 80 mg  80 mg Oral DAILY    aspirin delayed-release tablet 81 mg  81 mg Oral DAILY    dilTIAZem ER (DILACOR XR) capsule 180 mg  180 mg Oral DAILY    heparin (porcine) 1,000 unit/mL injection 3,600 Units  3,600 Units Hemodialysis DIALYSIS PRN    sodium chloride (NS) flush 5-40 mL  5-40 mL IntraVENous Q8H    sodium chloride (NS) flush 5-40 mL  5-40 mL IntraVENous PRN    acetaminophen (TYLENOL) tablet 650 mg  650 mg Oral Q6H PRN    Or    acetaminophen (TYLENOL) suppository 650 mg  650 mg Rectal Q6H PRN    polyethylene glycol (MIRALAX) packet 17 g  17 g Oral DAILY PRN    ondansetron (ZOFRAN ODT) tablet 4 mg  4 mg Oral Q8H PRN    amitriptyline (ELAVIL) tablet 25 mg  25 mg Oral QHS    carvediloL (COREG) tablet 25 mg  25 mg Oral BID WITH MEALS    predniSONE (DELTASONE) tablet 5 mg  5 mg Oral DAILY WITH BREAKFAST    sevelamer carbonate (RENVELA) tab 800 mg  800 mg Oral TIDAC    spironolactone (ALDACTONE) tablet 25 mg  25 mg Oral DAILY    [Held by provider] warfarin (COUMADIN) tablet 5 mg  5 mg Oral DAILY    HYDROmorphone (DILAUDID) injection 1 mg  1 mg IntraVENous Q6H PRN    HYDROcodone-acetaminophen (NORCO) 5-325 mg per tablet 1 Tablet  1 Tablet Oral Q4H PRN       Review of Systems:   A comprehensive review of systems was negative except for that written in the HPI. Objective:   Physical Exam:     Visit Vitals  BP (!) 151/105   Pulse 95   Temp 98.3 °F (36.8 °C) (Oral)   Resp 16   Ht 6' 3\" (1.905 m)   Wt (!) 163.8 kg (361 lb 1.8 oz)   SpO2 95%   BMI 45.14 kg/m²      O2 Device: None (Room air)    Temp (24hrs), Av.2 °F (36.8 °C), Min:98 °F (36.7 °C), Max:98.5 °F (36.9 °C)    No intake/output data recorded. No intake/output data recorded. General:   Awake and alert   Lungs:   Clear to auscultation bilaterally. Chest wall:  No tenderness or deformity. Heart:  Regular rate and rhythm, S1, S2 normal, no murmur, click, rub or gallop. Abdomen:   Soft, non-tender. Bowel sounds normal. No masses,  No organomegaly. Extremities: Extremities normal, atraumatic, no cyanosis or edema. Pulses: 2+ and symmetric all extremities. Skin: Skin color, texture, turgor normal. No rashes or lesions   Neurologic: CNII-XII intact.   No gross focal deficits         Data Review:       Recent Days:  Recent Labs     10/12/21  0306   WBC 9.2   HGB 7.8*   HCT 25.9*        Recent Labs     10/13/21  0648 10/12/21  1305 10/12/21  0306   NA  --   --  136   K  --   --  3.6   CL  --   --  99   CO2  --   --  28   GLU  --   --  104*   BUN  --   --  36*   CREA  --   --  14.90*   CA  --   --  7.7*   PHOS  --   --  5.2*   ALB  --   --  2.0*   INR 1.0 1.2*  --      No results for input(s): PH, PCO2, PO2, HCO3, FIO2 in the last 72 hours. 24 Hour Results:  Recent Results (from the past 24 hour(s))   PTT    Collection Time: 10/12/21  1:05 PM   Result Value Ref Range    aPTT 79.9 (H) 21.2 - 34.1 sec    aPTT, therapeutic range   82 - 109 sec   PROTHROMBIN TIME + INR    Collection Time: 10/12/21  1:05 PM   Result Value Ref Range    Prothrombin time 14.7 11.9 - 14.7 sec    INR 1.2 (H) 0.9 - 1.1     PROTHROMBIN TIME + INR    Collection Time: 10/13/21  6:48 AM   Result Value Ref Range    Prothrombin time 13.2 11.9 - 14.7 sec    INR 1.0 0.9 - 1.1         IR INSERT TUNL CVC W/O PORT OVER 5 YR   Final Result   1. Relatively small caliber of the right subclavian vein/brachiocephalic   junction, possibly due to an element of thoracic inlet compression and evidence   of well-formed neck collaterals. 2.  Widely patent left subclavian vein and SVC. 3.  Successful placement of a right internal jugular approach PermCath. IR INJ VENOGRAM EXT   Final Result   1. Relatively small caliber of the right subclavian vein/brachiocephalic   junction, possibly due to an element of thoracic inlet compression and evidence   of well-formed neck collaterals. 2.  Widely patent left subclavian vein and SVC. 3.  Successful placement of a right internal jugular approach PermCath. IR US GUIDED VASCULAR ACCESS   Final Result   1.   Relatively small caliber of the right subclavian vein/brachiocephalic   junction, possibly due to an element of thoracic inlet compression and evidence   of well-formed neck collaterals. 2.  Widely patent left subclavian vein and SVC. 3.  Successful placement of a right internal jugular approach PermCath. IR FLUORO GUIDED NEEDLE PLACEMENT   Final Result   1. Relatively small caliber of the right subclavian vein/brachiocephalic   junction, possibly due to an element of thoracic inlet compression and evidence   of well-formed neck collaterals. 2.  Widely patent left subclavian vein and SVC. 3.  Successful placement of a right internal jugular approach PermCath. XR CHEST PORT   Final Result           Assessment:  End-stage renal disease, transitioned from PD to HD this admission    Paroxysmal atrial fibrillation with RVR    Fluid overload due to #1 -improved    Acute on chronic diastolic heart failure    Acute non-STEMI type II    Hypertension    Coronary artery disease with history of MI    Diabetic neuropathy      Plan: We anticipating discharge home today assuming his outpatient dialysis can be set up    Care Plan discussed with: Patient/Family    Disposition: Discharge    Total time spent with patient: 30 minutes.     Stef Fraire MD

## 2021-11-10 ENCOUNTER — APPOINTMENT (OUTPATIENT)
Dept: PHYSICAL THERAPY | Age: 51
End: 2021-11-10

## 2021-11-23 ENCOUNTER — OFFICE VISIT (OUTPATIENT)
Dept: PODIATRY | Age: 51
End: 2021-11-23
Payer: MEDICAID

## 2021-11-23 VITALS
HEART RATE: 91 BPM | HEIGHT: 75 IN | TEMPERATURE: 98.2 F | SYSTOLIC BLOOD PRESSURE: 145 MMHG | DIASTOLIC BLOOD PRESSURE: 84 MMHG | BODY MASS INDEX: 45.14 KG/M2

## 2021-11-23 DIAGNOSIS — G62.9 NEUROPATHY: Primary | ICD-10-CM

## 2021-11-23 PROCEDURE — 99213 OFFICE O/P EST LOW 20 MIN: CPT | Performed by: PODIATRIST

## 2021-11-23 RX ORDER — AMITRIPTYLINE HYDROCHLORIDE 25 MG/1
50 TABLET, FILM COATED ORAL
Qty: 30 TABLET | Refills: 1 | Status: SHIPPED | OUTPATIENT
Start: 2021-11-23 | End: 2022-09-22

## 2021-11-23 NOTE — PROGRESS NOTES
1. Have you been to the ER, urgent care clinic since your last visit? Hospitalized since your last visit? No    2. Have you seen or consulted any other health care providers outside of the [address] [place] [address] since your last visit? Include any pap smears or colon screening.  No

## 2021-12-30 NOTE — PROGRESS NOTES
[facility]    Subjective:         Patient is a 54 y.o. male who is being seen as a returning patient with complaint of burning/tingling in both of his feet. Patient states the amitriptyline 25mg PO QHS was not helping. He states the pain was level of 6 out of 10 and is worse in the evenings. He describes the pain as an intense ache with burning/tingling in his feet and legs. He denies any recent trauma. He denies any breaks in the skin. He denies any systemic signs of infection. He denies any changes in his activity level or shoe gear.   He denies any other pedal complaints    Past Medical History:   Diagnosis Date    Arrhythmia     A-FIB    CAD (coronary artery disease)     MI 28    CHF (congestive heart failure) ([facility])     Chronic kidney disease     END STAGE KIDNEY DISEASE    Hypertension     Other ill-defined conditions(799.89)     Sleep apnea     WEARS CPAP    Stroke ([facility] 01/2020    NUMBNESS IN RIGHT LEG AND FOOT    Thromboembolus ([facility] 2018    PE     Past Surgical History:   Procedure Laterality Date    HX ORTHOPAEDIC      LEFT HAND, RIGHT ANKLE, AND RIGHT SHOULDER    HX TONSILLECTOMY      HX VASCULAR ACCESS      UPPER RIGHT CHEST    IR FLUORO GUIDED NEEDLE PLACEMENT  10/8/2021    IR INSERT TUNL CVC W/O PORT OVER 5 YR  10/8/2021    NC CARDIAC SURG PROCEDURE UNLIST      CARDIAC CATHX2-NO STENTS       Family History   Problem Relation Age of Onset    Stroke Mother     Heart Disease Mother     Diabetes Mother     Kidney Disease Mother     Heart Disease Sister     Lung Disease Brother     No Known Problems Sister     Deep Vein Thrombosis Brother     Anesth Problems Neg Hx       Social History     Tobacco Use    Smoking status: Former Smoker    Smokeless tobacco: Never Used    Tobacco comment: CIGARS ONCE PER MONTH-NOTHING SINCE 2018   Substance Use Topics    Alcohol use: Never     Comment: NOT FOR 3 YEARS     Allergies   Allergen Reactions    Lisinopril Swelling    Sulfa (Sulfonamide Antibiotics) Other (comments)     Had sores all over body    Sulfa (Sulfonamide Antibiotics) Hives     Prior to Admission medications    Medication Sig Start Date End Date Taking? Authorizing Provider   amitriptyline (ELAVIL) 25 mg tablet Take 2 Tablets by mouth nightly. 11/23/21  Yes Nitin Tadeo DPM   warfarin (Jantoven) 5 mg tablet Take 1 Tablet by mouth daily. 10/12/21   Frelier, Harlen Goldberg, MD   dilTIAZem ER (DILACOR XR) 180 mg capsule Take 1 Capsule by mouth daily. 10/13/21   Frelier, Harlen Goldberg, MD   furosemide (LASIX) 80 mg tablet 1 tab daily 10/12/21   Rabia Sierra MD   pregabalin (LYRICA) 75 mg capsule Take 1 Capsule by mouth two (2) times a day. Max Daily Amount: 150 mg. 10/12/21   Rabia Sierra MD   sevelamer carbonate (RENVELA) 800 mg tab tab Take 800 mg by mouth Before breakfast, lunch, and dinner. Other, MD Lea   montelukast (SINGULAIR) 10 mg tablet Take 10 mg by mouth daily. 11/25/20   Other, MD Lea   bumetanide (BUMEX) 2 mg tablet Take 2 mg by mouth three (3) times daily. Torin, MD Lea   cloNIDine (CATAPRES) 0.1 mg/24 hr ptwk 1 Patch by TransDERmal route every seven (7) days. Indications: CHANGES ON SUNDAY    Provider, Historical   allopurinoL (ZYLOPRIM) 100 mg tablet Take 50 mg by mouth. TAKES ON Monday AND Thursday    Provider, Historical   carvediloL (Coreg) 25 mg tablet Take 25 mg by mouth two (2) times daily (with meals). Provider, Historical   cyanocobalamin/salcaprozat sod (ELIGEN B12 PO) Take  by mouth every fourty-eight (48) hours. Provider, Historical   ERGOCALCIFEROL, VITAMIN D2, PO Take  by mouth every seven (7) days. Indications: ON SUNDAY    Provider, Historical   predniSONE (DELTASONE) 5 mg tablet Take  by mouth. Provider, Historical   spironolactone (ALDACTONE) 25 mg tablet Take  by mouth daily.   Patient not taking: Reported on 10/7/2021    Provider, Historical   amLODIPine (NORVASC) 10 mg tablet Take 1 Tab by mouth daily. 11/16/11   Ge Scott MD       Review of Systems   Constitutional: Negative. Eyes: Negative. Respiratory: Negative. Cardiovascular: Positive for leg swelling. Endocrine: Negative. Genitourinary: Negative. Musculoskeletal: Positive for myalgias. Allergic/Immunologic: Positive for immunocompromised state. Neurological: Positive for numbness. Hematological: Bruises/bleeds easily. Psychiatric/Behavioral: Negative. All other systems reviewed and are negative. Objective:     Visit Vitals  BP (!) 145/84 (BP 1 Location: Right upper arm, BP Patient Position: Sitting, BP Cuff Size: Large adult)   Pulse 91   Temp 98.2 °F (36.8 °C) (Temporal)   Ht 6' 3\" (1.905 m)   BMI 45.14 kg/m²       Physical Exam  Vitals reviewed. Constitutional:       Appearance: He is morbidly obese. Cardiovascular:      Pulses:           Dorsalis pedis pulses are 2+ on the right side and 2+ on the left side. Posterior tibial pulses are 2+ on the right side and 2+ on the left side. Pulmonary:      Effort: Pulmonary effort is normal.   Musculoskeletal:      Right lower leg: Edema present. Left lower leg: Edema present. Right foot: Decreased range of motion. No deformity or bunion. Left foot: Decreased range of motion. No deformity or bunion. Feet:      Right foot:      Protective Sensation: 10 sites tested. 0 sites sensed. Skin integrity: Skin integrity normal.      Toenail Condition: Right toenails are abnormally thick. Fungal disease present. Left foot:      Protective Sensation: 10 sites tested. 0 sites sensed. Skin integrity: Skin integrity normal.      Toenail Condition: Left toenails are abnormally thick. Fungal disease present. Lymphadenopathy:      Lower Body: No right inguinal adenopathy. No left inguinal adenopathy. Skin:     General: Skin is warm. Capillary Refill: Capillary refill takes 2 to 3 seconds.    Neurological:      Mental Status: He is alert and oriented to person, place, and time. Comments: Pain/tingling noted to the bilateral lower extremities   Psychiatric:         Mood and Affect: Mood and affect normal.         Behavior: Behavior is cooperative. Data Review: No results found for this or any previous visit (from the past 24 hour(s)). Impression:       ICD-10-CM ICD-9-CM    1. Neuropathy  G62.9 355.9        Recommendation:     Patient seen and evaluated in the office  Discussed and educated patient regarding his current medical condition  Patient was given for amitriptyline 25 mg to be taken nightly for symptomatic relief. He is to take (2) tabs as (1) tab was not achieving symptomatic relief. Instructed patient that dosing changes may be needed in the future to achieve full symptomatic relief. Patient verbalized understanding        Kelly Hyman.  [place] [id number] [facility], [address] [place] [address] and Sarita [address] [place] [address]  O: [phone]  F: [phone]  C: [phone]

## 2022-03-01 ENCOUNTER — HOSPITAL ENCOUNTER (OUTPATIENT)
Dept: PHYSICAL THERAPY | Age: 52
Discharge: HOME | End: 2022-03-01
Payer: MEDICAID

## 2022-03-01 VITALS — WEIGHT: 315 LBS | HEIGHT: 75 IN | BODY MASS INDEX: 39.17 KG/M2

## 2022-03-01 PROCEDURE — 97161 PT EVAL LOW COMPLEX 20 MIN: CPT

## 2022-03-01 NOTE — PROGRESS NOTES
[facility]  Lymphedema Clinic  [address] [place] [address], [address], [facility] 22.    INITIAL EVALUATION    NAME: Esther Mathew AGE: 54 y.o. GENDER: male  DATE: 3/1/2022  REFERRING PHYSICIAN: Jenny Carrillo NP  HISTORY AND BACKGROUND:   Primary Diagnosis:  ? Lymphedema, not elsewhere classified (L LE) (I89.0)  Other Treatment Diagnoses:  ? Abnormality of gait (R26.9)  ? Swelling not relieved by elevation (R60.9)  ? Congestive heart failure (CHF) (HCC) (I50.9)  ? Chronic kidney disease (N18.9)  ? Morbid obesity (E66.9)  Date of Onset: 5/8/20 - patient is returning today for evaluation. Present Symptoms and Functional Limitations: Patient was first seen in our clinic June 2020 with complaints of persistent swelling in the legs. Patient had a wound on the L heel, was sedentary, and on peritoneal hemodialysis at that time. Patient participated in phase 1 complete decongestive therapy and was then discharged with a home program as well as compression products for the legs. He is returning to the clinic today for assessment of swelling and to secure new compression products. He reports that he is now participating in a daily walking program and was transitioned to hemodialysis 3 days each week. He is making an effort to choose healthy food options and limits fluids as instructed by the physician.    Lymphedema Life Impact Scale (LLIS): scores a 23 with 34% impairment  Past Medical History:   Past Medical History:   Diagnosis Date    Arrhythmia     A-FIB    CAD (coronary artery disease)     MI 26    CHF (congestive heart failure) (HCC)     Chronic kidney disease     END STAGE KIDNEY DISEASE    Hypertension     Other ill-defined conditions(799.89)     Sleep apnea     WEARS CPAP    Stroke ([facility] [address] 01/2020    NUMBNESS IN RIGHT LEG AND FOOT    Thromboembolus ([address] 2018    PE     Past Surgical History:   Procedure Laterality Date    HX ORTHOPAEDIC      LEFT HAND, RIGHT ANKLE, AND RIGHT SHOULDER    HX TONSILLECTOMY      HX VASCULAR ACCESS      UPPER RIGHT CHEST    IR FLUORO GUIDED NEEDLE PLACEMENT  10/8/2021    IR INSERT TUNL CVC W/O PORT OVER 5 YR  10/8/2021    ID CARDIAC SURG PROCEDURE UNLIST      CARDIAC CATHX2-NO STENTS     Current Medications:    Current Outpatient Medications   Medication Sig    amitriptyline (ELAVIL) 25 mg tablet Take 2 Tablets by mouth nightly.  warfarin (Jantoven) 5 mg tablet Take 1 Tablet by mouth daily.  dilTIAZem ER (DILACOR XR) 180 mg capsule Take 1 Capsule by mouth daily.  furosemide (LASIX) 80 mg tablet 1 tab daily    pregabalin (LYRICA) 75 mg capsule Take 1 Capsule by mouth two (2) times a day. Max Daily Amount: 150 mg.    sevelamer carbonate (RENVELA) 800 mg tab tab Take 800 mg by mouth Before breakfast, lunch, and dinner.  montelukast (SINGULAIR) 10 mg tablet Take 10 mg by mouth daily.  bumetanide (BUMEX) 2 mg tablet Take 2 mg by mouth three (3) times daily.  cloNIDine (CATAPRES) 0.1 mg/24 hr ptwk 1 Patch by TransDERmal route every seven (7) days. Indications: CHANGES ON SUNDAY    allopurinoL (ZYLOPRIM) 100 mg tablet Take 50 mg by mouth. TAKES ON Monday AND Thursday    carvediloL (Coreg) 25 mg tablet Take 25 mg by mouth two (2) times daily (with meals).  cyanocobalamin/salcaprozat sod (ELIGEN B12 PO) Take  by mouth every fourty-eight (48) hours.  ERGOCALCIFEROL, VITAMIN D2, PO Take  by mouth every seven (7) days. Indications: ON SUNDAY    predniSONE (DELTASONE) 5 mg tablet Take  by mouth.  spironolactone (ALDACTONE) 25 mg tablet Take  by mouth daily. (Patient not taking: Reported on 10/7/2021)    amLODIPine (NORVASC) 10 mg tablet Take 1 Tab by mouth daily. No current facility-administered medications for this encounter. Allergies:    Allergies   Allergen Reactions    Lisinopril Swelling    Sulfa (Sulfonamide Antibiotics) Other (comments)     Had sores all over body    Sulfa (Sulfonamide Antibiotics) Hives        Prior Level of Function/Social/Work History/Personal factors and/or comorbidities impacting plan of care: Patient works in [employer] for the [employer] He participates in a daily walking program.    Living Situation: Lives with his girlfriend in a town house - bedroom on second floor. Trainable Caregiver?: Yes  Mobility: Independent gait without any assistive device for community distances  Sleeping Arrangement:  in bed at night  Adaptive Equipment Owned: not assessed. Community Resources Addressed (if needed): No  Other: Patient is able to don compression stockings and does have assistance if needed. Previous Therapy: In our clinic June 2020 - May 2021  Compression/Lymphedema Equipment: Custom Jobst Elvarex knee highs, Juxtafit premium lower leg products and farrow classic foot pieces. SUBJECTIVE:   Patient reports that he is in need of new compression products. His knee highs are old and his dog ate one of the knee highs. Patients goals for therapy: reassess swelling and to be measured for new compression garments. OBJECTIVE DATA SUMMARY:   EXAMINATION/PRESENTATION/DECISION MAKING:     Pain:  Pain Scale 1: Numeric (0 - 10)  Pain Intensity 1: 7  Pain Location 1: Foot  Pain Orientation 1: Left;Right  Pain Description 1: Burning, neuropathy per patient    Self-Care and ADLs: Independent     Skin and Tissue Assessment:  Dermal Status: Intact but dry and flaky on the legs and feet. Texture/Consistency: Brawny R lower leg and brawny/woody L lower leg. Cobblestone appearance L lower leg > R lower leg.      Pigmentation/Color Change: Hyperpigmented and hypertrophic skin changes on the heels and plantar surface of feet    Circulatory: Vascular studies ruled out DVT in past    Nails: Fungus    Stemmers Sign: Positive both feet    Height:  Height: 6' 3\" (190.5 cm)  Weight:  Weight: 153.1 kg (337 lb 8 oz), loss of 38 lbs since May 2021   BMI: BMI (calculated): 42.2  (36 or greater: adversely affecting lymphedema)  Wound/Ulcer:  none        Volumetric Measurements:   Right:  10,664.66 mL (loss of 1411 ml since last assessed 5/5/21) Left:  12,144. 73 mL (loss of 1647 ml since last assessed 5/5/21)   % Difference: 13.88% L LE > R LE  Dominance: L hand dominant     Range of Motion: within functional limits all extremities. Strength: Not formally assessed 2* patient is able to complete all functional activities in the clinic today. Sensation:  Impaired neuropathy in the feet    Mobility:    Bed/Chair Mobility: Independent  Transfers: Independent  Gait: Independent  Endurance: Fair - fatigues with activity. Rest breaks as needed. Safety:  Patient is alert and oriented: Yes  Safety awareness: Appears intact   Fall risk?: Low   Patient given written fall prevention handout: Yes    Based on the above components, the patient evaluation is determined to be of the following complexity level: LOW     Evaluation Time: 12:00-12:45 pm  Patient is returning to the clinic today for assessment of swelling and to be measured for new compression products. Patient wears a pair of custom knee highs each day. His current products are worn and in need of replacement and one knee high was destroyed by his dog. He will wear the Juxtafit premium lower leg velcro products with farrow foot pieces on occasion when the knee highs are being laundered or with any increase in swelling. Patient was measured for one pair of custom Jobst Elvarex knee highs with top silicone band CCL 2 today. The order will be submitted to the vendor, [employer], for processing. Patient will return to the clinic for fitting of the products. He may be interested in ordering a night time product in a future visit if covered by insurance. Patient is performing daily skin care.  Continued education in skin care and recommended Aquaphor to the heels, feet, and lower legs due to continual dryness and hypertrophic skin changes. Recommended skin care one to two times each day. Patient is participating in a walking program up to 25 minutes each day. Pain Level: 7/10 in the feet. ASSESSMENT:   Myrna Lara is a 54 y.o. male who presents with secondary B LE  Lymphedema, stage 2, complicated by history of chronic kidney disease and need for kidney transplant per patient report. Patient was recently transitioned from peritoneal dialysis to hemodialysis 3 days per week. Patient presents with worsening skin changes on the legs and daily skin care was discussed again today. Patient continues to work full time and is now participating in a daily walking program. Full LE volumes taken today reveal a loss of volume in the legs bilaterally in conjunction with a 38 lb weight loss since May 2021. Patient is in need of new compression products and was measured for those today. Patient may benefit from having a second set of knee highs for wash and wear and may be interested in obtaining a night time foam product in a future visit. This care is medically necessary due to the infection risk with lymphedema and to improve functional activities. CDT is necessary to resolve swelling to allow patient to return to wearing normal clothes/footwear and prevent worsening of symptoms, such as infections and/or hospitalizations. Patient will be independent with home program strategies to allow improved ADL ability and mobility and to allow patient to return to greatest functional independence. Rehabilitation potential is considered to be Fair. Factors which may influence rehabilitation potential include multiple co-morbidities and skin changes. Patient will benefit from 1 to 3 physical therapy visits over 12 weeks to optimize improvement in these areas.     PLAN OF CARE:   Recommendations and Planned Interventions: Manual lymph drainage/decongestive therapy, Compression garment fitting/provision, Lymphedema therapeutic exercise, Education in skin care and lymphedema precautions, Self-MLD education per home program and Caregiver education as needed    GOALS  Short term goals  Time frame: to be met by 4/5/22  1. Patient will demonstrate knowledge of signs/symptoms of infections/cellulitis and be independent in skin care to prevent cellulitis and improve texture of skin on legs and feet. 2.  Patient will tolerate multi-layer bandages (MLB) and show measureable decrease in limb volume to allow ordering of home compression system (daytime, nighttime garments and pump as needed). Long term goals  Time frame: to be met by 5/27/22  1. Pt will be independent in his home program and demonstrate a decrease in limb volumes showing decongestion and pt. ready for transition to independent restorative phase of lymphedema therapy. Patient has participated in goal setting and agrees to work toward plan of care. Patient was instructed to call if questions or concerns arise. Thank you for this referral.  Joshua Fowler, PT, CLT   Total timed codes: eval only  1:1 Treatment time: eval only    TREATMENT PLAN EFFECTIVE DATES:   3/1/2022 TO 5/27/2022  I have read the above plan of care for Citlali Leo. I certify the above prescribed services are required by this patient and are medically necessary.   The above plan of care has been developed in conjunction with the lymphedema/physical therapist.       Physician Signature: ____________________________________Date:______________     Valdez Steward NP

## 2022-03-01 NOTE — PROGRESS NOTES
Statement of Medical Necessity  Page 1 of 2      Simi Pavon 1970 Today's Date: 3/1/2022 LARY: [facility]   Payor: [place] MEDICAID / Plan: LESTER MOY [facility] CCCP / Product Type: Managed Care Medicaid /  ME: TBD  Refills: 2                   Diagnosis  []   I97.2 Post-Mastectomy Lymphedema []   I87.2 Venous Insufficiency   []   I89.0 Lymphedema, other secondary B LE []   I83.019 Venous Stasis Ulcer LE, Right   []   I89.9 Unspecified Lymphatic Disorder []   I83.029 Venous Stasis Ulcer LE, Left   [x]   R60.9 Swelling not relieved by elevation []   Q82.0 Hereditary/ Congenital Lymphedema   []   C50.211 Malignant neoplasm of breast, Right []   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   []   C50.212 Malignant neoplasm of breast, Left []   L03.115 LE Cellulitis, Right   []    []   L03.116 LE Cellulitis, Left                             Simi Pavon    1970  Page 2 of 2    Physician Order for DME for Diagnosis of secondary B LE lymphedema with history of skin changes and wounds as Listed on Statement of Medical Necessity, Page 1         Recommended Product:  Units Upper Extremity Rt Lt Units Lower Extremity Rt Lt    Circ-Aid, Ready Wrap, Sigvaris Arm    Inelastic binders (Circ-Aid, Farrow)  []Foot   []Below Knee   []Knee   []Thigh      Circ-Aid Ready Wrap, Sigvaris hand    Roque Kenneth, night use []Full Leg  []Lower Leg      Tribute Arm, night use   2 Tribute, night use  []Full Leg  [x]Lower Leg 1 1    Roque Kenneth Arm, night use    Stephan Sleeve Leg/ Foot, night use      Gradiant Compression Sleeves & Gloves  []Custom [] RM Arm:  []CCL1 []CCL2 []CCL3  []Custom [] RM Glove: []CCL1 []CCL2 []CCL3     2 Gradient Compression Stockings   [x]Custom  []RM Lower Extremity:   []CCL1       [x]CCL2         []CCL3   [x]Knee       []Thigh        []Waist Length 1 1    Stephan sleeve arm w/ hand, night use   2 Tribute Wrap, night use 1 1    Compression Bra          Compression Vest         The above patient was referred for treatment of Lymphedema due to the diagnosis indicated above. Lymphedema is a progressive and chronic condition. It may cause acute infections, muscle atrophy, discomfort, and connective tissue fibrosis with irreversible tissue damage, decreased ROM in the affected limb and diminished functional mobility possibly interfering with independence and ability to work. Recurrent infections and wound complications that commonly occur with Lymphedema often require hospitalization and extensive wound care, thus increasing medical costs. The patient has received complete decongestive therapy which includes manual lymphatic drainage, lymphedema specific exercises, compression bandaging, and hygiene/skin care. Goals of therapy are to reduce the edema and prevent re-accumulation of fluid with its complications. It is medically necessary for this patient to have daytime garments to control this condition. They will need 2 sets (one set to wear and one set to wash for adequate skin care and wearing time). Garments must be replaced every 4-6 months for effectiveness. There are no substitutes available that offer acceptable compression treatment for this Lymphedema patient. If further information is requested, please contact our certified lymphedema therapists at [phone].     [] Birdie Severs, PT, MSPT, CLT-ALEXANDER [] Rupal Trejo, MS, OTR/L, CLT [x]  Isaiah Ferrari, PT, CLT [] Gerald Winter, PTA, CLT, CSIS    Printed  Provider Name  Provider Signature  Date    Provider NPI

## 2022-03-18 PROBLEM — I48.91 ATRIAL FIBRILLATION WITH RVR (HCC): Status: ACTIVE | Noted: 2021-10-07

## 2022-03-19 PROBLEM — B35.1 ONYCHOMYCOSIS: Status: ACTIVE | Noted: 2020-10-14

## 2022-09-06 ENCOUNTER — APPOINTMENT (OUTPATIENT)
Dept: PHYSICAL THERAPY | Age: 52
End: 2022-09-06
Payer: MEDICARE

## 2022-09-22 ENCOUNTER — HOSPITAL ENCOUNTER (OUTPATIENT)
Dept: PREADMISSION TESTING | Age: 52
Discharge: HOME | End: 2022-09-22
Payer: MEDICARE

## 2022-09-22 VITALS
DIASTOLIC BLOOD PRESSURE: 77 MMHG | BODY MASS INDEX: 38.36 KG/M2 | WEIGHT: 315 LBS | HEART RATE: 97 BPM | SYSTOLIC BLOOD PRESSURE: 116 MMHG | TEMPERATURE: 98.2 F | RESPIRATION RATE: 20 BRPM | HEIGHT: 76 IN

## 2022-09-22 LAB
ABO + RH BLD: NORMAL
ALBUMIN SERPL-MCNC: 3.3 G/DL (ref 3.5–5)
ALBUMIN/GLOB SERPL: 0.7 {RATIO} (ref 1.1–2.2)
ALP SERPL-CCNC: 45 U/L (ref 45–117)
ALT SERPL-CCNC: 26 U/L (ref 12–78)
ANION GAP SERPL CALC-SCNC: 6 MMOL/L (ref 5–15)
APTT PPP: 43.9 SEC (ref 22.1–31)
AST SERPL-CCNC: 22 U/L (ref 15–37)
BASOPHILS # BLD: 0.1 K/UL (ref 0–0.1)
BASOPHILS NFR BLD: 1 % (ref 0–1)
BILIRUB SERPL-MCNC: 0.3 MG/DL (ref 0.2–1)
BLOOD GROUP ANTIBODIES SERPL: NORMAL
BUN SERPL-MCNC: 39 MG/DL (ref 6–20)
BUN/CREAT SERPL: 3 (ref 12–20)
CALCIUM SERPL-MCNC: 9.5 MG/DL (ref 8.5–10.1)
CHLORIDE SERPL-SCNC: 99 MMOL/L (ref 97–108)
CO2 SERPL-SCNC: 31 MMOL/L (ref 21–32)
CREAT SERPL-MCNC: 12.8 MG/DL (ref 0.7–1.3)
DIFFERENTIAL METHOD BLD: ABNORMAL
EOSINOPHIL # BLD: 0.4 K/UL (ref 0–0.4)
EOSINOPHIL NFR BLD: 6 % (ref 0–7)
ERYTHROCYTE [DISTWIDTH] IN BLOOD BY AUTOMATED COUNT: 15.7 % (ref 11.5–14.5)
GLOBULIN SER CALC-MCNC: 4.9 G/DL (ref 2–4)
GLUCOSE SERPL-MCNC: 101 MG/DL (ref 65–100)
HCT VFR BLD AUTO: 34.5 % (ref 36.6–50.3)
HGB BLD-MCNC: 11 G/DL (ref 12.1–17)
IMM GRANULOCYTES # BLD AUTO: 0.1 K/UL (ref 0–0.04)
IMM GRANULOCYTES NFR BLD AUTO: 1 % (ref 0–0.5)
INR PPP: 2.1 (ref 0.9–1.1)
LYMPHOCYTES # BLD: 1 K/UL (ref 0.8–3.5)
LYMPHOCYTES NFR BLD: 14 % (ref 12–49)
MCH RBC QN AUTO: 32.9 PG (ref 26–34)
MCHC RBC AUTO-ENTMCNC: 31.9 G/DL (ref 30–36.5)
MCV RBC AUTO: 103.3 FL (ref 80–99)
MONOCYTES # BLD: 0.9 K/UL (ref 0–1)
MONOCYTES NFR BLD: 14 % (ref 5–13)
NEUTS SEG # BLD: 4.4 K/UL (ref 1.8–8)
NEUTS SEG NFR BLD: 64 % (ref 32–75)
NRBC # BLD: 0 K/UL (ref 0–0.01)
NRBC BLD-RTO: 0 PER 100 WBC
PLATELET # BLD AUTO: 188 K/UL (ref 150–400)
PMV BLD AUTO: 11.4 FL (ref 8.9–12.9)
POTASSIUM SERPL-SCNC: 4 MMOL/L (ref 3.5–5.1)
PROT SERPL-MCNC: 8.2 G/DL (ref 6.4–8.2)
PROTHROMBIN TIME: 21.4 SEC (ref 9–11.1)
RBC # BLD AUTO: 3.34 M/UL (ref 4.1–5.7)
SODIUM SERPL-SCNC: 136 MMOL/L (ref 136–145)
SPECIMEN EXP DATE BLD: NORMAL
THERAPEUTIC RANGE,PTTT: ABNORMAL SECS (ref 58–77)
WBC # BLD AUTO: 6.7 K/UL (ref 4.1–11.1)

## 2022-09-22 PROCEDURE — 93005 ELECTROCARDIOGRAM TRACING: CPT

## 2022-09-22 PROCEDURE — 80053 COMPREHEN METABOLIC PANEL: CPT

## 2022-09-22 PROCEDURE — 85025 COMPLETE CBC W/AUTO DIFF WBC: CPT

## 2022-09-22 PROCEDURE — 36415 COLL VENOUS BLD VENIPUNCTURE: CPT

## 2022-09-22 PROCEDURE — 85610 PROTHROMBIN TIME: CPT

## 2022-09-22 PROCEDURE — 86900 BLOOD TYPING SEROLOGIC ABO: CPT

## 2022-09-22 PROCEDURE — 85730 THROMBOPLASTIN TIME PARTIAL: CPT

## 2022-09-22 NOTE — PERIOP NOTES
[facility]  PREOPERATIVE INSTRUCTIONS    Surgery Date:   9/29/22    Your surgeon's office or [facility] staff will call you between 4 PM- 8 PM the day before surgery with your arrival time. If your surgery is on a Monday, you will receive a call the preceding Friday. Please report to [facility] Patient Access/Admitting on the 1st floor. Bring your insurance card, photo identification, and any copayment ( if applicable). If you are going home the same day of your surgery, you must have a responsible adult to drive you home. You need to have a responsible adult to stay with you the first 24 hours after surgery and you should not drive a car for 24 hours following your surgery. Nothing to eat or drink after midnight the night before surgery. This includes no water, gum, mints, coffee, juice, etc.  Please note special instructions, if applicable, below for medications. Do NOT drink alcohol or smoke 24 hours before surgery. STOP smoking for 14 days prior as it helps with breathing and healing after surgery. If you are being admitted to the hospital, please leave personal belongings/luggage in your car until you have an assigned hospital room number. Please wear comfortable clothes. Wear your glasses instead of contacts. We ask that all money, jewelry and valuables be left at home. Wear no make up, particularly mascara, the day of surgery. All body piercings, rings, and jewelry need to be removed and left at home. Please remove any nail polish or artificial nails from your fingernails. Please wear your hair loose or down. Please no pony-tails, buns, or any metal hair accessories. If you shower the morning of surgery, please do not apply any lotions or powders afterwards. You may wear deodorant, unless having breast surgery. Do not shave any body area within 24 hours of your surgery. Please follow all instructions to avoid any potential surgical cancellation.   Should your physical condition change, (i.e. fever, cold, flu, etc.) please notify your surgeon as soon as possible. It is important to be on time. If a situation occurs where you may be delayed, please call:  [phone] / [phone] on the day of surgery. The Preadmission Testing staff can be reached at [phone]. Special instructions: NONE      Current Outpatient Medications   Medication Sig    warfarin (Jantoven) 5 mg tablet Take 1 Tablet by mouth daily. dilTIAZem ER (DILACOR XR) 180 mg capsule Take 1 Capsule by mouth daily. pregabalin (LYRICA) 75 mg capsule Take 1 Capsule by mouth two (2) times a day. Max Daily Amount: 150 mg.    sevelamer carbonate (RENVELA) 800 mg tab tab Take 800 mg by mouth Before breakfast, lunch, and dinner. montelukast (SINGULAIR) 10 mg tablet Take 10 mg by mouth daily. bumetanide (BUMEX) 2 mg tablet Take 2 mg by mouth three (3) times daily. allopurinoL (ZYLOPRIM) 100 mg tablet Take 100 mg by mouth. TAKES ON Monday AND Thursday    cyanocobalamin/salcaprozat sod (ELIGEN B12 PO) Take  by mouth every fourty-eight (48) hours. ERGOCALCIFEROL, VITAMIN D2, PO Take  by mouth every seven (7) days. Indications: ON SUNDAY    predniSONE (DELTASONE) 5 mg tablet Take  by mouth every other day. amLODIPine (NORVASC) 10 mg tablet Take 1 Tab by mouth daily. No current facility-administered medications for this encounter. YOU MUST ONLY TAKE THESE MEDICATIONS THE MORNING OF SURGERY WITH A SIP OF WATER: DILTIAZEM,  PREGABALIN,  BUMETANIDE,  ALLOPURINOL, PREDNISONE,  AMLODIPINE  MEDICATIONS TO TAKE THE MORNING OF SURGERY ONLY IF NEEDED: NONE  HOLD these prescription medications BEFORE Surgery: STOP WARFARIN 5 DAYS PRIOR TO SURGERY PER DR. Yumiko Elmore  Ask your surgeon/prescribing physician about when/if to STOP taking these medications: NONE  Stop all vitamins, herbal medicines and Aspirin containing products 7 days prior to surgery.  Stop any non-steroidal anti-inflammatory drugs (i.e. Ibuprofen, Naproxen, Advil, Aleve) 3 days before surgery. You may take Tylenol. If you are currently taking Plavix, Coumadin, or any other blood-thinning/anticoagulant medication contact your prescribing physician for instructions. Preventing Infections Before and After - Your Surgery    IMPORTANT INSTRUCTIONS      You play an important role in your health and preparation for surgery. To reduce the germs on your skin you will need to shower with CHG soap (Chorhexidine gluconate 4%) two times before surgery. CHG soap (Hibiclens, Hex-A-Clens or store brand)  CHG soap will be provided at your Preadmission Testing (PAT) appointment. If you do not have a PAT appointment before surgery, you may arrange to  CHG soap from our office or purchase CHG soap at a pharmacy, grocery or department store. You need to purchase TWO 4 ounce bottles to use for your 2 showers. Steps to follow:  Brand Linda your hair with your normal shampoo and your body with regular soap and rinse well to remove shampoo and soap from your skin. Wet a clean washcloth and turn off the shower. Put CHG soap on washcloth and apply to your entire body from the neck down. Do not use on your head, face or private parts(genitals). Do not use CHG soap on open sores, wounds or areas of skin irritation. Wash you body gently for 5 minutes. Do not wash your skin too hard. This soap does not create lather. Pay special attention to your underarms and from your belly button to your feet. Turn the shower back on and rinse well to get CHG soap off your body. Pat your skin dry with a clean, dry towel. Do not apply lotions or moisturizer. Put on clean clothes and sleep on fresh bed sheets and do not allow pets to sleep with you.     Shower with CHG soap 2 times before your surgery  The evening before your surgery  The morning of your surgery      Tips to help prevent infections after your surgery:  Protect your surgical wound from germs:  Hand washing is the most important thing you and your caregivers can do to prevent infections. Keep your bandage clean and dry! Do not touch your surgical wound. Use clean, freshly washed towels and washcloths every time you shower; do not share bath linens with others. Until your surgical wound is healed, wear clothing and sleep on bed linens each day that are clean and freshly washed. Do not allow pets to sleep in your bed with you or touch your surgical wound. Do not smoke - smoking delays wound healing. This may be a good time to stop smoking. If you have diabetes, it is important for you to manage your blood sugar levels properly before your surgery as well as after your surgery. Poorly managed blood sugar levels slow down wound healing and prevent you from healing completely. Patient Information Regarding COVID Restrictions    Day of Procedure    Please park in the parking deck or any designated visitor parking lot. Enter the facility through the Main Entrance of the hospital.  On the day of surgery, please provide the cell phone number of the person who will be waiting for you to the Patient Access representative at the time of registration. Please wear a mask on the day of your procedure. We are now allowing two designated visitors per stay. Pediatric patients may have 2 designated visitors. These two people may come in with you on the day of your procedure. The designated visitor must also wear a mask. Once your procedure and the immediate recovery period is completed, a nurse in the recovery area will contact your designated visitor to inform them of your room number or to otherwise review other pertinent information regarding your care. Social distancing practices are to be adhered to in waiting areas and the cafeteria. The patient was contacted  in person. He verbalized understanding of all instructions does not  need reinforcement.

## 2022-09-23 LAB
CALCULATED R AXIS, ECG10: 60 DEGREES
CALCULATED T AXIS, ECG11: 113 DEGREES
DIAGNOSIS, 93000: NORMAL
Q-T INTERVAL, ECG07: 342 MS
QRS DURATION, ECG06: 78 MS
QTC CALCULATION (BEZET), ECG08: 454 MS
VENTRICULAR RATE, ECG03: 106 BPM

## 2022-09-23 NOTE — PERIOP NOTES
FAXED PRE-ADMISSION TESTING REPORTS (AND FAX CONFIRMATION RECEIVED TO DR. CARVER'S OFFICE . CALLED ON 9/23/22 AT 26 AND  SPOKE WITH DR. CARVER'S NURSE , RE: ABNORMAL BUN 39 (H), CREATININE 12.80 (H), PT 21.4 (H), INR 2.1 (H), PTT 43.9 (H), AS WELL AS FAXED PREOP EKG TO SURGEONS OFFICE. RICHARDSON WINTERS NP REVIEWED EKG FOR SURGERY. LAST OV NOTES FROM CARDIOLOGY ON CHART, SENT SECOND FAXED REQUEST FOR LAST EKG DONE AT CARDIOLOGIST OFFICE.

## 2022-09-26 NOTE — PERIOP NOTES
EKG DONE IN [facility] WAS REVIEWED BY Radha Hummel NP, AS WELL AS RECENT CARDIOLOGY NOTES/TESTS. STATES NO FURTHER TESTING OR DOCUMENTATION NECESSARY AND PT CAN PROCEED TO  SURGERY.

## 2022-09-28 ENCOUNTER — ANESTHESIA EVENT (OUTPATIENT)
Dept: SURGERY | Age: 52
End: 2022-09-28
Payer: MEDICARE

## 2022-09-28 ENCOUNTER — HOSPITAL ENCOUNTER (OUTPATIENT)
Dept: PHYSICAL THERAPY | Age: 52
Discharge: HOME | End: 2022-09-28
Payer: MEDICARE

## 2022-09-28 VITALS — BODY MASS INDEX: 39.17 KG/M2 | HEIGHT: 75 IN | WEIGHT: 315 LBS

## 2022-09-28 PROCEDURE — 97140 MANUAL THERAPY 1/> REGIONS: CPT

## 2022-09-28 PROCEDURE — 97161 PT EVAL LOW COMPLEX 20 MIN: CPT

## 2022-09-28 NOTE — PROGRESS NOTES
Outpatient Lymphedema Clinic  a part of [address] [place] [address], [address] [facility], [facility], [address]  Phone: [phone]  Fax: [phone]    Plan of Care/Statement of Necessity for Physical Therapy/Occupational Therapy Services  2-15    Patient name: Beverley Poe  : 1970  Provider#: [id number]  Referral source: Hua Singh NP      Medical/Treatment Diagnosis: Lymphedema, not elsewhere classified [I89.0]     Prior Hospitalization: see medical history     Comorbidities: B LE lymphedema, CAD, MI, Afib, CKD, CHF, HTN, sleep apnea, CVA, PE  Prior Level of Function: Independent  Medications: Verified on Patient Summary List  Start of Care: 2022      Onset Date: 2020 - returning today for evaluation   The Plan of Care and following information is based on the information from the initial evaluation. Assessment/ key information: Beverley Poe is a 54 y.o. male who presents with secondary B LE lymphedema, stage 2, complicated by skin changes and multiple co-morbidities, including end stage renal disease and need for kidney transplant. Full LE volumes taken today reveal an overall loss of 3800 ml in the L LE and overall loss of 2400 ml loss in the R LE since first seen in our clinic in 2020. The volume loss in the legs has been affected by dialysis, weight loss, as well as, use of compression products. Patient wears custom knee highs on a daily basis but struggles with the other components of his home program of CDT, especially daily exercise and skin care. Patient's current knee highs are worn and in need of replacement and he was measured for those today. Patient will return to the clinic for fitting of the products.         Evaluation Complexity History HIGH Complexity :3+ comorbidities / personal factors will impact the outcome/ POC ; Examination MEDIUM Complexity : 3 Standardized tests and measures addressing body structure, function, activity limitation and / or participation in recreation  ;Presentation LOW Complexity : Stable, uncomplicated  ;Clinical Decision Making Other outcome measures LLIS  MEDIUM  Overall Complexity Rating: LOW     Problem List: edema affecting function   Treatment Plan may include any combination of the following: fitting of compression products, skin care, therapeutic exercise, home program, patient education  Patient / Family readiness to learn indicated by: asking questions, trying to perform skills, and interest  Persons(s) to be included in education: patient   Barriers to Learning/Limitations: multiple co-morbidities. Patient Goal (s): get new knee highs  Rehabilitation Potential: fair    Goals: To be met by 12/24/2022  1. Patient will demonstrate knowledge of signs/symptoms of infections/cellulitis and be independent in skin care to prevent cellulitis. 2.  The garment order will be submitted to the vendor, [employer], for processing. 3.  The fit and comfort of the custom knees highs will be deemed good prior to ordering a second set of products. 4.  Pt will be independent in home program and show stable or reduction in limb volumes showing decongestion and pt. ready for transition to independent restorative phase of lymphedema therapy. Frequency / Duration: Patient to be seen 1 to 3 visits over the next 12 weeks. Patient/ Caregiver education and instruction: skin care, compression options, home program.    [x]  Plan of care has been reviewed with PTA    Certification Period: 9/28/2022-12/24/2022    Jonathan Washington PT, CLT 9/28/2022     ________________________________________________________________________    I certify that the above Therapy Services are being furnished while the patient is under my care. I agree with the treatment plan and certify that this therapy is necessary.     [de-identified] Signature:____________________  Date:____________Time: _________         Jorge Arlin NP

## 2022-09-28 NOTE — PROGRESS NOTES
PT/OT LYMPHEDEMA INITIAL EVALUATION NOTE - [facility] 2-15    Patient Name: Mina Rush  Date:2022  : 1970  [x]  Patient  Verified  Payor: Jaja Vernon / Plan: Rojelio Davis 8141 HMO / Product Type: Managed Care Medicare /    In time: 8:05 am  Out time: 8:50 am  Total Treatment Time (min): 45  Total Timed Codes (min): 30  1:1 Treatment Time ( [address] only): 30   Visit #: 1     Treatment Area: Lymphedema, not elsewhere classified [I89.0] B LE     SUBJECTIVE  Pain Level (0-10 scale): Patient reports pain in the feet from neuropathy. Any medication changes, allergies to medications, adverse drug reactions, diagnosis change, or new procedure performed?: [] No    [x] Yes (see summary sheet for update)  Subjective:  Patient is making an effort to lose weight. He needs to lose 30 lbs before he can be placed on the kidney transplant list. He will have a surgical procedure tomorrow to reposition the AV fistula in the L arm. Function: Independent with self care and ambulation. Mechanism of Injury: Patient was first seen in our clinic 2020 with complaints of persistent swelling in the legs. Patient had a wound on the L heel, was sedentary, and on peritoneal hemodialysis at that time. Patient participated in phase 1 complete decongestive therapy and was then discharged with a home program as well as compression products for the legs. He is returning to the clinic today for assessment of swelling and to secure new compression products. Previous Treatment/Compliance:  In our clinic 2020 - May 2021 and then again 2022  PMHx/Surgical Hx:   Past Medical History:   Diagnosis Date    Arrhythmia     A-FIB    CAD (coronary artery disease)     MI 09    CHF (congestive heart failure) (HCC)     Chronic kidney disease     END STAGE KIDNEY DISEASE;  DIALYSIS M-W-F    Chronic pain     NEUROPATHY; BACK PAIN    Hypertension     Lymph edema     LEFT LEG    Needle phobia     Other ill-defined conditions(799.89)     Sleep apnea     WEARS CPAP    Stroke ([address] 01/2020    NUMBNESS IN RIGHT LEG AND FOOT    Thromboembolus ([address] 2018    PE     Past Surgical History:   Procedure Laterality Date    HX ORTHOPAEDIC      LEFT HAND, RIGHT ANKLE, AND RIGHT SHOULDER    HX TONSILLECTOMY      HX VASCULAR ACCESS      UPPER RIGHT CHEST    IR FLUORO GUIDED NEEDLE PLACEMENT  10/08/2021    IR INSERT TUNL CVC W/O PORT OVER 5 YR  10/08/2021    NE CARDIAC SURG PROCEDURE UNLIST      CARDIAC CATHX2-NO STENTS    VASCULAR SURGERY PROCEDURE UNLIST      PERITONEAL DIALYSIS CATH     Work Hx: Patient works in [employer] for [employer]. Living Situation: Lives with girlfriend in a single story house. Pt Goals: \"get new knee highs\"  Barriers: inconsistency with home program  Motivation: fair   Cognition: A & O x 3      Sleeping Arrangement:  in bed at night  Compression/Lymphedema Equipment: Custom Jobst El[employer]x knee highs, Juxtafit premium lower leg products and farrow classic foot pieces. LLIS Score: scores a 16 with 24% impairment    OBJECTIVE/EXAMINATION    Skin and Tissue Assessment:  Dermal Status: Dry and flaky on the legs and feet bilaterally. Texture/Consistency: Brawny/woody texture of the lower legs and boggy texture of the upper legs bilaterally. Pigmentation/Color Change: Hyperpigmented and hypertrophic/fibrotic skin changes on the lower legs, heels, and plantar surface of feet    Anomalies:  Cobblestone appearance on the lower legs    Circulatory: Vascular studies ruled out DVT in past    Nails: Fungus    Stemmers Sign: Positive both feet     Height:  Height: 6' 3\" (190.5 cm)  Weight:  Weight: 154 kg (339 lb 6.4 oz)   BMI:  BMI (calculated): 42.4  (36 or greater: adversely affecting lymphedema)  Volumetric Measurements:   Right:  10,728.90 mL (gain of 64 ml since last assessed 3/1/22) Left:  11,418. 85 mL (loss of 726 ml since last assessed 3/1/22)   % Difference: 6.43% L LE > R LE  Dominance: L hand dominant     30 min Manual Therapy: Patient's home program was discussed in detail. Patient performs skin care on the legs approximately 4 days each week. The skin on the legs is inspected and is dry, flaky, and with fibrotic changes present. Discussed performing skin care at least two times each day with Aquaphor or similar product using manual lymphatic drainage principles. Patient participates in a walking program two days each week. He walks 1/2 to 1 mile on those days and then is active when at work. Discussed participating in a daily exercise program for better management of body weight and swelling. Patient wears a pair of custom knee highs each day. His current knee highs are worn, too large, and in need of replacement. Patient voiced interest in ordering one pair of custom Jobst Elvarex knee highs with top silicone band CCL 2. Modifications to the girths and length of the knee highs will be made for better fit and comfort. The order will be submitted to the vendor, [employer], for processing. Discussed the remake/exchange period of garments with patient and he will return to the clinic for fitting prior to ordering a second set if covered by insurance. Patient reports making an effort to choose healthier food options, such as more fruits and vegetables, as he reports the need to lose weight prior to being placed on the kidney transplant list.    Rationale: increase tissue extensibility and decrease edema  to improve the patients ability to reduce the risk of infection and progression of swelling during the restorative phase of care. With   [] TE   [] TA   [x] MT   [] SC   [] other: Patient Education: [x] Review HEP    [x] MLD Patient Education Continued education in self MLD technique with bathing and skin care.    [x] Progressed/Changed HEP based on:   [x] positioning   [] Kinesiotape   [x] Skin care   [] wound care   [x] other: compression product options  [x] Home program.   Patient / caregiver re-demonstrated bandaging. [] Yes  [] No  Compression bandaging/garment precautions reviewed: [x] Yes  [] No            Pain Level (0-10 scale) post treatment: Patient reports pain in the feet from neuropathy. ASSESSMENT/Changes in Function: Chary Pino is a 54 y.o. male who presents with secondary B LE lymphedema, stage 2, complicated by skin changes and multiple co-morbidities, including end stage renal disease and need for kidney transplant. Full LE volumes taken today reveal an overall loss of 3800 ml in the L LE and overall loss of 2400 ml loss in the R LE since first seen in our clinic in June 2020. The volume loss in the legs has been affected by dialysis, weight loss, as well as, use of compression products. Patient wears custom knee highs on a daily basis but struggles with the other components of his home program of CDT, especially daily exercise and skin care. Patient's current knee highs are worn and in need of replacement and he was measured for those today. Patient will return to the clinic for fitting of the products. This care is medically necessary due to the infection risk with lymphedema and to improve functional activities. CDT is necessary to resolve swelling to allow patient to return to wearing normal clothes/footwear and prevent worsening of symptoms, such as infections and/or hospitalizations. Patient will be independent with home program strategies to allow improved ADL ability and mobility and to allow patient to return to greatest functional independence.     [x]  See Plan of [address], PT, CLT  9/28/2022

## 2022-09-28 NOTE — PROGRESS NOTES
Statement of Medical Necessity  Page 1 of 2      Matteo Soto 1970 Today's Date: 9/28/2022 LARY: [employer]   Payor: BLUE CROSS MEDICARE / Plan: [address] 8141 HMO / Product Type: Managed Care Medicare /  ME: TBD  Refills: 2                   Diagnosis  []   I97.2 Post-Mastectomy Lymphedema []   I87.2 Venous Insufficiency   [x]   I89.0 Lymphedema, other secondary B LE  []   I83.019 Venous Stasis Ulcer LE, Right   []   I89.9 Unspecified Lymphatic Disorder []   I83.029 Venous Stasis Ulcer LE, Left   [x]   R60.9 Swelling not relieved by elevation []   Q82.0 Hereditary/ Congenital Lymphedema   []   C50.211 Malignant neoplasm of breast, Right []   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   []   C50.212 Malignant neoplasm of breast, Left []   L03.115 LE Cellulitis, Right   []    []   L03.116 LE Cellulitis, Left                                   Matteo Soto    1970  Page 2 of 2    Physician Order for DME for Diagnosis of secondary B LE lymphedema as Listed on Statement of Medical Necessity, Page 1         Recommended Product:  Units Upper Extremity Rt Lt Units Lower Extremity Rt Lt    Circ-Aid, Ready Wrap, Sigvaris Arm    Inelastic binders (Circ-Aid, Dipika Dart)  []Foot   []Below Knee   []Knee   []Thigh      Circ-Aid Ready Wrap, Sigvaris hand    Roque Kenneth, night use []Full Leg  []Lower Leg      Tribute Arm, night use    Tribute, night use  []Full Leg  []Lower Leg      Roque Kenneth Arm, night use    Stephan Sleeve Leg/ Foot, night use      Gradiant Compression Sleeves  []Custom [] RM Arm:  []CCL1 []CCL2 []CCL3  []Custom [] RM:  []Glove  []Gauntlet:                         []CCL1 []CCL2 []CCL3     4 Gradient Compression Stockings   [x]Custom  []RM Lower Extremity:   []CCL1       [x]CCL2         []CCL3   [x]Knee       []Thigh        []Waist Length X X    Stephan sleeve arm w/ hand, night use    Tribute Wrap, night use      Compression Bra          Compression Vest         The above patient was referred for treatment of Lymphedema due to the diagnosis indicated above. Lymphedema is a progressive and chronic condition. It may cause acute infections, muscle atrophy, discomfort, and connective tissue fibrosis with irreversible tissue damage, decreased ROM in the affected limb and diminished functional mobility possibly interfering with independence and ability to work. Recurrent infections and wound complications that commonly occur with Lymphedema often require hospitalization and extensive wound care, thus increasing medical costs. The patient has received complete decongestive therapy which includes manual lymphatic drainage, lymphedema specific exercises, compression bandaging, and hygiene/skin care. Goals of therapy are to reduce the edema and prevent re-accumulation of fluid with its complications. It is medically necessary for this patient to have daytime garments to control this condition. They will need 2 sets (one set to wear and one set to wash for adequate skin care and wearing time). Garments must be replaced every 4-6 months for effectiveness. There are no substitutes available that offer acceptable compression treatment for this Lymphedema patient. If further information is requested, please contact our certified lymphedema therapists at [phone].     [] Logan Mcrae, PT, MSPT, CLT-ALEXANDER [] Silvestre Reid, MS, OTR/L, CLT [x]  Rosaura Paz, PT, CLT [] Kofi De Guzman, PTA, CLT, CSIS    Printed  Provider Name  Provider Signature  Date    Provider NPI

## 2022-09-29 ENCOUNTER — ANESTHESIA (OUTPATIENT)
Dept: SURGERY | Age: 52
End: 2022-09-29
Payer: MEDICARE

## 2022-09-29 ENCOUNTER — HOSPITAL ENCOUNTER (OUTPATIENT)
Age: 52
Setting detail: OUTPATIENT SURGERY
Discharge: HOME | End: 2022-09-29
Payer: MEDICARE

## 2022-09-29 VITALS
BODY MASS INDEX: 39.17 KG/M2 | OXYGEN SATURATION: 100 % | HEART RATE: 81 BPM | WEIGHT: 315 LBS | RESPIRATION RATE: 12 BRPM | HEIGHT: 75 IN | TEMPERATURE: 97.2 F | SYSTOLIC BLOOD PRESSURE: 110 MMHG | DIASTOLIC BLOOD PRESSURE: 73 MMHG

## 2022-09-29 DIAGNOSIS — N18.6 ESRD (END STAGE RENAL DISEASE) (HCC): Primary | ICD-10-CM

## 2022-09-29 LAB
ANION GAP BLD CALC-SCNC: 11 MMOL/L (ref 10–20)
CA-I BLD-MCNC: 1.03 MMOL/L (ref 1.12–1.32)
CHLORIDE BLD-SCNC: 100 MMOL/L (ref 98–107)
CO2 BLD-SCNC: 27.6 MMOL/L (ref 21–32)
CREAT BLD-MCNC: 13.73 MG/DL (ref 0.6–1.3)
GLUCOSE BLD-MCNC: 94 MG/DL (ref 65–100)
INR BLD: 1.7 (ref 0.9–1.2)
POTASSIUM BLD-SCNC: 4.2 MMOL/L (ref 3.5–5.1)
SERVICE CMNT-IMP: ABNORMAL
SODIUM BLD-SCNC: 138 MMOL/L (ref 136–145)

## 2022-09-29 PROCEDURE — 77030011992 HC AIRWY NASOPHGL TELE -A: Performed by: ANESTHESIOLOGY

## 2022-09-29 PROCEDURE — 74011250636 HC RX REV CODE- 250/636: Performed by: ANESTHESIOLOGY

## 2022-09-29 PROCEDURE — 77030003601 HC NDL NRV BLK BBMI -A

## 2022-09-29 PROCEDURE — 76210000016 HC OR PH I REC 1 TO 1.5 HR

## 2022-09-29 PROCEDURE — 77030040922 HC BLNKT HYPOTHRM STRY -A

## 2022-09-29 PROCEDURE — 77030002987 HC SUT PROL J&J -B

## 2022-09-29 PROCEDURE — 77030042556 HC PNCL CAUT -B

## 2022-09-29 PROCEDURE — 74011250636 HC RX REV CODE- 250/636

## 2022-09-29 PROCEDURE — A4565 SLINGS: HCPCS

## 2022-09-29 PROCEDURE — 74011250636 HC RX REV CODE- 250/636: Performed by: NURSE ANESTHETIST, CERTIFIED REGISTERED

## 2022-09-29 PROCEDURE — 76210000020 HC REC RM PH II FIRST 0.5 HR

## 2022-09-29 PROCEDURE — 74011000250 HC RX REV CODE- 250: Performed by: NURSE ANESTHETIST, CERTIFIED REGISTERED

## 2022-09-29 PROCEDURE — 80047 BASIC METABLC PNL IONIZED CA: CPT

## 2022-09-29 PROCEDURE — 2709999900 HC NON-CHARGEABLE SUPPLY

## 2022-09-29 PROCEDURE — 76010000149 HC OR TIME 1 TO 1.5 HR

## 2022-09-29 PROCEDURE — 77030040361 HC SLV COMPR DVT MDII -B

## 2022-09-29 PROCEDURE — 85610 PROTHROMBIN TIME: CPT

## 2022-09-29 PROCEDURE — 76060000034 HC ANESTHESIA 1.5 TO 2 HR

## 2022-09-29 PROCEDURE — 77030031139 HC SUT VCRL2 J&J -A

## 2022-09-29 RX ORDER — FENTANYL CITRATE 50 UG/ML
25 INJECTION, SOLUTION INTRAMUSCULAR; INTRAVENOUS
Status: DISCONTINUED | OUTPATIENT
Start: 2022-09-29 | End: 2022-09-29 | Stop reason: HOSPADM

## 2022-09-29 RX ORDER — PROPOFOL 10 MG/ML
INJECTION, EMULSION INTRAVENOUS
Status: DISCONTINUED | OUTPATIENT
Start: 2022-09-29 | End: 2022-09-29 | Stop reason: HOSPADM

## 2022-09-29 RX ORDER — FENTANYL CITRATE 50 UG/ML
50 INJECTION, SOLUTION INTRAMUSCULAR; INTRAVENOUS AS NEEDED
Status: DISCONTINUED | OUTPATIENT
Start: 2022-09-29 | End: 2022-09-29 | Stop reason: HOSPADM

## 2022-09-29 RX ORDER — SODIUM CHLORIDE 9 MG/ML
25 INJECTION, SOLUTION INTRAVENOUS CONTINUOUS
Status: DISCONTINUED | OUTPATIENT
Start: 2022-09-29 | End: 2022-09-29 | Stop reason: HOSPADM

## 2022-09-29 RX ORDER — SODIUM CHLORIDE 0.9 % (FLUSH) 0.9 %
5-40 SYRINGE (ML) INJECTION AS NEEDED
Status: DISCONTINUED | OUTPATIENT
Start: 2022-09-29 | End: 2022-09-29 | Stop reason: HOSPADM

## 2022-09-29 RX ORDER — ALBUMIN HUMAN 50 G/1000ML
SOLUTION INTRAVENOUS
Status: DISCONTINUED
Start: 2022-09-29 | End: 2022-09-29 | Stop reason: WASHOUT

## 2022-09-29 RX ORDER — ROPIVACAINE HYDROCHLORIDE 5 MG/ML
INJECTION, SOLUTION EPIDURAL; INFILTRATION; PERINEURAL
Status: DISCONTINUED | OUTPATIENT
Start: 2022-09-29 | End: 2022-09-29 | Stop reason: HOSPADM

## 2022-09-29 RX ORDER — MORPHINE SULFATE 2 MG/ML
2 INJECTION, SOLUTION INTRAMUSCULAR; INTRAVENOUS
Status: DISCONTINUED | OUTPATIENT
Start: 2022-09-29 | End: 2022-09-29 | Stop reason: HOSPADM

## 2022-09-29 RX ORDER — ACETAMINOPHEN 325 MG/1
650 TABLET ORAL ONCE
Status: DISCONTINUED | OUTPATIENT
Start: 2022-09-29 | End: 2022-09-29 | Stop reason: HOSPADM

## 2022-09-29 RX ORDER — LIDOCAINE HYDROCHLORIDE 10 MG/ML
0.1 INJECTION, SOLUTION EPIDURAL; INFILTRATION; INTRACAUDAL; PERINEURAL AS NEEDED
Status: DISCONTINUED | OUTPATIENT
Start: 2022-09-29 | End: 2022-09-29 | Stop reason: HOSPADM

## 2022-09-29 RX ORDER — ONDANSETRON 2 MG/ML
INJECTION INTRAMUSCULAR; INTRAVENOUS AS NEEDED
Status: DISCONTINUED | OUTPATIENT
Start: 2022-09-29 | End: 2022-09-29 | Stop reason: HOSPADM

## 2022-09-29 RX ORDER — MIDAZOLAM HYDROCHLORIDE 1 MG/ML
1 INJECTION, SOLUTION INTRAMUSCULAR; INTRAVENOUS AS NEEDED
Status: DISCONTINUED | OUTPATIENT
Start: 2022-09-29 | End: 2022-09-29 | Stop reason: HOSPADM

## 2022-09-29 RX ORDER — SODIUM CHLORIDE, SODIUM LACTATE, POTASSIUM CHLORIDE, CALCIUM CHLORIDE 600; 310; 30; 20 MG/100ML; MG/100ML; MG/100ML; MG/100ML
125 INJECTION, SOLUTION INTRAVENOUS CONTINUOUS
Status: DISCONTINUED | OUTPATIENT
Start: 2022-09-29 | End: 2022-09-29 | Stop reason: HOSPADM

## 2022-09-29 RX ORDER — GLYCOPYRROLATE 0.2 MG/ML
INJECTION INTRAMUSCULAR; INTRAVENOUS AS NEEDED
Status: DISCONTINUED | OUTPATIENT
Start: 2022-09-29 | End: 2022-09-29 | Stop reason: HOSPADM

## 2022-09-29 RX ORDER — HYDROCODONE BITARTRATE AND ACETAMINOPHEN 7.5; 325 MG/1; MG/1
1 TABLET ORAL
Qty: 15 TABLET | Refills: 0 | Status: SHIPPED | OUTPATIENT
Start: 2022-09-29 | End: 2022-10-02

## 2022-09-29 RX ORDER — MIDAZOLAM HYDROCHLORIDE 1 MG/ML
INJECTION, SOLUTION INTRAMUSCULAR; INTRAVENOUS AS NEEDED
Status: DISCONTINUED | OUTPATIENT
Start: 2022-09-29 | End: 2022-09-29 | Stop reason: HOSPADM

## 2022-09-29 RX ORDER — PHENYLEPHRINE HCL IN 0.9% NACL 0.4MG/10ML
SYRINGE (ML) INTRAVENOUS AS NEEDED
Status: DISCONTINUED | OUTPATIENT
Start: 2022-09-29 | End: 2022-09-29 | Stop reason: HOSPADM

## 2022-09-29 RX ORDER — DIPHENHYDRAMINE HYDROCHLORIDE 50 MG/ML
12.5 INJECTION, SOLUTION INTRAMUSCULAR; INTRAVENOUS AS NEEDED
Status: DISCONTINUED | OUTPATIENT
Start: 2022-09-29 | End: 2022-09-29 | Stop reason: HOSPADM

## 2022-09-29 RX ORDER — SODIUM CHLORIDE 0.9 % (FLUSH) 0.9 %
5-40 SYRINGE (ML) INJECTION EVERY 8 HOURS
Status: DISCONTINUED | OUTPATIENT
Start: 2022-09-29 | End: 2022-09-29 | Stop reason: HOSPADM

## 2022-09-29 RX ORDER — ONDANSETRON 2 MG/ML
4 INJECTION INTRAMUSCULAR; INTRAVENOUS AS NEEDED
Status: DISCONTINUED | OUTPATIENT
Start: 2022-09-29 | End: 2022-09-29 | Stop reason: HOSPADM

## 2022-09-29 RX ORDER — OXYCODONE AND ACETAMINOPHEN 5; 325 MG/1; MG/1
1 TABLET ORAL AS NEEDED
Status: DISCONTINUED | OUTPATIENT
Start: 2022-09-29 | End: 2022-09-29 | Stop reason: HOSPADM

## 2022-09-29 RX ORDER — MIDAZOLAM HYDROCHLORIDE 1 MG/ML
0.5 INJECTION, SOLUTION INTRAMUSCULAR; INTRAVENOUS
Status: DISCONTINUED | OUTPATIENT
Start: 2022-09-29 | End: 2022-09-29 | Stop reason: HOSPADM

## 2022-09-29 RX ORDER — DEXAMETHASONE SODIUM PHOSPHATE 4 MG/ML
INJECTION, SOLUTION INTRA-ARTICULAR; INTRALESIONAL; INTRAMUSCULAR; INTRAVENOUS; SOFT TISSUE AS NEEDED
Status: DISCONTINUED | OUTPATIENT
Start: 2022-09-29 | End: 2022-09-29 | Stop reason: HOSPADM

## 2022-09-29 RX ORDER — HYDROMORPHONE HYDROCHLORIDE 1 MG/ML
0.2 INJECTION, SOLUTION INTRAMUSCULAR; INTRAVENOUS; SUBCUTANEOUS
Status: DISCONTINUED | OUTPATIENT
Start: 2022-09-29 | End: 2022-09-29 | Stop reason: HOSPADM

## 2022-09-29 RX ADMIN — SODIUM CHLORIDE 25 ML/HR: 9 INJECTION, SOLUTION INTRAVENOUS at 07:13

## 2022-09-29 RX ADMIN — MIDAZOLAM 2 MG: 1 INJECTION INTRAMUSCULAR; INTRAVENOUS at 07:25

## 2022-09-29 RX ADMIN — ONDANSETRON HYDROCHLORIDE 4 MG: 2 INJECTION, SOLUTION INTRAMUSCULAR; INTRAVENOUS at 07:55

## 2022-09-29 RX ADMIN — FENTANYL CITRATE 50 MCG: 50 INJECTION, SOLUTION INTRAMUSCULAR; INTRAVENOUS at 07:16

## 2022-09-29 RX ADMIN — MIDAZOLAM 3 MG: 1 INJECTION INTRAMUSCULAR; INTRAVENOUS at 07:16

## 2022-09-29 RX ADMIN — ONDANSETRON HYDROCHLORIDE 4 MG: 2 INJECTION, SOLUTION INTRAMUSCULAR; INTRAVENOUS at 07:46

## 2022-09-29 RX ADMIN — GLYCOPYRROLATE 0.2 MG: 0.2 INJECTION INTRAMUSCULAR; INTRAVENOUS at 07:58

## 2022-09-29 RX ADMIN — ROPIVACAINE HYDROCHLORIDE 10 ML: 5 INJECTION, SOLUTION EPIDURAL; INFILTRATION; PERINEURAL at 07:18

## 2022-09-29 RX ADMIN — Medication 80 MCG: at 08:16

## 2022-09-29 RX ADMIN — Medication 3 G: at 07:43

## 2022-09-29 RX ADMIN — Medication 80 MCG: at 08:10

## 2022-09-29 RX ADMIN — MEPIVACAINE HYDROCHLORIDE 20 ML: 15 INJECTION, SOLUTION EPIDURAL; INFILTRATION at 07:18

## 2022-09-29 RX ADMIN — DEXAMETHASONE SODIUM PHOSPHATE 6 MG: 4 INJECTION, SOLUTION INTRAMUSCULAR; INTRAVENOUS at 07:46

## 2022-09-29 RX ADMIN — PROPOFOL 70 MCG/KG/MIN: 10 INJECTION, EMULSION INTRAVENOUS at 07:39

## 2022-09-29 NOTE — ANESTHESIA PROCEDURE NOTES
Peripheral Block    Start time: 9/29/2022 7:11 AM  End time: 9/29/2022 7:20 AM  Performed by: Pat Irby MD  Authorized by: Pat Irby MD       Pre-procedure:    Indications: at surgeon's request, post-op pain management and primary anesthetic    Preanesthetic Checklist: patient identified, risks and benefits discussed, site marked, timeout performed, anesthesia consent given, patient being monitored and fire risk safety assessment completed and verbalized    Timeout Time: 07:11 EDT      Block Type:   Block Type:  Supraclavicular  Laterality:  Left  Monitoring:  Standard ASA monitoring, continuous pulse ox, frequent vital sign checks, heart rate, responsive to questions and oxygen  Injection Technique:  Single shot  Procedures: ultrasound guided and nerve stimulator    Patient Position: supine  Prep: povidone-iodine 7.5% surgical scrub and povidone-iodine 7.5% surgical scrub    Location:  Supraclavicular  Needle Type:  Stimuplex  Needle Gauge:  22 G  Needle Localization:  Nerve stimulator and ultrasound guidance  Motor Response comment:   Motor Response: minimal motor response >0.4 mA    Medication Injected:  Mepivacaine PF (CARBOCAINE) 1.5 % injection - Peripheral Nerve Block   20 mL - 9/29/2022 7:18:00 AM  ropivacaine (PF) (NAROPIN)(0.5%) 5 mg/mL injection - Peripheral Nerve Block   10 mL - 9/29/2022 7:18:00 AM  Med Admin Time: 9/29/2022 7:18 AM    Assessment:  Number of attempts:  1  Injection Assessment:  Incremental injection every 5 mL, local visualized surrounding nerve on ultrasound, negative aspiration for blood, no intravascular symptoms, negative aspiration for CSF, no paresthesia and ultrasound image on chart  Patient tolerance:  Patient tolerated the procedure well with no immediate complications

## 2022-09-29 NOTE — OP NOTES
[facility]  OPERATIVE REPORT    Name:  Mark Appiah  MR#:  [id number]  :  1970  ACCOUNT #:  [de-identified]  DATE OF SERVICE:  2022    PREOPERATIVE DIAGNOSIS:  Renal failure. POSTOPERATIVE DIAGNOSIS:  Renal failure. PROCEDURE PERFORMED:  Left forearm cephalic vein transposition dialysis fistula. SURGEON:  Yanely Encarnacion MD    ASSISTANT:  Belle    ANESTHESIA:  Regional block. COMPLICATIONS:  None. SPECIMENS REMOVED:  None. IMPLANTS:  None. ESTIMATED BLOOD LOSS:  25 mL. INDICATION:  The patient is a 51-year-old male with end-stage renal disease, on hemodialysis. He had a left radiocephalic fistula placed a number of months ago. The fistula is too deep to use reliably. The fistula will be transposed to a more superficial location. PROCEDURE:  The patient's left arm was prepped and draped. Three longitudinal incisions were made beginning at the level of the distal forearm continuing to the proximal forearm. The fistula was identified and dissected free through each of the incisions. The fistula was clamped and divided at the level of the wrist and then removed from its previous anatomic location. It was then tunneled as closely to the skin as possible just medial to the incisions. The fistula was then reapproximated using running 6-0 Prolene. At the completion, there was an augmentable pulse in the fistula. The incisions were closed with Vicryl subcutaneous suture and skin staples. Dressings were applied and the patient was returned to the recovery room in stable condition.         Geo Ryan MD      [id number]  D:  2022 8:46  T:  2022 12:57  JOB #:  [id number]

## 2022-09-29 NOTE — ADDENDUM NOTE
Addendum  created 09/29/22 0938 by Salo Garrett MD    Child order released for a procedure order, Clinical Note Signed, Intraprocedure Blocks edited, SmartForm saved

## 2022-09-29 NOTE — DISCHARGE INSTRUCTIONS
Patient Discharge Instructions    Max Carvalho / [id number] : 1970    Admitted 2022 Discharged: 2022     Take Home Medications       It is important that you take the medication exactly as they are prescribed. Keep your medication in the bottles provided by the pharmacist and keep a list of the medication names, dosages, and times to be taken in your wallet. Do not take other medications without consulting your doctor. What to do at Home    Recommended diet: Renal Diet,     Recommended activity: Activity as tolerated,     Additional Instructions: remove left arm dressings on Sat and leave open, OK to resume coumadin    Follow-up with Dr Whitney Royal in 2 weeks, call [phone] for appt        Information obtained by :  I understand that if any problems occur once I am at home I am to contact my physician. I understand and acknowledge receipt of the instructions indicated above. [facility] or R.N.'s Signature                                                                  Date/Time                                                                                                                                              Patient or Representative Signature                                                          Date/Time DISCHARGE SUMMARY from Nurse    The following personal items collected during your admission are returned to you:   Dental Appliance:    Vision:    Hearing Aid:    Jewelry:    Clothing:    Other Valuables:    Valuables sent to safe: ALL CLOTHING/VALUABLES RETURNED TO PATIENT BEFORE D/C HOME    PATIENT INSTRUCTIONS:    The first meal should be something that is light; not greasy or spicy. If this is tolerated, then advance to regular diet as tolerated.         Anesthesia Discharge Instructions    After general anesthesia or intervenous sedation, for 24 hours or while taking prescription Narcotics:  Limit your activities  Do not drive or operate hazardous machinery  If you have not urinated within 8 hours after discharge, please contact your surgeon on call. Do not make important personal or business decisions  Do not drink alcoholic beverages    Report the following to your surgeon:  Excessive pain, swelling, redness or odor of or around the surgical area  Temperature over 100.5 degrees  Nausea and vomiting lasting longer than 4 hours or if unable to take medication  Any signs of decreased circulation or nerve impairment to extremity:  Change in color, persistent numbness, tingling, coldness or increased pain.   Any questions    Pain  Take pain medication as directed by your doctor   Call your doctor if pain is NOT relieved by medication  DO NOT take aspirin or blood thinners until directed by your doctor

## 2022-09-29 NOTE — ANESTHESIA PREPROCEDURE EVALUATION
Relevant Problems   CARDIOVASCULAR   (+) Atrial fibrillation with RVR (HCC)   (+) Malignant hypertension      RENAL FAILURE   (+) CKD (chronic kidney disease), stage III (HCC)       Anesthetic History   No history of anesthetic complications            Review of Systems / Medical History  Patient summary reviewed, nursing notes reviewed and pertinent labs reviewed    Pulmonary        Sleep apnea: CPAP           Neuro/Psych   Within defined limits           Cardiovascular  Within defined limits  Hypertension      CHF: NYHA Classification II  Dysrhythmias : atrial fibrillation  Past MI and CAD    Exercise tolerance: <4 METS     GI/Hepatic/Renal         Renal disease: ESRD       Endo/Other  Within defined limits           Other Findings              Physical Exam    Airway  Mallampati: II  TM Distance: > 6 cm  Neck ROM: normal range of motion   Mouth opening: Normal     Cardiovascular  Regular rate and rhythm,  S1 and S2 normal,  no murmur, click, rub, or gallop  Rhythm: irregular      Murmur: Grade 2, Mitral area     Dental  No notable dental hx       Pulmonary  Breath sounds clear to auscultation               Abdominal  GI exam deferred       Other Findings            Anesthetic Plan    ASA: 3  Anesthesia type: regional - supraclavicular block          Induction: Intravenous  Anesthetic plan and risks discussed with: Patient

## 2022-09-29 NOTE — ANESTHESIA POSTPROCEDURE EVALUATION
Post-Anesthesia Evaluation and Assessment    Patient: Sally White MRN: [id number]  SSN: [id number]    YOB: 1970  Age: 54 y.o. Sex: male      I have evaluated the patient and they are stable and ready for discharge from the PACU. Cardiovascular Function/Vital Signs  Visit Vitals  BP 93/65   Pulse (!) 104   Temp 36.2 °C (97.2 °F)   Resp 18   Ht 6' 3\" (1.905 m)   Wt 154 kg (339 lb 8.1 oz)   SpO2 100%   BMI 42.44 kg/m²       Patient is status post Other anesthesia for Procedure(s):  LEFT FOREARM CEPHALIC VEIN TRANSPOSITION AND FISTULA. Nausea/Vomiting: None    Postoperative hydration reviewed and adequate. Pain:  Pain Scale 1: Visual (09/29/22 0855)   Managed    Neurological Status:   Neuro (WDL): Exceptions to [facility] (09/29/22 0855)  Neuro  Neurologic State: Sleeping;Eyes open to voice (09/29/22 0855)  Orientation Level: Unable to verbalize (09/29/22 0855)  Cognition: Follows commands (09/29/22 0855)  Speech: Delayed responses;Slurred (09/29/22 0855)  LUE Motor Response: Numbness; No movement to any stimulus (09/29/22 0855)  LLE Motor Response: Purposeful (09/29/22 0855)  RUE Motor Response: Purposeful (09/29/22 0855)  RLE Motor Response: Purposeful (09/29/22 0855)   At baseline    Mental Status, Level of Consciousness: Alert and  oriented to person, place, and time    Pulmonary Status:   O2 Device: Nasal cannula (09/29/22 0900)   Adequate oxygenation and airway patent    Complications related to anesthesia: None    Post-anesthesia assessment completed. No concerns    Signed By: Darlene Baldwin MD     September 29, 2022              Procedure(s):  LEFT FOREARM CEPHALIC VEIN TRANSPOSITION AND FISTULA.    regional    <BSHSIANPOST>    INITIAL Post-op Vital signs:   Vitals Value Taken Time   BP 95/63 09/29/22 0930   Temp 36.2 °C (97.2 °F) 09/29/22 0900   Pulse 103 09/29/22 0933   Resp 28 09/29/22 0933   SpO2 100 % 09/29/22 0933   Vitals shown include unvalidated device data.

## 2022-09-29 NOTE — PROGRESS NOTES
Clinical Pharmacy Note: Cefazolin Dosing for Surgical Prophylaxis    Please note that the pre-op cefazolin dose for Simi Pavon has been changed to 3 grams for weight > 120 kg per [facility]-approved protocol. Please contact the pharmacy with any questions.     Chalo Obrien

## 2022-09-29 NOTE — BRIEF OP NOTE
Brief Postoperative Note    Patient: Elissa Jackson  YOB: 1970  MRN: [id number]    Date of Procedure: 9/29/2022     Pre-Op Diagnosis: END STAGE RENAL DISEASE    Post-Op Diagnosis: Same as preoperative diagnosis.       Procedure(s):  LEFT FOREARM CEPHALIC VEIN TRANSPOSITION AND FISTULA    Surgeon(s):  Del Westbrook MD    Surgical Assistant: Surg Asst-1: Ghazala Turner    Anesthesia: Other     Estimated Blood Loss (mL): less than 50     Complications: None    Specimens: * No specimens in log *     Implants: * No implants in log *    Drains: * No LDAs found *    Findings: none    Electronically Signed by Butch Mayer MD on 9/29/2022 at 8:41 AM

## 2022-09-29 NOTE — PROGRESS NOTES
09/29/22 0758   Family Communication   Family Update Message Procedure started   Delivery Origin Nurse    Relationship to Patient Partner    Phone Number [facility]   Family/Significant Other Update Called

## 2022-09-29 NOTE — ROUTINE PROCESS
Patient: Max Carvalho MRN: [id number]  SSN: [id number]   YOB: 1970  Age: 54 y.o. Sex: male     Patient is status post Procedure(s):  LEFT FOREARM CEPHALIC VEIN TRANSPOSITION AND FISTULA.     Surgeon(s) and Role:     * Mi Villavicencio MD - Primary    Local/Dose/Irrigation:  Regional Block                Peripheral IV 09/29/22 Right Antecubital (Active)                           Dressing/Packing:  Incision 09/29/22 Arm Left-Dressing/Treatment: Gauze dressing/dressing sponge;Tape/Soft cloth adhesive tape (09/29/22 0478)    Splint/Cast:  ]    Other:

## 2022-10-09 ENCOUNTER — APPOINTMENT (OUTPATIENT)
Dept: GENERAL RADIOLOGY | Age: 52
End: 2022-10-09
Attending: EMERGENCY MEDICINE
Payer: MEDICARE

## 2022-10-09 ENCOUNTER — HOSPITAL ENCOUNTER (EMERGENCY)
Age: 52
Discharge: HOME | End: 2022-10-09
Attending: EMERGENCY MEDICINE
Payer: MEDICARE

## 2022-10-09 VITALS
BODY MASS INDEX: 40.43 KG/M2 | TEMPERATURE: 98.6 F | SYSTOLIC BLOOD PRESSURE: 137 MMHG | OXYGEN SATURATION: 98 % | WEIGHT: 315 LBS | HEART RATE: 81 BPM | DIASTOLIC BLOOD PRESSURE: 76 MMHG | RESPIRATION RATE: 17 BRPM | HEIGHT: 74 IN

## 2022-10-09 DIAGNOSIS — T82.41XA HEMODIALYSIS CATHETER DYSFUNCTION, INITIAL ENCOUNTER (HCC): Primary | ICD-10-CM

## 2022-10-09 LAB
ABO + RH BLD: NORMAL
ALBUMIN SERPL-MCNC: 3.1 G/DL (ref 3.5–5)
ANION GAP SERPL CALC-SCNC: 9 MMOL/L (ref 5–15)
BASOPHILS # BLD: 0.1 K/UL (ref 0–0.1)
BASOPHILS NFR BLD: 1 % (ref 0–1)
BLOOD GROUP ANTIBODIES SERPL: NEGATIVE
BUN SERPL-MCNC: 66 MG/DL (ref 6–20)
BUN/CREAT SERPL: 4 (ref 12–20)
CA-I BLD-MCNC: 8.2 MG/DL (ref 8.5–10.1)
CHLORIDE SERPL-SCNC: 96 MMOL/L (ref 97–108)
CO2 SERPL-SCNC: 30 MMOL/L (ref 21–32)
CREAT SERPL-MCNC: 15.8 MG/DL (ref 0.7–1.3)
DIFFERENTIAL METHOD BLD: ABNORMAL
EOSINOPHIL # BLD: 0.7 K/UL (ref 0–0.4)
EOSINOPHIL NFR BLD: 9 % (ref 0–7)
ERYTHROCYTE [DISTWIDTH] IN BLOOD BY AUTOMATED COUNT: 15.4 % (ref 11.5–14.5)
GLUCOSE SERPL-MCNC: 92 MG/DL (ref 65–100)
HCT VFR BLD AUTO: 33.6 % (ref 36.6–50.3)
HGB BLD-MCNC: 10.8 G/DL (ref 12.1–17)
IMM GRANULOCYTES # BLD AUTO: 0 K/UL (ref 0–0.04)
IMM GRANULOCYTES NFR BLD AUTO: 1 % (ref 0–0.5)
INR PPP: 1.7 (ref 0.9–1.1)
LYMPHOCYTES # BLD: 1.3 K/UL (ref 0.8–3.5)
LYMPHOCYTES NFR BLD: 19 % (ref 12–49)
MCH RBC QN AUTO: 32.9 PG (ref 26–34)
MCHC RBC AUTO-ENTMCNC: 32.1 G/DL (ref 30–36.5)
MCV RBC AUTO: 102.4 FL (ref 80–99)
MONOCYTES # BLD: 1.1 K/UL (ref 0–1)
MONOCYTES NFR BLD: 16 % (ref 5–13)
NEUTS SEG # BLD: 3.8 K/UL (ref 1.8–8)
NEUTS SEG NFR BLD: 54 % (ref 32–75)
NRBC # BLD: 0 K/UL (ref 0–0.01)
NRBC BLD-RTO: 0 PER 100 WBC
PHOSPHATE SERPL-MCNC: 7.5 MG/DL (ref 2.6–4.7)
PLATELET # BLD AUTO: 144 K/UL (ref 150–400)
PMV BLD AUTO: 11.4 FL (ref 8.9–12.9)
POTASSIUM SERPL-SCNC: 5.2 MMOL/L (ref 3.5–5.1)
PROTHROMBIN TIME: 19.8 SEC (ref 11.9–14.6)
RBC # BLD AUTO: 3.28 M/UL (ref 4.1–5.7)
SODIUM SERPL-SCNC: 135 MMOL/L (ref 136–145)
SPECIMEN EXP DATE BLD: NORMAL
WBC # BLD AUTO: 6.9 K/UL (ref 4.1–11.1)

## 2022-10-09 PROCEDURE — 99285 EMERGENCY DEPT VISIT HI MDM: CPT

## 2022-10-09 PROCEDURE — 86900 BLOOD TYPING SEROLOGIC ABO: CPT

## 2022-10-09 PROCEDURE — 85610 PROTHROMBIN TIME: CPT

## 2022-10-09 PROCEDURE — 80069 RENAL FUNCTION PANEL: CPT

## 2022-10-09 PROCEDURE — 85025 COMPLETE CBC W/AUTO DIFF WBC: CPT

## 2022-10-09 PROCEDURE — 74011000250 HC RX REV CODE- 250: Performed by: EMERGENCY MEDICINE

## 2022-10-09 PROCEDURE — 71045 X-RAY EXAM CHEST 1 VIEW: CPT

## 2022-10-09 PROCEDURE — 93005 ELECTROCARDIOGRAM TRACING: CPT

## 2022-10-09 PROCEDURE — 74011250637 HC RX REV CODE- 250/637: Performed by: EMERGENCY MEDICINE

## 2022-10-09 PROCEDURE — 36415 COLL VENOUS BLD VENIPUNCTURE: CPT

## 2022-10-09 PROCEDURE — 74011250637 HC RX REV CODE- 250/637

## 2022-10-09 PROCEDURE — 96374 THER/PROPH/DIAG INJ IV PUSH: CPT

## 2022-10-09 RX ORDER — DILTIAZEM HYDROCHLORIDE 180 MG/1
180 CAPSULE, EXTENDED RELEASE ORAL DAILY
Status: DISCONTINUED | OUTPATIENT
Start: 2022-10-09 | End: 2022-10-09 | Stop reason: HOSPADM

## 2022-10-09 RX ORDER — AMLODIPINE BESYLATE 5 MG/1
TABLET ORAL
Status: DISCONTINUED
Start: 2022-10-09 | End: 2022-10-09 | Stop reason: HOSPADM

## 2022-10-09 RX ORDER — DILTIAZEM HYDROCHLORIDE 30 MG/1
TABLET, FILM COATED ORAL
Status: DISCONTINUED
Start: 2022-10-09 | End: 2022-10-09 | Stop reason: HOSPADM

## 2022-10-09 RX ORDER — DILTIAZEM HYDROCHLORIDE 5 MG/ML
20 INJECTION INTRAVENOUS ONCE
Status: COMPLETED | OUTPATIENT
Start: 2022-10-09 | End: 2022-10-09

## 2022-10-09 RX ORDER — AMLODIPINE BESYLATE 5 MG/1
10 TABLET ORAL
Status: DISCONTINUED | OUTPATIENT
Start: 2022-10-09 | End: 2022-10-09

## 2022-10-09 RX ADMIN — DILTIAZEM HYDROCHLORIDE 20 MG: 5 INJECTION INTRAVENOUS at 11:20

## 2022-10-09 RX ADMIN — DILTIAZEM HYDROCHLORIDE 180 MG: 180 CAPSULE, EXTENDED RELEASE ORAL at 10:14

## 2022-10-09 NOTE — ED TRIAGE NOTES
Patient noticed dialysis chest access was removed sometime between last night and this morning. Catheter brought with patient. Dialysis m,w,f. Patient compliant with dialysis. Has another access in arm, had to get procedure to move access up sept 29 in left arm.

## 2022-10-09 NOTE — ED PROVIDER NOTES
EMERGENCY DEPARTMENT HISTORY AND PHYSICAL EXAM      Date: 10/9/2022  Patient Name: Kristen Munguia      History of Presenting Illness     Chief Complaint   Patient presents with    Vascular Access Problem       History Provided By: Patient and EMS    HPI: Kristen Munguia, 54 y.o. male with a past medical history significant for ESRD on HD MWF, CAD, MI, Afib, CKD, CHF, HTN, sleep apnea, CVA, PE on warfarin presents to the ED with cc of vascular access problem. Pt's catheter fell out this AM as he bent over to  a pen. Had full session of HD on Friday. Denies CP, SOB, F/C/N/V/D. Otherwise in usual state of health. There are no other complaints, changes, or physical findings at this time. PCP: Aristeo Zambrano MD    Current Facility-Administered Medications   Medication Dose Route Frequency Provider Last Rate Last Admin    dilTIAZem ER (DILACOR XR) capsule 180 mg  180 mg Oral DAILY Vero Hayes MD   180 mg at 10/09/22 1014    dilTIAZem IR (CARDIZEM) 30 mg tablet              Current Outpatient Medications   Medication Sig Dispense Refill    warfarin (Jantoven) 5 mg tablet Take 1 Tablet by mouth daily. 30 Tablet 0    dilTIAZem ER (DILACOR XR) 180 mg capsule Take 1 Capsule by mouth daily. 30 Capsule 0    pregabalin (LYRICA) 75 mg capsule Take 1 Capsule by mouth two (2) times a day. Max Daily Amount: 150 mg. 30 Capsule 0    sevelamer carbonate (RENVELA) 800 mg tab tab Take 800 mg by mouth Before breakfast, lunch, and dinner. montelukast (SINGULAIR) 10 mg tablet Take 10 mg by mouth daily. bumetanide (BUMEX) 2 mg tablet Take 2 mg by mouth three (3) times daily. allopurinoL (ZYLOPRIM) 100 mg tablet Take 100 mg by mouth. TAKES ON Monday AND Thursday      cyanocobalamin/salcaprozat sod (ELIGEN B12 PO) Take  by mouth every fourty-eight (48) hours. ERGOCALCIFEROL, VITAMIN D2, PO Take  by mouth every seven (7) days.  Indications: ON SUNDAY      predniSONE (DELTASONE) 5 mg tablet Take  by mouth every other day. amLODIPine (NORVASC) 10 mg tablet Take 1 Tab by mouth daily. 27 Tab 12       Past History     Past Medical History:  Past Medical History:   Diagnosis Date    Arrhythmia     A-FIB    CAD (coronary artery disease)     MI 09    CHF (congestive heart failure) (HCC)     Chronic kidney disease     END STAGE KIDNEY DISEASE;  DIALYSIS M-W-F    Chronic pain     NEUROPATHY; BACK PAIN    Hypertension     Lymph edema     LEFT LEG    Needle phobia     Other ill-defined conditions(799.89)     Sleep apnea     WEARS CPAP    Stroke ([facility] [address] 01/2020    NUMBNESS IN RIGHT LEG AND FOOT    Thromboembolus ([facility] [address] 2018    PE       Past Surgical History:  Past Surgical History:   Procedure Laterality Date    HX ORTHOPAEDIC      LEFT HAND, RIGHT ANKLE, AND RIGHT SHOULDER    HX TONSILLECTOMY      HX VASCULAR ACCESS      UPPER RIGHT CHEST    IR FLUORO GUIDED NEEDLE PLACEMENT  10/08/2021    IR INSERT TUNL CVC W/O PORT OVER 5 YR  10/08/2021    IA CARDIAC SURG PROCEDURE UNLIST      CARDIAC CATHX2-NO STENTS    VASCULAR SURGERY PROCEDURE UNLIST      PERITONEAL DIALYSIS CATH       Family History:  Family History   Problem Relation Age of Onset    Stroke Mother     Heart Disease Mother     Diabetes Mother     Kidney Disease Mother     Heart Attack Mother     Thyroid Disease Mother     No Known Problems Father     Heart Disease Sister     No Known Problems Sister     Lung Disease Brother     Deep Vein Thrombosis Brother     Anesth Problems Neg Hx        Social History:  Social History     Tobacco Use    Smoking status: Some Days     Types: Cigars    Smokeless tobacco: Never    Tobacco comments:     CIGARS ONCE PER MONTH-NOTHING SINCE 2018   Substance Use Topics    Alcohol use: Never     Comment: NOT FOR 3 YEARS    Drug use: No       Allergies:   Allergies   Allergen Reactions    Lisinopril Swelling    Sulfa (Sulfonamide Antibiotics) Other (comments)     Had sores all over body    Beef Containing Products Other (comments)     PT DOES NOT EAT BEEF    Pork Derived (Porcine) Other (comments)     PT DOES NOT EAT PORK    Sulfa (Sulfonamide Antibiotics) Hives         Review of Systems   Constitutional: Negative except as in HPI. Eyes: Negative except as in HPI.  ENT: Negative except as in HPI. Cardiovascular: Negative except as in HPI. Respiratory: Negative except as in HPI. Gastrointestinal: Negative except as in HPI. Genitourinary: Negative except as in HPI. Musculoskeletal: Negative except as in HPI. Integumentary: Negative except as in HPI. Neurological: Negative except as in HPI. Psychiatric: Negative except as in HPI. Endocrine: Negative except as in HPI. Hematologic/Lymphatic: Negative except as in HPI. Allergic/Immunologic: Negative except as in HPI. Physical Exam   Constitutional: Awake and alert, interactive, NAD, visually anxious  Eyes: PERRL, no injection or scleral icterus, no discharge  HEENT: NCAT, neck supple, MMM, no oropharyngeal exudates  CV: slightly tachycardic, irregularly irregular, no m/r/g  Respiratory: CTAB, no r/r/w  GI: Abd soft, nondistended, nontender  : Deferred  MSK: FROM, no joint effusions or edema  Skin: No rashes  Neuro: CN2-12 intact, symmetric facies, fluent speech.   Psych: Well-groomed, normal speech, behavior, anxious mood    Lab and Diagnostic Study Results     Labs -     Recent Results (from the past 12 hour(s))   CBC WITH AUTOMATED DIFF    Collection Time: 10/09/22  9:37 AM   Result Value Ref Range    WBC 6.9 4.1 - 11.1 K/uL    RBC 3.28 (L) 4.10 - 5.70 M/uL    HGB 10.8 (L) 12.1 - 17.0 g/dL    HCT 33.6 (L) 36.6 - 50.3 %    .4 (H) 80.0 - 99.0 FL    MCH 32.9 26.0 - 34.0 PG    MCHC 32.1 30.0 - 36.5 g/dL    RDW 15.4 (H) 11.5 - 14.5 %    PLATELET 876 (L) 890 - 400 K/uL    MPV 11.4 8.9 - 12.9 FL    NRBC 0.0 0.0  WBC    ABSOLUTE NRBC 0.00 0.00 - 0.01 K/uL    NEUTROPHILS 54 32 - 75 %    LYMPHOCYTES 19 12 - 49 %    MONOCYTES 16 (H) 5 - 13 %    EOSINOPHILS 9 (H) 0 - 7 %    BASOPHILS 1 0 - 1 %    IMMATURE GRANULOCYTES 1 (H) 0 - 0.5 %    ABS. NEUTROPHILS 3.8 1.8 - 8.0 K/UL    ABS. LYMPHOCYTES 1.3 0.8 - 3.5 K/UL    ABS. MONOCYTES 1.1 (H) 0.0 - 1.0 K/UL    ABS. EOSINOPHILS 0.7 (H) 0.0 - 0.4 K/UL    ABS. BASOPHILS 0.1 0.0 - 0.1 K/UL    ABS. IMM. GRANS. 0.0 0.00 - 0.04 K/UL    DF AUTOMATED     TYPE & SCREEN    Collection Time: 10/09/22  9:37 AM   Result Value Ref Range    Crossmatch Expiration 10/12/2022,2359     ABO/Rh(D) B Positive     Antibody screen Negative    RENAL FUNCTION PANEL    Collection Time: 10/09/22  9:37 AM   Result Value Ref Range    Sodium 135 (L) 136 - 145 mmol/L    Potassium 5.2 (H) 3.5 - 5.1 mmol/L    Chloride 96 (L) 97 - 108 mmol/L    CO2 30 21 - 32 mmol/L    Anion gap 9 5 - 15 mmol/L    Glucose 92 65 - 100 mg/dL    BUN 66 (H) 6 - 20 mg/dL    Creatinine 15.80 (H) 0.70 - 1.30 mg/dL    BUN/Creatinine ratio 4 (L) 12 - 20      eGFR 3 (L) >60 ml/min/1.73m2    Calcium 8.2 (L) 8.5 - 10.1 mg/dL    Phosphorus 7.5 (H) 2.6 - 4.7 mg/dL    Albumin 3.1 (L) 3.5 - 5.0 g/dL   PROTHROMBIN TIME + INR    Collection Time: 10/09/22  9:37 AM   Result Value Ref Range    Prothrombin time 19.8 (H) 11.9 - 14.6 sec    INR 1.7 (H) 0.9 - 1.1         Radiologic Studies -   [unfilled]  CT Results  (Last 48 hours)      None          CXR Results  (Last 48 hours)                 10/09/22 1015  XR CHEST PORT Final result    Impression:      No dialysis catheter is identified. Mild probable pulmonary edema. Narrative:  EXAM:  XR CHEST PORT       INDICATION: Dialysis catheter dislodged       COMPARISON: 10/7/2021       TECHNIQUE: Upright portable chest AP view       FINDINGS: Cardiac monitor leads. No dialysis catheter is identified. Mild   diminished vascular clarity. Cardiomegaly. The lungs and pleural spaces are clear. The visualized bones and upper abdomen   are age-appropriate.                    Medical Decision Making and ED Course   - I am the first and primary provider for this patient AND AM THE PRIMARY PROVIDER OF RECORD. - I reviewed the vital signs, available nursing notes, past medical history, past surgical history, family history and social history. - Initial assessment performed. The patients presenting problems have been discussed, and the staff are in agreement with the care plan formulated and outlined with them. I have encouraged them to ask questions as they arise throughout their visit. Vital Signs-Reviewed the patient's vital signs. Patient Vitals for the past 12 hrs:   Temp Pulse Resp BP SpO2   10/09/22 1200 98.6 °F (37 °C) 81 17 137/76 98 %   10/09/22 1109 98.2 °F (36.8 °C) (!) 108 15 (!) 143/108 100 %   10/09/22 1014 98.2 °F (36.8 °C) (!) 106 20 (!) 123/105 100 %   10/09/22 0941 -- -- -- -- 100 %   10/09/22 0926 98.2 °F (36.8 °C) (!) 102 20 (!) 145/105 100 %       EKG interpretation: Afib @116bpm, nl QRS, Qtc 469, no MORE/STD, TWI lateral leads I, aVL, V4-V6 c/w LVH w/strain        Provider Notes (Medical Decision Making):   51M w/HD catheter problem, will check electrolytes, CBC, CXR for evaluation of need for emergent tx. Will discuss with IR for likely outpatient placement tomorrow. HR slightly elevated currently 102, likely 2/2 anxiety but has not taken home meds, will give and reassess need for admission for Afib w/RVR. Dispo likely home pending clinical stability. ED Course:       ED Course as of 10/09/22 1302   Sun Oct 09, 2022   0324 Spoke to IR Dr. Neftaly Govea, will place on schedule for HD catheter placement tomorrow pending clearance from today. [YA]   1016 Potassium(!): 5.2 [YA]   1016 HGB(!): 10.8 [YA]   1109 XR CHEST PORT  IMPRESSION     No dialysis catheter is identified. Mild probable pulmonary edema. [YA]   1116 Pulse (Heart Rate)(!): 108  Patient's HR still tachycardic, jumping to 120s and 130s at times.  Will give IV diltiazem and continue to observe for resolution of RVR to determine dispo, admit if still tachycardic. [YA]   1232 Pulse (Heart Rate): 81 [YA]      ED Course User Index  [YA] Krystal Mera MD         Consultations:     IR Dr. Neville Marin. Disposition     Disposition: DC- Adult Discharges: All of the diagnostic tests were reviewed and questions answered. Diagnosis, care plan and treatment options were discussed. The patient understands the instructions and will follow up as directed. The patients results have been reviewed with them. They have been counseled regarding their diagnosis. The patient verbally convey understanding and agreement of the signs, symptoms, diagnosis, treatment and prognosis and additionally agrees to follow up as recommended with their PCP in 24 - 48 hours. They also agree with the care-plan and convey that all of their questions have been answered. I have also put together some discharge instructions for them that include: 1) educational information regarding their diagnosis, 2) how to care for their diagnosis at home, as well a 3) list of reasons why they would want to return to the ED prior to their follow-up appointment, should their condition change. Discharged      Diagnosis     Clinical Impression:   1. Hemodialysis catheter dysfunction, initial encounter [facility])        Attestations:     Mary Cornejo MD

## 2022-10-09 NOTE — DISCHARGE INSTRUCTIONS
You were seen in the ER for your catheter dislodgement. Thankfully, you do not need emergent dialysis today and you did not have any complications of this issue. We gave you your home medication to control your heart rate and arranged for you to get this catheter replaced tomorrow. You will receive a call with your appointment time. Return to the ER for any difficulty breathing, fevers, vomiting, or any other new or concerning symptoms. Thank you! Thank you for allowing me to care for you in the emergency department. It is my goal to provide you with excellent care. If you have not received excellent quality care, please ask to speak to the nurse manager. Please fill out the survey that will come to you by mail or email since we listen to your feedback! Below you will find a list of your tests from today's visit. Should you have any questions, please do not hesitate to call the emergency department. Labs  Recent Results (from the past 12 hour(s))   CBC WITH AUTOMATED DIFF    Collection Time: 10/09/22  9:37 AM   Result Value Ref Range    WBC 6.9 4.1 - 11.1 K/uL    RBC 3.28 (L) 4.10 - 5.70 M/uL    HGB 10.8 (L) 12.1 - 17.0 g/dL    HCT 33.6 (L) 36.6 - 50.3 %    .4 (H) 80.0 - 99.0 FL    MCH 32.9 26.0 - 34.0 PG    MCHC 32.1 30.0 - 36.5 g/dL    RDW 15.4 (H) 11.5 - 14.5 %    PLATELET 636 (L) 424 - 400 K/uL    MPV 11.4 8.9 - 12.9 FL    NRBC 0.0 0.0  WBC    ABSOLUTE NRBC 0.00 0.00 - 0.01 K/uL    NEUTROPHILS 54 32 - 75 %    LYMPHOCYTES 19 12 - 49 %    MONOCYTES 16 (H) 5 - 13 %    EOSINOPHILS 9 (H) 0 - 7 %    BASOPHILS 1 0 - 1 %    IMMATURE GRANULOCYTES 1 (H) 0 - 0.5 %    ABS. NEUTROPHILS 3.8 1.8 - 8.0 K/UL    ABS. LYMPHOCYTES 1.3 0.8 - 3.5 K/UL    ABS. MONOCYTES 1.1 (H) 0.0 - 1.0 K/UL    ABS. EOSINOPHILS 0.7 (H) 0.0 - 0.4 K/UL    ABS. BASOPHILS 0.1 0.0 - 0.1 K/UL    ABS. IMM.  GRANS. 0.0 0.00 - 0.04 K/UL    DF AUTOMATED     TYPE & SCREEN    Collection Time: 10/09/22  9:37 AM   Result Value Ref Range Crossmatch Expiration 10/12/2022,2359     ABO/Rh(D) B Positive     Antibody screen Negative    RENAL FUNCTION PANEL    Collection Time: 10/09/22  9:37 AM   Result Value Ref Range    Sodium 135 (L) 136 - 145 mmol/L    Potassium 5.2 (H) 3.5 - 5.1 mmol/L    Chloride 96 (L) 97 - 108 mmol/L    CO2 30 21 - 32 mmol/L    Anion gap 9 5 - 15 mmol/L    Glucose 92 65 - 100 mg/dL    BUN 66 (H) 6 - 20 mg/dL    Creatinine 15.80 (H) 0.70 - 1.30 mg/dL    BUN/Creatinine ratio 4 (L) 12 - 20      eGFR 3 (L) >60 ml/min/1.73m2    Calcium 8.2 (L) 8.5 - 10.1 mg/dL    Phosphorus 7.5 (H) 2.6 - 4.7 mg/dL    Albumin 3.1 (L) 3.5 - 5.0 g/dL   PROTHROMBIN TIME + INR    Collection Time: 10/09/22  9:37 AM   Result Value Ref Range    Prothrombin time 19.8 (H) 11.9 - 14.6 sec    INR 1.7 (H) 0.9 - 1.1         Radiologic Studies  XR CHEST PORT   Final Result      No dialysis catheter is identified. Mild probable pulmonary edema. IR INSERT TUNL CVC W/O PORT OVER 5 YR    (Results Pending)     CT Results  (Last 48 hours)      None          CXR Results  (Last 48 hours)                 10/09/22 1015  XR CHEST PORT Final result    Impression:      No dialysis catheter is identified. Mild probable pulmonary edema. Narrative:  EXAM:  XR CHEST PORT       INDICATION: Dialysis catheter dislodged       COMPARISON: 10/7/2021       TECHNIQUE: Upright portable chest AP view       FINDINGS: Cardiac monitor leads. No dialysis catheter is identified. Mild   diminished vascular clarity. Cardiomegaly. The lungs and pleural spaces are clear. The visualized bones and upper abdomen   are age-appropriate.                 ------------------------------------------------------------------------------------------------------------  The exam and treatment you received in the Emergency Department were for an urgent problem and are not intended as complete care.  It is important that you follow-up with a doctor, nurse practitioner, or physician assistant to:  (1) confirm your diagnosis,  (2) re-evaluation of changes in your illness and treatment, and  (3) for ongoing care. Please take your discharge instructions with you when you go to your follow-up appointment. If you have any problem arranging a follow-up appointment, contact the Emergency Department. If your symptoms become worse or you do not improve as expected and you are unable to reach your health care provider, please return to the Emergency Department. We are available 24 hours a day. If a prescription has been provided, please have it filled as soon as possible to prevent a delay in treatment. If you have any questions or reservations about taking the medication due to side effects or interactions with other medications, please call your primary care provider or contact the ER.

## 2022-10-10 ENCOUNTER — HOSPITAL ENCOUNTER (OUTPATIENT)
Dept: INTERVENTIONAL RADIOLOGY/VASCULAR | Age: 52
Discharge: HOME | End: 2022-10-10
Attending: EMERGENCY MEDICINE
Payer: MEDICARE

## 2022-10-10 DIAGNOSIS — N18.6 ESRD (END STAGE RENAL DISEASE) ON DIALYSIS (HCC): ICD-10-CM

## 2022-10-10 DIAGNOSIS — Z99.2 ESRD (END STAGE RENAL DISEASE) ON DIALYSIS (HCC): ICD-10-CM

## 2022-10-10 LAB
ATRIAL RATE: 129 BPM
CALCULATED R AXIS, ECG10: 34 DEGREES
CALCULATED T AXIS, ECG11: 134 DEGREES
DIAGNOSIS, 93000: NORMAL
Q-T INTERVAL, ECG07: 338 MS
QRS DURATION, ECG06: 76 MS
QTC CALCULATION (BEZET), ECG08: 469 MS
VENTRICULAR RATE, ECG03: 116 BPM

## 2022-10-10 PROCEDURE — 74011000636 HC RX REV CODE- 636: Performed by: EMERGENCY MEDICINE

## 2022-10-10 PROCEDURE — 36558 INSERT TUNNELED CV CATH: CPT

## 2022-10-10 PROCEDURE — 75860 VEIN X-RAY NECK: CPT

## 2022-10-10 PROCEDURE — 76937 US GUIDE VASCULAR ACCESS: CPT

## 2022-10-10 PROCEDURE — 74011250636 HC RX REV CODE- 250/636: Performed by: STUDENT IN AN ORGANIZED HEALTH CARE EDUCATION/TRAINING PROGRAM

## 2022-10-10 PROCEDURE — 77001 FLUOROGUIDE FOR VEIN DEVICE: CPT

## 2022-10-10 PROCEDURE — 99152 MOD SED SAME PHYS/QHP 5/>YRS: CPT

## 2022-10-10 PROCEDURE — 99153 MOD SED SAME PHYS/QHP EA: CPT

## 2022-10-10 RX ORDER — MIDAZOLAM HYDROCHLORIDE 1 MG/ML
.5-2 INJECTION, SOLUTION INTRAMUSCULAR; INTRAVENOUS
Status: DISCONTINUED | OUTPATIENT
Start: 2022-10-10 | End: 2022-10-19 | Stop reason: HOSPADM

## 2022-10-10 RX ORDER — FENTANYL CITRATE 50 UG/ML
25-100 INJECTION, SOLUTION INTRAMUSCULAR; INTRAVENOUS
Status: DISCONTINUED | OUTPATIENT
Start: 2022-10-10 | End: 2022-10-19 | Stop reason: HOSPADM

## 2022-10-10 RX ADMIN — MIDAZOLAM HYDROCHLORIDE 1 MG: 1 INJECTION, SOLUTION INTRAMUSCULAR; INTRAVENOUS at 12:43

## 2022-10-10 RX ADMIN — FENTANYL CITRATE 50 MCG: 50 INJECTION, SOLUTION INTRAMUSCULAR; INTRAVENOUS at 12:52

## 2022-10-10 RX ADMIN — MIDAZOLAM HYDROCHLORIDE 1 MG: 1 INJECTION, SOLUTION INTRAMUSCULAR; INTRAVENOUS at 12:52

## 2022-10-10 RX ADMIN — IOPAMIDOL 18 ML: 755 INJECTION, SOLUTION INTRAVENOUS at 14:00

## 2022-10-10 RX ADMIN — FENTANYL CITRATE 50 MCG: 50 INJECTION, SOLUTION INTRAMUSCULAR; INTRAVENOUS at 12:34

## 2022-10-10 RX ADMIN — MIDAZOLAM HYDROCHLORIDE 1 MG: 1 INJECTION, SOLUTION INTRAMUSCULAR; INTRAVENOUS at 12:34

## 2022-10-10 RX ADMIN — FENTANYL CITRATE 50 MCG: 50 INJECTION, SOLUTION INTRAMUSCULAR; INTRAVENOUS at 12:43

## 2023-02-13 ENCOUNTER — APPOINTMENT (OUTPATIENT)
Dept: CT IMAGING | Age: 53
DRG: 175 | End: 2023-02-13
Attending: EMERGENCY MEDICINE
Payer: MEDICARE

## 2023-02-13 ENCOUNTER — HOSPITAL ENCOUNTER (INPATIENT)
Age: 53
LOS: 1 days | Discharge: HOME | DRG: 175 | End: 2023-02-14
Attending: EMERGENCY MEDICINE | Admitting: STUDENT IN AN ORGANIZED HEALTH CARE EDUCATION/TRAINING PROGRAM
Payer: MEDICARE

## 2023-02-13 DIAGNOSIS — N18.6 ESRD NEEDING DIALYSIS (HCC): Primary | ICD-10-CM

## 2023-02-13 DIAGNOSIS — Z99.2 ESRD NEEDING DIALYSIS (HCC): Primary | ICD-10-CM

## 2023-02-13 DIAGNOSIS — E87.5 ACUTE HYPERKALEMIA: ICD-10-CM

## 2023-02-13 DIAGNOSIS — I67.1 BRAIN ANEURYSM: ICD-10-CM

## 2023-02-13 DIAGNOSIS — I63.9 ISCHEMIC STROKE (HCC): ICD-10-CM

## 2023-02-13 DIAGNOSIS — R53.1 WEAKNESS GENERALIZED: ICD-10-CM

## 2023-02-13 LAB
ANION GAP SERPL CALC-SCNC: 11 MMOL/L (ref 5–15)
BASOPHILS # BLD: 0.1 K/UL (ref 0–0.1)
BASOPHILS NFR BLD: 1 % (ref 0–1)
BUN SERPL-MCNC: 82 MG/DL (ref 6–20)
BUN/CREAT SERPL: 4 (ref 12–20)
CALCIUM SERPL-MCNC: 8.8 MG/DL (ref 8.5–10.1)
CHLORIDE SERPL-SCNC: 102 MMOL/L (ref 97–108)
CO2 SERPL-SCNC: 23 MMOL/L (ref 21–32)
CREAT SERPL-MCNC: 23.3 MG/DL (ref 0.7–1.3)
DIFFERENTIAL METHOD BLD: ABNORMAL
EOSINOPHIL # BLD: 0.3 K/UL (ref 0–0.4)
EOSINOPHIL NFR BLD: 4 % (ref 0–7)
ERYTHROCYTE [DISTWIDTH] IN BLOOD BY AUTOMATED COUNT: 15.1 % (ref 11.5–14.5)
GLUCOSE BLD STRIP.AUTO-MCNC: 115 MG/DL (ref 65–117)
GLUCOSE SERPL-MCNC: 114 MG/DL (ref 65–100)
HBV SURFACE AB SER QL: REACTIVE
HBV SURFACE AB SER-ACNC: 35.68 MIU/ML
HBV SURFACE AG SER QL: <0.1 INDEX
HBV SURFACE AG SER QL: NEGATIVE
HCT VFR BLD AUTO: 31.2 % (ref 36.6–50.3)
HGB BLD-MCNC: 9.5 G/DL (ref 12.1–17)
IMM GRANULOCYTES # BLD AUTO: 0.1 K/UL (ref 0–0.04)
IMM GRANULOCYTES NFR BLD AUTO: 1 % (ref 0–0.5)
INR PPP: 3 (ref 0.9–1.1)
LYMPHOCYTES # BLD: 0.8 K/UL (ref 0.8–3.5)
LYMPHOCYTES NFR BLD: 10 % (ref 12–49)
MCH RBC QN AUTO: 32.4 PG (ref 26–34)
MCHC RBC AUTO-ENTMCNC: 30.4 G/DL (ref 30–36.5)
MCV RBC AUTO: 106.5 FL (ref 80–99)
MONOCYTES # BLD: 0.9 K/UL (ref 0–1)
MONOCYTES NFR BLD: 11 % (ref 5–13)
NEUTS SEG # BLD: 5.8 K/UL (ref 1.8–8)
NEUTS SEG NFR BLD: 73 % (ref 32–75)
NRBC # BLD: 0 K/UL (ref 0–0.01)
NRBC BLD-RTO: 0 PER 100 WBC
PLATELET # BLD AUTO: 187 K/UL (ref 150–400)
PMV BLD AUTO: 10.6 FL (ref 8.9–12.9)
POTASSIUM SERPL-SCNC: 5.6 MMOL/L (ref 3.5–5.1)
PROTHROMBIN TIME: 29.7 SEC (ref 9–11.1)
RBC # BLD AUTO: 2.93 M/UL (ref 4.1–5.7)
RBC MORPH BLD: ABNORMAL
RBC MORPH BLD: ABNORMAL
SERVICE CMNT-IMP: NORMAL
SODIUM SERPL-SCNC: 136 MMOL/L (ref 136–145)
TROPONIN I SERPL HS-MCNC: 69 NG/L (ref 0–76)
WBC # BLD AUTO: 8 K/UL (ref 4.1–11.1)

## 2023-02-13 PROCEDURE — 74011250637 HC RX REV CODE- 250/637: Performed by: EMERGENCY MEDICINE

## 2023-02-13 PROCEDURE — 36415 COLL VENOUS BLD VENIPUNCTURE: CPT

## 2023-02-13 PROCEDURE — 96365 THER/PROPH/DIAG IV INF INIT: CPT

## 2023-02-13 PROCEDURE — 86706 HEP B SURFACE ANTIBODY: CPT

## 2023-02-13 PROCEDURE — 65270000046 HC RM TELEMETRY

## 2023-02-13 PROCEDURE — 74011000250 HC RX REV CODE- 250

## 2023-02-13 PROCEDURE — 74011000636 HC RX REV CODE- 636

## 2023-02-13 PROCEDURE — 80048 BASIC METABOLIC PNL TOTAL CA: CPT

## 2023-02-13 PROCEDURE — 85610 PROTHROMBIN TIME: CPT

## 2023-02-13 PROCEDURE — 74011250636 HC RX REV CODE- 250/636: Performed by: EMERGENCY MEDICINE

## 2023-02-13 PROCEDURE — 85025 COMPLETE CBC W/AUTO DIFF WBC: CPT

## 2023-02-13 PROCEDURE — 5A1D70Z PERFORMANCE OF URINARY FILTRATION, INTERMITTENT, LESS THAN 6 HOURS PER DAY: ICD-10-PCS | Performed by: EMERGENCY MEDICINE

## 2023-02-13 PROCEDURE — 90935 HEMODIALYSIS ONE EVALUATION: CPT

## 2023-02-13 PROCEDURE — 0042T CT CODE NEURO PERF W CBF: CPT

## 2023-02-13 PROCEDURE — 99285 EMERGENCY DEPT VISIT HI MDM: CPT

## 2023-02-13 PROCEDURE — 84484 ASSAY OF TROPONIN QUANT: CPT

## 2023-02-13 PROCEDURE — 70450 CT HEAD/BRAIN W/O DYE: CPT

## 2023-02-13 PROCEDURE — 70496 CT ANGIOGRAPHY HEAD: CPT

## 2023-02-13 PROCEDURE — 87340 HEPATITIS B SURFACE AG IA: CPT

## 2023-02-13 PROCEDURE — 74011000636 HC RX REV CODE- 636: Performed by: EMERGENCY MEDICINE

## 2023-02-13 PROCEDURE — 82962 GLUCOSE BLOOD TEST: CPT

## 2023-02-13 RX ORDER — CINACALCET 30 MG/1
30 TABLET, FILM COATED ORAL
COMMUNITY

## 2023-02-13 RX ORDER — LANOLIN ALCOHOL/MO/W.PET/CERES
1000 CREAM (GRAM) TOPICAL DAILY
COMMUNITY

## 2023-02-13 RX ORDER — CALCIUM GLUCONATE 20 MG/ML
1 INJECTION, SOLUTION INTRAVENOUS ONCE
Status: COMPLETED | OUTPATIENT
Start: 2023-02-13 | End: 2023-02-13

## 2023-02-13 RX ORDER — CINACALCET 30 MG/1
30 TABLET, FILM COATED ORAL 3 TIMES WEEKLY
Status: DISCONTINUED | OUTPATIENT
Start: 2023-02-15 | End: 2023-02-15 | Stop reason: HOSPADM

## 2023-02-13 RX ORDER — PREGABALIN 75 MG/1
75 CAPSULE ORAL DAILY
COMMUNITY

## 2023-02-13 RX ORDER — LABETALOL HYDROCHLORIDE 5 MG/ML
10 INJECTION, SOLUTION INTRAVENOUS
Status: DISCONTINUED | OUTPATIENT
Start: 2023-02-13 | End: 2023-02-15 | Stop reason: HOSPADM

## 2023-02-13 RX ORDER — DILTIAZEM HYDROCHLORIDE 240 MG/1
240 CAPSULE, COATED, EXTENDED RELEASE ORAL DAILY
COMMUNITY

## 2023-02-13 RX ORDER — ACETAMINOPHEN 650 MG/1
650 SUPPOSITORY RECTAL
Status: DISCONTINUED | OUTPATIENT
Start: 2023-02-13 | End: 2023-02-15 | Stop reason: HOSPADM

## 2023-02-13 RX ORDER — LANOLIN ALCOHOL/MO/W.PET/CERES
325 CREAM (GRAM) TOPICAL
COMMUNITY

## 2023-02-13 RX ORDER — PREGABALIN 75 MG/1
75 CAPSULE ORAL DAILY
Status: DISCONTINUED | OUTPATIENT
Start: 2023-02-13 | End: 2023-02-15 | Stop reason: HOSPADM

## 2023-02-13 RX ORDER — MONTELUKAST SODIUM 10 MG/1
10 TABLET ORAL DAILY
Status: DISCONTINUED | OUTPATIENT
Start: 2023-02-13 | End: 2023-02-15 | Stop reason: HOSPADM

## 2023-02-13 RX ORDER — HYDROXYZINE HYDROCHLORIDE 10 MG/1
10 TABLET, FILM COATED ORAL
COMMUNITY

## 2023-02-13 RX ORDER — CALCIUM ACETATE 667 MG/1
1 CAPSULE ORAL DAILY
Status: ON HOLD | COMMUNITY
End: 2023-02-14

## 2023-02-13 RX ORDER — CALCIUM GLUCONATE 20 MG/ML
1 INJECTION, SOLUTION INTRAVENOUS ONCE
Status: DISCONTINUED | OUTPATIENT
Start: 2023-02-13 | End: 2023-02-13

## 2023-02-13 RX ORDER — ALLOPURINOL 100 MG/1
100 TABLET ORAL
Status: DISCONTINUED | OUTPATIENT
Start: 2023-02-13 | End: 2023-02-15 | Stop reason: HOSPADM

## 2023-02-13 RX ORDER — GUAIFENESIN 100 MG/5ML
162 LIQUID (ML) ORAL
Status: COMPLETED | OUTPATIENT
Start: 2023-02-13 | End: 2023-02-13

## 2023-02-13 RX ORDER — AMLODIPINE BESYLATE 5 MG/1
10 TABLET ORAL DAILY
Status: DISCONTINUED | OUTPATIENT
Start: 2023-02-13 | End: 2023-02-13

## 2023-02-13 RX ORDER — BUMETANIDE 1 MG/1
2 TABLET ORAL DAILY
Status: DISCONTINUED | OUTPATIENT
Start: 2023-02-13 | End: 2023-02-15 | Stop reason: HOSPADM

## 2023-02-13 RX ORDER — ATORVASTATIN CALCIUM 40 MG/1
40 TABLET, FILM COATED ORAL DAILY
COMMUNITY

## 2023-02-13 RX ORDER — OXYCODONE AND ACETAMINOPHEN 5; 325 MG/1; MG/1
1 TABLET ORAL DAILY
COMMUNITY
End: 2023-02-14

## 2023-02-13 RX ORDER — ACETAMINOPHEN 325 MG/1
650 TABLET ORAL
Status: DISCONTINUED | OUTPATIENT
Start: 2023-02-13 | End: 2023-02-15 | Stop reason: HOSPADM

## 2023-02-13 RX ORDER — LANOLIN ALCOHOL/MO/W.PET/CERES
325 CREAM (GRAM) TOPICAL
Status: DISCONTINUED | OUTPATIENT
Start: 2023-02-14 | End: 2023-02-15 | Stop reason: HOSPADM

## 2023-02-13 RX ORDER — DIAZEPAM 2 MG/1
2 TABLET ORAL
Status: COMPLETED | OUTPATIENT
Start: 2023-02-13 | End: 2023-02-14

## 2023-02-13 RX ORDER — WARFARIN 2 MG/1
4 TABLET ORAL
Status: DISCONTINUED | OUTPATIENT
Start: 2023-02-13 | End: 2023-02-13

## 2023-02-13 RX ORDER — DILTIAZEM HYDROCHLORIDE 120 MG/1
240 CAPSULE, COATED, EXTENDED RELEASE ORAL DAILY
Status: DISCONTINUED | OUTPATIENT
Start: 2023-02-13 | End: 2023-02-15 | Stop reason: HOSPADM

## 2023-02-13 RX ORDER — ATORVASTATIN CALCIUM 10 MG/1
40 TABLET, FILM COATED ORAL DAILY
Status: DISCONTINUED | OUTPATIENT
Start: 2023-02-14 | End: 2023-02-15 | Stop reason: HOSPADM

## 2023-02-13 RX ORDER — SEVELAMER CARBONATE 800 MG/1
1600 TABLET, FILM COATED ORAL
Status: DISCONTINUED | OUTPATIENT
Start: 2023-02-14 | End: 2023-02-15 | Stop reason: HOSPADM

## 2023-02-13 RX ORDER — HYDROXYZINE HYDROCHLORIDE 10 MG/1
10 TABLET, FILM COATED ORAL
Status: DISCONTINUED | OUTPATIENT
Start: 2023-02-13 | End: 2023-02-15 | Stop reason: HOSPADM

## 2023-02-13 RX ADMIN — ASPIRIN 162 MG: 81 TABLET, CHEWABLE ORAL at 11:46

## 2023-02-13 RX ADMIN — IOPAMIDOL 20 ML: 755 INJECTION, SOLUTION INTRAVENOUS at 12:31

## 2023-02-13 RX ADMIN — IOPAMIDOL 100 ML: 755 INJECTION, SOLUTION INTRAVENOUS at 12:31

## 2023-02-13 RX ADMIN — CALCIUM GLUCONATE 1000 MG: 20 INJECTION, SOLUTION INTRAVENOUS at 11:46

## 2023-02-13 NOTE — H&P
History & Physical    Primary Care Provider: Yani Orteag MD  Source of Information: Patient and chart review    History of Presenting Illness:   Awilda Nicholas is a 50 y.o. male with ESRD HD, CAD, CHF, hypertension, history of CVA, afib, history of PE who presents to the hospital with complaints of bilateral extremity weakness and numbness as well as numbness and tingling in both of his hands. Additionally, he has had more difficulty which started around 8:00 this morning. States he noted his neurological symptoms started at about 11:30 PM yesterday. Symptoms in his bilateral upper extremities have mildly improved but his lower extremity weakness has persisted. Also of note, he missed his dialysis session earlier today due to oversleeping. The patient denies any fever, chills, chest or abdominal pain, nausea, vomiting, cough, congestion, recent illness, palpitations, or dysuria. Remarkable vitals on ER Presentation: Vital signs stable. Labs Remarkable for: Hemoglobin 9.5, INR 3.9, potassium 5.6  ER Images: T code neuro showed no acute process. CTA/CTP: Concern for small area of apparent delayed perfusion in left temporal lobe, 3 mm outpouching of proximal left M2 branch concerning for saccular aneurysm or infundibulum. Enlargement pulmonary artery suggesting pulmonary hypertension. ER treatment-calcium gluconate, aspirin     Review of Systems:  Pertinent items are noted in the History of Present Illness.      Past Medical History:   Diagnosis Date    Arrhythmia     A-FIB    CAD (coronary artery disease)     MI 09    CHF (congestive heart failure) (HCC)     Chronic kidney disease     END STAGE KIDNEY DISEASE;  DIALYSIS M-W-F    Chronic pain     NEUROPATHY; BACK PAIN    Hypertension     Lymph edema     LEFT LEG    Needle phobia     Other ill-defined conditions(799.89)     Sleep apnea     WEARS CPAP    Stroke ([facility] [address] 01/2020    NUMBNESS IN RIGHT LEG AND FOOT Thromboembolus ([facility] [address] 2018    PE      Past Surgical History:   Procedure Laterality Date    HX ORTHOPAEDIC      LEFT HAND, RIGHT ANKLE, AND RIGHT SHOULDER    HX TONSILLECTOMY      HX VASCULAR ACCESS      UPPER RIGHT CHEST    IR FLUORO GUIDED NEEDLE PLACEMENT  10/08/2021    IR INSERT TUNL CVC W/O PORT OVER 5 YR  10/08/2021    IR INSERT TUNL CVC W/O PORT OVER 5 YR  10/10/2022    ND CARDIAC SURG PROCEDURE UNLIST      CARDIAC CATHX2-NO STENTS    VASCULAR SURGERY PROCEDURE UNLIST      PERITONEAL DIALYSIS CATH     Prior to Admission medications    Medication Sig Start Date End Date Taking? Authorizing Provider   dulaglutide (TRULICITY) 1.5 VR/4.9 mL sub-q pen 1.5 mg by SubCUTAneous route every seven (7) days. Takes on Sundays   Yes Provider, Historical   dilTIAZem ER (Cardizem CD) 240 mg capsule Take 240 mg by mouth daily. Yes Provider, Historical   oxyCODONE-acetaminophen (Percocet) 5-325 mg per tablet Take 1 Tablet by mouth daily. Yes Provider, Historical   calcium acetate,phosphat bind, (PHOSLO) 667 mg cap Take 1 Capsule by mouth daily. Yes Provider, Historical   ferrous sulfate 325 mg (65 mg iron) tablet Take 325 mg by mouth Daily (before breakfast). Yes Provider, Historical   hydrOXYzine HCL (ATARAX) 10 mg tablet Take 10 mg by mouth every eight (8) hours as needed. Yes Provider, Historical   cyanocobalamin 1,000 mcg tablet Take 1,000 mcg by mouth daily. Yes Provider, Historical   atorvastatin (LIPITOR) 40 mg tablet Take 40 mg by mouth daily. Yes Provider, Historical   pregabalin (LYRICA) 75 mg capsule Take 75 mg by mouth daily. Yes Provider, Historical   bumetanide (BUMEX) 2 mg tablet Take 2 mg by mouth daily. Yes Other, MD Lea   allopurinoL (ZYLOPRIM) 100 mg tablet Take 100 mg by mouth. TAKES ON Monday AND Thursday   Yes Provider, Historical   amLODIPine (NORVASC) 10 mg tablet Take 1 Tab by mouth daily.  11/16/11  Yes Wilda Sandoval MD   warfarin [place]) 5 mg tablet Take 1 Tablet by mouth daily. 10/12/21   [place] Leslie MD   sevelamer carbonate (RENVELA) 800 mg tab tab Take 800 mg by mouth Before breakfast, lunch, and dinner. Other, MD Lea   montelukast (SINGULAIR) 10 mg tablet Take 10 mg by mouth daily. 11/25/20   OtherLea MD   predniSONE (DELTASONE) 5 mg tablet Take  by mouth every other day. Provider, Historical     Allergies   Allergen Reactions    Lisinopril Swelling    Sulfa (Sulfonamide Antibiotics) Other (comments)     Had sores all over body    Beef Containing Products Other (comments)     PT DOES NOT EAT BEEF    Pork Derived (Porcine) Other (comments)     PT DOES NOT EAT PORK    Sulfa (Sulfonamide Antibiotics) Hives      Family History   Problem Relation Age of Onset    Stroke Mother     Heart Disease Mother     Diabetes Mother     Kidney Disease Mother     Heart Attack Mother     Thyroid Disease Mother     No Known Problems Father     Heart Disease Sister     No Known Problems Sister     Lung Disease Brother     Deep Vein Thrombosis Brother     Anesth Problems Neg Hx         SOCIAL HISTORY:  Patient resides:  Independently x   Assisted Living    SNF    With family care       Smoking history:   None x   Former    Chronic      Alcohol history:   None x   Social    Chronic      Ambulates:   Independently x   w/cane    w/walker    w/wc    CODE STATUS:  DNR    Full x   Other      Objective:     Physical Exam:     Visit Vitals  BP (!) 152/93   Pulse 99   Temp 97.9 °F (36.6 °C)   Resp 14   Ht 6' 2\" (1.88 m)   Wt 154.3 kg (340 lb 2.7 oz)   SpO2 98%   BMI 43.68 kg/m²           General:  Alert, cooperative, no distress, appears stated age. Head:  Normocephalic, without obvious abnormality, atraumatic. Eyes:  Conjunctivae/corneas clear. PERRL, EOMs intact. Nose: Nares normal. Septum midline. Mucosa normal.        Neck: Supple, symmetrical, trachea midline       Lungs:   Clear to auscultation bilaterally. Chest wall:  No tenderness or deformity.    Heart:  Regular rate and rhythm, S1, S2 normal   Abdomen:   Soft, non-tender. Bowel sounds normal. No masses,  No organomegaly. Extremities: Extremities normal, atraumatic, no cyanosis or edema. Pulses: 2+ and symmetric all extremities. Skin: Skin color, texture, turgor normal. No rashes or lesions   Neurologic: CNII-XII intact. EKG: A-fib with RVR    Data Review:     Recent Days:  Recent Labs     02/13/23  1010   WBC 8.0   HGB 9.5*   HCT 31.2*        Recent Labs     02/13/23  1010      K 5.6*      CO2 23   *   BUN 82*   CREA 23.30*   CA 8.8   INR 3.0*     No results for input(s): PH, PCO2, PO2, HCO3, FIO2 in the last 72 hours. 24 Hour Results:  Recent Results (from the past 24 hour(s))   GLUCOSE, POC    Collection Time: 02/13/23  9:59 AM   Result Value Ref Range    Glucose (POC) 115 65 - 117 mg/dL    Performed by Pato VASQUEZ    CBC WITH AUTOMATED DIFF    Collection Time: 02/13/23 10:10 AM   Result Value Ref Range    WBC 8.0 4.1 - 11.1 K/uL    RBC 2.93 (L) 4.10 - 5.70 M/uL    HGB 9.5 (L) 12.1 - 17.0 g/dL    HCT 31.2 (L) 36.6 - 50.3 %    .5 (H) 80.0 - 99.0 FL    MCH 32.4 26.0 - 34.0 PG    MCHC 30.4 30.0 - 36.5 g/dL    RDW 15.1 (H) 11.5 - 14.5 %    PLATELET 878 098 - 262 K/uL    MPV 10.6 8.9 - 12.9 FL    NRBC 0.0 0  WBC    ABSOLUTE NRBC 0.00 0.00 - 0.01 K/uL    NEUTROPHILS 73 32 - 75 %    LYMPHOCYTES 10 (L) 12 - 49 %    MONOCYTES 11 5 - 13 %    EOSINOPHILS 4 0 - 7 %    BASOPHILS 1 0 - 1 %    IMMATURE GRANULOCYTES 1 (H) 0.0 - 0.5 %    ABS. NEUTROPHILS 5.8 1.8 - 8.0 K/UL    ABS. LYMPHOCYTES 0.8 0.8 - 3.5 K/UL    ABS. MONOCYTES 0.9 0.0 - 1.0 K/UL    ABS. EOSINOPHILS 0.3 0.0 - 0.4 K/UL    ABS. BASOPHILS 0.1 0.0 - 0.1 K/UL    ABS. IMM.  GRANS. 0.1 (H) 0.00 - 0.04 K/UL    DF SMEAR SCANNED      RBC COMMENTS ANISOCYTOSIS  1+        RBC COMMENTS MACROCYTOSIS  1+       PROTHROMBIN TIME + INR    Collection Time: 02/13/23 10:10 AM   Result Value Ref Range    INR 3.0 (H) 0.9 - 1.1 Prothrombin time 29.7 (H) 9.0 - 47.6 sec   METABOLIC PANEL, BASIC    Collection Time: 02/13/23 10:10 AM   Result Value Ref Range    Sodium 136 136 - 145 mmol/L    Potassium 5.6 (H) 3.5 - 5.1 mmol/L    Chloride 102 97 - 108 mmol/L    CO2 23 21 - 32 mmol/L    Anion gap 11 5 - 15 mmol/L    Glucose 114 (H) 65 - 100 mg/dL    BUN 82 (H) 6 - 20 MG/DL    Creatinine 23.30 (H) 0.70 - 1.30 MG/DL    BUN/Creatinine ratio 4 (L) 12 - 20      eGFR 2 (L) >60 ml/min/1.73m2    Calcium 8.8 8.5 - 10.1 MG/DL   TROPONIN-HIGH SENSITIVITY    Collection Time: 02/13/23 10:11 AM   Result Value Ref Range    Troponin-High Sensitivity 69 0 - 76 ng/L   EKG, 12 LEAD, INITIAL    Collection Time: 02/13/23 10:22 AM   Result Value Ref Range    Ventricular Rate 112 BPM    QRS Duration 78 ms    Q-T Interval 344 ms    QTC Calculation (Bezet) 469 ms    Calculated R Axis 48 degrees    Calculated T Axis 174 degrees    Diagnosis       Atrial fibrillation with rapid ventricular response  Low voltage QRS  T wave abnormality, consider lateral ischemia  Abnormal ECG  When compared with ECG of 22-SEP-2022 15:22,  Nonspecific T wave abnormality now evident in Anterior leads           Imaging:     Assessment:     Heydi Mckeon is a 50 y.o. male with ESRD HD, CAD, CHF, hypertension, history of CVA, afib, history of PE who is admitted for acute CVA       Plan:       Acute CVA  ? Saccular MCA 3 mm aneurysm  ? Left temporal lobe perfusion defect  - ASA   - MRI/MRA Head without contrast, MRA neck with contrast   - TTE with bubble study   - Lipid panel, TSH, ESR/CRP, cardiac enzymes    - Neuro checks and vital signs q4h   -Permissive hypertension restricted by A-fib requiring Dilt  - Bedside swallow evaluation per nursing   - Observe on telemetry   - PT/OT   - Fall precautions   - Neurology following, appreciate recommendations   -Interventional surgery consulted for possible aneurysm.   Appreciate recs    Atrial fibrillation  -Continue diltiazem and warfarin    ESRD on HD Shaniqua Bryan  -Received calcium gluconate in the ED  -HD per nephro  -Repeat BMP to evaluate hyperkalemia    Recurrent PE  -Continue warfarin    Gout-Home allopurinol  Dyslipidemia-Home statin  Hypertension-Home diltiazem    Morbid obesity obesity  -Counseled on weight loss, dieting and exercise          FEN/GI -  po hydration  Activity - as tolerated  DVT prophylaxis - warfarin  GI prophylaxis -  ni  Disposition - home    CODE STATUS:   full code       Signed By: Danya Rush MD     February 13, 2023

## 2023-02-13 NOTE — CONSULTS
NEPHROLOGY CONSULT NOTE     Patient: Barbara Rodas MRN: [id number]  PCP: Luis Alfredo Ca MD   :     1970  Age:   50 y.o. Sex:  male      Referring physician: Randy Davenport MD  Reason for consultation: 50 y.o. male with CVA (cerebral vascular accident) [facility]) [N84.1] complicated by STEVE   Admission Date: 2023 10:07 AM  LOS: 0 days     DISCUSSION / PLAN :     ESRD-on HD MWF  -missed HD on Friday and today  -HD today 4 hrs (on 4hrs 15 min as OP)    Concern for stroke, CTA head pending    HTN  -c/w home meds    Anemia-on Epo and iron as outpatient  -Hold EPO pending eval for stroke    Secondary hyperparathyroidism  -on po cinacalcet , continue  -c/w renagel    Afib on coumadin         Active Problems / Assessment AAActive  : Active Problems:    CVA (cerebral vascular accident) ([address] (2023)         Subjective:   HPI: Barbara Rodas is a 50 y.o.  male with ESRD on HD MWF, HTN, CVA , PE and CHF who has been admitted to the hospital for speech changes and possible stroke. He woke up this am with total body weakness, numbness, tingling and twitching of  hands and garbled speech . CT head unremarkable, CTA head pending.   He missed HD on Friday and today  His speech is back to nl, still has tingling in feet, has old rt sided weakness from prior stroke          Past Medical Hx:   Past Medical History:   Diagnosis Date    Arrhythmia     A-FIB    CAD (coronary artery disease)     MI 09    CHF (congestive heart failure) (HCC)     Chronic kidney disease     END STAGE KIDNEY DISEASE;  DIALYSIS M-W-F    Chronic pain     NEUROPATHY; BACK PAIN    Hypertension     Lymph edema     LEFT LEG    Needle phobia     Other ill-defined conditions(799.89)     Sleep apnea     WEARS CPAP    Stroke ([address] 2020    NUMBNESS IN RIGHT LEG AND FOOT    Thromboembolus ([address] 2018    PE        Past Surgical Hx:     Past Surgical History:   Procedure Laterality Date    HX ORTHOPAEDIC      LEFT HAND, RIGHT ANKLE, AND RIGHT SHOULDER    HX TONSILLECTOMY      HX VASCULAR ACCESS      UPPER RIGHT CHEST    IR FLUORO GUIDED NEEDLE PLACEMENT  10/08/2021    IR INSERT TUNL CVC W/O PORT OVER 5 YR  10/08/2021    IR INSERT TUNL CVC W/O PORT OVER 5 YR  10/10/2022    IN CARDIAC SURG PROCEDURE UNLIST      CARDIAC CATHX2-NO STENTS    VASCULAR SURGERY PROCEDURE UNLIST      PERITONEAL DIALYSIS CATH       Medications:  Prior to Admission medications    Medication Sig Start Date End Date Taking? Authorizing Provider   warfarin (Jantoven) 5 mg tablet Take 1 Tablet by mouth daily. 10/12/21   Michelle Leslie MD   dilTIAZem ER (DILACOR XR) 180 mg capsule Take 1 Capsule by mouth daily. 10/13/21   Latanya Sheets MD   pregabalin (LYRICA) 75 mg capsule Take 1 Capsule by mouth two (2) times a day. Max Daily Amount: 150 mg. 10/12/21   Latanya Sheets MD   sevelamer carbonate (RENVELA) 800 mg tab tab Take 800 mg by mouth Before breakfast, lunch, and dinner. Other, MD Lea   montelukast (SINGULAIR) 10 mg tablet Take 10 mg by mouth daily. 11/25/20   Other, MD Lea   bumetanide (BUMEX) 2 mg tablet Take 2 mg by mouth three (3) times daily. Lea Harkins MD   allopurinoL (ZYLOPRIM) 100 mg tablet Take 100 mg by mouth. TAKES ON Monday AND Thursday    Provider, Historical   cyanocobalamin/salcaprozat sod (ELIGEN B12 PO) Take  by mouth every fourty-eight (48) hours. Provider, Historical   ERGOCALCIFEROL, VITAMIN D2, PO Take  by mouth every seven (7) days. Indications: ON SUNDAY    Provider, Historical   predniSONE (DELTASONE) 5 mg tablet Take  by mouth every other day. Provider, Historical   amLODIPine (NORVASC) 10 mg tablet Take 1 Tab by mouth daily.  11/16/11   Hallie Maria MD       Allergies   Allergen Reactions    Lisinopril Swelling    Sulfa (Sulfonamide Antibiotics) Other (comments)     Had sores all over body    Beef Containing Products Other (comments)     PT DOES NOT EAT BEEF    Pork Derived (Porcine) Other (comments)     PT DOES NOT EAT PORK    Sulfa (Sulfonamide Antibiotics) Hives       Social Hx:  reports that he has been smoking cigars. He has never used smokeless tobacco. He reports that he does not drink alcohol and does not use drugs. Family History   Problem Relation Age of Onset    Stroke Mother     Heart Disease Mother     Diabetes Mother     Kidney Disease Mother     Heart Attack Mother     Thyroid Disease Mother     No Known Problems Father     Heart Disease Sister     No Known Problems Sister     Lung Disease Brother     Deep Vein Thrombosis Brother     Anesth Problems Neg Hx        Review of Systems:  A twelve point review of system was performed today. Pertinent positives and negatives are mentioned in the HPI. The reminder of the ROS is negative and noncontributory. Objective:    Vitals:    Vitals:    02/13/23 1054 02/13/23 1055 02/13/23 1145 02/13/23 1200   BP:   (!) 147/80 122/71   Pulse:   (!) 107 (!) 101   Resp:   11 11   Temp: 97.9 °F (36.6 °C)      SpO2:    98%   Weight:  154.3 kg (340 lb 2.7 oz)     Height:  6' 2\" (1.88 m)       I&O's:  No intake/output data recorded. Visit Vitals  /71   Pulse (!) 101   Temp 97.9 °F (36.6 °C)   Resp 11   Ht 6' 2\" (1.88 m)   Wt 154.3 kg (340 lb 2.7 oz)   SpO2 98%   BMI 43.68 kg/m²       Physical Exam:  General:Alert, No distress, Obese  HEENT: Eyes are PERRL. Conjunctiva without pallor ,erythema. The sclerae without icterus.  .   Neck:Supple  Lungs : Clears to auscultation Bilaterally, Normal respiratory effort  CVS: RRR, S1 S2 normal, No rub, + LE edema  Abdomen: Soft, Non tender, Obese  Extremities: edema++  Skin: No rash   Neurologic:  AAO x 3, nl speech  Psych: emotional, tearful    Laboratory Results:    Recent Results (from the past 24 hour(s))   GLUCOSE, POC    Collection Time: 02/13/23  9:59 AM   Result Value Ref Range    Glucose (POC) 115 65 - 117 mg/dL    Performed by Gretchen Kendrick  PCT    CBC WITH AUTOMATED DIFF    Collection Time: 02/13/23 10:10 AM   Result Value Ref Range    WBC 8.0 4.1 - 11.1 K/uL    RBC 2.93 (L) 4.10 - 5.70 M/uL    HGB 9.5 (L) 12.1 - 17.0 g/dL    HCT 31.2 (L) 36.6 - 50.3 %    .5 (H) 80.0 - 99.0 FL    MCH 32.4 26.0 - 34.0 PG    MCHC 30.4 30.0 - 36.5 g/dL    RDW 15.1 (H) 11.5 - 14.5 %    PLATELET 860 779 - 858 K/uL    MPV 10.6 8.9 - 12.9 FL    NRBC 0.0 0  WBC    ABSOLUTE NRBC 0.00 0.00 - 0.01 K/uL    NEUTROPHILS 73 32 - 75 %    LYMPHOCYTES 10 (L) 12 - 49 %    MONOCYTES 11 5 - 13 %    EOSINOPHILS 4 0 - 7 %    BASOPHILS 1 0 - 1 %    IMMATURE GRANULOCYTES 1 (H) 0.0 - 0.5 %    ABS. NEUTROPHILS 5.8 1.8 - 8.0 K/UL    ABS. LYMPHOCYTES 0.8 0.8 - 3.5 K/UL    ABS. MONOCYTES 0.9 0.0 - 1.0 K/UL    ABS. EOSINOPHILS 0.3 0.0 - 0.4 K/UL    ABS. BASOPHILS 0.1 0.0 - 0.1 K/UL    ABS. IMM.  GRANS. 0.1 (H) 0.00 - 0.04 K/UL    DF SMEAR SCANNED      RBC COMMENTS ANISOCYTOSIS  1+        RBC COMMENTS MACROCYTOSIS  1+       PROTHROMBIN TIME + INR    Collection Time: 02/13/23 10:10 AM   Result Value Ref Range    INR 3.0 (H) 0.9 - 1.1      Prothrombin time 29.7 (H) 9.0 - 56.2 sec   METABOLIC PANEL, BASIC    Collection Time: 02/13/23 10:10 AM   Result Value Ref Range    Sodium 136 136 - 145 mmol/L    Potassium 5.6 (H) 3.5 - 5.1 mmol/L    Chloride 102 97 - 108 mmol/L    CO2 23 21 - 32 mmol/L    Anion gap 11 5 - 15 mmol/L    Glucose 114 (H) 65 - 100 mg/dL    BUN 82 (H) 6 - 20 MG/DL    Creatinine 23.30 (H) 0.70 - 1.30 MG/DL    BUN/Creatinine ratio 4 (L) 12 - 20      eGFR 2 (L) >60 ml/min/1.73m2    Calcium 8.8 8.5 - 10.1 MG/DL   TROPONIN-HIGH SENSITIVITY    Collection Time: 02/13/23 10:11 AM   Result Value Ref Range    Troponin-High Sensitivity 69 0 - 76 ng/L   EKG, 12 LEAD, INITIAL    Collection Time: 02/13/23 10:22 AM   Result Value Ref Range    Ventricular Rate 112 BPM    QRS Duration 78 ms    Q-T Interval 344 ms    QTC Calculation (Bezet) 469 ms    Calculated R Axis 48 degrees    Calculated T Axis 174 degrees    Diagnosis       Atrial fibrillation with rapid ventricular response  Low voltage QRS  T wave abnormality, consider lateral ischemia  Abnormal ECG  When compared with ECG of 22-SEP-2022 15:22,  Nonspecific T wave abnormality now evident in Anterior leads          Lab Results   Component Value Date    BUN 82 (H) 02/13/2023     02/13/2023    K 5.6 (H) 02/13/2023     02/13/2023    CO2 23 02/13/2023       Lab Results   Component Value Date    BUN 82 (H) 02/13/2023    BUN 66 (H) 10/09/2022    BUN 39 (H) 09/22/2022    BUN 36 (H) 10/12/2021    BUN 68 (H) 10/08/2021    K 5.6 (H) 02/13/2023    K 5.2 (H) 10/09/2022    K 4.0 09/22/2022    K 3.6 10/12/2021    K 3.7 10/08/2021       Lab Results   Component Value Date    WBC 8.0 02/13/2023    RBC 2.93 (L) 02/13/2023    HGB 9.5 (L) 02/13/2023    HCT 31.2 (L) 02/13/2023    .5 (H) 02/13/2023    MCH 32.4 02/13/2023    RDW 15.1 (H) 02/13/2023     02/13/2023       Lab Results   Component Value Date    PHOS 7.5 (H) 10/09/2022       Urine dipstick:   Lab Results   Component Value Date/Time    Color YELLOW 07/05/2011 05:00 PM    Appearance CLEAR 07/05/2011 05:00 PM    Specific gravity 1.022 07/05/2011 05:00 PM    pH (UA) 5.0 07/05/2011 05:00 PM    Protein 100 (A) 07/05/2011 05:00 PM    Glucose NEGATIVE 07/05/2011 05:00 PM    Ketone NEGATIVE 07/05/2011 05:00 PM    Bilirubin NEGATIVE 07/05/2011 05:00 PM    Urobilinogen 0.2 07/05/2011 05:00 PM    Nitrites NEGATIVE 07/05/2011 05:00 PM    Leukocyte Esterase NEGATIVE 07/05/2011 05:00 PM    Epithelial cells 0-5 07/05/2011 05:00 PM    Bacteria NEGATIVE 07/05/2011 05:00 PM    WBC 0-4 07/05/2011 05:00 PM    RBC 0-3 07/05/2011 05:00 PM       I have reviewed the following: All pertinent labs, microbiology data, radiology imaging for my assessment     ECG- Rev: Yes / No  Xray/CT/US/MRI REV: Yes/ No    Care Plan discussed with:  patient     Chart reviewed.   Total time spent with patient:  40 min  Medications list Personally Reviewed   [x]      Yes     []               No      Thank you for allowing us to participate in the care of this patient. We will follow patient.  Please dont hesitate to call with any questions    Zach Gruber MD  2/13/2023    [address] [facility]

## 2023-02-13 NOTE — CONSULTS
Neurointerventional Surgery Consult  [facility]  Neurocritical [facility] NP    Patient: Aleyda Owen MRN: [id number]  SSN: [id number]    YOB: 1970  Age: 50 y.o. Sex: male        Chief Complaint: transient episode of speech difficulty     Subjective:      Aleyda Owen is a 50 y.o. male who is being seen for an incidental finding of a small outpouching of the left MCA favoring an aneurysm. He has a PMH of a.fibb on Coumadin, remote CVA with residual right leg weakness on 81 mg of aspirin daily, HTN, neuropathy, sleep apnea, CHF, ESRD on dialysis, CAD, MI, PE, and lymphedema who presented to the ER today as a stroke alert due to a transient episode of speech difficulty and bilateral leg weakness. The patient reported that last night he was over his friends house watching the Super bowl and drove himself home around 11 pm. He was fine. He got home and went to the refrigerator to put some banana pudding in the fridge. He reported he started having tremors all over and then got \"stuck\" and could not move. He was aware of his surroundings. Symptoms lasted for about 30 seconds and he went back to normal. His girlfriend was sleep at the time. He sat down in his recliner which he usually does this to sleep comfortably. Around 5:40 am his dogs came to him and he noticed it took longer for him to get out of the recliner than usual. He took his dogs outside to use the bathroom and he had another episode of tremors. He then proceeded to get ready for work and drove to work. One of his coworkers noted he was \"off\" at work and was having trouble putting words together. He was sent to the ED for stroke evaluation. By the time he came to the ED his symptoms nearly resolved. CT showed no acute process. CTA of head and neck showed no significant stenosis. A 3 mm left MCA outpouching was noted and appearance favored to be an aneurysm.  CTP showed a small area of apparently delayed perfusion in the left temporal lobe, although this may be artifactual given poor contrast enhancement of the intracranial arteries. NIS is consulted due to incidental finding of aneurysm. The patient missed his dialysis because he reported he overslept. He is receiving dialysis now. Creatinine 23.30 upon admission. He reports he is trying to get on the renal transplant list. Speech is back to normal. He denies any headache, vision changes, dizziness, chest pain, or SOB. He has occasional heart palpitations, chronic right leg weakness with associated numbness and tingling in right leg. He denies any other focal weakness. He denies any coordination issues, but has some issues with balance due to his leg weakness.      Past Medical History:   Diagnosis Date    Arrhythmia     A-FIB    CAD (coronary artery disease)     MI 09    CHF (congestive heart failure) (HCC)     Chronic kidney disease     END STAGE KIDNEY DISEASE;  DIALYSIS M-W-F    Chronic pain     NEUROPATHY; BACK PAIN    Hypertension     Lymph edema     LEFT LEG    Needle phobia     Other ill-defined conditions(799.89)     Sleep apnea     WEARS CPAP    Stroke ([facility] [address] 01/2020    NUMBNESS IN RIGHT LEG AND FOOT    Thromboembolus ([facility] [address] 2018    PE     Past Surgical History:   Procedure Laterality Date    HX ORTHOPAEDIC      LEFT HAND, RIGHT ANKLE, AND RIGHT SHOULDER    HX TONSILLECTOMY      HX VASCULAR ACCESS      UPPER RIGHT CHEST    IR FLUORO GUIDED NEEDLE PLACEMENT  10/08/2021    IR INSERT TUNL CVC W/O PORT OVER 5 YR  10/08/2021    IR INSERT TUNL CVC W/O PORT OVER 5 YR  10/10/2022    NM CARDIAC SURG PROCEDURE UNLIST      CARDIAC CATHX2-NO STENTS    VASCULAR SURGERY PROCEDURE UNLIST      PERITONEAL DIALYSIS CATH      Family History   Problem Relation Age of Onset    Stroke Mother     Heart Disease Mother     Diabetes Mother     Kidney Disease Mother     Heart Attack Mother     Thyroid Disease Mother     No Known Problems Father     Heart Disease Sister     No Known Problems Sister Lung Disease Brother     Deep Vein Thrombosis Brother     Anesth Problems Neg Hx    Mother with hx of vertigo also. He denies any family history of aneurysms.      Social History     Tobacco Use    Smoking status: Smokes 1 cigar about every 3 months      Types: Cigars    Smokeless tobacco: Never    Tobacco comments:     CIGARS ONCE every three MONTHS   Substance Use Topics    Alcohol use: Denies any alcohol use     Comment:    Denies any illicit drug use  Current Facility-Administered Medications   Medication Dose Route Frequency Provider Last Rate Last Admin    allopurinoL (ZYLOPRIM) tablet 100 mg  100 mg Oral EVERY OTHER DAY Johnny Keating MD        amLODIPine (NORVASC) tablet 10 mg  10 mg Oral DAILY Johnny Keating MD        [START ON 2/14/2023] atorvastatin (LIPITOR) tablet 40 mg  40 mg Oral DAILY Johnny Keating MD        bumetanide (BUMEX) tablet 2 mg  2 mg Oral DAILY Johnny Keating MD        .PHARMACY TO SUBSTITUTE PER PROTOCOL (Reordered from: cinacalcet HCl (CINACALCET PO))    Per Protocol Johnny Keating MD        .PHARMACY TO SUBSTITUTE PER PROTOCOL (Reordered from: calcium acetate,phosphat bind, (PHOSLO) 667 mg cap)    Per Protocol Johnny Keating MD        dilTIAZem ER (CARDIZEM CD) capsule 240 mg  240 mg Oral DAILY Johnny Keating MD        [START ON 2/14/2023] ferrous sulfate tablet 325 mg  325 mg Oral ACB Johnny Keating MD        hydrOXYzine HCL (ATARAX) tablet 10 mg  10 mg Oral Q8H PRN Johnny Keating MD        montelukast (SINGULAIR) tablet 10 mg  10 mg Oral DAILY Johnny Keating MD        pregabalin (LYRICA) capsule 75 mg  75 mg Oral DAILY Johnny Keating MD        sevelamer carbonate (RENVELA) tab 800 mg  800 mg Oral TIDAC Johnny Keating MD        acetaminophen (TYLENOL) tablet 650 mg  650 mg Oral Q4H PRN Johnny Keating MD        Or    acetaminophen (TYLENOL) solution 650 mg  650 mg Per NG tube Q4H PRN Johnny Keating MD        Or    acetaminophen (TYLENOL) suppository 650 mg  650 mg Rectal Q4H PRN Johnny Keating MD         Current Outpatient Medications   Medication Sig Dispense Refill    dulaglutide (TRULICITY) 1.5 AC/0.2 mL sub-q pen 1.5 mg by SubCUTAneous route every seven (7) days. Takes on Sundays      dilTIAZem ER (CARDIZEM CD) 240 mg capsule Take 240 mg by mouth daily. oxyCODONE-acetaminophen (PERCOCET) 5-325 mg per tablet Take 1 Tablet by mouth daily. calcium acetate,phosphat bind, (PHOSLO) 667 mg cap Take 1 Capsule by mouth daily. ferrous sulfate 325 mg (65 mg iron) tablet Take 325 mg by mouth Daily (before breakfast). hydrOXYzine HCL (ATARAX) 10 mg tablet Take 10 mg by mouth every eight (8) hours as needed. cyanocobalamin 1,000 mcg tablet Take 1,000 mcg by mouth daily. atorvastatin (LIPITOR) 40 mg tablet Take 40 mg by mouth daily. pregabalin (LYRICA) 75 mg capsule Take 75 mg by mouth daily. cinacalcet HCl (CINACALCET PO) Take  by mouth. Takes at dialysis      ERGOCALCIFEROL, VITAMIN D2, PO Take  by mouth. Takes at dialysis      warfarin (Jantoven) 5 mg tablet Take 1 Tablet by mouth daily. 30 Tablet 0    sevelamer carbonate (RENVELA) 800 mg tab tab Take 800 mg by mouth Before breakfast, lunch, and dinner. montelukast (SINGULAIR) 10 mg tablet Take 10 mg by mouth daily. bumetanide (BUMEX) 2 mg tablet Take 2 mg by mouth daily. allopurinoL (ZYLOPRIM) 100 mg tablet Take 100 mg by mouth. TAKES ON Monday AND Thursday      predniSONE (DELTASONE) 5 mg tablet Take 5 mg by mouth daily. amLODIPine (NORVASC) 10 mg tablet Take 1 Tab by mouth daily.  30 Tab 12        Allergies   Allergen Reactions    Lisinopril Swelling    Sulfa (Sulfonamide Antibiotics) Other (comments)     Had sores all over body    Beef Containing Products Other (comments)     PT DOES NOT EAT BEEF    Pork Derived (Porcine) Other (comments)     PT DOES NOT EAT PORK    Sulfa (Sulfonamide Antibiotics) Hives       Review of Systems:  Pertinent items are noted in the History of Present Illness. Objective:     Vitals:    02/13/23 1200 02/13/23 1245 02/13/23 1300 02/13/23 1330   BP: 122/71 (!) 149/93 (!) 150/98 (!) 152/93   Pulse: (!) 101  93 99   Resp: 11 17 14   Temp:       SpO2: 98%      Weight:       Height:            Physical Exam:  GENERAL: alert, cooperative, no distress, appears stated age  EYE: conjunctivae/corneas clear. EOM's intact  LUNG: lungs clear, but diminished anteriorly   HEART: irregularly irregular rhythm, atrial fib on monitor, no murmur, click, rub, or gallop  EXTREMITIES:  extremities normal, atraumatic, no cyanosis, generalized non-pitting peripheral edema  SKIN: Skin scaly and dry in BLE. Color appropriate for ethnicity. Warm to touch. Neurologic Exam:  Mental Status:  Alert and oriented x 4. Appropriate affect, mood and behavior. Language:    Normal fluency, repetition, comprehension and naming. Cranial Nerves:       Pupils 3 mm bilaterally, very sluggish response to light. Visual fields full to confrontation. Extraocular movements intact. Facial sensation intact. Full facial strength, no asymmetry. No dysarthria. Tongue protrudes to midline, palate elevates symmetrically. Motor:    Patient receiving dialysis and not able to lift left arm up due to this, but has good strength with . Moves RUE spontaneously. Strength 5/5 in RUE. LLE 5/5 strength. RLE 4/5 strength. No drift in legs. Bulk and tone normal.      Mild tremor in right hand with arm extended. Sensation:    Sensation intact throughout to light touch. No neglect. Reflexes:    Deferred. Coordination & Gait: No ataxia with FTN in right hand. Unable to assess FTN in left hand due to dialysis catheter in place. Unable to assess HTS accurately on both sides, patient not able to completely place legs on shin. Gait deferred.      Recent Results (from the past 24 hour(s))   GLUCOSE, POC Collection Time: 02/13/23  9:59 AM   Result Value Ref Range    Glucose (POC) 115 65 - 117 mg/dL    Performed by Marlene Pair  PCT    CBC WITH AUTOMATED DIFF    Collection Time: 02/13/23 10:10 AM   Result Value Ref Range    WBC 8.0 4.1 - 11.1 K/uL    RBC 2.93 (L) 4.10 - 5.70 M/uL    HGB 9.5 (L) 12.1 - 17.0 g/dL    HCT 31.2 (L) 36.6 - 50.3 %    .5 (H) 80.0 - 99.0 FL    MCH 32.4 26.0 - 34.0 PG    MCHC 30.4 30.0 - 36.5 g/dL    RDW 15.1 (H) 11.5 - 14.5 %    PLATELET 367 350 - 489 K/uL    MPV 10.6 8.9 - 12.9 FL    NRBC 0.0 0  WBC    ABSOLUTE NRBC 0.00 0.00 - 0.01 K/uL    NEUTROPHILS 73 32 - 75 %    LYMPHOCYTES 10 (L) 12 - 49 %    MONOCYTES 11 5 - 13 %    EOSINOPHILS 4 0 - 7 %    BASOPHILS 1 0 - 1 %    IMMATURE GRANULOCYTES 1 (H) 0.0 - 0.5 %    ABS. NEUTROPHILS 5.8 1.8 - 8.0 K/UL    ABS. LYMPHOCYTES 0.8 0.8 - 3.5 K/UL    ABS. MONOCYTES 0.9 0.0 - 1.0 K/UL    ABS. EOSINOPHILS 0.3 0.0 - 0.4 K/UL    ABS. BASOPHILS 0.1 0.0 - 0.1 K/UL    ABS. IMM.  GRANS. 0.1 (H) 0.00 - 0.04 K/UL    DF SMEAR SCANNED      RBC COMMENTS ANISOCYTOSIS  1+        RBC COMMENTS MACROCYTOSIS  1+       PROTHROMBIN TIME + INR    Collection Time: 02/13/23 10:10 AM   Result Value Ref Range    INR 3.0 (H) 0.9 - 1.1      Prothrombin time 29.7 (H) 9.0 - 28.4 sec   METABOLIC PANEL, BASIC    Collection Time: 02/13/23 10:10 AM   Result Value Ref Range    Sodium 136 136 - 145 mmol/L    Potassium 5.6 (H) 3.5 - 5.1 mmol/L    Chloride 102 97 - 108 mmol/L    CO2 23 21 - 32 mmol/L    Anion gap 11 5 - 15 mmol/L    Glucose 114 (H) 65 - 100 mg/dL    BUN 82 (H) 6 - 20 MG/DL    Creatinine 23.30 (H) 0.70 - 1.30 MG/DL    BUN/Creatinine ratio 4 (L) 12 - 20      eGFR 2 (L) >60 ml/min/1.73m2    Calcium 8.8 8.5 - 10.1 MG/DL   TROPONIN-HIGH SENSITIVITY    Collection Time: 02/13/23 10:11 AM   Result Value Ref Range    Troponin-High Sensitivity 69 0 - 76 ng/L   EKG, 12 LEAD, INITIAL    Collection Time: 02/13/23 10:22 AM   Result Value Ref Range    Ventricular Rate 112 BPM QRS Duration 78 ms    Q-T Interval 344 ms    QTC Calculation (Bezet) 469 ms    Calculated R Axis 48 degrees    Calculated T Axis 174 degrees    Diagnosis       Atrial fibrillation with rapid ventricular response  Low voltage QRS  T wave abnormality, consider lateral ischemia  Abnormal ECG  When compared with ECG of 22-SEP-2022 15:22,  Nonspecific T wave abnormality now evident in Anterior leads         Imaging:  CTH: No acute process   CTA: 3 mm left MCA outpouching was noted and appearance favored to be an aneurysm. No significant stenosis. CTP: Small area of apparently delayed perfusion in the left temporal lobe, although this may be artifactual given poor contrast enhancement of the intracranial arteries. Assessment:     Possible Acute CVA vs TIA  ESRD  Left MCA outpouching, favored to be an aneurysm      Plan:   - no emergent endovascular intervention indicated  - follow up with Dr. Linda Spears in [place] [address] clinic in two weeks after discharge for further discussion  - patient educated on stroke-like symptoms and to call 911 immediately. Patient also instructed to seek emergency services if he experiences the worst headache of his life  - strict BP control long term, counseling on smoking cessation  - Neurology consulted for stroke evaluation  - MRI of Brain pending  - check TSH, lipid panel, Hgb A1C, and ECHO  - PT/OT/SLP evals  - ESRD- currently on dialysis, management per Nephrology   - NIS signing off, please call if needed     Plan discussed with Hospitalist Dr. Kiana Odell, Dr. Linda Spears, RN, patient, and patient's significant other. Thank you for this consult and participating in the care of this patient.         Signed By: Lisa Lan NP     February 13, 2023

## 2023-02-13 NOTE — PROGRESS NOTES
Neurocritical Care Code Stroke Documentation      Symptoms:   Speech difficulty, worsening bilateral leg weakness    Last Known Well: Sometime last night (unclear time). He was fine watching the football game and drove home. Medical hx:   Past Medical History:   Diagnosis Date    Arrhythmia     A-FIB    CAD (coronary artery disease)     MI 09    CHF (congestive heart failure) (HCC)     Chronic kidney disease     END STAGE KIDNEY DISEASE;  DIALYSIS M-W-F    Chronic pain     NEUROPATHY; BACK PAIN    Hypertension     Lymph edema     LEFT LEG    Needle phobia     Other ill-defined conditions(799.89)     Sleep apnea     WEARS CPAP    Stroke ([facility] [address] 2020    NUMBNESS IN RIGHT LEG AND FOOT    Thromboembolus ([facility] [address] 2018    PE      Anticoagulation: On Coumadin and 81 mg of aspirin daily    VAN:   Negative   NIHSS:   1a-LOC:0    1b-Month/Age:0    1c-Open/Close Hand:0    2-Best Gaze:0    3-Visual Fields:0    4-Facial Palsy:0    5a-Left Arm:0    5b-Right Arm:0    6a-Left Le    6b-Right Le    7-Limb Ataxia: 0    8-Sensory:0    9-Best Language:0 (speech hesitant)    10-Dysarthria:0    11-Extinction/Inattention:0  TOTAL SCORE:2   Imaging:   CT: No acute abnormality. CTA: pending    CTP: pending  See results below   Plan:   TNK Candidate: NO    Mechanical thrombectomy Candidate: NO   Of note patient missed two days of dialysis. Creatinine noted to be 23.30 on labs today in ER. Very subtle asterixis noted in bilateral hands when extended. Patient has baseline right leg weakness from previous stroke. Patient reports both legs are weak at baseline, but feels they are worse this morning. Suspect symptoms are metabolic related given renal disease. Discussed with Dr. Mary Juarez. Addendum: CTA/CTP:  1. Small area of apparently delayed perfusion in the left temporal lobe,  although this may be artifactual given poor contrast enhancement of the  intracranial arteries.   2. 3 mm outpouching projecting superiorly from a proximal left M2 branch. Overall appearance favors a saccular aneurysm over infundibulum. 3. Enlarged main pulmonary artery, suggestive of pulmonary arterial  hypertension. No significant stenosis    Patient can see NIS in clinic after discharge for incidental finding of left MCA aneurysm  Arrival time: 0954  Time spent: 25 minutes.      Cary Treviño NP

## 2023-02-13 NOTE — ED NOTES
TRANSFER - OUT REPORT:    Verbal report given to JANIS Sorto(name) on Crystal Vernon  being transferred to [facility](unit) for routine progression of care       Report consisted of patients Situation, Background, Assessment and   Recommendations(SBAR). Information from the following report(s) SBAR, Kardex, ED Summary, MAR, Recent Results, Med Rec Status, Alarm Parameters , and Quality Measures was reviewed with the receiving nurse. Lines:   Peripheral IV 02/13/23 Right Antecubital (Active)   Site Assessment Clean, dry, & intact 02/13/23 1023   Phlebitis Assessment 0 02/13/23 1023   Infiltration Assessment 0 02/13/23 1023   Dressing Status Clean, dry, & intact 02/13/23 1023        Opportunity for questions and clarification was provided.       Patient transported with:   Unbound Spine appears normal, range of motion is not limited, no muscle or joint tenderness, no c/c/e/ttp bilat le

## 2023-02-13 NOTE — PROGRESS NOTES
Admission Medication Reconciliation:    Information obtained from:  Patient interview, Recent prescription fill history    RxQuery data available¹:  YES    Comments/Recommendations: Updated PTA meds/reviewed patient's allergies. Patient states that warfarin dosing should be 5 mg every day and 7.5 mg on Thursdays, but only takes 5 mg every day. Patient states cinacalcet and vitamin D2 are given at dialysis but was unsure of doses. Patient is on trulicity but is switching to ozempic. Medication changes (since last review): Added  - Atorvastatin 40 mg every day  - Vit B12 1000 mcg every day   - Trulicity 5.7AZ / 8.3TV every Sunday  - Hydroxyzine 10 mg every 8 hours  - Calcium acetate 667 mg every day   - Ferrous sulfate 325 mg every day   - Oxycodone/acetaminophen 5mg / 325mg every day   - Cinacalcet with dialysis    Adjusted  - Bumetanide 2 mg every day   - Vit D2 with dialysis  - Diltiazem  mg every day      ¹RxQuery pharmacy benefit data reflects medications filled and processed through the patient's insurance, however   this data does NOT capture whether the medication was picked up or is currently being taken by the patient. Allergies:  Lisinopril, Sulfa (sulfonamide antibiotics), Beef containing products, Pork derived (porcine), and Sulfa (sulfonamide antibiotics)    Significant PMH/Disease States:   Past Medical History:   Diagnosis Date    Arrhythmia     A-FIB    CAD (coronary artery disease)     MI 09    CHF (congestive heart failure) (HCC)     Chronic kidney disease     END STAGE KIDNEY DISEASE;  DIALYSIS M-W-F    Chronic pain     NEUROPATHY; BACK PAIN    Hypertension     Lymph edema     LEFT LEG    Needle phobia     Other ill-defined conditions(799.89)     Sleep apnea     WEARS CPAP    Stroke ([facility] [address] 01/2020    NUMBNESS IN RIGHT LEG AND FOOT    Thromboembolus ([facility] [address] 2018    PE     Chief Complaint for this Admission:  No chief complaint on file.     Prior to Admission Medications:   Prior to Admission Medications   Prescriptions Last Dose Informant Taking? ERGOCALCIFEROL, VITAMIN D2, PO  Self Yes   Sig: Take  by mouth. Takes at dialysis   allopurinoL (ZYLOPRIM) 100 mg tablet  Self Yes   Sig: Take 100 mg by mouth. TAKES ON Monday AND Thursday   amLODIPine (NORVASC) 10 mg tablet  Self Yes   Sig: Take 1 Tab by mouth daily. atorvastatin (LIPITOR) 40 mg tablet  Self Yes   Sig: Take 40 mg by mouth daily. bumetanide (BUMEX) 2 mg tablet  Self Yes   Sig: Take 2 mg by mouth daily. calcium acetate,phosphat bind, (PHOSLO) 667 mg cap  Self Yes   Sig: Take 1 Capsule by mouth daily. cinacalcet HCl (CINACALCET PO)  Self Yes   Sig: Take  by mouth. Takes at dialysis   cyanocobalamin 1,000 mcg tablet  Self Yes   Sig: Take 1,000 mcg by mouth daily. dilTIAZem ER (CARDIZEM CD) 240 mg capsule  Self Yes   Sig: Take 240 mg by mouth daily. dulaglutide (TRULICITY) 1.5 CG/7.0 mL sub-q pen  Self Yes   Si.5 mg by SubCUTAneous route every seven (7) days. Takes on Sundays   ferrous sulfate 325 mg (65 mg iron) tablet  Self Yes   Sig: Take 325 mg by mouth Daily (before breakfast). hydrOXYzine HCL (ATARAX) 10 mg tablet  Self Yes   Sig: Take 10 mg by mouth every eight (8) hours as needed. montelukast (SINGULAIR) 10 mg tablet  Self Yes   Sig: Take 10 mg by mouth daily. oxyCODONE-acetaminophen (PERCOCET) 5-325 mg per tablet  Self Yes   Sig: Take 1 Tablet by mouth daily. predniSONE (DELTASONE) 5 mg tablet  Self Yes   Sig: Take 5 mg by mouth daily. pregabalin (LYRICA) 75 mg capsule  Self Yes   Sig: Take 75 mg by mouth daily. sevelamer carbonate (RENVELA) 800 mg tab tab  Self Yes   Sig: Take 800 mg by mouth Before breakfast, lunch, and dinner. warfarin (Jantoven) 5 mg tablet  Self Yes   Sig: Take 1 Tablet by mouth daily.       Facility-Administered Medications: None     Please contact the main inpatient pharmacy with any questions or concerns at [phone] and we will direct you to the clinical pharmacist covering this patient's care while in-house.    [employer] [employer]

## 2023-02-13 NOTE — ED PROVIDER NOTES
52M w/ hx AF, CAD, ESRD on HD, CHF, HTN, CVA, PE p/w speech changes x1d. Pt brought to ED as a stroke alert. Pt reports that this AM woke up w/ total body weakness, numbness, tingling and twitching of his hands. Also noticed speech changes w/ garbled speech and word finding difficulty today about 8:30am. He's unsure exactly when this started and hadn't noticed these speech changes until he tried to talk on the phone w/ a friend. This prompted a colleague at work to call 911. Notes that since onset, his speech changes are improving over the course of this AM. States that his speech was normal when he went to bed at 11:30pm last night. Has prior RLE weakness from prior CVA that is unchanged today. Denies any new vision changes or new focal ext weakness or sensory changes. Denies any HA or CP/SOB. No dizziness, syncope or N/V/D. On warfarin for AF. Also notes that has missed last 2 dialysis sessions.        Past Medical History:   Diagnosis Date    Arrhythmia     A-FIB    CAD (coronary artery disease)     MI 09    CHF (congestive heart failure) (HCC)     Chronic kidney disease     END STAGE KIDNEY DISEASE;  DIALYSIS M-W-F    Chronic pain     NEUROPATHY; BACK PAIN    Hypertension     Lymph edema     LEFT LEG    Needle phobia     Other ill-defined conditions(799.89)     Sleep apnea     WEARS CPAP    Stroke ([address] 01/2020    NUMBNESS IN RIGHT LEG AND FOOT    Thromboembolus ([address] 2018    PE       Past Surgical History:   Procedure Laterality Date    HX ORTHOPAEDIC      LEFT HAND, RIGHT ANKLE, AND RIGHT SHOULDER    HX TONSILLECTOMY      HX VASCULAR ACCESS      UPPER RIGHT CHEST    IR FLUORO GUIDED NEEDLE PLACEMENT  10/08/2021    IR INSERT TUNL CVC W/O PORT OVER 5 YR  10/08/2021    IR INSERT TUNL CVC W/O PORT OVER 5 YR  10/10/2022    OK CARDIAC SURG PROCEDURE UNLIST      CARDIAC CATHX2-NO STENTS    VASCULAR SURGERY PROCEDURE UNLIST      PERITONEAL DIALYSIS CATH         Family History:   Problem Relation Age of Onset    Stroke Mother     Heart Disease Mother     Diabetes Mother     Kidney Disease Mother     Heart Attack Mother     Thyroid Disease Mother     No Known Problems Father     Heart Disease Sister     No Known Problems Sister     Lung Disease Brother     Deep Vein Thrombosis Brother     Anesth Problems Neg Hx        Social History     Socioeconomic History    Marital status: SINGLE     Spouse name: Not on file    Number of children: Not on file    Years of education: Not on file    Highest education level: Not on file   Occupational History    Not on file   Tobacco Use    Smoking status: Some Days     Types: Cigars    Smokeless tobacco: Never    Tobacco comments:     CIGARS ONCE PER MONTH-NOTHING SINCE 2018   Substance and Sexual Activity    Alcohol use: Never     Comment: NOT FOR 3 YEARS    Drug use: No    Sexual activity: Not on file   Other Topics Concern    Not on file   Social History Narrative    ** Merged History Encounter **          Social Determinants of Health     Financial Resource Strain: Not on file   Food Insecurity: Not on file   Transportation Needs: Not on file   Physical Activity: Not on file   Stress: Not on file   Social Connections: Not on file   Intimate Partner Violence: Not on file   Housing Stability: Not on file         ALLERGIES: Lisinopril, Sulfa (sulfonamide antibiotics), Beef containing products, Pork derived (porcine), and Sulfa (sulfonamide antibiotics)    Review of Systems   Constitutional:  Negative for chills, diaphoresis and fever. HENT:  Negative for facial swelling, mouth sores, nosebleeds, trouble swallowing and voice change. Eyes:  Negative for pain and visual disturbance. Respiratory:  Negative for apnea, cough, shortness of breath, wheezing and stridor. Cardiovascular:  Negative for chest pain, palpitations and leg swelling. Gastrointestinal:  Negative for abdominal distention, abdominal pain, blood in stool, diarrhea, nausea and vomiting.    Genitourinary:  Negative for difficulty urinating, dysuria, flank pain, hematuria, scrotal swelling and testicular pain. Musculoskeletal:  Negative for joint swelling. Skin:  Negative for color change and rash. Allergic/Immunologic: Negative for immunocompromised state. Neurological:  Positive for speech difficulty, weakness and numbness. Negative for dizziness, syncope and light-headedness. Hematological:  Does not bruise/bleed easily. Psychiatric/Behavioral:  Negative for agitation and behavioral problems. Vitals:    02/14/23 0607 02/14/23 0947 02/14/23 1000 02/14/23 1014   BP: (!) 145/102 (!) 151/100  128/85   Pulse: 89 (!) 113 (!) 112 (!) 121   Resp: 14 15  14   Temp: 97.7 °F (36.5 °C)   97.8 °F (36.6 °C)   TempSrc:       SpO2: 100%      Weight:       Height:                Physical Exam  Vitals and nursing note reviewed. Constitutional:       General: He is not in acute distress. Appearance: Normal appearance. He is not ill-appearing or toxic-appearing. HENT:      Head: Normocephalic and atraumatic. Right Ear: External ear normal.      Left Ear: External ear normal.      Nose: Nose normal.      Mouth/Throat:      Mouth: Mucous membranes are moist.      Pharynx: Oropharynx is clear. No oropharyngeal exudate or posterior oropharyngeal erythema. Eyes:      General: No scleral icterus. Extraocular Movements: Extraocular movements intact. Conjunctiva/sclera: Conjunctivae normal.      Pupils: Pupils are equal, round, and reactive to light. Cardiovascular:      Rate and Rhythm: Normal rate and regular rhythm. Pulses: Normal pulses. Heart sounds: No murmur heard. No friction rub. No gallop. Comments: Left forearm AV fistula w/ +bruit/thrill  Pulmonary:      Effort: Pulmonary effort is normal. No respiratory distress. Breath sounds: Normal breath sounds. No stridor. No wheezing, rhonchi or rales. Abdominal:      General: Bowel sounds are normal. There is no distension. Palpations: Abdomen is soft. Tenderness: There is no abdominal tenderness. There is no guarding or rebound. Musculoskeletal:         General: No deformity. Normal range of motion. Cervical back: Normal range of motion and neck supple. No rigidity. Right lower leg: No edema. Left lower leg: No edema. Skin:     General: Skin is warm. Capillary Refill: Capillary refill takes less than 2 seconds. Coloration: Skin is not jaundiced. Neurological:      Mental Status: He is alert. Cranial Nerves: No cranial nerve deficit. Sensory: No sensory deficit. Coordination: Coordination normal.      Comments: -GCS15, Aaox3  +RLE 4/5 strength (chronic)  -Normal b/l UE/LE sensory exam  -CN2-12 intact including able to smile/frown, elevate eyebrows symmetrically, close eyes tightly against force, sensation to light touch intact V1-V3 distribution, hearing intact to finger rub b/l, palate/uvula elevate midline/symmetrically, able to shrug shoulders and move head left/right against force, tongue protrudes midline  -EOMI, PERRL, no nystagmus, normal visual fields  +mild dysarthria, no aphasia  -cerebellar testing intact including rapid/alternating movements, finger-to-nose/shin-to-heel   Psychiatric:         Mood and Affect: Mood normal.         Behavior: Behavior normal.         Thought Content: Thought content normal.         Judgment: Judgment normal.      I personally reviewed and independently interpreted EKG, labs and imaging results.     EKG Interpretation   Narrow complex AF w/o ischemic changes    LABORATORY TESTS:  Admission on 02/13/2023   Component Date Value Ref Range Status    WBC 02/13/2023 8.0  4.1 - 11.1 K/uL Final    RBC 02/13/2023 2.93 (A)  4.10 - 5.70 M/uL Final    HGB 02/13/2023 9.5 (A)  12.1 - 17.0 g/dL Final    HCT 02/13/2023 31.2 (A)  36.6 - 50.3 % Final    MCV 02/13/2023 106.5 (A)  80.0 - 99.0 FL Final    MCH 02/13/2023 32.4  26.0 - 34.0 PG Final    MCHC 02/13/2023 30.4  30.0 - 36.5 g/dL Final    RDW 02/13/2023 15.1 (A)  11.5 - 14.5 % Final    PLATELET 04/54/7345 200  150 - 400 K/uL Final    MPV 02/13/2023 10.6  8.9 - 12.9 FL Final    NRBC 02/13/2023 0.0  0  WBC Final    ABSOLUTE NRBC 02/13/2023 0.00  0.00 - 0.01 K/uL Final    NEUTROPHILS 02/13/2023 73  32 - 75 % Final    LYMPHOCYTES 02/13/2023 10 (A)  12 - 49 % Final    MONOCYTES 02/13/2023 11  5 - 13 % Final    EOSINOPHILS 02/13/2023 4  0 - 7 % Final    BASOPHILS 02/13/2023 1  0 - 1 % Final    IMMATURE GRANULOCYTES 02/13/2023 1 (A)  0.0 - 0.5 % Final    ABS. NEUTROPHILS 02/13/2023 5.8  1.8 - 8.0 K/UL Final    ABS. LYMPHOCYTES 02/13/2023 0.8  0.8 - 3.5 K/UL Final    ABS. MONOCYTES 02/13/2023 0.9  0.0 - 1.0 K/UL Final    ABS. EOSINOPHILS 02/13/2023 0.3  0.0 - 0.4 K/UL Final    ABS. BASOPHILS 02/13/2023 0.1  0.0 - 0.1 K/UL Final    ABS. IMM. GRANS. 02/13/2023 0.1 (A)  0.00 - 0.04 K/UL Final    DF 02/13/2023 SMEAR SCANNED    Final    RBC COMMENTS 02/13/2023     Final                    Value:ANISOCYTOSIS  1+      RBC COMMENTS 02/13/2023     Final                    Value:MACROCYTOSIS  1+      INR 02/13/2023 3.0 (A)  0.9 - 1.1   Final    A single therapeutic range for Vit K antagonists may not be optimal for all indications - see June, 2008 issue of Chest, American College of Chest Physicians Evidence-Based Clinical Practice Guidelines, 8th Edition.     Prothrombin time 02/13/2023 29.7 (A)  9.0 - 11.1 sec Final    Sodium 02/13/2023 136  136 - 145 mmol/L Final    Potassium 02/13/2023 5.6 (A)  3.5 - 5.1 mmol/L Final    SPECIMEN HEMOLYZED, RESULTS MAY BE AFFECTED    Chloride 02/13/2023 102  97 - 108 mmol/L Final    CO2 02/13/2023 23  21 - 32 mmol/L Final    Anion gap 02/13/2023 11  5 - 15 mmol/L Final    Glucose 02/13/2023 114 (A)  65 - 100 mg/dL Final    BUN 02/13/2023 82 (A)  6 - 20 MG/DL Final    Creatinine 02/13/2023 23.30 (A)  0.70 - 1.30 MG/DL Final    BUN/Creatinine ratio 02/13/2023 4 (A)  12 - 20   Final    eGFR 02/13/2023 2 (A)  >60 ml/min/1.73m2 Final    Comment:      Pediatric calculator link: Mo.at. org/professionals/kdoqi/gfr_calculatorped       These results are not intended for use in patients <25years of age. eGFR results are calculated without a race factor using  the 2021 CKD-EPI equation. Careful clinical correlation is recommended, particularly when comparing to results calculated using previous equations. The CKD-EPI equation is less accurate in patients with extremes of muscle mass, extra-renal metabolism of creatinine, excessive creatine ingestion, or following therapy that affects renal tubular secretion. Calcium 02/13/2023 8.8  8.5 - 10.1 MG/DL Final    Troponin-High Sensitivity 02/13/2023 69  0 - 76 ng/L Final    Comment: A HS troponin value change of (+ or -) 50% or more below the 99th percentile, in a 1/2/3 hr interval represents a significant change. Clinical correlation is recommended. A HS troponin value change of (+ or -) 20% or above the 99th percentile, in a 1/2/3 hr interval represents a significant change. Clinical correlation is recommended.   99th Percentile:   Women: 0-51 ng/L                                                                Men:   0-76 ng/L  Patients taking more than 20 mg/day of biotin may have falsely negative results and should not use this test.      Ventricular Rate 02/13/2023 112  BPM Preliminary    QRS Duration 02/13/2023 78  ms Preliminary    Q-T Interval 02/13/2023 344  ms Preliminary    QTC Calculation (Bezet) 02/13/2023 469  ms Preliminary    Calculated R Axis 02/13/2023 48  degrees Preliminary    Calculated T Axis 02/13/2023 174  degrees Preliminary    Diagnosis 02/13/2023    Preliminary                    Value:Atrial fibrillation with rapid ventricular response  Low voltage QRS  T wave abnormality, consider lateral ischemia  Abnormal ECG  When compared with ECG of 22-SEP-2022 15:22,  Nonspecific T wave abnormality now evident in Anterior leads      Glucose (POC) 02/13/2023 115  65 - 117 mg/dL Final    Comment: (NOTE)  The FDA has indicated that no capillary point of care blood glucose  monitoring systems are approved for use in \"critically ill\" patients,  however they have not defined this population. The College of  American Pathologists has recommended that these devices should not  be used in cases such as severe hypotension, dehydration, shock, and  hyperglycemic-hyperosmolar state, amongst others. Venous or arterial  collection is the recommended specimen for testing these patients. Performed by 02/13/2023 Leonides Rule  PCT   Final    Hepatitis B surface Ab 02/13/2023 35.68  mIU/mL Final    Hep B surface Ab Interp. 02/13/2023 REACTIVE (A)  NR   Final    Hepatitis B surface Ag 02/13/2023 <0.10  Index Final    Hep B surface Ag Interp. 02/13/2023 Negative  NEG   Final    Sodium 02/13/2023 135 (A)  136 - 145 mmol/L Final    Potassium 02/13/2023 3.9  3.5 - 5.1 mmol/L Final    INVESTIGATED PER DELTA CHECK PROTOCOL    Chloride 02/13/2023 98  97 - 108 mmol/L Final    CO2 02/13/2023 28  21 - 32 mmol/L Final    Anion gap 02/13/2023 9  5 - 15 mmol/L Final    Glucose 02/13/2023 99  65 - 100 mg/dL Final    BUN 02/13/2023 47 (A)  6 - 20 MG/DL Final    INVESTIGATED PER DELTA CHECK PROTOCOL    Creatinine 02/13/2023 15.10 (A)  0.70 - 1.30 MG/DL Final    INVESTIGATED PER DELTA CHECK PROTOCOL    BUN/Creatinine ratio 02/13/2023 3 (A)  12 - 20   Final    eGFR 02/13/2023 4 (A)  >60 ml/min/1.73m2 Final    Comment:      Pediatric calculator link: [employer]Touch.at. org/professionals/kdoqi/gfr_calculatorped       These results are not intended for use in patients <25years of age. eGFR results are calculated without a race factor using  the 2021 CKD-EPI equation. Careful clinical correlation is recommended, particularly when comparing to results calculated using previous equations.   The CKD-EPI equation is less accurate in patients with extremes of muscle mass, extra-renal metabolism of creatinine, excessive creatine ingestion, or following therapy that affects renal tubular secretion. Calcium 02/13/2023 9.1  8.5 - 10.1 MG/DL Final    Cholesterol, total 02/14/2023 111  <200 MG/DL Final    Triglyceride 02/14/2023 128  <150 MG/DL Final    Based on NCEP-ATP III:  Triglycerides <150 mg/dL  is considered normal, 150-199 mg/dL  borderline high,  200-499 mg/dL high and  greater than or equal to 500 mg/dL very high. HDL Cholesterol 02/14/2023 31  MG/DL Final    Based on NCEP ATP III, HDL Cholesterol <40 mg/dL is considered low and >60 mg/dL is elevated. LDL, calculated 02/14/2023 54.4  0 - 100 MG/DL Final    Comment: Based on the NCEP-ATP: LDL-C concentrations are considered  optimal <100 mg/dL,  near optimal/above Normal 100-129 mg/dL  Borderline High: 130-159, High: 160-189 mg/dL  Very High: Greater than or equal to 190 mg/dL      VLDL, calculated 02/14/2023 25.6  MG/DL Final    CHOL/HDL Ratio 02/14/2023 3.6  0.0 - 5.0   Final    Hemoglobin A1c 02/14/2023 4.8  4.0 - 5.6 % Final    Comment: NEW METHOD  PLEASE NOTE NEW REFERENCE RANGE  (NOTE)  HbA1C Interpretive Ranges  <5.7              Normal  5.7 - 6.4         Consider Prediabetes  >6.5              Consider Diabetes      Est. average glucose 02/14/2023 91  mg/dL Final    TSH 02/14/2023 0.51  0.36 - 3.74 uIU/mL Final    Comment:      Due to TSH heterogeneity, both structurally and degree of glycosylation, monoclonal antibodies used in the TSH assay may not accurately quantitate TSH. Therefore, this result should be correlated with clinical findings as well as with other assessments of thyroid function, e.g., free T4, free T3.       Magnesium 02/14/2023 2.4  1.6 - 2.4 mg/dL Final    Phosphorus 02/14/2023 7.2 (A)  2.6 - 4.7 MG/DL Final    INR 02/14/2023 3.1 (A)  0.9 - 1.1   Final    A single therapeutic range for Vit K antagonists may not be optimal for all indications - see June, 2008 issue of Chest, American College of Chest Physicians Evidence-Based Clinical Practice Guidelines, 8th Edition. Prothrombin time 02/14/2023 30.3 (A)  9.0 - 11.1 sec Final       IMAGING RESULTS:  CTA CODE NEURO HEAD AND NECK W CONT   Final Result      1. Small area of apparently delayed perfusion in the left temporal lobe,   although this may be artifactual given poor contrast enhancement of the   intracranial arteries. 2. 3 mm outpouching projecting superiorly from a proximal left M2 branch. Overall appearance favors a saccular aneurysm over infundibulum. 3. Enlarged main pulmonary artery, suggestive of pulmonary arterial   hypertension. These findings were discussed with Dr. Rascon Party by Dr. Hilda Stock at 1:24pm on 2-13-23. CT CODE NEURO PERF W CBF   Final Result      1. Small area of apparently delayed perfusion in the left temporal lobe,   although this may be artifactual given poor contrast enhancement of the   intracranial arteries. 2. 3 mm outpouching projecting superiorly from a proximal left M2 branch. Overall appearance favors a saccular aneurysm over infundibulum. 3. Enlarged main pulmonary artery, suggestive of pulmonary arterial   hypertension. These findings were discussed with Dr. Rascon Party by Dr. Hilda Stock at 1:24pm on 2-13-23.       CT CODE NEURO HEAD WO CONTRAST   Final Result   No acute abnormality            MRI BRAIN WO CONT    (Results Pending)       MEDICATIONS GIVEN:  Medications   allopurinoL (ZYLOPRIM) tablet 100 mg (has no administration in time range)   atorvastatin (LIPITOR) tablet 40 mg (40 mg Oral Given 2/14/23 0947)   bumetanide (BUMEX) tablet 2 mg (2 mg Oral Given 2/14/23 0947)   cinacalcet (SENSIPAR) tablet 30 mg (has no administration in time range)   dilTIAZem ER (CARDIZEM CD) capsule 240 mg (240 mg Oral Given 2/14/23 0947)   ferrous sulfate tablet 325 mg (325 mg Oral Given 2/14/23 0658)   hydrOXYzine HCL (ATARAX) tablet 10 mg (has no administration in time range)   montelukast (SINGULAIR) tablet 10 mg (10 mg Oral Given 2/14/23 0947)   pregabalin (LYRICA) capsule 75 mg (75 mg Oral Given 2/14/23 0947)   sevelamer carbonate (RENVELA) tab 1,600 mg (has no administration in time range)   acetaminophen (TYLENOL) tablet 650 mg (650 mg Oral Given 2/14/23 0615)     Or   acetaminophen (TYLENOL) solution 650 mg ( Per NG tube See Alternative 2/14/23 8522)     Or   acetaminophen (TYLENOL) suppository 650 mg ( Rectal See Alternative 2/14/23 8077)   Warfarin pharmacy to dose (has no administration in time range)   diazePAM (VALIUM) tablet 2 mg (has no administration in time range)   labetaloL (NORMODYNE;TRANDATE) injection 10 mg ( IntraVENous Canceled Entry 2/13/23 5930)   warfarin (COUMADIN) tablet 4 mg (has no administration in time range)   aspirin chewable tablet 162 mg (162 mg Oral Given 2/13/23 1146)   calcium gluconate 1 gram in sodium chloride (ISO-OSM) 50 mL infusion (0 g IntraVENous IV Completed 2/13/23 1205)   iopamidoL (ISOVUE-370) 370 mg iodine /mL (76 %) injection 100 mL (100 mL IntraVENous Given 2/13/23 1231)   iopamidoL (ISOVUE-370) 370 mg iodine /mL (76 %) injection 20 mL (20 mL IntraVENous Given 2/13/23 1231)       IMPRESSION:  1. ESRD needing dialysis ([address]    2. Acute hyperkalemia    3. Ischemic stroke [facility])        PLAN:  - Admit to hospitalist    Delois Denver, MD      Medical Decision Making  52M w/ hx AF, CAD, ESRD on HD, CHF, HTN, CVA, PE p/w speech changes x1d. Last known normal 11:30pm last night. Has prior RLE weakness from prior CVA that is unchanged today. Missed last 2 HD sessions. Hemodynamically stable w/o hypoxia. BS WNL. Stroke alert called on arrival and seen by tele neurology. NIHSS=2.  Ddx includes CVA (ischemic vs hemorrhagic) vs cerebellar/brainstem stroke vs carotid/vertebral artery occlusion/dissection vs SAH vs ICH/IPH vs SDH/epidural vs HTN emergency/urgency vs PRES vs obstructive hydrocephalus vs intracranial mass vs cerebral edema vs neuromuscular/neurodegenerative disease vs partial/focal seizure vs complex migraine vs syncope vs electrolyte/metabolic vs CNS infection. Ordered CTH, CTA head/neck and labs w/ EKG. Monitor and reassess. 1400 Pt remains stable w/o change in neuro exam. CTH neg for acute findings. Neuro asked for CTA head/neck which are neg for LVO or dissection but does show a small intracranial aneurysm (likely incidental finding, neuro-interventional consulted for this). Not a TNK based on timing of symptom onset. Not an interventional candidate based on CTA results w/o LVO. On warfarin for AF w/ INR 3. Has extensive metabolic derrangements w/ Cr 23 and K5.6. NO eKG changes c/w hyperK (showing narrow complex AF). Gave asa and calcium gluconate. I spoke w/ nephrology who will get him dialyzed today. Tele admit for stroke work up. Amount and/or Complexity of Data Reviewed  External Data Reviewed: notes. Labs: ordered. Decision-making details documented in ED Course. Radiology: ordered and independent interpretation performed. Decision-making details documented in ED Course. ECG/medicine tests: ordered and independent interpretation performed. Decision-making details documented in ED Course. Risk  OTC drugs. Prescription drug management. Decision regarding hospitalization.            Critical Care  Performed by: Ana Lynch MD  Authorized by: Ana Lynch MD     Critical care provider statement:     Critical care time (minutes):  56    Critical care was necessary to treat or prevent imminent or life-threatening deterioration of the following conditions:  Renal failure, metabolic crisis and CNS failure or compromise    Critical care was time spent personally by me on the following activities:  Blood draw for specimens, development of treatment plan with patient or surrogate, discussions with consultants, discussions with primary provider, evaluation of patient's response to treatment, examination of patient, interpretation of cardiac output measurements, obtaining history from patient or surrogate, ordering and performing treatments and interventions, ordering and review of laboratory studies, ordering and review of radiographic studies, pulse oximetry, re-evaluation of patient's condition and review of old charts    Care discussed with: admitting provider      Total critical care time (not including time spent performing separately reportable procedures): 64      400 [facility] for Admission  11:27 AM    ED Room Number: 660/01  Patient Name and age:  Eufemia Barlow 50 y.o.  male  Working Diagnosis:   1. ESRD needing dialysis ([facility]    2. Acute hyperkalemia    3.  Ischemic stroke ([facility]        COVID-19 Suspicion:  no  Sepsis present:  no  Reassessment needed: no  Code Status:  Full Code  Readmission: no  Isolation Requirements:  no  Recommended Level of Care:  telemetry  Department:[facility] Adult ED - 21

## 2023-02-14 ENCOUNTER — APPOINTMENT (OUTPATIENT)
Dept: NON INVASIVE DIAGNOSTICS | Age: 53
DRG: 175 | End: 2023-02-14
Attending: STUDENT IN AN ORGANIZED HEALTH CARE EDUCATION/TRAINING PROGRAM
Payer: MEDICARE

## 2023-02-14 ENCOUNTER — APPOINTMENT (OUTPATIENT)
Dept: MRI IMAGING | Age: 53
DRG: 175 | End: 2023-02-14
Attending: STUDENT IN AN ORGANIZED HEALTH CARE EDUCATION/TRAINING PROGRAM
Payer: MEDICARE

## 2023-02-14 VITALS
BODY MASS INDEX: 40.43 KG/M2 | WEIGHT: 315 LBS | OXYGEN SATURATION: 100 % | SYSTOLIC BLOOD PRESSURE: 131 MMHG | DIASTOLIC BLOOD PRESSURE: 96 MMHG | HEIGHT: 74 IN | RESPIRATION RATE: 14 BRPM | HEART RATE: 116 BPM | TEMPERATURE: 97.3 F

## 2023-02-14 LAB
ANION GAP SERPL CALC-SCNC: 9 MMOL/L (ref 5–15)
BUN SERPL-MCNC: 47 MG/DL (ref 6–20)
BUN/CREAT SERPL: 3 (ref 12–20)
CALCIUM SERPL-MCNC: 9.1 MG/DL (ref 8.5–10.1)
CHLORIDE SERPL-SCNC: 98 MMOL/L (ref 97–108)
CHOLEST SERPL-MCNC: 111 MG/DL
CO2 SERPL-SCNC: 28 MMOL/L (ref 21–32)
CREAT SERPL-MCNC: 15.1 MG/DL (ref 0.7–1.3)
EST. AVERAGE GLUCOSE BLD GHB EST-MCNC: 91 MG/DL
GLUCOSE SERPL-MCNC: 99 MG/DL (ref 65–100)
HBA1C MFR BLD: 4.8 % (ref 4–5.6)
HDLC SERPL-MCNC: 31 MG/DL
HDLC SERPL: 3.6 (ref 0–5)
INR PPP: 3.1 (ref 0.9–1.1)
LDLC SERPL CALC-MCNC: 54.4 MG/DL (ref 0–100)
MAGNESIUM SERPL-MCNC: 2.4 MG/DL (ref 1.6–2.4)
PHOSPHATE SERPL-MCNC: 7.2 MG/DL (ref 2.6–4.7)
POTASSIUM SERPL-SCNC: 3.9 MMOL/L (ref 3.5–5.1)
PROTHROMBIN TIME: 30.3 SEC (ref 9–11.1)
SODIUM SERPL-SCNC: 135 MMOL/L (ref 136–145)
TRIGL SERPL-MCNC: 128 MG/DL (ref ?–150)
TSH SERPL DL<=0.05 MIU/L-ACNC: 0.51 UIU/ML (ref 0.36–3.74)
VLDLC SERPL CALC-MCNC: 25.6 MG/DL

## 2023-02-14 PROCEDURE — 93308 TTE F-UP OR LMTD: CPT | Performed by: SPECIALIST

## 2023-02-14 PROCEDURE — 97112 NEUROMUSCULAR REEDUCATION: CPT

## 2023-02-14 PROCEDURE — 97165 OT EVAL LOW COMPLEX 30 MIN: CPT

## 2023-02-14 PROCEDURE — 84100 ASSAY OF PHOSPHORUS: CPT

## 2023-02-14 PROCEDURE — 99223 1ST HOSP IP/OBS HIGH 75: CPT | Performed by: NURSE PRACTITIONER

## 2023-02-14 PROCEDURE — 92610 EVALUATE SWALLOWING FUNCTION: CPT

## 2023-02-14 PROCEDURE — 74011250637 HC RX REV CODE- 250/637

## 2023-02-14 PROCEDURE — 70551 MRI BRAIN STEM W/O DYE: CPT

## 2023-02-14 PROCEDURE — 97161 PT EVAL LOW COMPLEX 20 MIN: CPT

## 2023-02-14 PROCEDURE — 93325 DOPPLER ECHO COLOR FLOW MAPG: CPT | Performed by: SPECIALIST

## 2023-02-14 PROCEDURE — 97116 GAIT TRAINING THERAPY: CPT

## 2023-02-14 PROCEDURE — 83735 ASSAY OF MAGNESIUM: CPT

## 2023-02-14 PROCEDURE — 74011000250 HC RX REV CODE- 250: Performed by: NURSE PRACTITIONER

## 2023-02-14 PROCEDURE — 84443 ASSAY THYROID STIM HORMONE: CPT

## 2023-02-14 PROCEDURE — 36415 COLL VENOUS BLD VENIPUNCTURE: CPT

## 2023-02-14 PROCEDURE — 80061 LIPID PANEL: CPT

## 2023-02-14 PROCEDURE — C8929 TTE W OR WO FOL WCON,DOPPLER: HCPCS

## 2023-02-14 PROCEDURE — 74011250637 HC RX REV CODE- 250/637: Performed by: NURSE PRACTITIONER

## 2023-02-14 PROCEDURE — 74011250636 HC RX REV CODE- 250/636: Performed by: NURSE PRACTITIONER

## 2023-02-14 PROCEDURE — 85610 PROTHROMBIN TIME: CPT

## 2023-02-14 PROCEDURE — 74011250637 HC RX REV CODE- 250/637: Performed by: STUDENT IN AN ORGANIZED HEALTH CARE EDUCATION/TRAINING PROGRAM

## 2023-02-14 PROCEDURE — 83036 HEMOGLOBIN GLYCOSYLATED A1C: CPT

## 2023-02-14 RX ORDER — WARFARIN 2 MG/1
4 TABLET ORAL
Status: COMPLETED | OUTPATIENT
Start: 2023-02-14 | End: 2023-02-14

## 2023-02-14 RX ADMIN — SEVELAMER CARBONATE 1600 MG: 800 TABLET, FILM COATED ORAL at 17:04

## 2023-02-14 RX ADMIN — ACETAMINOPHEN 650 MG: 325 TABLET ORAL at 06:58

## 2023-02-14 RX ADMIN — SEVELAMER CARBONATE 1600 MG: 800 TABLET, FILM COATED ORAL at 12:44

## 2023-02-14 RX ADMIN — ATORVASTATIN CALCIUM 40 MG: 10 TABLET, FILM COATED ORAL at 09:47

## 2023-02-14 RX ADMIN — DIAZEPAM 2 MG: 2 TABLET ORAL at 14:07

## 2023-02-14 RX ADMIN — PREGABALIN 75 MG: 75 CAPSULE ORAL at 09:47

## 2023-02-14 RX ADMIN — DILTIAZEM HYDROCHLORIDE 240 MG: 120 CAPSULE, EXTENDED RELEASE ORAL at 09:47

## 2023-02-14 RX ADMIN — WARFARIN SODIUM 4 MG: 2 TABLET ORAL at 17:04

## 2023-02-14 RX ADMIN — FERROUS SULFATE TAB 325 MG (65 MG ELEMENTAL FE) 325 MG: 325 (65 FE) TAB at 06:58

## 2023-02-14 RX ADMIN — PERFLUTREN 1.5 ML: 6.52 INJECTION, SUSPENSION INTRAVENOUS at 14:00

## 2023-02-14 RX ADMIN — BUMETANIDE 2 MG: 1 TABLET ORAL at 09:47

## 2023-02-14 RX ADMIN — MONTELUKAST 10 MG: 10 TABLET, FILM COATED ORAL at 09:47

## 2023-02-14 NOTE — PROCEDURES
Hemodialysis / [phone]    Vitals Pre Post Assessment Pre Post   BP BP: 135/77 (talking on iphone) (02/13/23 1915) 173/84 LOC A/O x 3 A/O x 3   HR Pulse (Heart Rate): 91 (02/13/23 1915) 95 Lungs CTA CTA   Resp Resp Rate: 16 (02/13/23 1645) 17 Cardiac A-Fib A-Fib   Temp Temp: 97.9 °F (36.6 °C) (02/13/23 1645) 98.4 Skin Intact left AVF Intact left AVF   Weight Pre-Dialysis Weight: 169.6 kg (373 lb 14.4 oz) (02/13/23 1645) 166.5 kg Edema + 2 LE +1 LE   Tele status Yes Yes Pain Pain Intensity 1: 0 (02/13/23 1639) 0/10     Orders   Duration: Start: 4404 End: 2045 Total: 4 hours   Dialyzer: Dialyzer/Set Up Inspection: Imelda Nice (02/13/23 1645)   K Bath: Dialysate K (mEq/L): 2 (02/13/23 1645)   Ca Bath: Dialysate CA (mEq/L): 2.5 (02/13/23 1645)   Na: Dialysate NA (mEq/L): 138 (02/13/23 1645)   Bicarb: Dialysate HCO3 (mEq/L): 35 (02/13/23 1645)   Target Fluid Removal: Goal/Amount of Fluid to Remove (mL): 3000 mL (02/13/23 1645)     Access   Type & Location: Left lower AV-Fistula   Comments:    + B/T. Cleaned with Alcohol pads.  Cannulated x 2 # 15 gauge, 1\" needles without difficulty                                    Labs   HBsAg (Antigen) / date:  Negative on 2/13/2023                                             HBsAb (Antibody) / date: Immune on 2/13/2023   Source: EPIC   Obtained/Reviewed  Critical Results Called HGB   Date Value Ref Range Status   02/13/2023 9.5 (L) 12.1 - 17.0 g/dL Final     Potassium   Date Value Ref Range Status   02/13/2023 5.6 (H) 3.5 - 5.1 mmol/L Final     Comment:     SPECIMEN HEMOLYZED, RESULTS MAY BE AFFECTED     Calcium   Date Value Ref Range Status   02/13/2023 8.8 8.5 - 10.1 MG/DL Final     BUN   Date Value Ref Range Status   02/13/2023 82 (H) 6 - 20 MG/DL Final     Creatinine   Date Value Ref Range Status   02/13/2023 23.30 (H) 0.70 - 1.30 MG/DL Final        Meds Given   Name Dose Route   None given                 Adequacy / Fluid    Total Liters Process: 88.1   Net Fluid Removed: 4000-465=7010 ml      Comments   Time Out Done:   (Time) 1430   Admitting Diagnosis: CVA   Consent obtained/signed: Informed Consent Verified: Yes (continuation of chronic unit) (02/13/23 4810)   Machine / RO # Machine Number: [id number] (02/13/23 6370)   Primary Nurse Rpt Pre: Montana George RN   Primary Nurse Rpt Post: Montana George RN   Pt Education: Procedure   Care Plan:    Continue HD as prescribed   Pts outpatient clinic: [facility]     Tx Summary   Comments:       Treatment initiated via Left AVF. UF goal increased for drinks and dinner meal. Tolerated treatment. All possible blood returned. Fistula needles removed. Hemostasis achieved post hand held pressure.  Clean dressing applied, Endorsed to primary, Nirmala Jain RN

## 2023-02-14 NOTE — PROGRESS NOTES
PHYSICAL THERAPY EVALUATION WITH DISCHARGE  Patient: Awilda Nicholas (11 y.o. male)  Date: 2/14/2023  Primary Diagnosis: CVA (cerebral vascular accident) [facility]) [I63.9]       Precautions:          ASSESSMENT  Based on the objective data described below, the patient presents at his baseline functional level following admission for c/o now resolved slurred speech. CT shows possible area of reduced perfusion in L temporal lobe, MRI pending. At baseline, he lives at home with his significant other and is fully indep and active without AD. PMHx significant for previous CVA with very mild R sided residual deficits. Neuro exam today appears non focal; strength, coordination, sensation, speech, and vision in tact and consistent with his baseline. He was able to complete all mobility without AD or difficulty. Gait training completed in hallway x300+ ft - no overt LOB, deviation, or difficulty with integration of head movements, distractions, or dual tasks. Morales score indicates a low falls risk. Provided education on BE FAST and answered questions to satisfaction. No further acute care PT indicated at this time. Will complete orders and sign off. Recommend home once medically cleared. Functional Outcome Measure: The patient scored Total: 53/56 on the Morales Balance Assessment which is indicative of low fall risk. Other factors to consider for discharge: none     Further skilled acute physical therapy is not indicated at this time. PLAN :  Recommendation for discharge: (in order for the patient to meet his/her long term goals)  No skilled physical therapy/ follow up rehabilitation needs identified at this time. This discharge recommendation:  Has been made in collaboration with the attending provider and/or case management    IF patient discharges home will need the following DME: none         SUBJECTIVE:   Patient stated Am I going to do tricks now?     OBJECTIVE DATA SUMMARY:   HISTORY:    Past Medical History:   Diagnosis Date    Arrhythmia     A-FIB    CAD (coronary artery disease)     MI 09    CHF (congestive heart failure) (HCC)     Chronic kidney disease     END STAGE KIDNEY DISEASE;  DIALYSIS M-W-F    Chronic pain     NEUROPATHY; BACK PAIN    Hypertension     Lymph edema     LEFT LEG    Needle phobia     Other ill-defined conditions(799.89)     Sleep apnea     WEARS CPAP    Stroke ([facility] [address] 01/2020    NUMBNESS IN RIGHT LEG AND FOOT    Thromboembolus ([facility] [address] 2018    PE     Past Surgical History:   Procedure Laterality Date    HX ORTHOPAEDIC      LEFT HAND, RIGHT ANKLE, AND RIGHT SHOULDER    HX TONSILLECTOMY      HX VASCULAR ACCESS      UPPER RIGHT CHEST    IR FLUORO GUIDED NEEDLE PLACEMENT  10/08/2021    IR INSERT TUNL CVC W/O PORT OVER 5 YR  10/08/2021    IR INSERT TUNL CVC W/O PORT OVER 5 YR  10/10/2022    [place] CARDIAC SURG PROCEDURE UNLIST      CARDIAC CATHX2-NO STENTS    VASCULAR SURGERY PROCEDURE UNLIST      PERITONEAL DIALYSIS CATH       Prior level of function: Lives at home with significant other; hx of CVA with R deficits but non-focal on exam today. Fully indep and works   Personal factors and/or comorbidities impacting plan of care: PMHx    Home Situation  Home Environment: Private residence  # Steps to Enter: 4  Rails to Enter: Yes  One/Two Story Residence: One story  Living Alone: No  Support Systems: Spouse/Significant Other  Patient Expects to be Discharged to[de-identified] Home  Current DME Used/Available at Home: None  Tub or Shower Type: Tub/Shower combination    EXAMINATION/PRESENTATION/DECISION MAKING:   Critical Behavior:  Neurologic State: Alert  Orientation Level: Oriented X4  Cognition: Appropriate decision making  Safety/Judgement: Awareness of environment, Home safety  Hearing:     Skin:    Edema:   Range Of Motion:  AROM: Within functional limits  PROM: Within functional limits        Strength:    Strength:  Within functional limits        Tone & Sensation:   Tone: Normal     Sensation: Impaired (\"tinging\" in R foot at baseline)      Coordination:  Coordination: Within functional limits  Vision:   Tracking: Able to track stimulus in all quadrants w/o difficulty  Diplopia: No  Functional Mobility:  Bed Mobility:     Transfers:  Sit to Stand: Independent  Stand to Sit: Independent  Bed to Chair: Independent    Balance:   Sitting: Intact  Standing: Intact    Ambulation/Gait Training:  Distance (ft): 300 Feet (ft)  Assistive Device: Gait belt  Ambulation - Level of Assistance: Independent  Gait Description (WDL): Exceptions to [facility]  Base of Support: Widened    Functional Measure  Morales Balance Test:    Sitting to Standin  Standing Unsupported: 4  Sitting with Back Unsupported: 4  Standing to Sittin  Transfers: 4  Standing Unsupported with Eyes Closed: 4  Standing Unsupported with Feet Together: 4  Reach Forward with Outstretched Arm: 4   Object: 4  Turn to Look Over Shoulders: 4  Turn 360 Degrees: 4  Alternate Foot on Step/Stool: 4  Standing Unsupported One Foot in Front: 3  Stand on One Le  Total: 53/56         56=Maximum possible score;   0-20=High fall risk  21-40=Moderate fall risk   41-56=Low fall risk        Physical Therapy Evaluation Charge Determination   History Examination Presentation Decision-Making   LOW Complexity : Zero comorbidities / personal factors that will impact the outcome / POC LOW Complexity : 1-2 Standardized tests and measures addressing body structure, function, activity limitation and / or participation in recreation  LOW Complexity : Stable, uncomplicated  LOW Complexity : FOTO score of       Based on the above components, the patient evaluation is determined to be of the following complexity level: LOW     Pain Rating:  none    Activity Tolerance:   Good    After treatment patient left in no apparent distress:   Sitting in chair and Call bell within reach    COMMUNICATION/EDUCATION:   The patients plan of care was discussed with: Registered nurse and Case management. Patient was educated regarding His deficit(s) of resolved c/o slurred speech as this relates to His diagnosis of possible TIA/CVA. He demonstrated Good understanding as evidenced by nodding. Patient and/or family was verbally educated on the BE FAST acronym for signs/symptoms of CVA and TIA. BE FAST was written on patient's communication board  for visual education and reinforcement. All questions answered with patient indicating good understanding. Fall prevention education was provided and the patient/caregiver indicated understanding.     Thank you for this referral.  Simone Bautista, PT, DPT   Time Calculation: 18 mins

## 2023-02-14 NOTE — PROGRESS NOTES
Patient arrived to room ambulating, pleasant, A&O x 4. HD RN already waiting at bedside to begin HD. This RN completed NIH (see flowsheet) and a set of vitals with HD nurse. Full assessment to follow post HD.

## 2023-02-14 NOTE — PROGRESS NOTES
SPEECH PATHOLOGY BEDSIDE SWALLOW EVALUATION/DISCHARGE  Patient: Julio Cesar Felton (86 y.o. male)  Date: 2/14/2023  Primary Diagnosis: CVA (cerebral vascular accident) [facility]) [I63.9]       Precautions:       ASSESSMENT :  Based on the objective data described below, the patient presents with no oral or pharyngeal dysphagia. Patient with timely mastication of travis crackers. No overt s/s aspiration with rapid sequential straw sips of thins. Patient's speech is clear and fluent. He reports  yesterday he was stuttering and could not get his words out but today is back to baseline. Skilled acute therapy provided by a speech-language pathologist is not indicated at this time. PLAN :  Recommendations:  Regular diet/ thin liquids. Straw OK     Discharge Recommendations: None     SUBJECTIVE:   Patient stated I am doing great today.     OBJECTIVE:     Past Medical History:   Diagnosis Date    Arrhythmia     A-FIB    CAD (coronary artery disease)     MI 09    CHF (congestive heart failure) (HCC)     Chronic kidney disease     END STAGE KIDNEY DISEASE;  DIALYSIS M-W-F    Chronic pain     NEUROPATHY; BACK PAIN    Hypertension     Lymph edema     LEFT LEG    Needle phobia     Other ill-defined conditions(799.89)     Sleep apnea     WEARS CPAP    Stroke ([address] 01/2020    NUMBNESS IN RIGHT LEG AND FOOT    Thromboembolus ([address] 2018    PE     Past Surgical History:   Procedure Laterality Date    HX ORTHOPAEDIC      LEFT HAND, RIGHT ANKLE, AND RIGHT SHOULDER    HX TONSILLECTOMY      HX VASCULAR ACCESS      UPPER RIGHT CHEST    IR FLUORO GUIDED NEEDLE PLACEMENT  10/08/2021    IR INSERT TUNL CVC W/O PORT OVER 5 YR  10/08/2021    IR INSERT TUNL CVC W/O PORT OVER 5 YR  10/10/2022    [place] CARDIAC SURG PROCEDURE UNLIST      CARDIAC CATHX2-NO STENTS    VASCULAR SURGERY PROCEDURE UNLIST      PERITONEAL DIALYSIS CATH     Prior Level of Function/Home Situation:   Home Situation  Home Environment: Private residence  # Steps to Enter: 4  Rails to Enter: Yes  One/Two Story Residence: One story  Living Alone: No  Support Systems: Spouse/Significant Other  Patient Expects to be Discharged to[de-identified] Home  Current DME Used/Available at Home: None  Tub or Shower Type: Tub/Shower combination  Diet prior to admission: regular/ythin  Current Diet:  regular/thin   Cognitive and Communication Status:  Neurologic State: Alert  Orientation Level: Oriented X4  Cognition: Follows commands           Oral Assessment:  Oral Assessment  Labial: No impairment  Dentition: Natural  Oral Hygiene: wfl  Lingual: No impairment  Velum: Unable to visualize  Mandible: No impairment  P.O. Trials:  Patient Position: up in chair  Vocal quality prior to P.O.: No impairment  Consistency Presented: Thin liquid; Solid        Bolus Acceptance: No impairment  Bolus Formation/Control: No impairment     Propulsion: No impairment  Oral Residue: None  Initiation of Swallow: No impairment  Laryngeal Elevation: Functional  Aspiration Signs/Symptoms: None  Pharyngeal Phase Characteristics: No impairment, issues, or problems              Oral Phase Severity: No impairment  Pharyngeal Phase Severity : No impairment  NOMS:   The NOMS functional outcome measure was used to quantify this patient's level of swallowing impairment. Based on the NOMS, the patient was determined to be at level 7 for swallow function     NOMS Swallowing Levels:  Level 1 (CN): NPO  Level 2 (CM): NPO but takes consistency in therapy  Level 3 (CL): Takes less than 50% of nutrition p.o. and continues with nonoral feedings; and/or safe with mod cues; and/or max diet restriction  Level 4 (CK):  Safe swallow but needs mod cues; and/or mod diet restriction; and/or still requires some nonoral feeding/supplements  Level 5 (CJ): Safe swallow with min diet restriction; and/or needs min cues  Level 6 (CI): Independent with p.o.; rare cues; usually self cues; may need to avoid some foods or needs extra time  Level 7 [facility]): Independent for all p.o.  SARA. (2003). National Outcomes Measurement System (NOMS): Adult Speech-Language Pathology User's Guide. Pain:  Pain Scale 1: Numeric (0 - 10)  Pain Intensity 1: 0     After treatment:   Patient left in no apparent distress in bed, Call bell within reach, and Nursing notified    COMMUNICATION/EDUCATION:       The patient's plan of care including recommendations, planned interventions, and recommended diet changes were discussed with: Registered nurse.      Thank you for this referral.  CAROL Montero  Time Calculation: 10 mins

## 2023-02-14 NOTE — PROGRESS NOTES
OCCUPATIONAL THERAPY EVALUATION/DISCHARGE  Patient: Cole Byers (57 y.o. male)  Date: 2/14/2023  Primary Diagnosis: CVA (cerebral vascular accident) [facility]) [I63.9]       Precautions:        ASSESSMENT  Based on the objective data described below, the patient presents with baseline functional performance s/p admission for CVA work-up. Of note, CT imaging negative for acute process and MRI pending. Patient is received in bedside chair, pleasant and readily agreeable to session. Patient endorses sensation is back to baseline; he only has diminished sensation to distal RLE due to past CVA. Patient presents at overall independent level for functional mobility and self-care, receptive to BE FAST education and verbalizing understanding. Patient with no acute OT needs; will sign off. Current Level of Function (ADLs/self-care): independent    Functional Outcome Measure: The patient scored Total A-D  Total A-D (Motor Function): 66/66 on the Fugl-Rausch Assessment which is indicative of no impairment in upper extremity functional status. Other factors to consider for discharge:      PLAN :  Recommendation for discharge: (in order for the patient to meet his/her long term goals)  No skilled occupational therapy/ follow up rehabilitation needs identified at this time.     This discharge recommendation:  Has been made in collaboration with the attending provider and/or case management    IF patient discharges home will need the following DME: none       SUBJECTIVE:   Patient stated It's weird, I write with my left hand but I have to do everything else with my R.    OBJECTIVE DATA SUMMARY:   HISTORY:   Past Medical History:   Diagnosis Date    Arrhythmia     A-FIB    CAD (coronary artery disease)     MI 09    CHF (congestive heart failure) ([facility] [address]     Chronic kidney disease     END STAGE KIDNEY DISEASE;  DIALYSIS M-W-F    Chronic pain     NEUROPATHY; BACK PAIN    Hypertension     Lymph edema     LEFT LEG    Needle phobia Other ill-defined conditions(799.89)     Sleep apnea     WEARS CPAP    Stroke ([facility] [address] 01/2020    NUMBNESS IN RIGHT LEG AND FOOT    Thromboembolus ([facility] [address] 2018    PE     Past Surgical History:   Procedure Laterality Date    HX ORTHOPAEDIC      LEFT HAND, RIGHT ANKLE, AND RIGHT SHOULDER    HX TONSILLECTOMY      HX VASCULAR ACCESS      UPPER RIGHT CHEST    IR FLUORO GUIDED NEEDLE PLACEMENT  10/08/2021    IR INSERT TUNL CVC W/O PORT OVER 5 YR  10/08/2021    IR INSERT TUNL CVC W/O PORT OVER 5 YR  10/10/2022    [place] CARDIAC SURG PROCEDURE UNLIST      CARDIAC CATHX2-NO STENTS    VASCULAR SURGERY PROCEDURE UNLIST      PERITONEAL DIALYSIS CATH       Prior Level of Function/Environment/Context: independent and active; 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    Expanded or extensive additional review of patient history:     Home Situation  Home Environment: Private residence  # Steps to Enter: 4  Rails to Enter: Yes  One/Two Story Residence: One story  Living Alone: No  Support Systems: Spouse/Significant Other  Patient Expects to be Discharged to[de-identified] Home  Current DME Used/Available at Home: None  Tub or Shower Type: Tub/Shower combination    Hand dominance: Left    EXAMINATION OF PERFORMANCE DEFICITS:  Cognitive/Behavioral Status:  Neurologic State: Alert  Orientation Level: Oriented X4  Cognition: Appropriate decision making  Perception: Appears intact  Perseveration: No perseveration noted  Safety/Judgement: Awareness of environment;Home safety    Skin:     Edema:     Hearing:       Vision/Perceptual:    Tracking: Able to track stimulus in all quadrants w/o difficulty    Saccades: Within Defined Limits         Visual Fields:  (WFL)  Diplopia: No         Corrective Lenses: Glasses (not present; supposed to wear all the time but does not)    Range of Motion:  AROM: Within functional limits  PROM: Within functional limits    Strength:  Strength:  Within functional limits    Coordination:  Coordination: Within functional limits  Fine Motor Skills-Upper: Left Intact; Right Intact    Gross Motor Skills-Upper: Left Intact; Right Intact    Tone & Sensation:  Tone: Normal  Sensation: Impaired (\"tinging\" in R foot at baseline)    Balance:  Sitting: Intact  Standing: Intact    Functional Mobility and Transfers for ADLs:  Bed Mobility:       Transfers:  Sit to Stand: Independent  Stand to Sit: Independent  Bed to Chair: Independent  Bathroom Mobility: Independent  Toilet Transfer : Independent    ADL Assessment:  Feeding: Independent    Oral Facial Hygiene/Grooming: Independent    Upper Body Dressing: Independent    Lower Body Dressing: Modified independent    Toileting: Independent      ADL Intervention and task modifications:  Feeding  Feeding Assistance: Independent    Lower Body Dressing Assistance  Dressing Assistance: Modified independent  Socks: Modified independent    Cognitive Retraining  Safety/Judgement: Awareness of environment;Home safety    Functional Measure:  Fugl-Rausch Assessment of Motor Recovery after Stroke:   Upper Extremity Assessment: Yes    Reflex Activity  Flexors/Biceps/Fingers: Can be elicited  Extensors/Triceps: Can be elicited  Reflex Subtotal: 4    Volitional Movement Within Synergies  Shoulder Retraction: Full  Shoulder Elevation: Full  Shoulder Abduction (90 degrees): Full  Shoulder External Rotation: Full  Elbow Flexion: Full  Forearm Supination: Full  Shoulder Adduction/Internal Rotation: Full  Elbow Extension: Full  Forearm Pronation: Full  Subtotal: 18    Volitional Movement Mixing Synergies  Hand to Lumbar Spine: Full  Shoulder Flexion (0-90 degrees): Full  Pronation-Supination: Full  Subtotal: 6    Volitional Movement With Little or No Synergy  Shoulder Abduction (0-90 degrees): Full  Shoulder Flexion ( degrees): Full  Pronation/Supination: Full  Subtotal : 6    Normal Reflex Activity  Biceps, Triceps, Finger Flexors:  Full  Subtotal : 2    Upper Extremity Total   Upper Extremity Total: 36    Wrist  Stability at 15 Degree Dorsiflexion: Full  Repeated Dorsiflexion/ Volar Flexion: Full  Stability at 15 Degree Dorsiflexion: Full  Repeated Dorsiflexion/ Volar Flexion: Full  Circumduction: Full  Wrist Total: 10    Hand  Mass Flexion: Full  Mass Extension: Full  Grasp A: Full  Grasp B: Full  Grasp C: Full  Grasp D: Full  Grasp E: Full  Hand Total: 14    Coordination/Speed  Tremor: None  Dysmetria: None  Time: <1s (R 6s, L 7s)  Coordination/Speed Total : 6    Total A-D  Total A-D (Motor Function): 66/66     This is a reliable/valid measure of arm function after a neurological event. It has established value to characterize functional status and for measuring spontaneous and therapy-induced recovery; tests proximal and distal motor functions. Fugl-Rausch Assessment - UE scores recorded between five and 30 days post neurologic event can be used to predict UE recovery at six months post neurologic event. Severe = 0-21 points   Moderately Severe = 22-33 points   Moderate = 34-47 points   Mild = 48-66 points  Sharyne Ganser, D., TAMY Jin, & AGUSTIN Tidwell (1992). Measurement of motor recovery after stroke: Outcome assessment and sample size requirements.  Stroke, 23, pp. 4017-9211.   ------------------------------------------------------------------------------------------------------------------------------------------------------------------  MCID:  Stroke:   Doris Carver, 2001; n = 171; mean age 79 (5) years; assessed within 16 (12) days of stroke, Acute Stroke)  FMA Motor Scores from Admission to Discharge   10 point increase in FMA Upper Extremity = 1.5 change in discharge FIM   10 point increase in FMA Lower Extremity = 1.9 change in discharge FIM  MDC:   Stroke:   Paola Stewart et al, 2008, n = 14, mean age = 59.9 (14.6) years, assessed on average 14 (6.5) months post stroke, Chronic Stroke)   FMA = 5.2 points for the Upper Extremity portion of the assessment     Occupational Therapy Evaluation Charge Determination   History Examination Decision-Making   LOW Complexity : Brief history review  LOW Complexity : 1-3 performance deficits relating to physical, cognitive , or psychosocial skils that result in activity limitations and / or participation restrictions  LOW Complexity : No comorbidities that affect functional and no verbal or physical assistance needed to complete eval tasks       Based on the above components, the patient evaluation is determined to be of the following complexity level: LOW   Pain Rating:  No reports. Activity Tolerance:   Good    After treatment patient left in no apparent distress:    Sitting in chair, Call bell within reach, and as received    COMMUNICATION/EDUCATION:   The patients plan of care was discussed with: Physical therapist and Registered nurse. Patient and/or family was verbally educated on the BE FAST acronym for signs/symptoms of CVA and TIA. BE FAST was written on patient's communication board  for visual education and reinforcement. All questions answered with patient indicating good understanding.      Thank you for this referral.  Ruiz Marcum OT  Time Calculation: 14 mins

## 2023-02-14 NOTE — PROGRESS NOTES
Transition of Care Plan  RUR- Med 18%  DISPOSITION: Home with partner, when stable  Dialysis: [facility]; [facility] 3:30-7:45pm  F/U with PCP/Specialist    Transport: Partner     Reason for Admission:   stroke rule out                    RUR Score:     18%             PCP: First and Last name:   Marry Perla MD     Name of Practice:    Are you a current patient: Yes/No: Yes   Approximate date of last visit: 1 month   Can you participate in a virtual visit if needed:     Do you (patient/family) have any concerns for transition/discharge? None  Plan for utilizing home health:   No hx of home health, per therapy no home health needs    Current Advanced Directive/Advance Care Plan:  Full Code      Healthcare Decision Maker:   Click here to complete [address] including selection of the Healthcare Decision Maker Relationship (ie \"Primary\")            Primary Decision Maker: Wale Acuñacherylerené - Girlfriend - [phone]    Transition of Care Plan:          CM met with patient at bedside to introduce self and role. 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 Patient drives self or uses Medicaid transport. CM to follow patient progress and assist as recommended with [facility] plan. Care Management Interventions  PCP Verified by CM: Yes  Palliative Care Criteria Met (RRAT>21 & CHF Dx)?: No  Mode of Transport at Discharge:  Other (see comment) (partner)  Transition of Care Consult (CM Consult): Discharge Planning  MyChart Signup: Yes  Discharge Durable Medical Equipment: No  Health Maintenance Reviewed: Yes  Physical Therapy Consult: Yes  Occupational Therapy Consult: Yes  Speech Therapy Consult: Yes  Support Systems: Spouse/Significant Other  Confirm Follow Up Transport: Family  The Plan for Transition of Care is Related to the Following Treatment Goals : home  Name of the Patient Representative Who was Provided with a Choice of Provider and Agrees with the Discharge Plan: patient  Discharge Location  [facility] Cheryle Locker, M.S.W.

## 2023-02-14 NOTE — CONSULTS
NEUROLOGY CONSULT NOTE    Name Heydi Mckeon Age 50 y.o. MRN [id number]  1970     Consulting Physician: Vish Loving NP      Chief Complaint:  slurred speech, word finding difficulty, weakness, paresthesias     Assessment:     Active Problems:    CVA (cerebral vascular accident) [facility]) (2023)    Patient is a 50year-old male with history of afib on warfarin (therapeutic), CAD, ESRD on HD CHF, HTN, CVA with residual RLE weakness who had sudden symptoms of dropping a dish, slurred speech, word finding difficulty, generalized weakness, paresthesias and possible facial droop witnessed by a coworker who presented to the ED yesterday. Patient changed HD session on Friday 2/10/23 with it planned to be rescheduled on  which he missed. His BUN/creatinine was severely elevated to 82/23. 3. CT head showed no acute abnormalities. CTA/P showed small area delayed perfusion in the L temporal lobe, 3 mm outpouching projecting superiorly from proximal L M2 branch. NIS evaluated and no emergent intervention. Symptoms non-focal to stroke except for witnessed \"facial droop\" by bystander. Possible symptoms metabolic related to significant need for HD with worsening BUN/Cr after missed session. LDL 54.4, HgbA1C 4.8, TSH 0.51  Recommendations:   - Continue atorvastatin 40 mg daily, LDL at goal <70  - Patient states he is not on aspirin while on warfarin.   - Follow-up Dr. Kathy Martinez outpatient for proximal L M2 branch outpouching  - MRI pending to rule-out stroke  - TTE completed and need to review  - Patient stable for discharge if MRI negative. History of Present Illness: This is a 50 y.o. male with history of afib on warfarin, CAD , ESRD on HD, CHF, HTN, CVA with residual RLE weakness, and PE who presents with slurred speech and word finding difficulty yesterday. After watching the Super Bowl he went to the refrigerator and dropped a dish while trying to put it in.  He woke up yesterday am with total body weakness, numbness/tingling and twitching of his hands. At work, a coworker then noticed speech changes with garbled speech and word finding difficulty, as well as, a facial droop. EMS was called and he was brought to the ED. INR was noted to be 3.1 on admission. Patient missed 2 days of HD and Cr was noted to be 23.3 in the ED. He had no associated blurred vision, headache, palpitations, SOB or LOC. We are asked to consult on the patient for possible stroke versus TIA. Allergies   Allergen Reactions    Lisinopril Swelling    Sulfa (Sulfonamide Antibiotics) Other (comments)     Had sores all over body    Beef Containing Products Other (comments)     PT DOES NOT EAT BEEF    Pork Derived (Porcine) Other (comments)     PT DOES NOT EAT PORK    Sulfa (Sulfonamide Antibiotics) Hives        Prior to Admission medications    Medication Sig Start Date End Date Taking? Authorizing Provider   dulaglutide (TRULICITY) 1.5 RY/4.8 mL sub-q pen 1.5 mg by SubCUTAneous route every seven (7) days. Takes on Sundays   Yes Provider, Historical   dilTIAZem ER (CARDIZEM CD) 240 mg capsule Take 240 mg by mouth daily. Yes Provider, Historical   oxyCODONE-acetaminophen (PERCOCET) 5-325 mg per tablet Take 1 Tablet by mouth daily. Yes Provider, Historical   ferrous sulfate 325 mg (65 mg iron) tablet Take 325 mg by mouth Daily (before breakfast). Yes Provider, Historical   hydrOXYzine HCL (ATARAX) 10 mg tablet Take 10 mg by mouth every eight (8) hours as needed. Yes Provider, Historical   cyanocobalamin 1,000 mcg tablet Take 1,000 mcg by mouth daily. Yes Provider, Historical   atorvastatin (LIPITOR) 40 mg tablet Take 40 mg by mouth daily. Yes Provider, Historical   pregabalin (LYRICA) 75 mg capsule Take 75 mg by mouth daily. Yes Provider, Historical   cinacalcet (SENSIPAR) 30 mg tablet Take 30 mg by mouth DIALYSIS MON, WED & FRI.  Takes at dialysis   Yes Provider, Historical   ERGOCALCIFEROL, VITAMIN D2, PO Take  by mouth. Takes at dialysis   Yes Provider, Historical   warfarin (Jantoven) 5 mg tablet Take 1 Tablet by mouth daily. 10/12/21  Yes Francisco Tejeda MD   sevelamer carbonate (RENVELA) 800 mg tab tab Take 1,600 mg by mouth Before breakfast, lunch, and dinner. Yes Other, MD Lea   montelukast (SINGULAIR) 10 mg tablet Take 10 mg by mouth daily. 11/25/20  Yes Other, MD Lea   bumetanide (BUMEX) 2 mg tablet Take 2 mg by mouth daily. Yes Other, MD Lea   allopurinoL (ZYLOPRIM) 100 mg tablet Take 100 mg by mouth. TAKES ON Monday AND Thursday   Yes Provider, Historical   predniSONE (DELTASONE) 5 mg tablet Take 5 mg by mouth daily. Yes Provider, Historical   amLODIPine (NORVASC) 10 mg tablet Take 1 Tab by mouth daily.  11/16/11  Yes Simon Escamilla MD       Past Medical History:   Diagnosis Date    Arrhythmia     A-FIB    CAD (coronary artery disease)     MI 09    CHF (congestive heart failure) (HCC)     Chronic kidney disease     END STAGE KIDNEY DISEASE;  DIALYSIS M-W-F    Chronic pain     NEUROPATHY; BACK PAIN    Hypertension     Lymph edema     LEFT LEG    Needle phobia     Other ill-defined conditions(799.89)     Sleep apnea     WEARS CPAP    Stroke ([address] 01/2020    NUMBNESS IN RIGHT LEG AND FOOT    Thromboembolus ([address] 2018    PE        Past Surgical History:   Procedure Laterality Date    HX ORTHOPAEDIC      LEFT HAND, RIGHT ANKLE, AND RIGHT SHOULDER    HX TONSILLECTOMY      HX VASCULAR ACCESS      UPPER RIGHT CHEST    IR FLUORO GUIDED NEEDLE PLACEMENT  10/08/2021    IR INSERT TUNL CVC W/O PORT OVER 5 YR  10/08/2021    IR INSERT TUNL CVC W/O PORT OVER 5 YR  10/10/2022    MO CARDIAC SURG PROCEDURE UNLIST      CARDIAC CATHX2-NO STENTS    VASCULAR SURGERY PROCEDURE UNLIST      PERITONEAL DIALYSIS CATH        Social History     Tobacco Use    Smoking status: Some Days     Types: Cigars    Smokeless tobacco: Never    Tobacco comments:     CIGARS ONCE PER MONTH-NOTHING SINCE 2018   Substance Use Topics    Alcohol use: Never     Comment: NOT FOR 3 YEARS        Family History   Problem Relation Age of Onset    Stroke Mother     Heart Disease Mother     Diabetes Mother     Kidney Disease Mother     Heart Attack Mother     Thyroid Disease Mother     No Known Problems Father     Heart Disease Sister     No Known Problems Sister     Lung Disease Brother     Deep Vein Thrombosis Brother     Anesth Problems Neg Hx         Review of Systems:   Comprehensive review of systems performed and negative except for as listed above. Exam:     Visit Vitals  BP (!) 151/84 (BP 1 Location: Right lower arm, BP Patient Position: Sitting)   Pulse 87   Temp 97.9 °F (36.6 °C)   Resp 11   Ht 6' 2\" (1.88 m)   Wt 154 kg (339 lb 8.1 oz)   SpO2 100%   BMI 43.59 kg/m²        General: Well developed, well nourished. Patient in no apparent distress   Head: Normocephalic, atraumatic, anicteric sclera   Lungs:  Clear to auscultation bilaterally, No wheezes or rubs   Cardiac: Regular rate and rhythm with no murmurs. Abd: Bowel sounds were audible. No tenderness on palpation   Ext: No pedal edema   Skin: No overt signs of rash     Neurological Exam:  Mental Status: A&Ox3, follows commands   Speech: Fluent no aphasia or dysarthria. Cranial Nerves:   VFF. Facial sensation is normal. Facial movement is symmetric. Palate is midline. Normal sternocleidomastoid strength. Tongue is midline. Hearing is intact bilaterally. Eyes: PERRL, EOM's full, no nystagmus, no ptosis. Motor:  Full strength x3 5/5. RLE 4+/5. Normal bulk and tone. Reflexes:   DTR 2+/4 and symmetrical.  Toes downgoing bilat. Sensory:   Sensation intact to light touch bilat throughout   Gait:  Deferred   Tremor:   No tremor noted. Cerebellar:  FTN and HTS intact   Neurovascular: No carotid bruits. Imaging  CT Results (maximum last 3):   Results from [place] encounter on 02/13/23    CT CODE NEURO PERF W CBF    Narrative  CLINICAL HISTORY: Speech changes    EXAMINATION:  CT ANGIOGRAPHY HEAD AND NECK, CT PERFUSION    COMPARISON: None    TECHNIQUE:  Following the uneventful administration of iodinated contrast  material, axial CT angiography of the head and neck was performed. Delayed axial  images through the head were also obtained. Coronal and sagittal reconstructions  were obtained. Manual postprocessing of images was performed. 3-D  Sagittal  maximal intensity projection images were obtained. 3-D Coronal maximal  intensity projections were obtained. CT brain perfusion was performed with  generation of hemodynamic maps of multiple parameters, including cerebral blood  flow, cerebral blood volume, mean transit time, and TMAX. CT dose reduction was  achieved through use of a standardized protocol tailored for this examination  and automatic exposure control for dose modulation. This study was analyzed by the [address] ai algorithm. FINDINGS:    DELAYED ENHANCEMENT HEAD CT  No intra or extra-axial mass or collection. Ventricles are normal in size and  configuration. The basal cisterns are patent. Dural venous sinuses are patent. No abnormal parenchymal or meningeal  enhancement. Mastoid air cells and paranasal sinuses are clear. CTA NECK:    Enlarged pulmonary artery, 4.4 cm. Great vessels: Normal arch anatomy with the origins patent. Right subclavian artery: Patent    Left subclavian artery: Patent    Right common carotid artery: Patent    Left common carotid artery: Patent    Cervical right internal carotid artery: Patent with no significant stenosis by  NASCET criteria. Cervical left internal carotid artery: Patent with no significant stenosis by  NASCET criteria. Right vertebral artery: Patent    Left vertebral artery: Patent    The lung apices are clear. The thyroid is homogeneous. No cervical  lymphadenopathy. Measurements utilize NASCET criteria.     CTA HEAD:    Right cavernous internal carotid artery: Patent    Left cavernous internal carotid artery: Patent    Anterior cerebral arteries: Patent    Anterior communicating artery: Patent    Right middle cerebral artery: Patent    Left middle cerebral artery: Arising from a proximal left M2 branch there is a 3  mm outpouching projecting superiorly (602-2 95). Posterior communicating arteries: Patent    Posterior cerebral arteries: Patent    Basilar artery: Patent    Distal vertebral arteries: Patent    No evidence for intracranial aneurysm or hemodynamically significant stenosis. CT Perfusion:  Bolus timing issues diminished diagnostic accuracy. Perfusion defect in the left temporal lobe. Tmax > 6s: 15 cc  rCBF < 30%: 0 cc  Mismatch volume: 15 cc  Mismatch ratio:  N/A    Impression  1. Small area of apparently delayed perfusion in the left temporal lobe,  although this may be artifactual given poor contrast enhancement of the  intracranial arteries. 2. 3 mm outpouching projecting superiorly from a proximal left M2 branch. Overall appearance favors a saccular aneurysm over infundibulum. 3. Enlarged main pulmonary artery, suggestive of pulmonary arterial  hypertension. These findings were discussed with Dr. Velia Wall by Dr. Logan Johnson at 1:24pm on 2-13-23. CTA CODE NEURO HEAD AND NECK W CONT    Narrative  CLINICAL HISTORY: Speech changes    EXAMINATION:  CT ANGIOGRAPHY HEAD AND NECK, CT PERFUSION    COMPARISON: None    TECHNIQUE:  Following the uneventful administration of iodinated contrast  material, axial CT angiography of the head and neck was performed. Delayed axial  images through the head were also obtained. Coronal and sagittal reconstructions  were obtained. Manual postprocessing of images was performed. 3-D  Sagittal  maximal intensity projection images were obtained. 3-D Coronal maximal  intensity projections were obtained.  CT brain perfusion was performed with  generation of hemodynamic maps of multiple parameters, including cerebral blood  flow, cerebral blood volume, mean transit time, and TMAX. CT dose reduction was  achieved through use of a standardized protocol tailored for this examination  and automatic exposure control for dose modulation. This study was analyzed by the [address] ai algorithm. FINDINGS:    DELAYED ENHANCEMENT HEAD CT  No intra or extra-axial mass or collection. Ventricles are normal in size and  configuration. The basal cisterns are patent. Dural venous sinuses are patent. No abnormal parenchymal or meningeal  enhancement. Mastoid air cells and paranasal sinuses are clear. CTA NECK:    Enlarged pulmonary artery, 4.4 cm. Great vessels: Normal arch anatomy with the origins patent. Right subclavian artery: Patent    Left subclavian artery: Patent    Right common carotid artery: Patent    Left common carotid artery: Patent    Cervical right internal carotid artery: Patent with no significant stenosis by  NASCET criteria. Cervical left internal carotid artery: Patent with no significant stenosis by  NASCET criteria. Right vertebral artery: Patent    Left vertebral artery: Patent    The lung apices are clear. The thyroid is homogeneous. No cervical  lymphadenopathy. Measurements utilize NASCET criteria. CTA HEAD:    Right cavernous internal carotid artery: Patent    Left cavernous internal carotid artery: Patent    Anterior cerebral arteries: Patent    Anterior communicating artery: Patent    Right middle cerebral artery: Patent    Left middle cerebral artery: Arising from a proximal left M2 branch there is a 3  mm outpouching projecting superiorly (602-2 95). Posterior communicating arteries: Patent    Posterior cerebral arteries: Patent    Basilar artery: Patent    Distal vertebral arteries: Patent    No evidence for intracranial aneurysm or hemodynamically significant stenosis. CT Perfusion:  Bolus timing issues diminished diagnostic accuracy. Perfusion defect in the left temporal lobe.   Tmax > 6s: 15 cc  rCBF < 30%: 0 cc  Mismatch volume: 15 cc  Mismatch ratio:  N/A    Impression  1. Small area of apparently delayed perfusion in the left temporal lobe,  although this may be artifactual given poor contrast enhancement of the  intracranial arteries. 2. 3 mm outpouching projecting superiorly from a proximal left M2 branch. Overall appearance favors a saccular aneurysm over infundibulum. 3. Enlarged main pulmonary artery, suggestive of pulmonary arterial  hypertension. These findings were discussed with Dr. Cesar Winters by Dr. Rashad Martines at 1:24pm on 2-13-23. CT CODE NEURO HEAD WO CONTRAST    Narrative  EXAM: CT CODE NEURO HEAD WO CONTRAST    INDICATION: speech changes since last night on warfarin    COMPARISON: CT head 9/15/2012. CONTRAST: None. TECHNIQUE: Unenhanced CT of the head was performed using 5 mm images. Brain and  bone windows were generated. Coronal and sagittal reformats. CT dose reduction  was achieved through use of a standardized protocol tailored for this  examination and automatic exposure control for dose modulation. FINDINGS:  The ventricles and sulci are normal in size, shape and configuration. There is  no significant white matter disease. There is no intracranial hemorrhage,  extra-axial collection, or mass effect. The basilar cisterns are open. No CT  evidence of acute infarct. The bone windows demonstrate no acute abnormalities. 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 Bilateral maxillary sinus mucous retention cysts. Mastoid  air cells are clear. Impression  No acute abnormality      MRI Results (maximum last 3): Results from [place] encounter on 07/07/10    MRI ABDOMEN WITH AND WITHOUT CONT          ICD Codes / Adm. 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    Adrenal glands are normal in size, contour and enhancement. Right kidney is smaller than left measuring 10 cm in length versus 12 cm. There is no renal mass or cyst and there is no hydronephrosis. Aorta shows  no aneurysm or dissection. Solitary renal arteries are present bilaterally;  right renal artery is a small vessel but not obviously stenotic. Left renal  artery appears normal.  Liver, spleen and pancreas appear normal. There is  no ascites, cyst formation or significant adenopathy. IMPRESSION:  1. No adrenal enlargement. 2. Mildly atrophic right kidney. Interpreting/Reading Doctor: Teodoro Deal ([id number])  Transcribed: n/a on 07/10/2010  Approved: Teodoro Deal ([id number])  07/10/2010        Distribution:  Attending Doctor: Meryl Arriola Doctor: Yunior Rodriguez      Lab Review  Lab Results   Component Value Date/Time    WBC 8.0 02/13/2023 10:10 AM    HCT 31.2 (L) 02/13/2023 10:10 AM    HGB 9.5 (L) 02/13/2023 10:10 AM    PLATELET 112 15/21/0291 10:10 AM     Lab Results   Component Value Date/Time    Sodium 135 (L) 02/13/2023 11:12 PM    Potassium 3.9 02/13/2023 11:12 PM    Chloride 98 02/13/2023 11:12 PM    CO2 28 02/13/2023 11:12 PM    Glucose 99 02/13/2023 11:12 PM    BUN 47 (H) 02/13/2023 11:12 PM    Creatinine 15.10 (H) 02/13/2023 11:12 PM    Calcium 9.1 02/13/2023 11:12 PM       Signed:  FABRIZIO Mcfarland  2/14/2023  2:11 PM    The patient was discussed with Dr. Allen Sauer.

## 2023-02-14 NOTE — PROGRESS NOTES
Pharmacist Note - Warfarin Dosing  Consult provided for this 52 y.o.male to manage warfarin for Atrial Fibrillation    INR Goal: 2 - 3    Home regimen/ tablet size: 5 mg daily (how patient takes) Unable to determine if pt took today  Drugs that may increase INR: allopurinol  Drugs that may decrease INR: None  Other current anticoagulants/ drugs that may increase bleeding risk: Aspirin  Risk factors: None  Daily INR ordered: YES    Recent Labs     02/14/23  0555 02/13/23  1010   HGB  --  9.5*   INR 3.1* 3.0*     Date               INR                  Dose  2/13    3.0      5 mg (PTA)  02/14  3.1  4 mg                                                                               Assessment/ Plan: Will order warfarin 4 mg PO x 1 dose. Pharmacy will continue to monitor daily and adjust therapy as indicated. Please contact the pharmacist at  for outpatient recommendations if needed.

## 2023-02-14 NOTE — PROGRESS NOTES
Renal Progress Note    NAME:  Snow Cullen   :   1970   MRN:   [id number]     Date/Time:  2023 11:50 AM  Subjective:       23-Patient feels better. Denies any new weakness.  Had HD last night with 3.5 kg UF      Medications reviewed:  Current Facility-Administered Medications   Medication Dose Route Frequency    warfarin (COUMADIN) tablet 4 mg  4 mg Oral NOW    allopurinoL (ZYLOPRIM) tablet 100 mg  100 mg Oral Once per day on     atorvastatin (LIPITOR) tablet 40 mg  40 mg Oral DAILY    bumetanide (BUMEX) tablet 2 mg  2 mg Oral DAILY    [START ON 2/15/2023] cinacalcet (SENSIPAR) tablet 30 mg  30 mg Oral 3 times weekly    dilTIAZem ER (CARDIZEM CD) capsule 240 mg  240 mg Oral DAILY    ferrous sulfate tablet 325 mg  325 mg Oral ACB    hydrOXYzine HCL (ATARAX) tablet 10 mg  10 mg Oral Q8H PRN    montelukast (SINGULAIR) tablet 10 mg  10 mg Oral DAILY    pregabalin (LYRICA) capsule 75 mg  75 mg Oral DAILY    sevelamer carbonate (RENVELA) tab 1,600 mg  1,600 mg Oral TIDAC    acetaminophen (TYLENOL) tablet 650 mg  650 mg Oral Q4H PRN    Or    acetaminophen (TYLENOL) solution 650 mg  650 mg Per NG tube Q4H PRN    Or    acetaminophen (TYLENOL) suppository 650 mg  650 mg Rectal Q4H PRN    Warfarin pharmacy to dose   Other Rx Dosing/Monitoring    diazePAM (VALIUM) tablet 2 mg  2 mg Oral ONCE PRN    labetaloL (NORMODYNE;TRANDATE) injection 10 mg  10 mg IntraVENous Q6H PRN        Objective:   Vitals:  Visit Vitals  /85 (BP 1 Location: Right lower arm, BP Patient Position: Sitting)   Pulse (!) 121   Temp 97.8 °F (36.6 °C)   Resp 14   Ht 6' 2\" (1.88 m)   Wt 154.3 kg (340 lb 2.7 oz)   SpO2 100%   BMI 43.68 kg/m²     Temp (24hrs), Av °F (36.7 °C), Min:97.7 °F (36.5 °C), Max:98.4 °F (36.9 °C)    O2 Flow Rate (L/min): 2.5 l/min O2 Device: Nasal cannula    Last 24hr Input/Output:    Intake/Output Summary (Last 24 hours) at 2023 1150  Last data filed at 2023 2045  Gross per 24 hour Intake 50 ml   Output 3500 ml   Net -3450 ml        Physical Exam:  General:Alert, No distress, Obese  HEENT: Eyes are PERRL. Conjunctiva without pallor ,erythema. The sclerae without icterus. .   Neck:Supple  Lungs : Clears to auscultation Bilaterally, Normal respiratory effort  CVS: RRR, S1 S2 normal, No rub, + LE edema  Abdomen: Soft, Non tender, Obese  Extremities: edema+  Skin: No rash   Neurologic:  AAO x 3, nl speech  Psych: in good spirit       [] Telemetry Reviewed     [] NSR [] PAC/PVCs   [] Afib  [] Paced   [] NSVT   [] Shin [] NGT  [] Intubated on vent    Lab Data Reviewed:    Recent Results (from the past 24 hour(s))   HEP B SURFACE AB    Collection Time: 02/13/23  4:58 PM   Result Value Ref Range    Hepatitis B surface Ab 35.68 mIU/mL    Hep B surface Ab Interp. REACTIVE (A) NR     HEP B SURFACE AG    Collection Time: 02/13/23  4:58 PM   Result Value Ref Range    Hepatitis B surface Ag <0.10 Index    Hep B surface Ag Interp.  Negative NEG     METABOLIC PANEL, BASIC    Collection Time: 02/13/23 11:12 PM   Result Value Ref Range    Sodium 135 (L) 136 - 145 mmol/L    Potassium 3.9 3.5 - 5.1 mmol/L    Chloride 98 97 - 108 mmol/L    CO2 28 21 - 32 mmol/L    Anion gap 9 5 - 15 mmol/L    Glucose 99 65 - 100 mg/dL    BUN 47 (H) 6 - 20 MG/DL    Creatinine 15.10 (H) 0.70 - 1.30 MG/DL    BUN/Creatinine ratio 3 (L) 12 - 20      eGFR 4 (L) >60 ml/min/1.73m2    Calcium 9.1 8.5 - 10.1 MG/DL   LIPID PANEL    Collection Time: 02/14/23  5:55 AM   Result Value Ref Range    Cholesterol, total 111 <200 MG/DL    Triglyceride 128 <150 MG/DL    HDL Cholesterol 31 MG/DL    LDL, calculated 54.4 0 - 100 MG/DL    VLDL, calculated 25.6 MG/DL    CHOL/HDL Ratio 3.6 0.0 - 5.0     HEMOGLOBIN A1C WITH EAG    Collection Time: 02/14/23  5:55 AM   Result Value Ref Range    Hemoglobin A1c 4.8 4.0 - 5.6 %    Est. average glucose 91 mg/dL   TSH 3RD GENERATION    Collection Time: 02/14/23  5:55 AM   Result Value Ref Range    TSH 0.51 0.36 - 3.74 uIU/mL   MAGNESIUM    Collection Time: 02/14/23  5:55 AM   Result Value Ref Range    Magnesium 2.4 1.6 - 2.4 mg/dL   PHOSPHORUS    Collection Time: 02/14/23  5:55 AM   Result Value Ref Range    Phosphorus 7.2 (H) 2.6 - 4.7 MG/DL   PROTHROMBIN TIME + INR    Collection Time: 02/14/23  5:55 AM   Result Value Ref Range    INR 3.1 (H) 0.9 - 1.1      Prothrombin time 30.3 (H) 9.0 - 11.1 sec         Assessment/Plan:   Active Problems:    CVA (cerebral vascular accident) ([facility] [address] (2/13/2023)         ESRD-on HD MWF  -missed HD on Friday and Monday  -s/p HD 2/13  -next HD tomorrow if still remains inpatient       Concern for stroke, CTA head pending     HTN  -c/w home meds     Anemia-on Epo and iron as outpatient  -Hold EPO pending eval for stroke     Secondary hyperparathyroidism  -on po cinacalcet , continue  -c/w renagel     Afib on coumadin  ___________________________________________________    Total time spent with patient:  [] 15   [] 25   [] 35   []  __ minutes    [] Critical Care Provided    Care Plan discussed with:    [] Patient   [] Family    [] Care Manager   [] Consultant/Specialist :      []   >50% of visit spent in counseling and coordination of care   (Discussed [] CODE status,  [] Care Plan, [] D/C Planning)    ___________________________________________________    Attending Physician: MD Travis Kong

## 2023-02-15 NOTE — DISCHARGE SUMMARY
Discharge Summary     PATIENT ID: Tanya Frey  MRN: [id number]   YOB: 1970    DATE OF ADMISSION: 2/13/2023 10:07 AM    DATE OF DISCHARGE: 2/14/2023   PRIMARY CARE PROVIDER: Jermain Moore MD   ATTENDING PHYSICIAN: Lashae Mitchell MD  DISCHARGING PROVIDER: Chilo Luo NP      CONSULTATIONS: IP CONSULT TO NEPHROLOGY  IP CONSULT TO NEUROINTERVENTIONAL SURGERY  IP CONSULT TO NEPHROLOGY  IP CONSULT TO [address] [facility] [facility] COURSE:   Mr. Priscilla García is a 50 y.o. male with ESRD HD, CAD, CHF, hypertension, history of CVA, afib, history of PE who presents to the hospital with complaints of bilateral extremity weakness and numbness as well as numbness and tingling in both of his hands. States he noted his neurological symptoms started at about 11:30 PM yesterday but worsened by the next am. Symptoms in his bilateral upper extremities have mildly improved but his lower extremity weakness has persisted. Also of note, he missed his dialysis session earlier today due to oversleeping    DISCHARGE DIAGNOSES / PLAN:      Possible CVA  ? Saccular MCA 3 mm aneurysm  ? Left temporal lobe perfusion defect  - Not on aspirin while on warfarin  - MRI with no acute intracranial abnormality  - TTE with bubble study: Normal left ventricular function with EF 55 to 60%. Left ventricular size is normal, increased wall thickness. Finding consistent with mild concentric hypertrophy. No shunt noted. - Lipid panel reviewed, continue with atorvastatin  - TSH 0.51  - cardiac enzymes normal- Neurology consultation, appreciate recommendations  - Neuro interventional surgery has seen, no need for acute intervention at this time.   Patient to follow-up in 2 weeks with Dr. Belinda Mederos    ESRD:   - On dialysis 3 times a week, managed to miss dialysis session due to oversleeping.  - Received dialysis on 2/13/2023, next scheduled dialysis tomorrow    Hx Atrial Fibrillation  - rate controlled  - continue with home meds   - Reports he has his INR checked weekly, 3.1 2/14    Hx PE:   - continue with warfarin and continue to monitor outpatient    Diabetes:   - glucose 115 this am  - Hgb A1c 4.8    BMI: Body mass index is 43.59 kg/m². . This patient: Meets criteria for severe obesity given BMI >/= to 40 due to excess calories/nutritional. Weight loss and lifestyle modifications should be encouraged as an outpatient. PENDING TEST RESULTS:   At the time of discharge the following test results are still pending: none     ADDITIONAL CARE RECOMMENDATIONS:   Seek emergency services if you are experiencing \"the worst headache of your life\"    Maintain regular dialysis sessions, follow up with nephrologist as indicated    Hemoglobin A1c 4.8 today, if you are still taking Trulicity at home-recommend following up with your PCP to discuss possibly reducing dose. DIET: Renal/Diabetic Diet    ACTIVITY: Activity as tolerated    WOUND CARE: none    EQUIPMENT needed: none new    NOTIFY YOUR PHYSICIAN FOR ANY OF THE FOLLOWING:   Fever over 101 degrees for 24 hours. Chest pain, shortness of breath, fever, chills, nausea, vomiting, diarrhea, change in mentation, falling, weakness, bleeding. Severe pain or pain not relieved by medications, as well as any other signs or symptoms that you may have questions about.     FOLLOW UP APPOINTMENTS:    Follow-up Information       Follow up With Specialties Details Why Contact Info    Sanchez Owen MD Endovascular Surgical Neuroradiology Schedule an appointment as soon as possible for a visit in 2 week(s) for possible left MCA aneurysm [address]  [facility] [address] [place]      Faith Jeffrey MD Internal Medicine Physician Follow up in 2 week(s)  [address]              DISCHARGE MEDICATIONS:  Current Discharge Medication List        CONTINUE these medications which have NOT CHANGED    Details   dulaglutide (TRULICITY) 1.5 IH/7.2 mL sub-q pen 1.5 mg by SubCUTAneous route every seven (7) days. Takes on Sundays      dilTIAZem ER (CARDIZEM CD) 240 mg capsule Take 240 mg by mouth daily. ferrous sulfate 325 mg (65 mg iron) tablet Take 325 mg by mouth Daily (before breakfast). hydrOXYzine HCL (ATARAX) 10 mg tablet Take 10 mg by mouth every eight (8) hours as needed. cyanocobalamin 1,000 mcg tablet Take 1,000 mcg by mouth daily. atorvastatin (LIPITOR) 40 mg tablet Take 40 mg by mouth daily. pregabalin (LYRICA) 75 mg capsule Take 75 mg by mouth daily. cinacalcet (SENSIPAR) 30 mg tablet Take 30 mg by mouth DIALYSIS MON, WED & FRI. Takes at dialysis      ERGOCALCIFEROL, VITAMIN D2, PO Take  by mouth. Takes at dialysis      warfarin (Jantoven) 5 mg tablet Take 1 Tablet by mouth daily. Qty: 30 Tablet, Refills: 0      sevelamer carbonate (RENVELA) 800 mg tab tab Take 1,600 mg by mouth Before breakfast, lunch, and dinner. montelukast (SINGULAIR) 10 mg tablet Take 10 mg by mouth daily. bumetanide (BUMEX) 2 mg tablet Take 2 mg by mouth daily. allopurinoL (ZYLOPRIM) 100 mg tablet Take 100 mg by mouth. TAKES ON Monday AND Thursday      predniSONE (DELTASONE) 5 mg tablet Take 5 mg by mouth daily. amLODIPine (NORVASC) 10 mg tablet Take 1 Tab by mouth daily.   Qty: 30 Tab, Refills: 12           STOP taking these medications       oxyCODONE-acetaminophen (PERCOCET) 5-325 mg per tablet Comments:   Reason for Stopping:             DISPOSITION:    Home With:   OT  PT  HH  RN       Long term SNF/Inpatient Rehab    Independent/assisted living    Hospice   xx Other: Home     PATIENT CONDITION AT DISCHARGE:   Functional status    Poor     Deconditioned    xx Independent      Cognition    xx Lucid     Forgetful     Dementia      Catheters/lines (plus indication)    Shin     PICC     PEG    xx None      Code status   xx  Full code     DNR      PHYSICAL EXAMINATION AT DISCHARGE:    Patient was seen and examined this afternoon, he was awaiting his MRI. He denies any headaches, chest pain, chest pressure, shortness of breath or cough. Denies any GI complaints such as nausea, vomiting, diarrhea or constipation and has no dysuria. He reports he has no sensations like he did prior to admission and feels like he has returned to baseline. Neurology has seen and if MRI is negative for a stroke, he may be discharged home. Addendum 1950: MRI negative, may go home. General:          Alert, cooperative, no distress, appears stated age. HEENT:           MMM  Neck:               Trachea midline  Lungs:             Clear to auscultation bilaterally. Heart:              Irregular rhythm, no murmur. HR 91. Abdomen:        Soft, non-tender. Not distended. Bowel sounds normal  Extremities:     Generalized lower extremity edema, no pitting. Moves all extremities. Dialysis graft to left forearm, + thrill/+ bruit  Skin:                Not pale. Not Jaundiced  No rashes   Psych:             Not anxious or agitated. Neurologic:      Alert, moves all extremities, answers questions appropriately and responds to commands. Sensation is intact. Strength is equal and intact. CHRONIC MEDICAL DIAGNOSES:  Problem List as of 2/14/2023 Date Reviewed: 9/29/2022            Codes Class Noted - Resolved    CVA (cerebral vascular accident) [facility]) ICD-10-CM: I63.9  ICD-9-CM: 434.91  2/13/2023 - Present        Atrial fibrillation with RVR ([facility] ICD-10-CM: I48.91  ICD-9-CM: 427.31  10/7/2021 - Present        Onychomycosis ICD-10-CM: B35.1  ICD-9-CM: 110.1  10/14/2020 - Present        CKD (chronic kidney disease), stage III ([facility] ICD-10-CM: N18.30  ICD-9-CM: 585.3  11/16/2011 - Present        Malignant hypertension ICD-10-CM: I10  ICD-9-CM: 401.0  7/8/2010 - Present        Angioedema of lips ICD-10-CM: T78. 3XXA  ICD-9-CM: 995.1  7/8/2010 - Present        Adverse drug reaction-to lisinipril ICD-10-CM: T50.905A  ICD-9-CM: E947.9  7/8/2010 - Present        RESOLVED: HTN (hypertension) ICD-10-CM: I10  ICD-9-CM: 401.9  11/16/2011 - 8/3/2021         Greater than 30 minutes were spent with the patient on counseling and coordination of care    Signed:   Zeke Fonseca NP  2/14/2023  7:19 PM

## 2023-02-15 NOTE — DISCHARGE INSTRUCTIONS
Discharge Instructions     PATIENT ID: Jasmin Ortez  MRN: [id number]   YOB: 1970    DATE OF ADMISSION: 2/13/2023   DATE OF DISCHARGE: 2/14/2023  PRIMARY CARE PROVIDER: Anton STEVENSON   ATTENDING PHYSICIAN: Mackenzie Atkins MD  DISCHARGING PROVIDER: Radha Urias NP      DISCHARGE DIAGNOSES   MCA Aneurysm (incidental finding)  Missed dialysis    CONSULTATIONS:   Neurointerventional Surgery  Neurology  Nephrology    PROCEDURES/SURGERIES: * No surgery found *    PENDING TEST RESULTS:   At the time of discharge the following test results are still pending: none    FOLLOW UP APPOINTMENTS:      Follow-up Information       Follow up With Specialties Details Why Contact Info    Sondra Pineda MD Endovascular Surgical Neuroradiology Schedule an appointment as soon as possible for a visit in 2 week(s) for possible left MCA aneurysm [address]  [facility] [address] Erica Samuels MD Internal Medicine Physician Follow up in 2 week(s)  [address]             ADDITIONAL CARE RECOMMENDATIONS:   Seek emergency services if you are experiencing \"the worst headache of your life\"    Maintain regular dialysis sessions, follow up with nephrologist as indicated    Hemoglobin A1c 4.8 today, if you are still taking Trulicity at home-recommend following up with your PCP to discuss possibly reducing dose. DIET: Renal/Diabetic Diet    ACTIVITY: Activity as tolerated    WOUND CARE: none    EQUIPMENT needed: none new    DISCHARGE MEDICATIONS:   See Medication Reconciliation Form    It is important that you take the medication exactly as they are prescribed. Keep your medication in the bottles provided by the pharmacist and keep a list of the medication names, dosages, and times to be taken in your wallet. Do not take other medications without consulting your doctor.      NOTIFY YOUR PHYSICIAN FOR ANY OF THE FOLLOWING:   Fever over 101 degrees for 24 hours. Chest pain, shortness of breath, fever, chills, nausea, vomiting, diarrhea, change in mentation, falling, weakness, bleeding. Severe pain or pain not relieved by medications. Or, any other signs or symptoms that you may have questions about.     DISPOSITION:    Home With:   OT  PT  HH  RN       SNF/Inpatient Rehab/LTAC    Independent/assisted living    Hospice   xx Other: Home     CDMP Checked:   Yes xx     PROBLEM LIST Updated:  Yes xx     Signed:   Rita Martínez NP  2/14/2023  7:07 PM

## 2023-02-17 LAB
CALCULATED R AXIS, ECG10: 48 DEGREES
CALCULATED T AXIS, ECG11: 174 DEGREES
DIAGNOSIS, 93000: NORMAL
Q-T INTERVAL, ECG07: 344 MS
QRS DURATION, ECG06: 78 MS
QTC CALCULATION (BEZET), ECG08: 469 MS
VENTRICULAR RATE, ECG03: 112 BPM

## 2023-03-17 ENCOUNTER — APPOINTMENT (OUTPATIENT)
Dept: MRI IMAGING | Age: 53
End: 2023-03-17
Attending: EMERGENCY MEDICINE
Payer: MEDICARE

## 2023-03-17 ENCOUNTER — APPOINTMENT (OUTPATIENT)
Dept: MRI IMAGING | Age: 53
End: 2023-03-17
Attending: NURSE PRACTITIONER
Payer: MEDICARE

## 2023-03-17 ENCOUNTER — HOSPITAL ENCOUNTER (EMERGENCY)
Age: 53
Discharge: HOME | End: 2023-03-17
Attending: EMERGENCY MEDICINE
Payer: MEDICARE

## 2023-03-17 ENCOUNTER — APPOINTMENT (OUTPATIENT)
Dept: CT IMAGING | Age: 53
End: 2023-03-17
Attending: EMERGENCY MEDICINE
Payer: MEDICARE

## 2023-03-17 VITALS
WEIGHT: 315 LBS | OXYGEN SATURATION: 99 % | TEMPERATURE: 97.7 F | HEIGHT: 75 IN | SYSTOLIC BLOOD PRESSURE: 133 MMHG | BODY MASS INDEX: 39.17 KG/M2 | RESPIRATION RATE: 16 BRPM | HEART RATE: 78 BPM | DIASTOLIC BLOOD PRESSURE: 95 MMHG

## 2023-03-17 DIAGNOSIS — R20.2 FACIAL TINGLING: Primary | ICD-10-CM

## 2023-03-17 LAB
ALBUMIN SERPL-MCNC: 3.2 G/DL (ref 3.5–5)
ALBUMIN/GLOB SERPL: 0.6 (ref 1.1–2.2)
ALP SERPL-CCNC: 60 U/L (ref 45–117)
ALT SERPL-CCNC: 33 U/L (ref 12–78)
ANION GAP SERPL CALC-SCNC: 9 MMOL/L (ref 5–15)
AST SERPL-CCNC: 27 U/L (ref 15–37)
BASOPHILS # BLD: 0.1 K/UL (ref 0–0.1)
BASOPHILS NFR BLD: 1 % (ref 0–1)
BILIRUB SERPL-MCNC: 0.3 MG/DL (ref 0.2–1)
BUN SERPL-MCNC: 56 MG/DL (ref 6–20)
BUN/CREAT SERPL: 4 (ref 12–20)
CALCIUM SERPL-MCNC: 9.6 MG/DL (ref 8.5–10.1)
CALCULATED R AXIS, ECG10: 77 DEGREES
CALCULATED T AXIS, ECG11: -88 DEGREES
CHLORIDE SERPL-SCNC: 96 MMOL/L (ref 97–108)
CO2 SERPL-SCNC: 29 MMOL/L (ref 21–32)
COMMENT, HOLDF: NORMAL
CREAT SERPL-MCNC: 15.1 MG/DL (ref 0.7–1.3)
DIAGNOSIS, 93000: NORMAL
DIFFERENTIAL METHOD BLD: ABNORMAL
EOSINOPHIL # BLD: 0.5 K/UL (ref 0–0.4)
EOSINOPHIL NFR BLD: 7 % (ref 0–7)
ERYTHROCYTE [DISTWIDTH] IN BLOOD BY AUTOMATED COUNT: 14.6 % (ref 11.5–14.5)
GLOBULIN SER CALC-MCNC: 5.2 G/DL (ref 2–4)
GLUCOSE BLD STRIP.AUTO-MCNC: 128 MG/DL (ref 65–117)
GLUCOSE SERPL-MCNC: 129 MG/DL (ref 65–100)
HCT VFR BLD AUTO: 37.6 % (ref 36.6–50.3)
HGB BLD-MCNC: 11.6 G/DL (ref 12.1–17)
IMM GRANULOCYTES # BLD AUTO: 0 K/UL (ref 0–0.04)
IMM GRANULOCYTES NFR BLD AUTO: 0 % (ref 0–0.5)
INR PPP: 2.9 (ref 0.9–1.1)
LYMPHOCYTES # BLD: 1.5 K/UL (ref 0.8–3.5)
LYMPHOCYTES NFR BLD: 19 % (ref 12–49)
MCH RBC QN AUTO: 32.8 PG (ref 26–34)
MCHC RBC AUTO-ENTMCNC: 30.9 G/DL (ref 30–36.5)
MCV RBC AUTO: 106.2 FL (ref 80–99)
MONOCYTES # BLD: 1.1 K/UL (ref 0–1)
MONOCYTES NFR BLD: 15 % (ref 5–13)
NEUTS SEG # BLD: 4.3 K/UL (ref 1.8–8)
NEUTS SEG NFR BLD: 58 % (ref 32–75)
NRBC # BLD: 0 K/UL (ref 0–0.01)
NRBC BLD-RTO: 0 PER 100 WBC
PLATELET # BLD AUTO: 180 K/UL (ref 150–400)
PMV BLD AUTO: 11.1 FL (ref 8.9–12.9)
POTASSIUM SERPL-SCNC: 4.7 MMOL/L (ref 3.5–5.1)
PROT SERPL-MCNC: 8.4 G/DL (ref 6.4–8.2)
PROTHROMBIN TIME: 28.4 SEC (ref 9–11.1)
Q-T INTERVAL, ECG07: 350 MS
QRS DURATION, ECG06: 78 MS
QTC CALCULATION (BEZET), ECG08: 437 MS
RBC # BLD AUTO: 3.54 M/UL (ref 4.1–5.7)
SAMPLES BEING HELD,HOLD: NORMAL
SERVICE CMNT-IMP: ABNORMAL
SODIUM SERPL-SCNC: 134 MMOL/L (ref 136–145)
VENTRICULAR RATE, ECG03: 94 BPM
WBC # BLD AUTO: 7.5 K/UL (ref 4.1–11.1)

## 2023-03-17 PROCEDURE — 93005 ELECTROCARDIOGRAM TRACING: CPT

## 2023-03-17 PROCEDURE — 99284 EMERGENCY DEPT VISIT MOD MDM: CPT

## 2023-03-17 PROCEDURE — 74011000636 HC RX REV CODE- 636: Performed by: RADIOLOGY

## 2023-03-17 PROCEDURE — 74011250636 HC RX REV CODE- 250/636: Performed by: EMERGENCY MEDICINE

## 2023-03-17 PROCEDURE — 80053 COMPREHEN METABOLIC PANEL: CPT

## 2023-03-17 PROCEDURE — 0042T CT CODE NEURO PERF W CBF: CPT

## 2023-03-17 PROCEDURE — 70496 CT ANGIOGRAPHY HEAD: CPT

## 2023-03-17 PROCEDURE — 70450 CT HEAD/BRAIN W/O DYE: CPT

## 2023-03-17 PROCEDURE — 70544 MR ANGIOGRAPHY HEAD W/O DYE: CPT

## 2023-03-17 PROCEDURE — 85610 PROTHROMBIN TIME: CPT

## 2023-03-17 PROCEDURE — 70551 MRI BRAIN STEM W/O DYE: CPT

## 2023-03-17 PROCEDURE — 82962 GLUCOSE BLOOD TEST: CPT

## 2023-03-17 PROCEDURE — 96374 THER/PROPH/DIAG INJ IV PUSH: CPT

## 2023-03-17 PROCEDURE — 85025 COMPLETE CBC W/AUTO DIFF WBC: CPT

## 2023-03-17 PROCEDURE — 36415 COLL VENOUS BLD VENIPUNCTURE: CPT

## 2023-03-17 RX ORDER — LORAZEPAM 2 MG/ML
1 INJECTION, SOLUTION INTRAMUSCULAR; INTRAVENOUS
Status: COMPLETED | OUTPATIENT
Start: 2023-03-17 | End: 2023-03-17

## 2023-03-17 RX ADMIN — IOPAMIDOL 40 ML: 755 INJECTION, SOLUTION INTRAVENOUS at 13:02

## 2023-03-17 RX ADMIN — LORAZEPAM 1 MG: 2 INJECTION, SOLUTION INTRAMUSCULAR; INTRAVENOUS at 18:01

## 2023-03-17 RX ADMIN — IOPAMIDOL 80 ML: 755 INJECTION, SOLUTION INTRAVENOUS at 13:10

## 2023-03-17 NOTE — DISCHARGE INSTRUCTIONS
Your labs and MRI all appear normal.  There are no acute changes noted. In speaking with the neurologist, he wanted to discharge you home and have you follow-up with your regular physician or return if any worsening of the symptoms. It is unclear whether this is a form of TIA or whether this is just some localized nerve root irritation. In any event it does not appear to be a serious event with the negative evaluation that you have had today.

## 2023-03-17 NOTE — ED NOTES
Pt AOX4. Pt in no distress, has been on the phone making calls. MRI screening completed. Passed swallow screening.

## 2023-03-17 NOTE — ED TRIAGE NOTES
Pt comes to ED from work reports of sudden onset of facial numbness at 0915. Pt noted to have have right side facial droop. Pt reports right side weakness. Pt is A&Ox4. Code stroke level 1 van - called. Reports HD pt with access to left arm. Treatment MWF.

## 2023-03-17 NOTE — PROGRESS NOTES
Neurocritical Care Code Stroke Documentation      Symptoms:  Right sided facial droop/numbness and tingling   Baseline mRS:      Last Known Well:   Maxim 17 hx: Past Medical History:   Diagnosis Date    Anxiety     Arrhythmia     A-FIB    Arthritis     CAD (coronary artery disease)     MI 09    CHF (congestive heart failure) (HCC)     Chronic kidney disease     END STAGE KIDNEY DISEASE;  DIALYSIS M-W-F    Chronic pain     NEUROPATHY; BACK PAIN    Fatigue     Hypertension     Lymph edema     LEFT LEG    Needle phobia     Neuropathy     Other ill-defined conditions(799.89)     Rash     Skipped beats     Sleep apnea     WEARS CPAP    Snoring     SOB (shortness of breath)     Stroke ([facility] [address] 2020    NUMBNESS IN RIGHT LEG AND FOOT    [facility]    PE      Anticoagulation:  Coumadin, INR 3.1 on 23 in chart, per patient was checked last week and was 2.8   VAN:   Negative   NIHSS:   1a-LOC:0    1b-Month/Age:0    1c-Open/Close Hand:0    2-Best Gaze:0    3-Visual Fields:0    4-Facial Palsy:1    5a-Left Arm:0    5b-Right Arm:0    6a-Left Le    6b-Right Le (baseline per pt from prior CVA)    7-Limb Ataxia:0    8-Sensory:1    9-Best Language:0    10-Dysarthria:0    11-Extinction/Inattention:0  TOTAL SCORE:3   Imaging:   CT head negative for acute process    CTA negative for LVO, incidental 2 mm lmca aneurysm   Plan:   TNK Candidate: NO    Mechanical thrombectomy Candidate: NO     *Perform dysphagia screening prior to any PO intake*    Discussed with: Dr Juan Lara. Also D/w Dr Celeste Rolle incidental 2 mm lmca aneurysm, is a clinic patient of Dr Jorge Luis Sue and is supposed to be getting an outpatient MRA. Pt will be getting MRI brain while in ED, so we will add MRA brain onto this so he does not have to come back to have the MRA. He already has an office visit with Dr Celeste Rolle scheduled for 3/29. Arrival time: 1245  Time spent: 30 minutes.      Vi Hawkins NP  Neurocritical Care Nurse Practitioner  [phone]  /

## 2023-03-17 NOTE — ED PROVIDER NOTES
This is a 53-year-old male who presents with numbness and tingling in the right face and some weakness in his right arm. Onset of symptoms was approximately 915 this morning. He has a history of what he calls TIAs with similar symptoms in the past.  Last episode was February of this year. He has a history of atrial fib and is on warfarin for the same. There is a history of coronary artery disease with an MI in 2009. He also has end-stage renal disease and is on Monday Wednesday and Friday dialysis and has not had his dialysis today. He does have a history of heart failure and neuropathy as well as on his going hypertension. He states the symptoms in his face may perhaps be a little better than they were initially. He has not had any headache. He felt that his right arm was slightly weaker than normal although he is not able to demonstrate any weakness. He has not had any fever or chill, nausea or vomiting and no chest pain or shortness of breath. There is been no abdominal pain. Patient states all of his symptoms are getting somewhat better. He presented to the ED because he wanted to make sure this was just a TIA.        Past Medical History:   Diagnosis Date    Anxiety     Arrhythmia     A-FIB    Arthritis     CAD (coronary artery disease)     MI 09    CHF (congestive heart failure) (HCC)     Chronic kidney disease     END STAGE KIDNEY DISEASE;  DIALYSIS M-W-F    Chronic pain     NEUROPATHY; BACK PAIN    Fatigue     Hypertension     Lymph edema     LEFT LEG    Needle phobia     Neuropathy     Other ill-defined conditions(799.89)     Rash     Skipped beats     Sleep apnea     WEARS CPAP    Snoring     SOB (shortness of breath)     Stroke ([address] 01/2020    NUMBNESS IN RIGHT LEG AND FOOT    Thromboembolus ([address] 2018    PE       Past Surgical History:   Procedure Laterality Date    HX ORTHOPAEDIC      LEFT HAND, RIGHT ANKLE, AND RIGHT SHOULDER    HX TONSILLECTOMY      HX VASCULAR ACCESS      UPPER RIGHT CHEST    IR FLUORO GUIDED NEEDLE PLACEMENT  10/08/2021    IR INSERT TUNL CVC W/O PORT OVER 5 YR  10/08/2021    IR INSERT TUNL CVC W/O PORT OVER 5 YR  10/10/2022    KS UNLISTED PROCEDURE CARDIAC SURGERY      CARDIAC CATHX2-NO STENTS    KS UNLISTED PROCEDURE VASCULAR SURGERY      PERITONEAL DIALYSIS CATH         Family History:   Problem Relation Age of Onset    Stroke Mother     Heart Disease Mother     Diabetes Mother     Kidney Disease Mother     Heart Attack Mother     Thyroid Disease Mother     No Known Problems Father     Heart Disease Sister     No Known Problems Sister     Lung Disease Brother     Deep Vein Thrombosis Brother     Neuropathy Other     Anesth Problems Neg Hx        Social History     Socioeconomic History    Marital status: SINGLE     Spouse name: Not on file    Number of children: Not on file    Years of education: Not on file    Highest education level: Not on file   Occupational History    Not on file   Tobacco Use    Smoking status: Former     Types: Cigars    Smokeless tobacco: Never    Tobacco comments:     CIGARS ONCE PER MONTH-NOTHING SINCE 2018   Vaping Use    Vaping Use: Never used   Substance and Sexual Activity    Alcohol use: Never     Comment: NOT FOR 3 YEARS    Drug use: No    Sexual activity: Not on file   Other Topics Concern    Not on file   Social History Narrative    ** Merged History Encounter **          Social Determinants of Health     Financial Resource Strain: Not on file   Food Insecurity: Not on file   Transportation Needs: Not on file   Physical Activity: Not on file   Stress: Not on file   Social Connections: Not on file   Intimate Partner Violence: Not on file   Housing Stability: Not on file         ALLERGIES: Lisinopril, Sulfa (sulfonamide antibiotics), Beef containing products, Pork derived (porcine), and Sulfa (sulfonamide antibiotics)    Review of Systems   Constitutional:  Negative for activity change, appetite change, chills, fatigue and fever.    HENT: Negative for ear pain, facial swelling, sore throat and trouble swallowing. Eyes:  Negative for pain, discharge and visual disturbance. Respiratory:  Negative for chest tightness, shortness of breath and wheezing. Cardiovascular:  Negative for chest pain and palpitations. Gastrointestinal:  Negative for abdominal pain, blood in stool, nausea and vomiting. Genitourinary:  Negative for difficulty urinating, flank pain and hematuria. Musculoskeletal:  Negative for arthralgias, joint swelling, myalgias and neck pain. Skin:  Negative for color change and rash. Neurological:  Negative for dizziness, weakness, numbness and headaches. Tingling in the right side of the face with mild numbness. Some weakness in the right upper extremity. Hematological:  Negative for adenopathy. Does not bruise/bleed easily. Psychiatric/Behavioral:  Negative for behavioral problems, confusion and sleep disturbance. All other systems reviewed and are negative. Vitals:    03/17/23 1236 03/17/23 1336   BP: (!) 162/73 125/77   Pulse: 92 96   Resp: 20 14   Temp: 98.5 °F (36.9 °C)    SpO2: 99% 99%   Weight: 158.3 kg (348 lb 15.8 oz)             Physical Exam  Vitals and nursing note reviewed. Constitutional:       General: He is not in acute distress. Appearance: He is well-developed. HENT:      Head: Normocephalic and atraumatic. Nose: Nose normal.      Mouth/Throat:      Mouth: Mucous membranes are moist.   Eyes:      General: No scleral icterus. Conjunctiva/sclera: Conjunctivae normal.      Pupils: Pupils are equal, round, and reactive to light. Neck:      Thyroid: No thyromegaly. Vascular: No JVD. Trachea: No tracheal deviation. Comments: No carotid bruits noted. Cardiovascular:      Rate and Rhythm: Normal rate and regular rhythm. Heart sounds: Normal heart sounds. No murmur heard. No friction rub. No gallop.    Pulmonary:      Effort: Pulmonary effort is normal. No respiratory distress. Breath sounds: Normal breath sounds. No wheezing or rales. Chest:      Chest wall: No tenderness. Abdominal:      General: Bowel sounds are normal. There is no distension. Palpations: Abdomen is soft. There is no mass. Tenderness: There is no abdominal tenderness. There is no guarding or rebound. Musculoskeletal:         General: No tenderness. Normal range of motion. Cervical back: Normal range of motion and neck supple. Lymphadenopathy:      Cervical: No cervical adenopathy. Skin:     General: Skin is warm and dry. Capillary Refill: Capillary refill takes 2 to 3 seconds. Findings: No erythema or rash. Neurological:      Mental Status: He is alert and oriented to person, place, and time. Cranial Nerves: No cranial nerve deficit. Sensory: Sensory deficit present. Motor: No weakness. Coordination: Coordination normal.      Deep Tendon Reflexes: Reflexes are normal and symmetric. Comments: Mild tingling and paresthesia over the right cheek and normal area of the lateral canthus on the right. No definitive weakness or numbness of the right upper extremity is noted. Psychiatric:         Behavior: Behavior normal.         Thought Content: Thought content normal.         Judgment: Judgment normal.        Medical Decision Making  This is a 57-year-old male who presents with a history of end-stage renal disease on dialysis and hypertension and developed tingling and numbness in the right side of his face and some weakness in the right arm around 915 this morning. He has no headache. He has had symptoms like this previously that have resolved on their own. He was concerned that this may be another TIA. He has had symptoms like this previously but they have disappeared and he calls them TIAs. Is never had a stroke. Patient was evaluated in the triage area for Level One code stroke was called.   The findings at that time were as noted on the physical exam.  He was sent to CT and I spoke with the telemetry neurologist as well as neuro interventional list.  After complete evaluation, the CTs were felt to be nonacute. Telemetry neurologist felt the patient should have an MRI and if negative could be discharged home with symptomatic treatment and follow-up with neuro interventional in the next week. Patient reportedly is being evaluated by them for possible aneurysm. Patient is clinically stable in the ED. Amount and/or Complexity of Data Reviewed  Labs: ordered. Radiology: ordered. ECG/medicine tests: ordered. Risk  Prescription drug management. Procedures    EDMD EKG interpretation: There is atrial fibrillation with a ventricular rate of 94 beats a minute. Normal axis. T wave inversion in V4 through V6. No acute abnormalities noted. Patient has a history of A-fib.   Catalina Schmidt MD

## 2023-03-22 ENCOUNTER — TRANSCRIBE ORDER (OUTPATIENT)
Dept: SCHEDULING | Age: 53
End: 2023-03-22

## 2023-03-22 DIAGNOSIS — R07.2 PRECORDIAL PAIN: Primary | ICD-10-CM

## 2023-03-29 ENCOUNTER — OFFICE VISIT (OUTPATIENT)
Dept: NEUROSURGERY | Age: 53
End: 2023-03-29

## 2023-03-29 VITALS
DIASTOLIC BLOOD PRESSURE: 68 MMHG | BODY MASS INDEX: 39.17 KG/M2 | HEIGHT: 75 IN | WEIGHT: 315 LBS | HEART RATE: 79 BPM | OXYGEN SATURATION: 98 % | TEMPERATURE: 96.8 F | SYSTOLIC BLOOD PRESSURE: 110 MMHG

## 2023-03-29 DIAGNOSIS — I67.1 CEREBRAL ANEURYSM: Primary | ICD-10-CM

## 2023-03-31 ENCOUNTER — HOSPITAL ENCOUNTER (INPATIENT)
Age: 53
LOS: 2 days | Discharge: HOME | DRG: 100 | End: 2023-04-03
Attending: EMERGENCY MEDICINE | Admitting: FAMILY MEDICINE
Payer: MEDICARE

## 2023-03-31 DIAGNOSIS — Z99.2 ESRD NEEDING DIALYSIS (HCC): Primary | ICD-10-CM

## 2023-03-31 DIAGNOSIS — R79.1 SUPRATHERAPEUTIC INR: ICD-10-CM

## 2023-03-31 DIAGNOSIS — N18.6 ESRD NEEDING DIALYSIS (HCC): Primary | ICD-10-CM

## 2023-03-31 DIAGNOSIS — R20.0 FACIAL NUMBNESS: ICD-10-CM

## 2023-03-31 DIAGNOSIS — E87.1 HYPONATREMIA: ICD-10-CM

## 2023-03-31 PROCEDURE — 99285 EMERGENCY DEPT VISIT HI MDM: CPT

## 2023-04-01 ENCOUNTER — APPOINTMENT (OUTPATIENT)
Dept: MRI IMAGING | Age: 53
DRG: 100 | End: 2023-04-01
Attending: EMERGENCY MEDICINE
Payer: MEDICARE

## 2023-04-01 ENCOUNTER — APPOINTMENT (OUTPATIENT)
Dept: CT IMAGING | Age: 53
DRG: 100 | End: 2023-04-01
Attending: EMERGENCY MEDICINE
Payer: MEDICARE

## 2023-04-01 PROBLEM — E87.1 ACUTE HYPONATREMIA: Status: ACTIVE | Noted: 2023-04-01

## 2023-04-01 PROBLEM — R79.1 SUPRATHERAPEUTIC INTERNATIONAL NORMALIZED RATIO (INR): Status: ACTIVE | Noted: 2023-04-01

## 2023-04-01 PROBLEM — Z99.2 ESRD NEEDING DIALYSIS (HCC): Status: ACTIVE | Noted: 2023-04-01

## 2023-04-01 PROBLEM — R20.0 FACIAL NUMBNESS: Status: ACTIVE | Noted: 2023-04-01

## 2023-04-01 PROBLEM — N18.6 ESRD NEEDING DIALYSIS (HCC): Status: ACTIVE | Noted: 2023-04-01

## 2023-04-01 LAB
ANION GAP SERPL CALC-SCNC: 7 MMOL/L (ref 5–15)
BASOPHILS # BLD: 0.1 K/UL (ref 0–0.1)
BASOPHILS NFR BLD: 1 % (ref 0–1)
BUN SERPL-MCNC: 71 MG/DL (ref 6–20)
BUN/CREAT SERPL: 4 (ref 12–20)
CALCIUM SERPL-MCNC: 9.1 MG/DL (ref 8.5–10.1)
CALCULATED R AXIS, ECG10: 77 DEGREES
CALCULATED T AXIS, ECG11: -64 DEGREES
CHLORIDE SERPL-SCNC: 95 MMOL/L (ref 97–108)
CHOLEST SERPL-MCNC: 149 MG/DL
CO2 SERPL-SCNC: 27 MMOL/L (ref 21–32)
COMMENT, HOLDF: NORMAL
CREAT SERPL-MCNC: 16.3 MG/DL (ref 0.7–1.3)
DIAGNOSIS, 93000: NORMAL
DIFFERENTIAL METHOD BLD: ABNORMAL
EOSINOPHIL # BLD: 0.5 K/UL (ref 0–0.4)
EOSINOPHIL NFR BLD: 7 % (ref 0–7)
ERYTHROCYTE [DISTWIDTH] IN BLOOD BY AUTOMATED COUNT: 15.1 % (ref 11.5–14.5)
EST. AVERAGE GLUCOSE BLD GHB EST-MCNC: 120 MG/DL
GLUCOSE BLD STRIP.AUTO-MCNC: 104 MG/DL (ref 65–117)
GLUCOSE BLD STRIP.AUTO-MCNC: 131 MG/DL (ref 65–117)
GLUCOSE BLD STRIP.AUTO-MCNC: 79 MG/DL (ref 65–117)
GLUCOSE BLD STRIP.AUTO-MCNC: 81 MG/DL (ref 65–117)
GLUCOSE SERPL-MCNC: 116 MG/DL (ref 65–100)
HAV IGM SER QL: NONREACTIVE
HBA1C MFR BLD: 5.8 % (ref 4–5.6)
HBV CORE IGM SER QL: NONREACTIVE
HBV SURFACE AG SER QL: <0.1 INDEX
HBV SURFACE AG SER QL: NEGATIVE
HCT VFR BLD AUTO: 35 % (ref 36.6–50.3)
HCV AB SERPL QL IA: NONREACTIVE
HDLC SERPL-MCNC: 30 MG/DL
HDLC SERPL: 5 (ref 0–5)
HGB BLD-MCNC: 11 G/DL (ref 12.1–17)
IMM GRANULOCYTES # BLD AUTO: 0 K/UL (ref 0–0.04)
IMM GRANULOCYTES NFR BLD AUTO: 1 % (ref 0–0.5)
INR PPP: 3.9 (ref 0.9–1.1)
LDLC SERPL CALC-MCNC: 59.4 MG/DL (ref 0–100)
LYMPHOCYTES # BLD: 1.7 K/UL (ref 0.8–3.5)
LYMPHOCYTES NFR BLD: 23 % (ref 12–49)
MCH RBC QN AUTO: 33.2 PG (ref 26–34)
MCHC RBC AUTO-ENTMCNC: 31.4 G/DL (ref 30–36.5)
MCV RBC AUTO: 105.7 FL (ref 80–99)
MONOCYTES # BLD: 1 K/UL (ref 0–1)
MONOCYTES NFR BLD: 14 % (ref 5–13)
NEUTS SEG # BLD: 4.1 K/UL (ref 1.8–8)
NEUTS SEG NFR BLD: 54 % (ref 32–75)
NRBC # BLD: 0 K/UL (ref 0–0.01)
NRBC BLD-RTO: 0 PER 100 WBC
PLATELET # BLD AUTO: 198 K/UL (ref 150–400)
PMV BLD AUTO: 11.4 FL (ref 8.9–12.9)
POTASSIUM SERPL-SCNC: 4.6 MMOL/L (ref 3.5–5.1)
PROTHROMBIN TIME: 37.7 SEC (ref 9–11.1)
Q-T INTERVAL, ECG07: 406 MS
QRS DURATION, ECG06: 80 MS
QTC CALCULATION (BEZET), ECG08: 485 MS
RBC # BLD AUTO: 3.31 M/UL (ref 4.1–5.7)
SAMPLES BEING HELD,HOLD: NORMAL
SERVICE CMNT-IMP: ABNORMAL
SERVICE CMNT-IMP: NORMAL
SODIUM SERPL-SCNC: 129 MMOL/L (ref 136–145)
SP1: NORMAL
SP2: NORMAL
SP3: NORMAL
TRIGL SERPL-MCNC: 298 MG/DL (ref ?–150)
VENTRICULAR RATE, ECG03: 86 BPM
VLDLC SERPL CALC-MCNC: 59.6 MG/DL
WBC # BLD AUTO: 7.4 K/UL (ref 4.1–11.1)

## 2023-04-01 PROCEDURE — 85025 COMPLETE CBC W/AUTO DIFF WBC: CPT

## 2023-04-01 PROCEDURE — 85610 PROTHROMBIN TIME: CPT

## 2023-04-01 PROCEDURE — 83036 HEMOGLOBIN GLYCOSYLATED A1C: CPT

## 2023-04-01 PROCEDURE — 80061 LIPID PANEL: CPT

## 2023-04-01 PROCEDURE — G0378 HOSPITAL OBSERVATION PER HR: HCPCS

## 2023-04-01 PROCEDURE — 99222 1ST HOSP IP/OBS MODERATE 55: CPT | Performed by: PSYCHIATRY & NEUROLOGY

## 2023-04-01 PROCEDURE — 93005 ELECTROCARDIOGRAM TRACING: CPT

## 2023-04-01 PROCEDURE — 74011250637 HC RX REV CODE- 250/637: Performed by: FAMILY MEDICINE

## 2023-04-01 PROCEDURE — 90935 HEMODIALYSIS ONE EVALUATION: CPT

## 2023-04-01 PROCEDURE — 65270000046 HC RM TELEMETRY

## 2023-04-01 PROCEDURE — 80074 ACUTE HEPATITIS PANEL: CPT

## 2023-04-01 PROCEDURE — 36415 COLL VENOUS BLD VENIPUNCTURE: CPT

## 2023-04-01 PROCEDURE — 80048 BASIC METABOLIC PNL TOTAL CA: CPT

## 2023-04-01 PROCEDURE — G0257 UNSCHED DIALYSIS ESRD PT HOS: HCPCS

## 2023-04-01 PROCEDURE — 82962 GLUCOSE BLOOD TEST: CPT

## 2023-04-01 PROCEDURE — 70450 CT HEAD/BRAIN W/O DYE: CPT

## 2023-04-01 RX ORDER — ATORVASTATIN CALCIUM 40 MG/1
40 TABLET, FILM COATED ORAL DAILY
Status: DISCONTINUED | OUTPATIENT
Start: 2023-04-01 | End: 2023-04-03 | Stop reason: HOSPADM

## 2023-04-01 RX ORDER — IBUPROFEN 200 MG
4 TABLET ORAL AS NEEDED
Status: DISCONTINUED | OUTPATIENT
Start: 2023-04-01 | End: 2023-04-03 | Stop reason: HOSPADM

## 2023-04-01 RX ORDER — INSULIN LISPRO 100 [IU]/ML
INJECTION, SOLUTION INTRAVENOUS; SUBCUTANEOUS
Status: DISCONTINUED | OUTPATIENT
Start: 2023-04-01 | End: 2023-04-03 | Stop reason: HOSPADM

## 2023-04-01 RX ORDER — PREGABALIN 75 MG/1
75 CAPSULE ORAL DAILY
Status: DISCONTINUED | OUTPATIENT
Start: 2023-04-01 | End: 2023-04-03 | Stop reason: HOSPADM

## 2023-04-01 RX ORDER — MONTELUKAST SODIUM 10 MG/1
10 TABLET ORAL DAILY
Status: DISCONTINUED | OUTPATIENT
Start: 2023-04-01 | End: 2023-04-03 | Stop reason: HOSPADM

## 2023-04-01 RX ORDER — DILTIAZEM HYDROCHLORIDE 120 MG/1
240 CAPSULE, COATED, EXTENDED RELEASE ORAL DAILY
Status: DISCONTINUED | OUTPATIENT
Start: 2023-04-01 | End: 2023-04-03 | Stop reason: HOSPADM

## 2023-04-01 RX ORDER — AMITRIPTYLINE HYDROCHLORIDE 25 MG/1
25 TABLET, FILM COATED ORAL
COMMUNITY
End: 2023-04-27

## 2023-04-01 RX ORDER — ACETAMINOPHEN 325 MG/1
650 TABLET ORAL
Status: DISCONTINUED | OUTPATIENT
Start: 2023-04-01 | End: 2023-04-03 | Stop reason: HOSPADM

## 2023-04-01 RX ORDER — GABAPENTIN 300 MG/1
300 CAPSULE ORAL
COMMUNITY
End: 2023-04-27

## 2023-04-01 RX ORDER — CINACALCET 30 MG/1
30 TABLET, FILM COATED ORAL
Status: DISCONTINUED | OUTPATIENT
Start: 2023-04-03 | End: 2023-04-03 | Stop reason: HOSPADM

## 2023-04-01 RX ORDER — UREA 10 %
325 LOTION (ML) TOPICAL
Status: DISCONTINUED | OUTPATIENT
Start: 2023-04-01 | End: 2023-04-03 | Stop reason: HOSPADM

## 2023-04-01 RX ORDER — LANOLIN ALCOHOL/MO/W.PET/CERES
1000 CREAM (GRAM) TOPICAL DAILY
Status: DISCONTINUED | OUTPATIENT
Start: 2023-04-01 | End: 2023-04-03 | Stop reason: HOSPADM

## 2023-04-01 RX ORDER — DIAZEPAM 10 MG/2ML
5 INJECTION INTRAMUSCULAR ONCE
Status: ACTIVE | OUTPATIENT
Start: 2023-04-01 | End: 2023-04-02

## 2023-04-01 RX ORDER — SEVELAMER CARBONATE 800 MG/1
1600 TABLET, FILM COATED ORAL
Status: DISCONTINUED | OUTPATIENT
Start: 2023-04-01 | End: 2023-04-03 | Stop reason: HOSPADM

## 2023-04-01 RX ORDER — ACETAMINOPHEN 650 MG/1
650 SUPPOSITORY RECTAL
Status: DISCONTINUED | OUTPATIENT
Start: 2023-04-01 | End: 2023-04-03 | Stop reason: HOSPADM

## 2023-04-01 RX ADMIN — ATORVASTATIN CALCIUM 40 MG: 40 TABLET, FILM COATED ORAL at 09:36

## 2023-04-01 RX ADMIN — PREGABALIN 75 MG: 75 CAPSULE ORAL at 09:36

## 2023-04-01 RX ADMIN — DILTIAZEM HYDROCHLORIDE 240 MG: 120 CAPSULE, EXTENDED RELEASE ORAL at 09:36

## 2023-04-01 RX ADMIN — MONTELUKAST 10 MG: 10 TABLET, FILM COATED ORAL at 09:36

## 2023-04-01 RX ADMIN — CYANOCOBALAMIN TAB 500 MCG 1000 MCG: 500 TAB at 09:36

## 2023-04-01 NOTE — CONSULTS
Neurology Consult Note     NAME: Driss Dan   :  1970   MRN:  [id number]   DATE:  2023       HPI:  Pt is a 56yo [address] male admitted 3/31/23 with c/o right facial/arm N/T with onset at 1600, took three, 325mg ASA and resolved, but returned at 0342 [id number] so came to ED. CTH neg. Pt has h/o Afib on coumadin with INR 3.9. Pt reports onset of right F/A N/T that started in face and moves down, eye feels like it is going to close, then had bilateral R>L feeling of jitteriness in his hands, and vibrations through his body. Also noticed right hearing felt blocked. He had these same right sided sensory changes in Feb and March. Reports prior h/o CVA with right sided deficits. He was seen by Ada Bacon NP from neurology on 23 for sudden symptoms of dropping a dish, slurred speech, word finding difficulty, generalized weakness, paresthesias and possible facial droop witnessed by a coworker. Pt had missed HD x days and BUN/Cr 82/23. 3. MRI brain 3/17/23 neg for acute infarct and sxs were not suggestive of stroke except possible facial droop. CTA H/N with incidental 3mm left M2 aneurysm. 23 TTE with EF 55-60%, mild CLVH, LA severely dilated. MRI brain 3/17/23 neg. MRA brain neg except known incidental aneurysm. CTA H/N unremarkable except for known aneurysm. Pt denies HA associated with sxs, no h/o MHAs. Tells me that his mother had MHA and sz, but his girlfriend states that this is not true. Pt has not missed HD. Na 129, BUN/Cr 71/16. 3. LDL 54.4, HgbA1C 5.8. He also mentions intermittent numbness in bilateral LE. His girlfriend points out that he has been told he has PN and is undergoing workup in podiatry.      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 Taking? ERGOCALCIFEROL, VITAMIN D2, PO 3/25/2023 Self Yes Yes   Sig: Take  by mouth. Takes at dialysis   albuterol (PROVENTIL HFA, VENTOLIN HFA, PROAIR HFA) 90 mcg/actuation inhaler   Yes No   Sig: ProAir HFA 90 mcg/actuation aerosol inhaler   INHALE TWO PUFFS BY MOUTH EVERY 4 HOURS AS NEEDED FOR FOR SHORTNESS OF BREATH AND WHEEZING   allopurinoL (ZYLOPRIM) 100 mg tablet  Self Yes Yes   Sig: Take 100 mg by mouth. TAKES ON Monday AND Thursday   amLODIPine (NORVASC) 10 mg tablet 3/31/2023  No Yes   Sig: Take 1 Tab by mouth daily. amitriptyline (ELAVIL) 25 mg tablet   Yes Yes   Sig: Take 25 mg by mouth nightly. Indications: for sleep   atorvastatin (LIPITOR) 40 mg tablet 3/31/2023 Self Yes Yes   Sig: Take 40 mg by mouth daily. bumetanide (BUMEX) 2 mg tablet Unknown Self Yes No   Sig: Take 2 mg by mouth daily. cinacalcet (SENSIPAR) 30 mg tablet 3/31/2023 Self Yes Yes   Sig: Take 30 mg by mouth DIALYSIS MON, WED & FRI. Takes at dialysis   cyanocobalamin 1,000 mcg tablet 3/31/2023 Self Yes Yes   Sig: Take 1,000 mcg by mouth daily. dilTIAZem ER (CARDIZEM CD) 240 mg capsule 3/31/2023 Self Yes Yes   Sig: Take 240 mg by mouth daily. ferrous sulfate 325 mg (65 mg iron) tablet 3/31/2023 Self Yes Yes   Sig: Take 325 mg by mouth Daily (before breakfast). gabapentin (NEURONTIN) 300 mg capsule   Yes Yes   Sig: Take 300 mg by mouth nightly. Indications: neuropathic pain   hydrOXYzine HCL (ATARAX) 10 mg tablet  Self Yes Yes   Sig: Take 10 mg by mouth every eight (8) hours as needed. montelukast (SINGULAIR) 10 mg tablet 3/31/2023 Self Yes Yes   Sig: Take 10 mg by mouth daily. predniSONE (DELTASONE) 5 mg tablet 3/31/2023  Yes Yes   Sig: Take 5 mg by mouth daily.    semaglutide (Ozempic) 1 mg/dose (2 mg/1.5 mL) sub-q pen   Yes Yes   Si mg by SubCUTAneous route. sevelamer carbonate (RENVELA) 800 mg tab tab 3/31/2023 Self Yes Yes   Sig: Take 1,600 mg by mouth Before breakfast, lunch, and dinner. warfarin (Jantoven) 5 mg tablet 3/31/2023 Self No Yes   Sig: Take 1 Tablet by mouth daily.       Facility-Administered Medications: None       Current Facility-Administered Medications:     atorvastatin (LIPITOR) tablet 40 mg, 40 mg, Oral, DAILY, Irena Arias MD, 40 mg at 23    [START ON 4/3/2023] cinacalcet (SENSIPAR) tablet 30 mg, 30 mg, Oral, DIALYSIS MON, WED & FRI, Irena Arias MD    cyanocobalamin (VITAMIN B12) tablet 1,000 mcg, 1,000 mcg, Oral, DAILY, Irena Arias MD, 1,000 mcg at 23    dilTIAZem ER (CARDIZEM CD) capsule 240 mg, 240 mg, Oral, DAILY, Irena Arias MD, 240 mg at 23    ferrous sulfate tablet 325 mg, 325 mg, Oral, ACB, Irena Arias MD    montelukast (SINGULAIR) tablet 10 mg, 10 mg, Oral, DAILY, Irena Arias MD, 10 mg at 23    pregabalin (LYRICA) capsule 75 mg, 75 mg, Oral, DAILY, Irena Arias MD, 75 mg at 23    sevelamer carbonate (RENVELA) tab 1,600 mg, 1,600 mg, Oral, TIDAC, Irena Arias MD    acetaminophen (TYLENOL) tablet 650 mg, 650 mg, Oral, Q4H PRN **OR** acetaminophen (TYLENOL) solution 650 mg, 650 mg, Per NG tube, Q4H PRN **OR** acetaminophen (TYLENOL) suppository 650 mg, 650 mg, Rectal, Q4H PRN, Carlos Arias MD    glucose chewable tablet 16 g, 4 Tablet, Oral, PRN, Carlos Arias MD    glucagon (GLUCAGEN) injection 1 mg, 1 mg, IntraMUSCular, PRN, Carlos Arias MD    dextrose 10 % infusion 0-250 mL, 0-250 mL, IntraVENous, PRN, Carlos Arias MD    insulin lispro (HUMALOG) injection, , SubCUTAneous, AC&HS, [place], Irena Moss MD    diazePAM (VALIUM) injection 5 mg, 5 mg, IntraVENous, ONCE, Bandar Benitez MD      Allergies   Allergen Reactions    Lisinopril Swelling    Sulfa (Sulfonamide Antibiotics) Other (comments) Had sores all over body    Beef Containing Products Other (comments)     PT DOES NOT EAT BEEF    Pork Derived (Porcine) Other (comments)     PT DOES NOT EAT PORK    Sulfa (Sulfonamide Antibiotics) Hives         SH:  Lives in [place]  Has a girlfriend  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 No T/E/D    FH:  No MHA or sz      PHYSICAL EXAM:    Visit Vitals  /87   Pulse 80   Temp 97.5 °F (36.4 °C)   Resp 16   Ht 6' 3\" (1.905 m)   Wt 347 lb 0.1 oz (157.4 kg)   SpO2 99%   BMI 43.37 kg/m²     Temp (24hrs), Av °F (36.7 °C), Min:97.5 °F (36.4 °C), Max:98.4 °F (36.9 °C)    General: Well developed well nourished morbidly obese patient in no apparent distress. Cardiac: Regular rate and rhythm with no murmurs. Carotids: 2+ symmetric, no bruits  Extremities: 2+ Radial pulses, no cyanosis or edema    Neurological Exam:  Mental Status: Oriented to time, place and person. Speech and language intact. Attention and fund of knowledge appropriate. Normal recent and remote memory. Cranial Nerves:   VFF, PERRL, EOMI, no nystagmus, no diplopia, no ptosis. Facial sensation is increased to PP on right. Facial movement is symmetric. Palate is midline. Tongue is midline. Hearing is intact   Motor:  5/5 strength in upper and lower proximal and distal muscles. Normal bulk and tone. No PD. No tremors   Reflexes:   Deep tendon reflexes 1+ and symmetric. Toes downgoing.    Sensory:   Intact to LT and PP   Gait:     Cerebellar:  Intact FTN, PIOTR         STUDIES AND REPORTS:  Recent Results (from the past 24 hour(s))   GLUCOSE, POC    Collection Time: 23 12:02 AM   Result Value Ref Range    Glucose (POC) 104 65 - 117 mg/dL    Performed by Patel Cruz (PCT)    SAMPLES BEING HELD    Collection Time: 23 12:11 AM   Result Value Ref Range    SAMPLES BEING HELD 1PST,1RED,1LAV,1BLUE     COMMENT        Add-on orders for these samples will be processed based on acceptable specimen integrity and analyte stability, which may vary by analyte. METABOLIC PANEL, BASIC    Collection Time: 04/01/23 12:11 AM   Result Value Ref Range    Sodium 129 (L) 136 - 145 mmol/L    Potassium 4.6 3.5 - 5.1 mmol/L    Chloride 95 (L) 97 - 108 mmol/L    CO2 27 21 - 32 mmol/L    Anion gap 7 5 - 15 mmol/L    Glucose 116 (H) 65 - 100 mg/dL    BUN 71 (H) 6 - 20 MG/DL    Creatinine 16.30 (H) 0.70 - 1.30 MG/DL    BUN/Creatinine ratio 4 (L) 12 - 20      eGFR 3 (L) >60 ml/min/1.73m2    Calcium 9.1 8.5 - 10.1 MG/DL   CBC WITH AUTOMATED DIFF    Collection Time: 04/01/23 12:11 AM   Result Value Ref Range    WBC 7.4 4.1 - 11.1 K/uL    RBC 3.31 (L) 4.10 - 5.70 M/uL    HGB 11.0 (L) 12.1 - 17.0 g/dL    HCT 35.0 (L) 36.6 - 50.3 %    .7 (H) 80.0 - 99.0 FL    MCH 33.2 26.0 - 34.0 PG    MCHC 31.4 30.0 - 36.5 g/dL    RDW 15.1 (H) 11.5 - 14.5 %    PLATELET 419 201 - 211 K/uL    MPV 11.4 8.9 - 12.9 FL    NRBC 0.0 0  WBC    ABSOLUTE NRBC 0.00 0.00 - 0.01 K/uL    NEUTROPHILS 54 32 - 75 %    LYMPHOCYTES 23 12 - 49 %    MONOCYTES 14 (H) 5 - 13 %    EOSINOPHILS 7 0 - 7 %    BASOPHILS 1 0 - 1 %    IMMATURE GRANULOCYTES 1 (H) 0.0 - 0.5 %    ABS. NEUTROPHILS 4.1 1.8 - 8.0 K/UL    ABS. LYMPHOCYTES 1.7 0.8 - 3.5 K/UL    ABS. MONOCYTES 1.0 0.0 - 1.0 K/UL    ABS. EOSINOPHILS 0.5 (H) 0.0 - 0.4 K/UL    ABS. BASOPHILS 0.1 0.0 - 0.1 K/UL    ABS. IMM.  GRANS. 0.0 0.00 - 0.04 K/UL    DF AUTOMATED     PROTHROMBIN TIME + INR    Collection Time: 04/01/23 12:11 AM   Result Value Ref Range    INR 3.9 (H) 0.9 - 1.1      Prothrombin time 37.7 (H) 9.0 - 11.1 sec   EKG, 12 LEAD, INITIAL    Collection Time: 04/01/23  2:21 AM   Result Value Ref Range    Ventricular Rate 86 BPM    QRS Duration 80 ms    Q-T Interval 406 ms    QTC Calculation (Bezet) 485 ms    Calculated R Axis 77 degrees    Calculated T Axis -64 degrees    Diagnosis       Atrial fibrillation  T wave abnormality, consider inferior ischemia  T wave abnormality, consider anterolateral ischemia  Prolonged QT  When compared with ECG of 17-MAR-2023 13:13,  No significant change was found  Confirmed by Yanni Meyers M.D., Jonathan Aguilera ([id number]) on 4/1/2023 11:31:20 AM     GLUCOSE, POC    Collection Time: 04/01/23  8:58 AM   Result Value Ref Range    Glucose (POC) 79 65 - 117 mg/dL    Performed by Cristhian Fong    GLUCOSE, POC    Collection Time: 04/01/23 12:10 PM   Result Value Ref Range    Glucose (POC) 81 65 - 117 mg/dL    Performed by [address], ACUTE    Collection Time: 04/01/23  3:51 PM   Result Value Ref Range    Hepatitis A, IgM NONREACTIVE NR      __          Hepatitis B surface Ag <0.10 Index    Hep B surface Ag Interp. Negative NEG      __          Hepatitis B core, IgM NONREACTIVE NR      __          Hep C virus Ab Interp. NONREACTIVE NR       MRI Results (most recent):  Results from Hospital Encounter encounter on 03/17/23    MRA BRAIN WO CONT    Narrative  CLINICAL HISTORY: Facial numbness    INDICATION: Facial numbness. COMPARISON: 2/13/2023  TECHNIQUE: MR examination of the brain includes axial and sagittal T1 , axial  T2, axial FLAIR, axial gradient echo, axial DWI, coronal T2. Contrast: No contrast was administered. .  Next, 3-D time-of-flight MRA of the brain was performed. Multiplanar  reconstructions were obtained. FINDINGS:  There is no intracranial mass, hemorrhage or evidence of acute infarction. IACs are symmetric in size. Mucous retention cyst in the left maxillary sinus. There is no Chiari or syrinx. The pituitary and infundibulum are grossly  unremarkable. There is no skull base mass. Cerebellopontine angles are grossly  unremarkable. The major intracranial vascular flow-voids are unremarkable. The cavernous sinuses are symmetric. Optic chiasm and infundibulum grossly  unremarkable. Orbits are grossly symmetric. Dural venous sinuses are grossly  patent.  The brain architecture is normal. There is no evidence of midline shift  or mass-effect. There are no extra-axial fluid collections. The mastoid air  cells and paranasal sinuses are well pneumatized and clear. The vertebral arteries are codominant. The basilar artery and its branches are  normal. The internal carotid, anterior cerebral, and middle cerebral arteries  are patent. There is no flow-limiting intracranial stenosis. 2 mm left M2  aneurysm is unchanged. 1-41 There are no sizable posterior communicating  arteries. Impression  No intracranial mass, hemorrhage or evidence of acute infarction. There is no major vessel occlusion or dissection. Left MCA M2 aneurysm is redemonstrated      CT Results (most recent):  Results from Hospital Encounter encounter on 03/31/23    CT HEAD WO CONT    Narrative  EXAM: CT HEAD WO CONT    INDICATION: facial numbness    COMPARISON: 3.17.2023. CONTRAST: None. TECHNIQUE: Unenhanced CT of the head was performed using 5 mm images. Brain and  bone windows were generated. Coronal and sagittal reformats. CT dose reduction  was achieved through use of a standardized protocol tailored for this  examination and automatic exposure control for dose modulation. FINDINGS:  The ventricles and sulci are normal in size, shape and configuration. There is  no significant white matter disease. There is no intracranial hemorrhage,  extra-axial collection, or mass effect. The basilar cisterns are open. No CT  evidence of acute infarct. The bone windows demonstrate no abnormalities. The visualized portions of the  paranasal sinuses and mastoid air cells are remarkable for mucus retention cyst  in the left maxillary sinus.     Impression  No acute process seen          Assessment and Plan:   Pt is a 54yo LH male with Afib therapeutic on coumadin, HTN, HLD, CVA, prediabetes, CAD, admitted 3/31/23 with c/o right facial/arm N/T that started in face and moves down, eye feels like it is going to close, then had bilateral R>L feeling of jitteriness in his hands, and vibrations through his body. Also noticed right hearing felt blocked. He had these same right sided sensory changes in Feb and March. CTH neg. Exam with BMI 43.4, increased PP in right face, o/w non-focal.  Low suspicion for TIA/CVA. DDx of sensory changes with Jacksonian March includes migraine and seizure. MRI brain pending, if neg consider EEG and/or empiric trial of ASD. Continue Coumadin and Lipitor 40mg daily. Signed: Ole Chong MD

## 2023-04-01 NOTE — PROCEDURES
[facility] [place] / [phone]    Vitals Pre Post Assessment Pre Post   BP BP: 127/78 (04/01/23 1616) 122/72 LOC Awake, alert, oriented x 4 Awake, alert, oriented x 4   HR Pulse (Heart Rate): (!) 53 (04/01/23 1616)   94 Lungs Diminished Diminished   Resp Resp Rate: 16 (04/01/23 1449) 18 Cardiac HR 53 HR 94   Temp Temp: 97.5 °F (36.4 °C) (04/01/23 1449) 98.0 Skin CDI, tattoos CDI, tattoos   Weight  Not obtained Not obtained Edema +1 - +2 pitting BLE +1 - +2 pitting BLE   Tele status No tele Bedside Pain  No c/o pain No c/o pain     Orders   Duration: Start: 4832 End: 1916 Total: 3.5   Dialyzer: Dialyzer/Set Up Inspection: Maria Guadalupe Felton (04/01/23 1532)   K Bath: Dialysate K (mEq/L): 2 (04/01/23 1532)   Ca Bath: Dialysate CA (mEq/L): 2.5 (04/01/23 1532)   Na: Dialysate NA (mEq/L):  (138) (04/01/23 1532)   Bicarb: Dialysate HCO3 (mEq/L): 35 (04/01/23 1532)   Target Fluid Removal: Goal/Amount of Fluid to Remove (mL): 3000 mL (04/01/23 1532)     Access   Type & Location: LUE AVF   Comments:           AVF CDI. + Bruit and thrill. Skin at AVF appropriate for patient's ethnicity without redness, swelling, or drainage noted. AVF cleaned per P&P, site accessed with 15g needle x 2, + flashback in needle limbs followed by 10cc NS flush, no resistance noted. Needle limbs secured with tape, chevrons placed for limb security.                              Labs   HBsAg (Antigen) / date:     Negative 2/13/2023                                          HBsAb (Antibody) / date:     Immune 2/13/2023   Source:     Epic   Obtained/Reviewed  Critical Results Called HGB   Date Value Ref Range Status   04/01/2023 11.0 (L) 12.1 - 17.0 g/dL Final     Potassium   Date Value Ref Range Status   04/01/2023 4.6 3.5 - 5.1 mmol/L Final     Calcium   Date Value Ref Range Status   04/01/2023 9.1 8.5 - 10.1 MG/DL Final     BUN   Date Value Ref Range Status   04/01/2023 71 (H) 6 - 20 MG/DL Final     Creatinine   Date Value Ref Range Status   04/01/2023 16.30 (H) 0.70 - 1.30 MG/DL Final        Meds Given   Name Dose Route                    Adequacy / Fluid    Total Liters Process: 73.0   Net Fluid Removed: 3000      Comments   Time Out Done:   (Time) 1510   Admitting Diagnosis: ESRD   Consent obtained/signed: Informed Consent Verified:  (OP HD [facility]) (04/01/23 1532)   Machine / [place] [employer] # Machine Number: Gladis Guevara (04/01/23 1393)   Primary Nurse Rpt Pre: Abbey Simeon RN   Primary Nurse Rpt Post: Abbey Simeon RN   Pt Education: Infection prevention   Care Plan: Continue HD plan of care   Pts outpatient clinic: [facility]     Tx Summary   Comments:                 2860 - HD initiated. Pump speed at 300, venous pressures exceeding 300, causing blood pump to stop. Venous needle assessed for patency and position, + blood return and NS flush without swelling or c/o pain. Needle limbs secured, resumed HD with blood pump at 375. All circuit pressures remain within acceptable limits. Will continue to monitor. 1916 - All possible blood returned to patient. Needles removed, manual pressure applied over puncture sites for approximately ten minutes, hemostasis achieved. Puncture sites covered with clean dry folded 2x2, secured with tape. Patient is awake and alert, VSS at end of HD treatment. Report to HERI Goldberg RN.

## 2023-04-01 NOTE — H&P
History and Physical    Date of Service:  4/1/2023  Primary Care Provider: Dayday Rendon MD  Source of information: The patient and Chart review    Chief Complaint: Numbness      History of Presenting Illness:   Hilda Purcell is a 50 y.o. male with past medical history of end-stage renal disease on hemodialysis, CAD, MI, CHF, atrial fibrillation, hypertension, type 2 diabetes mellitus, CVA, PE, lymphedema left leg, peripheral neuropathy, sleep apnea, arthritis, asthma, class III obesity presented to the emergency department chief complaint of facial numbness. Symptoms onset reported began approximate 1600 hrs. yesterday with right facial and right arm and finger numbness and tingling, initially constant, moderate severity, without specific precipitating factors, reportedly resolved after taking aspirin. Symptoms reportedly occurred approximate 22:45 hours yesterday. On arrival emergency department, initial vital signs temperature 98.4 °F, /80, heart rate 88, respiratory rate 20, saturation 99% on room air. Abnormal labs include hemoglobin 11.0, .7, INR 3.9, sodium 129, blood glucose 116, BUN 71, creatinine 16.30, GFR 3.  CT head without IV contrast showed no acute intracranial normalities. 12 EKG showed atrial fibrillation, T wave changes anterior anterior lateral leads, prolonged QTc 485 ms at 86 bpm.  Patient is now seen for admission to the hospital service. REVIEW OF SYSTEMS:  Pertinent items are noted in the History of Present Illness.      Past Medical History:   Diagnosis Date    Anxiety     Arrhythmia     A-FIB    Arthritis     CAD (coronary artery disease)     MI 09    CHF (congestive heart failure) (HCC)     Chronic kidney disease     END STAGE KIDNEY DISEASE;  DIALYSIS M-W-F    Chronic pain     NEUROPATHY; BACK PAIN    Fatigue     Hypertension     Lymph edema     LEFT LEG    Needle phobia     Neuropathy     Other ill-defined conditions(039.89)     Rash     Skipped beats     Sleep apnea     WEARS CPAP    Snoring     SOB (shortness of breath)     Stroke ([facility] 01/2020    NUMBNESS IN RIGHT LEG AND FOOT    Thromboembolus ([facility] [address] 2018    PE      Past Surgical History:   Procedure Laterality Date    HX ORTHOPAEDIC      LEFT HAND, RIGHT ANKLE, AND RIGHT SHOULDER    HX TONSILLECTOMY      HX VASCULAR ACCESS      UPPER RIGHT CHEST    IR FLUORO GUIDED NEEDLE PLACEMENT  10/08/2021    IR INSERT TUNL CVC W/O PORT OVER 5 YR  10/08/2021    IR INSERT TUNL CVC W/O PORT OVER 5 YR  10/10/2022    FL UNLISTED PROCEDURE CARDIAC SURGERY      CARDIAC CATHX2-NO STENTS    FL UNLISTED PROCEDURE VASCULAR SURGERY      PERITONEAL DIALYSIS CATH     MEDICATIONS:  Prior to Admission medications    Medication Sig Start Date End Date Taking? Authorizing Provider   albuterol (PROVENTIL HFA, VENTOLIN HFA, PROAIR HFA) 90 mcg/actuation inhaler ProAir HFA 90 mcg/actuation aerosol inhaler   INHALE TWO PUFFS BY MOUTH EVERY 4 HOURS AS NEEDED FOR FOR SHORTNESS OF BREATH AND WHEEZING    Provider, Historical   calcium acetate,phosphat bind, (PHOSLO) 667 mg cap calcium acetate(phosphate binders) 667 mg capsule   TAKE THREE CAPSULES BY MOUTH THREE TIMES A DAY WITH MEALS AND 1 CAPSULE TWO TIMES A DAY WITH SNACKS    Provider, Historical   semaglutide (Ozempic) 1 mg/dose (2 mg/1.5 mL) sub-q pen 1 mg by SubCUTAneous route. 1/17/23   Provider, Historical   nystatin (MYCOSTATIN) powder  1/17/23   Provider, Historical   dulaglutide (TRULICITY) 1.5 SK/5.6 mL sub-q pen 1.5 mg by SubCUTAneous route every seven (7) days. Takes on Sundays  Patient not taking: No sig reported    Provider, Historical   dilTIAZem ER (CARDIZEM CD) 240 mg capsule Take 240 mg by mouth daily. Provider, Historical   ferrous sulfate 325 mg (65 mg iron) tablet Take 325 mg by mouth Daily (before breakfast). Provider, Historical   hydrOXYzine HCL (ATARAX) 10 mg tablet Take 10 mg by mouth every eight (8) hours as needed.     Provider, Historical   cyanocobalamin 1,000 mcg tablet Take 1,000 mcg by mouth daily. Provider, Historical   atorvastatin (LIPITOR) 40 mg tablet Take 40 mg by mouth daily. Provider, Historical   pregabalin (LYRICA) 75 mg capsule Take 75 mg by mouth daily. Provider, Historical   cinacalcet (SENSIPAR) 30 mg tablet Take 30 mg by mouth DIALYSIS MON, WED & FRI. Takes at dialysis    Provider, Historical   ERGOCALCIFEROL, VITAMIN D2, PO Take  by mouth. Takes at dialysis    Provider, Historical   warfarin (Jantoven) 5 mg tablet Take 1 Tablet by mouth daily. 10/12/21   Joshua Leslie MD   sevelamer carbonate (RENVELA) 800 mg tab tab Take 1,600 mg by mouth Before breakfast, lunch, and dinner. Other, MD Lea   montelukast (SINGULAIR) 10 mg tablet Take 10 mg by mouth daily. 11/25/20   Other, MD Lea   bumetanide (BUMEX) 2 mg tablet Take 2 mg by mouth daily. Other, MD Lea   allopurinoL (ZYLOPRIM) 100 mg tablet Take 100 mg by mouth. TAKES ON Monday AND Thursday    Provider, Historical   predniSONE (DELTASONE) 5 mg tablet Take 5 mg by mouth daily. Patient not taking: No sig reported    Provider, Historical   amLODIPine (NORVASC) 10 mg tablet Take 1 Tab by mouth daily.   Patient not taking: No sig reported 11/16/11   Kell Delacruz MD     Allergies   Allergen Reactions    Lisinopril Swelling    Sulfa (Sulfonamide Antibiotics) Other (comments)     Had sores all over body    Beef Containing Products Other (comments)     PT DOES NOT EAT BEEF    Pork Derived (Porcine) Other (comments)     PT DOES NOT EAT PORK    Sulfa (Sulfonamide Antibiotics) Hives      Family History   Problem Relation Age of Onset    Stroke Mother     Heart Disease Mother     Diabetes Mother     Kidney Disease Mother     Heart Attack Mother     Thyroid Disease Mother     No Known Problems Father     Heart Disease Sister     No Known Problems Sister     Lung Disease Brother     Deep Vein Thrombosis Brother     Neuropathy Other     Anesth Problems Neg Hx       Social History: Smoking:  denies  Alcohol:  denies  Illicit drugs:  denies  Social Determinants of Health     Tobacco Use: Medium Risk    Smoking Tobacco Use: Former    Smokeless Tobacco Use: Never    Passive Exposure: Not on file   Alcohol Use: Not on file   Financial Resource Strain: Not on file   Food Insecurity: Not on file   Transportation Needs: Not on file   Physical Activity: Not on file   Stress: Not on file   Social Connections: Not on file   Intimate Partner Violence: Not on file   Depression: Not on file   Housing Stability: Not on file        Medications were reconciled to the best of my ability given all available resources at the time of admission. Route is PO if not otherwise noted. Family and social history were personally reviewed, all pertinent and relevant details are outlined as above. Objective:   Visit Vitals  BP (!) 139/59   Pulse 80   Temp 98.4 °F (36.9 °C)   Resp 12   Ht 6' 3\" (1.905 m)   Wt 157.4 kg (347 lb 0.1 oz)   SpO2 99%   BMI 43.37 kg/m²      O2 Device: None (Room air)    PHYSICAL EXAM:   General:  Patient is in no acute respiratory distress. Head:  Normocephalic, without obvious abnormality, atraumatic   Eyes:  Conjunctivae/corneas clear. PERRL, EOMs intact   E/N/M/T: Nares normal. Septum midline.  No nasal drainage or sinus tenderness  Tongue midline/ non-edematous  Clear oropharynx   Neck: Normal appearance and movements, symmetrical, trachea midline  No palpable adenopathy  No thyroid enlargement, tenderness or nodules  No carotid bruit   No JVD  Trachea midline   Lungs:   Symmetrical chest expansion and respiratory effort  Clear to auscultation bilaterally   Chest wall:  No tenderness or deformity   Heart:  Regular rate and rhythm   Normal S1 and S2; no murmur, click, rub or gallop   Abdomen:   Soft, no tenderness  No rebound, guarding, or rigidity  Non-distended   Bowel sounds normal  No masses or hepatosplenomegaly  No hernias present   Back: No costovertebral angle tenderness  No step-off deformity   Extremities: Extremities normal, atraumatic  No cyanosis or edema     Vascular/  Pulses: 2+ radial/ 1+ DP bilateral pulses   Integument/  Skin: No rashes or ulcers  Warm and dry   Musculo-      skeletal: Gait not tested  No calf tenderness   Neuro: GCS 15. Moves all extremities x 4. No slurred speech. No facial droop. Sensation grossly intact. No pronator drift. Psych: Alert, oriented x 3              Data Review:   I have independently reviewed and interpreted patient's lab and all other diagnostic data    Abnormal Labs Reviewed   METABOLIC PANEL, BASIC - Abnormal; Notable for the following components:       Result Value    Sodium 129 (*)     Chloride 95 (*)     Glucose 116 (*)     BUN 71 (*)     Creatinine 16.30 (*)     BUN/Creatinine ratio 4 (*)     eGFR 3 (*)     All other components within normal limits   CBC WITH AUTOMATED DIFF - Abnormal; Notable for the following components:    RBC 3.31 (*)     HGB 11.0 (*)     HCT 35.0 (*)     .7 (*)     RDW 15.1 (*)     MONOCYTES 14 (*)     IMMATURE GRANULOCYTES 1 (*)     ABS.  EOSINOPHILS 0.5 (*)     All other components within normal limits   PROTHROMBIN TIME + INR - Abnormal; Notable for the following components:    INR 3.9 (*)     Prothrombin time 37.7 (*)     All other components within normal limits       All Micro Results       None            IMAGING:   CT HEAD WO CONT   Final Result   No acute process seen      MRI BRAIN WO CONT    (Results Pending)        ECG/ECHO:    Results for orders placed or performed during the hospital encounter of 03/31/23   EKG, 12 LEAD, INITIAL   Result Value Ref Range    Ventricular Rate 86 BPM    QRS Duration 80 ms    Q-T Interval 406 ms    QTC Calculation (Bezet) 485 ms    Calculated R Axis 77 degrees    Calculated T Axis -64 degrees    Diagnosis       Atrial fibrillation  T wave abnormality, consider inferior ischemia  T wave abnormality, consider anterolateral ischemia  Prolonged QT  Abnormal ECG  When compared with ECG of 17-MAR-2023 13:13,  No significant change was found            Notes reviewed from all clinical/nonclinical/nursing services involved in patient's clinical care. Care coordination discussions were held with appropriate clinical/nonclinical/ nursing providers based on care coordination needs. Assessment:   Given the patient's current clinical presentation, there is a high level of concern for decompensation if discharged from the emergency department. Complex decision making was performed, which includes reviewing the patient's available past medical records, laboratory results, and imaging studies. Active Problems:    Acute hyponatremia (4/1/2023)      Facial numbness (4/1/2023)      ESRD needing dialysis ([address] (4/1/2023)      Supratherapeutic international normalized ratio (INR) (4/1/2023)        Plan:     Facial numbness        Numbness and tingling right upper extremity  -recent hospitalization with possible TIA/ saccular MCA aneurysm/ left temporal artery perfusion defect; with residual RLE numbness   -admit to neuro/ stroke floor  -symptoms recurrent  -CT head without IV contrast:  no acute process  -consider for MRI brain without IV contrast  -neurologist consultation if symptoms recur    2. ESRD needing dialysis  -consult nephrologist for hemodialysis  -repeat renal panel in a.m.    3. Acute hyponatremia  -Na 129  -repeat sodium level and proceed towards slow correction    4. Supratherapeutic international normalized ratio (INR)  -h/o PE  -on long term anticoagulation with Warfarin  -INR = 3.9  -repeat PT/INR  -hold Warfarin    5. Macrocytic anemia  -anemia of chronic disease  -Hgb 11.0, .7  -repeat H&H  -check J35 and folic acid levels    6. History of atrial fibrillation  -continuous telemetry monitoring  -continue Diltiazem  mg daily    7. Essential hypertension  -Monitor BP and provide hypertensive medications as needed    8.   Hyperlipidemia  -Continue atorvastatin 40 mg daily  -Check lipid panel    9. Class III obesity  -BMI 43.37 kg/M2  -Would encourage weight loss, reduce calorie diet, lifestyle modifications    10. Type 2 diabetes mellitus  -Order Humalog insulin correctional coverage, scheduled blood glucose checks and check hemoglobin A1c level. DIET: renal/ carb consistent   ISOLATION PRECAUTIONS: There are currently no Active Isolations  CODE STATUS: FULL CODE  DVT PROPHYLAXIS: No VTE Prophylaxis Needed  FUNCTIONAL STATUS PRIOR TO HOSPITALIZATION: Fully active and ambulatory; able to carry on all self-care without restriction. Ambulatory status/function: By self   EARLY MOBILITY ASSESSMENT: Recommend routine ambulation while hospitalized with the assistance of nursing staff  ANTICIPATED DISCHARGE: Greater than 48 hours. ANTICIPATED DISPOSITION: Home with Home Healthcare  EMERGENCY CONTACT/SURROGATE DECISION MAKER:  Trevor Wallace, life partner [phone]. CRITICAL CARE WAS PERFORMED FOR THIS ENCOUNTER: NO.    ADVANCED DIRECTIVE/ CODE STATUS:  FULL CODE as per discussion with patient. Signed By: Al Morocho MD     April 1, 2023         Please note that this dictation may have been completed with Dragon, the computer voice recognition software. Quite often unanticipated grammatical, syntax, homophones, and other interpretive errors are inadvertently transcribed by the computer software. Please disregard these errors. Please excuse any errors that have escaped final proofreading.

## 2023-04-01 NOTE — PROCEDURES
Hemodialysis / [phone]    Vitals Pre Post Assessment Pre Post   BP BP: 127/78 (04/01/23 1616) 122/72 LOC Awake, alert, oriented x 4 Awake, alert, oriented x 4   HR Pulse (Heart Rate): (!) 53 (04/01/23 1616)   94 Lungs Diminished Diminished   Resp Resp Rate: 16 (04/01/23 1449) 18 Cardiac HR 53 HR 94   Temp Temp: 97.5 °F (36.4 °C) (04/01/23 1449) 98.0 Skin CDI, tattoos CDI, tattoos   Weight  Not obtained Not obtained Edema +1 - +2 pitting BLE +1 - +2 pitting BLE   Tele status No tele Bedside Pain  No c/o pain No c/o pain     Orders   Duration: Start: 0446 End: 1916 Total: 3.5   Dialyzer: Dialyzer/Set Up Inspection: En Lin (04/01/23 1532)   K Bath: Dialysate K (mEq/L): 2 (04/01/23 1532)   Ca Bath: Dialysate CA (mEq/L): 2.5 (04/01/23 1532)   Na: Dialysate NA (mEq/L):  (138) (04/01/23 1532)   Bicarb: Dialysate HCO3 (mEq/L): 35 (04/01/23 1532)   Target Fluid Removal: Goal/Amount of Fluid to Remove (mL): 3000 mL (04/01/23 1532)     Access   Type & Location: LUE AVF   Comments:           AVF CDI. + Bruit and thrill. Skin at AVF appropriate for patient's ethnicity without redness, swelling, or drainage noted. AVF cleaned per P&P, site accessed with 15g needle x 2, + flashback in needle limbs followed by 10cc NS flush, no resistance noted. Needle limbs secured with tape, chevrons placed for limb security.                              Labs   HBsAg (Antigen) / date:     Negative 2/13/2023                                          HBsAb (Antibody) / date:     Immune 2/13/2023   Source:     Epic   Obtained/Reviewed  Critical Results Called HGB   Date Value Ref Range Status   04/01/2023 11.0 (L) 12.1 - 17.0 g/dL Final     Potassium   Date Value Ref Range Status   04/01/2023 4.6 3.5 - 5.1 mmol/L Final     Calcium   Date Value Ref Range Status   04/01/2023 9.1 8.5 - 10.1 MG/DL Final     BUN   Date Value Ref Range Status   04/01/2023 71 (H) 6 - 20 MG/DL Final     Creatinine   Date Value Ref Range Status   04/01/2023 16.30 (H) 0.70 - 1.30 MG/DL Final        Meds Given   Name Dose Route                    Adequacy / Fluid    Total Liters Process: 73.0   Net Fluid Removed: 3000      Comments   Time Out Done:   (Time) 1510   Admitting Diagnosis: ESRD   Consent obtained/signed: Informed Consent Verified:  (OP HD [facility]) (04/01/23 1532)   Machine / [place] # Machine Number: Shanae Delatorre (04/01/23 [id number])   Primary Nurse Rpt Pre: Abhi Randolph RN   Primary Nurse Rpt Post: Abhi Randolph RN   Pt Education: Infection prevention   Care Plan: Continue HD plan of care   Pts outpatient clinic: [facility]     Tx Summary   Comments:                 1137 - HD initiated. Pump speed at 300, venous pressures exceeding 300, causing blood pump to stop. Venous needle assessed for patency and position, + blood return and NS flush without swelling or c/o pain. Needle limbs secured, resumed HD with blood pump at 375. All circuit pressures remain within acceptable limits. Will continue to monitor. 1916 - All possible blood returned to patient. Needles removed, manual pressure applied over puncture sites for approximately ten minutes, hemostasis achieved. Puncture sites covered with clean dry folded 2x2, secured with tape. Patient is awake and alert, VSS at end of HD treatment. Report to HERI Causey RN.

## 2023-04-01 NOTE — CONSULTS
[address]    Name:  Van Randall  MR#:  [id number]  :  1970  ACCOUNT #:  [de-identified]  DATE OF SERVICE:  2023    IN-HOSPITAL NEPHROLOGIST CONSULTATION    REFERRING PHYSICIAN:  Dr. Nidhi Koehler:  Assistance with management of a dialysis patient. HISTORY OF PRESENT ILLNESS:  The patient is a 53-year-old man who is a dialysis patient of Dr. Miguel Mckenzie. He dialyzes at [facility] on Monday, Wednesday, and Friday schedule. He has an AV fistula on the left arm. The patient missed his dialysis yesterday. He presents today with right-sided facial numbness which has since self-resolved. No other complaints. PAST MEDICAL HISTORY:  Coronary artery disease, CHF, and end-stage renal disease. PAST SURGICAL HISTORY:  Placement of AV fistula. ALLERGIES:  ALLERGIC TO SULFA MEDICATIONS, PORK, BEEF. FAMILY HISTORY:  Positive for stroke, diabetes, kidney disease, thyroid disease, and DVT. SOCIAL HISTORY:  The patient is single. He is a former smoker. He did smoke cigars. REVIEW OF SYSTEMS:  Positive as outlined in history of present illness; otherwise, the rest of the review of systems is negative. PHYSICAL EXAMINATION:  GENERAL:  A middle-aged black male who is awake, alert, and oriented. He does not appear to be in any acute distress. He is quite comfortable, conversant. VITAL SIGNS:  Blood pressure is 139/59, heart rate is 94, temperature is 98.4. HEENT:  Head is normocephalic. Eyes with anicteric sclerae. NECK:  Supple with no increased JVP. LUNGS:  Fairly clear to auscultation. HEART:  S1 and S2, regular rate and rhythm. ABDOMEN:  Soft, obese with positive bowel sounds. No CVA tenderness. EXTREMITIES:  No edema. The patient has AV fistula on the left arm. NEUROLOGIC:  Seems to be nonfocal.    LABORATORY DATA:  Sodium is 129, potassium is 4.6, CO2 is 27, BUN is 71, creatinine is 16.3.   Hemoglobin is 11.9.    IMPRESSION:  1. End-stage renal disease, on hemodialysis. The patient missed one treatment. He is moderately azotemic but not clinically uremic. Electrolytes are with predialysis hyponatremia. 2.  Anemia of end-stage renal disease; hemoglobin is above target. 3.  History of hypertension; blood pressure is at target. RECOMMENDATIONS:  1. The patient will be dialyzed today with his routine treatment. 2.  We will hold Epogen since hemoglobin is above 11.  3.  The rest of the medical care as per primary team.  4.  Diabetic renal diet is suggested. Thank you very much for the opportunity to be part of this patient's care.       Domenic Zhang MD      [id number]  D:  04/01/2023 12:18  T:  04/01/2023 14:56  JOB #:  [id number]  CC:  Reji Carrera MD

## 2023-04-01 NOTE — ED PROVIDER NOTES
52M w/ hx CVA/TIA, AF, CAD, CHF, ESRD on HD, VTE p/w facial numbness x1d. Pt reports 3-4hrs of right sided facial numbness that has since self resolved. No facial droop, speech/vision changes or ext weakness or sensory changes. Prior CVA w/ residual RLE numbness. No head/neck pain, dizziness, syncope. No CP/SOB, N/V/D. Last completed HD 2d ago. On warfarin for AF.  Pt took full dose asa at home prior to ED arrival.       Past Medical History:   Diagnosis Date    Anxiety     Arrhythmia     A-FIB    Arthritis     CAD (coronary artery disease)     MI 09    CHF (congestive heart failure) (HCC)     Chronic kidney disease     END STAGE KIDNEY DISEASE;  DIALYSIS M-W-Sat    Chronic pain     NEUROPATHY; BACK PAIN    Fatigue     Hypertension     Lymph edema     LEFT LEG    Needle phobia     Neuropathy     Other ill-defined conditions(799.89)     Rash     Skipped beats     Sleep apnea     WEARS CPAP    Snoring     SOB (shortness of breath)     Stroke ([facility] [place] [address] 01/2020    NUMBNESS IN RIGHT LEG AND FOOT    Thromboembolus ([address] 2018    PE       Past Surgical History:   Procedure Laterality Date    HX ORTHOPAEDIC      LEFT HAND, RIGHT ANKLE, AND RIGHT SHOULDER    HX TONSILLECTOMY      HX VASCULAR ACCESS      UPPER RIGHT CHEST    IR FLUORO GUIDED NEEDLE PLACEMENT  10/08/2021    IR INSERT TUNL CVC W/O PORT OVER 5 YR  10/08/2021    IR INSERT TUNL CVC W/O PORT OVER 5 YR  10/10/2022    HI UNLISTED PROCEDURE CARDIAC SURGERY      CARDIAC CATHX2-NO STENTS    HI UNLISTED PROCEDURE VASCULAR SURGERY      PERITONEAL DIALYSIS CATH         Family History:   Problem Relation Age of Onset    Stroke Mother     Heart Disease Mother     Diabetes Mother     Kidney Disease Mother     Heart Attack Mother     Thyroid Disease Mother     No Known Problems Father     Heart Disease Sister     No Known Problems Sister     Lung Disease Brother     Deep Vein Thrombosis Brother     Neuropathy Other     Anesth Problems Neg Hx        Social History     Socioeconomic History    Marital status: SINGLE     Spouse name: Not on file    Number of children: Not on file    Years of education: Not on file    Highest education level: Not on file   Occupational History    Not on file   Tobacco Use    Smoking status: Former     Types: Cigars    Smokeless tobacco: Never    Tobacco comments:     CIGARS ONCE PER MONTH-NOTHING SINCE 2018   Vaping Use    Vaping Use: Never used   Substance and Sexual Activity    Alcohol use: Never     Comment: NOT FOR 3 YEARS    Drug use: No    Sexual activity: Not on file   Other Topics Concern    Not on file   Social History Narrative    ** Merged History Encounter **          Social Determinants of Health     Financial Resource Strain: Not on file   Food Insecurity: Not on file   Transportation Needs: Not on file   Physical Activity: Not on file   Stress: Not on file   Social Connections: Not on file   Intimate Partner Violence: Not on file   Housing Stability: Not on file         ALLERGIES: Lisinopril, Sulfa (sulfonamide antibiotics), Beef containing products, Pork derived (porcine), and Sulfa (sulfonamide antibiotics)    Review of Systems   Constitutional:  Negative for chills, diaphoresis and fever. HENT:  Negative for facial swelling, mouth sores, nosebleeds, trouble swallowing and voice change. Eyes:  Negative for pain and visual disturbance. Respiratory:  Negative for apnea, cough, shortness of breath, wheezing and stridor. Cardiovascular:  Negative for chest pain, palpitations and leg swelling. Gastrointestinal:  Negative for abdominal distention, abdominal pain, blood in stool, diarrhea, nausea and vomiting. Genitourinary:  Negative for difficulty urinating, dysuria, flank pain, hematuria, scrotal swelling and testicular pain. Musculoskeletal:  Negative for joint swelling. Skin:  Negative for color change and rash. Allergic/Immunologic: Negative for immunocompromised state. Neurological:  Positive for numbness.  Negative for dizziness, syncope and light-headedness. Hematological:  Does not bruise/bleed easily. Psychiatric/Behavioral:  Negative for agitation and behavioral problems. Vitals:    04/01/23 0445 04/01/23 0447 04/01/23 1200 04/01/23 1449   BP: (!) 139/59  (!) 135/97 114/62   Pulse: 94 80 88 (!) 50   Resp: 13 12 13 16   Temp:    97.5 °F (36.4 °C)   SpO2:       Weight:       Height:                Physical Exam  Vitals and nursing note reviewed. Constitutional:       General: He is not in acute distress. Appearance: Normal appearance. He is not ill-appearing or toxic-appearing. HENT:      Head: Normocephalic and atraumatic. Right Ear: External ear normal.      Left Ear: External ear normal.      Nose: Nose normal.      Mouth/Throat:      Mouth: Mucous membranes are moist.      Pharynx: Oropharynx is clear. No oropharyngeal exudate or posterior oropharyngeal erythema. Eyes:      General: No scleral icterus. Extraocular Movements: Extraocular movements intact. Conjunctiva/sclera: Conjunctivae normal.      Pupils: Pupils are equal, round, and reactive to light. Cardiovascular:      Rate and Rhythm: Normal rate and regular rhythm. Pulses: Normal pulses. Heart sounds: No murmur heard. No friction rub. No gallop. Comments: LUE AV fistula +bruit/thrill  Pulmonary:      Effort: Pulmonary effort is normal. No respiratory distress. Breath sounds: Normal breath sounds. No stridor. No wheezing, rhonchi or rales. Abdominal:      General: Bowel sounds are normal. There is no distension. Palpations: Abdomen is soft. Tenderness: There is no abdominal tenderness. There is no guarding or rebound. Musculoskeletal:         General: No deformity. Normal range of motion. Cervical back: Normal range of motion and neck supple. No rigidity. Right lower leg: No edema. Left lower leg: No edema. Skin:     General: Skin is warm.       Capillary Refill: Capillary refill takes less than 2 seconds. Coloration: Skin is not jaundiced. Neurological:      General: No focal deficit present. Mental Status: He is alert. Cranial Nerves: No cranial nerve deficit. Sensory: No sensory deficit. Motor: No weakness. Coordination: Coordination normal.      Comments: -GCS15, AAox3  -Normal b/l UE/LE motor/sensory exam  -CN2-12 intact including able to smile/frown, elevate eyebrows symmetrically, close eyes tightly against force, sensation to light touch intact V1-V3 distribution, hearing intact to finger rub b/l, palate/uvula elevate midline/symmetrically, able to shrug shoulders and move head left/right against force, tongue protrudes midline  -EOMI, PERRL, no nystagmus, normal visual fields, nl speech w/o dysarthria/aphasia  -no pronator drift  -cerebellar testing intact including rapid/alternating movements, finger-to-nose/shin-to-heel   Psychiatric:         Mood and Affect: Mood normal.         Behavior: Behavior normal.         Thought Content: Thought content normal.         Judgment: Judgment normal.      I personally reviewed and independently interpreted EKG, labs and imaging results. EKG Interpretation   Narrow complex AF w/ inferior-lateral TWI, no ST changes    LABORATORY TESTS:  Admission on 03/31/2023   Component Date Value Ref Range Status    Glucose (POC) 04/01/2023 104  65 - 117 mg/dL Final    Comment: (NOTE)  The FDA has indicated that no capillary point of care blood glucose  monitoring systems are approved for use in \"critically ill\" patients,  however they have not defined this population. The College of  American Pathologists has recommended that these devices should not  be used in cases such as severe hypotension, dehydration, shock, and  hyperglycemic-hyperosmolar state, amongst others. Venous or arterial  collection is the recommended specimen for testing these patients.       Performed by 04/01/2023 Racquel Blank (PCT)   Final SAMPLES BEING HELD 04/01/2023 1PST,1RED,1LAV,1BLUE   Final    COMMENT 04/01/2023 Add-on orders for these samples will be processed based on acceptable specimen integrity and analyte stability, which may vary by analyte. Final    Ventricular Rate 04/01/2023 86  BPM Final    QRS Duration 04/01/2023 80  ms Final    Q-T Interval 04/01/2023 406  ms Final    QTC Calculation (Bezet) 04/01/2023 485  ms Final    Calculated R Axis 04/01/2023 77  degrees Final    Calculated T Axis 04/01/2023 -64  degrees Final    Diagnosis 04/01/2023    Final                    Value:Atrial fibrillation  T wave abnormality, consider inferior ischemia  T wave abnormality, consider anterolateral ischemia  Prolonged QT  When compared with ECG of 17-MAR-2023 13:13,  No significant change was found  Confirmed by Dixie Rosa M.D., Dinora Garcia ([id number]) on 4/1/2023 11:31:20 AM      Sodium 04/01/2023 129 (A)  136 - 145 mmol/L Final    Potassium 04/01/2023 4.6  3.5 - 5.1 mmol/L Final    Chloride 04/01/2023 95 (A)  97 - 108 mmol/L Final    CO2 04/01/2023 27  21 - 32 mmol/L Final    Anion gap 04/01/2023 7  5 - 15 mmol/L Final    Glucose 04/01/2023 116 (A)  65 - 100 mg/dL Final    BUN 04/01/2023 71 (A)  6 - 20 MG/DL Final    Creatinine 04/01/2023 16.30 (A)  0.70 - 1.30 MG/DL Final    BUN/Creatinine ratio 04/01/2023 4 (A)  12 - 20   Final    eGFR 04/01/2023 3 (A)  >60 ml/min/1.73m2 Final    Comment:      Pediatric calculator link: [employer].at. org/professionals/kdoqi/gfr_calculatorped       These results are not intended for use in patients <25years of age. eGFR results are calculated without a race factor using  the 2021 CKD-EPI equation. Careful clinical correlation is recommended, particularly when comparing to results calculated using previous equations.   The CKD-EPI equation is less accurate in patients with extremes of muscle mass, extra-renal metabolism of creatinine, excessive creatine ingestion, or following therapy that affects renal tubular secretion. Calcium 04/01/2023 9.1  8.5 - 10.1 MG/DL Final    WBC 04/01/2023 7.4  4.1 - 11.1 K/uL Final    RBC 04/01/2023 3.31 (A)  4.10 - 5.70 M/uL Final    HGB 04/01/2023 11.0 (A)  12.1 - 17.0 g/dL Final    HCT 04/01/2023 35.0 (A)  36.6 - 50.3 % Final    MCV 04/01/2023 105.7 (A)  80.0 - 99.0 FL Final    MCH 04/01/2023 33.2  26.0 - 34.0 PG Final    MCHC 04/01/2023 31.4  30.0 - 36.5 g/dL Final    RDW 04/01/2023 15.1 (A)  11.5 - 14.5 % Final    PLATELET 28/39/5358 015  150 - 400 K/uL Final    MPV 04/01/2023 11.4  8.9 - 12.9 FL Final    NRBC 04/01/2023 0.0  0  WBC Final    ABSOLUTE NRBC 04/01/2023 0.00  0.00 - 0.01 K/uL Final    NEUTROPHILS 04/01/2023 54  32 - 75 % Final    LYMPHOCYTES 04/01/2023 23  12 - 49 % Final    MONOCYTES 04/01/2023 14 (A)  5 - 13 % Final    EOSINOPHILS 04/01/2023 7  0 - 7 % Final    BASOPHILS 04/01/2023 1  0 - 1 % Final    IMMATURE GRANULOCYTES 04/01/2023 1 (A)  0.0 - 0.5 % Final    ABS. NEUTROPHILS 04/01/2023 4.1  1.8 - 8.0 K/UL Final    ABS. LYMPHOCYTES 04/01/2023 1.7  0.8 - 3.5 K/UL Final    ABS. MONOCYTES 04/01/2023 1.0  0.0 - 1.0 K/UL Final    ABS. EOSINOPHILS 04/01/2023 0.5 (A)  0.0 - 0.4 K/UL Final    ABS. BASOPHILS 04/01/2023 0.1  0.0 - 0.1 K/UL Final    ABS. IMM. GRANS. 04/01/2023 0.0  0.00 - 0.04 K/UL Final    DF 04/01/2023 AUTOMATED    Final    INR 04/01/2023 3.9 (A)  0.9 - 1.1   Final    A single therapeutic range for Vit K antagonists may not be optimal for all indications - see June, 2008 issue of Chest, American College of Chest Physicians Evidence-Based Clinical Practice Guidelines, 8th Edition. Prothrombin time 04/01/2023 37.7 (A)  9.0 - 11.1 sec Final    Glucose (POC) 04/01/2023 79  65 - 117 mg/dL Final    Comment: (NOTE)  The FDA has indicated that no capillary point of care blood glucose  monitoring systems are approved for use in \"critically ill\" patients,  however they have not defined this population.  The College of  American Pathologists has recommended that these devices should not  be used in cases such as severe hypotension, dehydration, shock, and  hyperglycemic-hyperosmolar state, amongst others. Venous or arterial  collection is the recommended specimen for testing these patients. Performed by 04/01/2023 Augusto Benedict   Final    Glucose (POC) 04/01/2023 81  65 - 117 mg/dL Final    Comment: (NOTE)  The FDA has indicated that no capillary point of care blood glucose  monitoring systems are approved for use in \"critically ill\" patients,  however they have not defined this population. The College of  American Pathologists has recommended that these devices should not  be used in cases such as severe hypotension, dehydration, shock, and  hyperglycemic-hyperosmolar state, amongst others. Venous or arterial  collection is the recommended specimen for testing these patients.       Performed by 04/01/2023 Augusto Benedict   Final       IMAGING RESULTS:  CT HEAD WO CONT   Final Result   No acute process seen      MRI BRAIN WO CONT    (Results Pending)       MEDICATIONS GIVEN:  Medications   atorvastatin (LIPITOR) tablet 40 mg (40 mg Oral Given 4/1/23 0936)   cinacalcet (SENSIPAR) tablet 30 mg (has no administration in time range)   cyanocobalamin (VITAMIN B12) tablet 1,000 mcg (1,000 mcg Oral Given 4/1/23 0936)   dilTIAZem ER (CARDIZEM CD) capsule 240 mg (240 mg Oral Given 4/1/23 0936)   ferrous sulfate tablet 325 mg (325 mg Oral Missed 4/1/23 0730)   montelukast (SINGULAIR) tablet 10 mg (10 mg Oral Given 4/1/23 0936)   pregabalin (LYRICA) capsule 75 mg (75 mg Oral Given 4/1/23 0936)   sevelamer carbonate (RENVELA) tab 1,600 mg (1,600 mg Oral Missed 4/1/23 0730)   acetaminophen (TYLENOL) tablet 650 mg (has no administration in time range)     Or   acetaminophen (TYLENOL) solution 650 mg (has no administration in time range)     Or   acetaminophen (TYLENOL) suppository 650 mg (has no administration in time range)   glucose chewable tablet 16 g (has no administration in time range)   glucagon (GLUCAGEN) injection 1 mg (has no administration in time range)   dextrose 10 % infusion 0-250 mL (has no administration in time range)   insulin lispro (HUMALOG) injection (0 Units SubCUTAneous Held 4/1/23 1130)   diazePAM (VALIUM) injection 5 mg (has no administration in time range)       IMPRESSION:  1. ESRD needing dialysis ([address]    2. Hyponatremia    3. Facial numbness    4. Supratherapeutic INR        PLAN:  - Admit to hospitalist    Jose Sheppard MD      Medical Decision Making  52M w/ hx CVA/TIA, AF, CAD, CHF, ESRD on HD, VTE p/w facial numbness x1d. Afebrile, hemodynamically stable w/o resp distress or hypoxia. BS is WNL. Has no focal neuro deficits on my exam w/ NIHSS=0 so CVA alert not called. Ddx includes TIA vs CVA (ischemic vs hemorrhagic) vs cerebellar/brainstem stroke vs SAH vs ICH/IPH vs SDH/epidural vs HTN emergency/urgency vs PRES vs obstructive hydrocephalus vs intracranial mass vs cerebral edema vs neuromuscular/neurodegenerative disease vs partial/focal seizure vs complex migraine vs syncope vs electrolyte/metabolic vs CNS infection. Ordered CTH, EKG, labs. Monitor and reassess. 0500 Pt remains stable w/o change in neuro exam. Labs show ESRD w/ hyponatremia and BUN 70. INR supratherapeutic 3.9 (no active bleeding and Hgb stable 11). EKG showing narrow complex AF w/o ST changes but inferior-lateral TWI. CTH neg for acute findings. Admitting for MRI to r/o stroke and also needs his dialysis. Nephrology consulted. Pt already took full dose asa prior to ED arrival.    Amount and/or Complexity of Data Reviewed  Labs: ordered. Decision-making details documented in ED Course. Radiology: ordered and independent interpretation performed. Decision-making details documented in ED Course. ECG/medicine tests: ordered and independent interpretation performed. Decision-making details documented in ED Course. Risk  Decision regarding hospitalization. Procedures        Perfect Serve Consult for Admission  5:04 AM    ED Room Number: 653/01  Patient Name and age:  Amrit Warner 50 y.o.  male  Working Diagnosis:   1. ESRD needing dialysis ([address]    2. Hyponatremia    3. Facial numbness    4.  Supratherapeutic INR        COVID-19 Suspicion:  no  Sepsis present:  no  Reassessment needed: no  Code Status:  Full Code  Readmission: no  Isolation Requirements:  no  Recommended Level of Care:  telemetry  Department:[facility] Adult ED - 21

## 2023-04-01 NOTE — H&P
History and Physical    Date of Service:  4/1/2023  Primary Care Provider: Micha Chaidez MD  Source of information: The patient and Chart review    Chief Complaint: Numbness      History of Presenting Illness:   Evelyne Rios is a 50 y.o. male with past medical history of end-stage renal disease on hemodialysis, CAD, MI, CHF, atrial fibrillation, hypertension, type 2 diabetes mellitus, CVA, PE, lymphedema left leg, peripheral neuropathy, sleep apnea, arthritis, asthma, class III obesity presented to the emergency department chief complaint of facial numbness. Symptoms onset reported began approximate 1600 hrs. yesterday with right facial and right arm and finger numbness and tingling, initially constant, moderate severity, without specific precipitating factors, reportedly resolved after taking aspirin. Symptoms reportedly occurred approximate 22:45 hours yesterday. On arrival emergency department, initial vital signs temperature 98.4 °F, /80, heart rate 88, respiratory rate 20, saturation 99% on room air. Abnormal labs include hemoglobin 11.0, .7, INR 3.9, sodium 129, blood glucose 116, BUN 71, creatinine 16.30, GFR 3.  CT head without IV contrast showed no acute intracranial normalities. 12 EKG showed atrial fibrillation, T wave changes anterior anterior lateral leads, prolonged QTc 485 ms at 86 bpm.  Patient is now seen for admission to the hospital service. REVIEW OF SYSTEMS:  Pertinent items are noted in the History of Present Illness.      Past Medical History:   Diagnosis Date    Anxiety     Arrhythmia     A-FIB    Arthritis     CAD (coronary artery disease)     MI 09    CHF (congestive heart failure) (HCC)     Chronic kidney disease     END STAGE KIDNEY DISEASE;  DIALYSIS M-W-F    Chronic pain     NEUROPATHY; BACK PAIN    Fatigue     Hypertension     Lymph edema     LEFT LEG    Needle phobia     Neuropathy     Other ill-defined conditions(359.89)     Rash     Skipped beats     Sleep apnea     WEARS CPAP    Snoring     SOB (shortness of breath)     Stroke ([facility] [address] 01/2020    NUMBNESS IN RIGHT LEG AND FOOT    Thromboembolus ([facility] [address] 2018    PE      Past Surgical History:   Procedure Laterality Date    HX ORTHOPAEDIC      LEFT HAND, RIGHT ANKLE, AND RIGHT SHOULDER    HX TONSILLECTOMY      HX VASCULAR ACCESS      UPPER RIGHT CHEST    IR FLUORO GUIDED NEEDLE PLACEMENT  10/08/2021    IR INSERT TUNL CVC W/O PORT OVER 5 YR  10/08/2021    IR INSERT TUNL CVC W/O PORT OVER 5 YR  10/10/2022    ME UNLISTED PROCEDURE CARDIAC SURGERY      CARDIAC CATHX2-NO STENTS    ME UNLISTED PROCEDURE VASCULAR SURGERY      PERITONEAL DIALYSIS CATH     MEDICATIONS:  Prior to Admission medications    Medication Sig Start Date End Date Taking? Authorizing Provider   albuterol (PROVENTIL HFA, VENTOLIN HFA, PROAIR HFA) 90 mcg/actuation inhaler ProAir HFA 90 mcg/actuation aerosol inhaler   INHALE TWO PUFFS BY MOUTH EVERY 4 HOURS AS NEEDED FOR FOR SHORTNESS OF BREATH AND WHEEZING    Provider, Historical   calcium acetate,phosphat bind, (PHOSLO) 667 mg cap calcium acetate(phosphate binders) 667 mg capsule   TAKE THREE CAPSULES BY MOUTH THREE TIMES A DAY WITH MEALS AND 1 CAPSULE TWO TIMES A DAY WITH SNACKS    Provider, Historical   semaglutide (Ozempic) 1 mg/dose (2 mg/1.5 mL) sub-q pen 1 mg by SubCUTAneous route. 1/17/23   Provider, Historical   nystatin (MYCOSTATIN) powder  1/17/23   Provider, Historical   dulaglutide (TRULICITY) 1.5 RO/0.2 mL sub-q pen 1.5 mg by SubCUTAneous route every seven (7) days. Takes on Sundays  Patient not taking: No sig reported    Provider, Historical   dilTIAZem ER (CARDIZEM CD) 240 mg capsule Take 240 mg by mouth daily. Provider, Historical   ferrous sulfate 325 mg (65 mg iron) tablet Take 325 mg by mouth Daily (before breakfast). Provider, Historical   hydrOXYzine HCL (ATARAX) 10 mg tablet Take 10 mg by mouth every eight (8) hours as needed.     Provider, Historical   cyanocobalamin 1,000 mcg tablet Take 1,000 mcg by mouth daily. Provider, Historical   atorvastatin (LIPITOR) 40 mg tablet Take 40 mg by mouth daily. Provider, Historical   pregabalin (LYRICA) 75 mg capsule Take 75 mg by mouth daily. Provider, Historical   cinacalcet (SENSIPAR) 30 mg tablet Take 30 mg by mouth DIALYSIS MON, WED & FRI. Takes at dialysis    Provider, Historical   ERGOCALCIFEROL, VITAMIN D2, PO Take  by mouth. Takes at dialysis    Provider, Historical   warfarin (Jantoven) 5 mg tablet Take 1 Tablet by mouth daily. 10/12/21   Dina Leslie MD   sevelamer carbonate (RENVELA) 800 mg tab tab Take 1,600 mg by mouth Before breakfast, lunch, and dinner. Other, MD Lea   montelukast (SINGULAIR) 10 mg tablet Take 10 mg by mouth daily. 11/25/20   Other, MD Lea   bumetanide (BUMEX) 2 mg tablet Take 2 mg by mouth daily. Other, MD Lea   allopurinoL (ZYLOPRIM) 100 mg tablet Take 100 mg by mouth. TAKES ON Monday AND Thursday    Provider, Historical   predniSONE (DELTASONE) 5 mg tablet Take 5 mg by mouth daily. Patient not taking: No sig reported    Provider, Historical   amLODIPine (NORVASC) 10 mg tablet Take 1 Tab by mouth daily.   Patient not taking: No sig reported 11/16/11   Stephani Spurling, MD     Allergies   Allergen Reactions    Lisinopril Swelling    Sulfa (Sulfonamide Antibiotics) Other (comments)     Had sores all over body    Beef Containing Products Other (comments)     PT DOES NOT EAT BEEF    Pork Derived (Porcine) Other (comments)     PT DOES NOT EAT PORK    Sulfa (Sulfonamide Antibiotics) Hives      Family History   Problem Relation Age of Onset    Stroke Mother     Heart Disease Mother     Diabetes Mother     Kidney Disease Mother     Heart Attack Mother     Thyroid Disease Mother     No Known Problems Father     Heart Disease Sister     No Known Problems Sister     Lung Disease Brother     Deep Vein Thrombosis Brother     Neuropathy Other     Anesth Problems Neg Hx       Social History: Smoking:  denies  Alcohol:  denies  Illicit drugs:  denies  Social Determinants of Health     Tobacco Use: Medium Risk    Smoking Tobacco Use: Former    Smokeless Tobacco Use: Never    Passive Exposure: Not on file   Alcohol Use: Not on file   Financial Resource Strain: Not on file   Food Insecurity: Not on file   Transportation Needs: Not on file   Physical Activity: Not on file   Stress: Not on file   Social Connections: Not on file   Intimate Partner Violence: Not on file   Depression: Not on file   Housing Stability: Not on file        Medications were reconciled to the best of my ability given all available resources at the time of admission. Route is PO if not otherwise noted. Family and social history were personally reviewed, all pertinent and relevant details are outlined as above. Objective:   Visit Vitals  BP (!) 139/59   Pulse 80   Temp 98.4 °F (36.9 °C)   Resp 12   Ht 6' 3\" (1.905 m)   Wt 157.4 kg (347 lb 0.1 oz)   SpO2 99%   BMI 43.37 kg/m²      O2 Device: None (Room air)    PHYSICAL EXAM:   General:  Patient is in no acute respiratory distress. Head:  Normocephalic, without obvious abnormality, atraumatic   Eyes:  Conjunctivae/corneas clear. PERRL, EOMs intact   E/N/M/T: Nares normal. Septum midline.  No nasal drainage or sinus tenderness  Tongue midline/ non-edematous  Clear oropharynx   Neck: Normal appearance and movements, symmetrical, trachea midline  No palpable adenopathy  No thyroid enlargement, tenderness or nodules  No carotid bruit   No JVD  Trachea midline   Lungs:   Symmetrical chest expansion and respiratory effort  Clear to auscultation bilaterally   Chest wall:  No tenderness or deformity   Heart:  Regular rate and rhythm   Normal S1 and S2; no murmur, click, rub or gallop   Abdomen:   Soft, no tenderness  No rebound, guarding, or rigidity  Non-distended   Bowel sounds normal  No masses or hepatosplenomegaly  No hernias present   Back: No costovertebral angle tenderness  No step-off deformity   Extremities: Extremities normal, atraumatic  No cyanosis or edema     Vascular/  Pulses: 2+ radial/ 1+ DP bilateral pulses   Integument/  Skin: No rashes or ulcers  Warm and dry   Musculo-      skeletal: Gait not tested  No calf tenderness   Neuro: GCS 15. Moves all extremities x 4. No slurred speech. No facial droop. Sensation grossly intact. No pronator drift. Psych: Alert, oriented x 3              Data Review:   I have independently reviewed and interpreted patient's lab and all other diagnostic data    Abnormal Labs Reviewed   METABOLIC PANEL, BASIC - Abnormal; Notable for the following components:       Result Value    Sodium 129 (*)     Chloride 95 (*)     Glucose 116 (*)     BUN 71 (*)     Creatinine 16.30 (*)     BUN/Creatinine ratio 4 (*)     eGFR 3 (*)     All other components within normal limits   CBC WITH AUTOMATED DIFF - Abnormal; Notable for the following components:    RBC 3.31 (*)     HGB 11.0 (*)     HCT 35.0 (*)     .7 (*)     RDW 15.1 (*)     MONOCYTES 14 (*)     IMMATURE GRANULOCYTES 1 (*)     ABS.  EOSINOPHILS 0.5 (*)     All other components within normal limits   PROTHROMBIN TIME + INR - Abnormal; Notable for the following components:    INR 3.9 (*)     Prothrombin time 37.7 (*)     All other components within normal limits       All Micro Results       None            IMAGING:   CT HEAD WO CONT   Final Result   No acute process seen      MRI BRAIN WO CONT    (Results Pending)        ECG/ECHO:    Results for orders placed or performed during the hospital encounter of 03/31/23   EKG, 12 LEAD, INITIAL   Result Value Ref Range    Ventricular Rate 86 BPM    QRS Duration 80 ms    Q-T Interval 406 ms    QTC Calculation (Bezet) 485 ms    Calculated R Axis 77 degrees    Calculated T Axis -64 degrees    Diagnosis       Atrial fibrillation  T wave abnormality, consider inferior ischemia  T wave abnormality, consider anterolateral ischemia  Prolonged QT  Abnormal ECG  When compared with ECG of 17-MAR-2023 13:13,  No significant change was found            Notes reviewed from all clinical/nonclinical/nursing services involved in patient's clinical care. Care coordination discussions were held with appropriate clinical/nonclinical/ nursing providers based on care coordination needs. Assessment:   Given the patient's current clinical presentation, there is a high level of concern for decompensation if discharged from the emergency department. Complex decision making was performed, which includes reviewing the patient's available past medical records, laboratory results, and imaging studies. Active Problems:    Acute hyponatremia (4/1/2023)      Facial numbness (4/1/2023)      ESRD needing dialysis ([address] (4/1/2023)      Supratherapeutic international normalized ratio (INR) (4/1/2023)        Plan:     Facial numbness        Numbness and tingling right upper extremity  -recent hospitalization with possible TIA/ saccular MCA aneurysm/ left temporal artery perfusion defect; with residual RLE numbness   -admit to neuro/ stroke floor  -symptoms recurrent  -CT head without IV contrast:  no acute process  -consider for MRI brain without IV contrast  -neurologist consultation if symptoms recur    2. ESRD needing dialysis  -consult nephrologist for hemodialysis  -repeat renal panel in a.m.    3. Acute hyponatremia  -Na 129  -repeat sodium level and proceed towards slow correction    4. Supratherapeutic international normalized ratio (INR)  -h/o PE  -on long term anticoagulation with Warfarin  -INR = 3.9  -repeat PT/INR  -hold Warfarin    5. Macrocytic anemia  -anemia of chronic disease  -Hgb 11.0, .7  -repeat H&H  -check Z94 and folic acid levels    6. History of atrial fibrillation  -continuous telemetry monitoring  -continue Diltiazem  mg daily    7. Essential hypertension  -Monitor BP and provide hypertensive medications as needed    8.   Hyperlipidemia  -Continue atorvastatin 40 mg daily  -Check lipid panel    9. Class III obesity  -BMI 43.37 kg/M2  -Would encourage weight loss, reduce calorie diet, lifestyle modifications    10. Type 2 diabetes mellitus  -Order Humalog insulin correctional coverage, scheduled blood glucose checks and check hemoglobin A1c level. DIET: renal/ carb consistent   ISOLATION PRECAUTIONS: There are currently no Active Isolations  CODE STATUS: FULL CODE  DVT PROPHYLAXIS: No VTE Prophylaxis Needed  FUNCTIONAL STATUS PRIOR TO HOSPITALIZATION: Fully active and ambulatory; able to carry on all self-care without restriction. Ambulatory status/function: By self   EARLY MOBILITY ASSESSMENT: Recommend routine ambulation while hospitalized with the assistance of nursing staff  ANTICIPATED DISCHARGE: Greater than 48 hours. ANTICIPATED DISPOSITION: Home with Home Healthcare  EMERGENCY CONTACT/SURROGATE DECISION MAKER:  Chuckie Mcwilliams, life partner [phone]. CRITICAL CARE WAS PERFORMED FOR THIS ENCOUNTER: NO.    ADVANCED DIRECTIVE/ CODE STATUS:  FULL CODE as per discussion with patient. Signed By: Malcolm Palafox MD     April 1, 2023         Please note that this dictation may have been completed with Dragon, the computer voice recognition software. Quite often unanticipated grammatical, syntax, homophones, and other interpretive errors are inadvertently transcribed by the computer software. Please disregard these errors. Please excuse any errors that have escaped final proofreading.

## 2023-04-01 NOTE — CONSULTS
[address]    Name:  Rosa Brown  MR#:  [id number]  :  1970  ACCOUNT #:  [de-identified]  DATE OF SERVICE:  2023    IN-HOSPITAL NEPHROLOGIST CONSULTATION    REFERRING PHYSICIAN:  Dr. Richa Kamara:  Assistance with management of a dialysis patient. HISTORY OF PRESENT ILLNESS:  The patient is a 62-year-old man who is a dialysis patient of Dr. Hawa Alcaraz. He dialyzes at [facility] on Monday, Wednesday, and Friday schedule. He has an AV fistula on the left arm. The patient missed his dialysis yesterday. He presents today with right-sided facial numbness which has since self-resolved. No other complaints. PAST MEDICAL HISTORY:  Coronary artery disease, CHF, and end-stage renal disease. PAST SURGICAL HISTORY:  Placement of AV fistula. ALLERGIES:  ALLERGIC TO SULFA MEDICATIONS, PORK, BEEF. FAMILY HISTORY:  Positive for stroke, diabetes, kidney disease, thyroid disease, and DVT. SOCIAL HISTORY:  The patient is single. He is a former smoker. He did smoke cigars. REVIEW OF SYSTEMS:  Positive as outlined in history of present illness; otherwise, the rest of the review of systems is negative. PHYSICAL EXAMINATION:  GENERAL:  A middle-aged black male who is awake, alert, and oriented. He does not appear to be in any acute distress. He is quite comfortable, conversant. VITAL SIGNS:  Blood pressure is 139/59, heart rate is 94, temperature is 98.4. HEENT:  Head is normocephalic. Eyes with anicteric sclerae. NECK:  Supple with no increased JVP. LUNGS:  Fairly clear to auscultation. HEART:  S1 and S2, regular rate and rhythm. ABDOMEN:  Soft, obese with positive bowel sounds. No CVA tenderness. EXTREMITIES:  No edema. The patient has AV fistula on the left arm. NEUROLOGIC:  Seems to be nonfocal.    LABORATORY DATA:  Sodium is 129, potassium is 4.6, CO2 is 27, BUN is 71, creatinine is 16.3.   Hemoglobin is 11.9.    IMPRESSION:  1. End-stage renal disease, on hemodialysis. The patient missed one treatment. He is moderately azotemic but not clinically uremic. Electrolytes are with predialysis hyponatremia. 2.  Anemia of end-stage renal disease; hemoglobin is above target. 3.  History of hypertension; blood pressure is at target. RECOMMENDATIONS:  1. The patient will be dialyzed today with his routine treatment. 2.  We will hold Epogen since hemoglobin is above 11.  3.  The rest of the medical care as per primary team.  4.  Diabetic renal diet is suggested. Thank you very much for the opportunity to be part of this patient's care.       Rosenda Zambrano MD      [id number]  D:  04/01/2023 12:18  T:  04/01/2023 14:56  JOB #:  [id number]  CC:  Rosa North MD

## 2023-04-01 NOTE — ED TRIAGE NOTES
Pt arrives from home with cc of right sided facial numbness and tingling. He was also experiencing it in his right arm and fingers but states that has now eased up. Symptoms started at 4pm today, he took aspirin and symptoms resolved. Symptoms reappeared at [id number] (LKW) tonight. Pt has otherwise unremarkable neuro assessment. No facial droop, extremity weakness, or dizziness noted. Hx of CVA/TIA.       Physician notified and he wants to evaluate patient prior to calling code s

## 2023-04-01 NOTE — ED TRIAGE NOTES
Pt arrives from home with cc of right sided facial numbness and tingling. He was also experiencing it in his right arm and fingers but states that has now eased up. Symptoms started at 4pm today, he took aspirin and symptoms resolved. Symptoms reappeared at [id number] (LKW) tonight. Pt has otherwise unremarkable neuro assessment. No facial droop, extremity weakness, or dizziness noted. Hx of CVA/TIA.       Physician notified and he wants to evaluate patient prior to calling code s

## 2023-04-01 NOTE — CONSULTS
Neurology Consult Note     NAME: Genevie Meigs   :  1970   MRN:  [id number]   DATE:  2023       HPI:  Pt is a 56yo [address] male admitted 3/31/23 with c/o right facial/arm N/T with onset at 1600, took three, 325mg ASA and resolved, but returned at [id number] so came to ED. CTH neg. Pt has h/o Afib on coumadin with INR 3.9. Pt reports onset of right F/A N/T that started in face and moves down, eye feels like it is going to close, then had bilateral R>L feeling of jitteriness in his hands, and vibrations through his body. Also noticed right hearing felt blocked. He had these same right sided sensory changes in Feb and March. Reports prior h/o CVA with right sided deficits. He was seen by Chuckie Bentley NP from neurology on 23 for sudden symptoms of dropping a dish, slurred speech, word finding difficulty, generalized weakness, paresthesias and possible facial droop witnessed by a coworker. Pt had missed HD x days and BUN/Cr 82/23. 3. MRI brain 3/17/23 neg for acute infarct and sxs were not suggestive of stroke except possible facial droop. CTA H/N with incidental 3mm left M2 aneurysm. 23 TTE with EF 55-60%, mild CLVH, LA severely dilated. MRI brain 3/17/23 neg. MRA brain neg except known incidental aneurysm. CTA H/N unremarkable except for known aneurysm. Pt denies HA associated with sxs, no h/o MHAs. Tells me that his mother had MHA and sz, but his girlfriend states that this is not true. Pt has not missed HD. Na 129, BUN/Cr 71/16. 3. LDL 54.4, HgbA1C 5.8. He also mentions intermittent numbness in bilateral LE. His girlfriend points out that he has been told he has PN and is undergoing workup in podiatry.      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 Taking? ERGOCALCIFEROL, VITAMIN D2, PO 3/25/2023 Self Yes Yes   Sig: Take  by mouth. Takes at dialysis   albuterol (PROVENTIL HFA, VENTOLIN HFA, PROAIR HFA) 90 mcg/actuation inhaler   Yes No   Sig: ProAir HFA 90 mcg/actuation aerosol inhaler   INHALE TWO PUFFS BY MOUTH EVERY 4 HOURS AS NEEDED FOR FOR SHORTNESS OF BREATH AND WHEEZING   allopurinoL (ZYLOPRIM) 100 mg tablet  Self Yes Yes   Sig: Take 100 mg by mouth. TAKES ON Monday AND Thursday   amLODIPine (NORVASC) 10 mg tablet 3/31/2023  No Yes   Sig: Take 1 Tab by mouth daily. amitriptyline (ELAVIL) 25 mg tablet   Yes Yes   Sig: Take 25 mg by mouth nightly. Indications: for sleep   atorvastatin (LIPITOR) 40 mg tablet 3/31/2023 Self Yes Yes   Sig: Take 40 mg by mouth daily. bumetanide (BUMEX) 2 mg tablet Unknown Self Yes No   Sig: Take 2 mg by mouth daily. cinacalcet (SENSIPAR) 30 mg tablet 3/31/2023 Self Yes Yes   Sig: Take 30 mg by mouth DIALYSIS MON, WED & FRI. Takes at dialysis   cyanocobalamin 1,000 mcg tablet 3/31/2023 Self Yes Yes   Sig: Take 1,000 mcg by mouth daily. dilTIAZem ER (CARDIZEM CD) 240 mg capsule 3/31/2023 Self Yes Yes   Sig: Take 240 mg by mouth daily. ferrous sulfate 325 mg (65 mg iron) tablet 3/31/2023 Self Yes Yes   Sig: Take 325 mg by mouth Daily (before breakfast). gabapentin (NEURONTIN) 300 mg capsule   Yes Yes   Sig: Take 300 mg by mouth nightly. Indications: neuropathic pain   hydrOXYzine HCL (ATARAX) 10 mg tablet  Self Yes Yes   Sig: Take 10 mg by mouth every eight (8) hours as needed. montelukast (SINGULAIR) 10 mg tablet 3/31/2023 Self Yes Yes   Sig: Take 10 mg by mouth daily. predniSONE (DELTASONE) 5 mg tablet 3/31/2023  Yes Yes   Sig: Take 5 mg by mouth daily.    semaglutide (Ozempic) 1 mg/dose (2 mg/1.5 mL) sub-q pen   Yes Yes   Si mg by SubCUTAneous route. sevelamer carbonate (RENVELA) 800 mg tab tab 3/31/2023 Self Yes Yes   Sig: Take 1,600 mg by mouth Before breakfast, lunch, and dinner. warfarin (Jantoven) 5 mg tablet 3/31/2023 Self No Yes   Sig: Take 1 Tablet by mouth daily.       Facility-Administered Medications: None       Current Facility-Administered Medications:     atorvastatin (LIPITOR) tablet 40 mg, 40 mg, Oral, DAILY, Marshal Arias MD, 40 mg at 23    [START ON 4/3/2023] cinacalcet (SENSIPAR) tablet 30 mg, 30 mg, Oral, DIALYSIS MON, WED & FRI, Marshal Arias MD    cyanocobalamin (VITAMIN B12) tablet 1,000 mcg, 1,000 mcg, Oral, DAILY, Marshal Arias MD, 1,000 mcg at 23    dilTIAZem ER (CARDIZEM CD) capsule 240 mg, 240 mg, Oral, DAILY, Marshal Arias MD, 240 mg at 23    ferrous sulfate tablet 325 mg, 325 mg, Oral, ACB, Marshal Arias MD    montelukast (SINGULAIR) tablet 10 mg, 10 mg, Oral, DAILY, Marshal Arias MD, 10 mg at 23    pregabalin (LYRICA) capsule 75 mg, 75 mg, Oral, DAILY, Marshal Arias MD, 75 mg at 23    sevelamer carbonate (RENVELA) tab 1,600 mg, 1,600 mg, Oral, TIDAC, Marshal Arias MD    acetaminophen (TYLENOL) tablet 650 mg, 650 mg, Oral, Q4H PRN **OR** acetaminophen (TYLENOL) solution 650 mg, 650 mg, Per NG tube, Q4H PRN **OR** acetaminophen (TYLENOL) suppository 650 mg, 650 mg, Rectal, Q4H PRN, Carlos Arias MD    glucose chewable tablet 16 g, 4 Tablet, Oral, PRN, Carlos Arias MD    glucagon (GLUCAGEN) injection 1 mg, 1 mg, IntraMUSCular, PRN, Carlos Arias MD    dextrose 10 % infusion 0-250 mL, 0-250 mL, IntraVENous, PRN, Carlos Arias MD    insulin lispro (HUMALOG) injection, , SubCUTAneous, AC&HS, [place], Marshal Bean MD    diazePAM (VALIUM) injection 5 mg, 5 mg, IntraVENous, ONCE, Zara Bianchi MD      Allergies   Allergen Reactions    Lisinopril Swelling    Sulfa (Sulfonamide Antibiotics) Other (comments) Had sores all over body    Beef Containing Products Other (comments)     PT DOES NOT EAT BEEF    Pork Derived (Porcine) Other (comments)     PT DOES NOT EAT PORK    Sulfa (Sulfonamide Antibiotics) Hives         SH:  Lives in [place]  Has a girlfriend  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 No T/E/D    FH:  No MHA or sz      PHYSICAL EXAM:    Visit Vitals  /87   Pulse 80   Temp 97.5 °F (36.4 °C)   Resp 16   Ht 6' 3\" (1.905 m)   Wt 347 lb 0.1 oz (157.4 kg)   SpO2 99%   BMI 43.37 kg/m²     Temp (24hrs), Av °F (36.7 °C), Min:97.5 °F (36.4 °C), Max:98.4 °F (36.9 °C)    General: Well developed well nourished morbidly obese patient in no apparent distress. Cardiac: Regular rate and rhythm with no murmurs. Carotids: 2+ symmetric, no bruits  Extremities: 2+ Radial pulses, no cyanosis or edema    Neurological Exam:  Mental Status: Oriented to time, place and person. Speech and language intact. Attention and fund of knowledge appropriate. Normal recent and remote memory. Cranial Nerves:   VFF, PERRL, EOMI, no nystagmus, no diplopia, no ptosis. Facial sensation is increased to PP on right. Facial movement is symmetric. Palate is midline. Tongue is midline. Hearing is intact   Motor:  5/5 strength in upper and lower proximal and distal muscles. Normal bulk and tone. No PD. No tremors   Reflexes:   Deep tendon reflexes 1+ and symmetric. Toes downgoing.    Sensory:   Intact to LT and PP   Gait:     Cerebellar:  Intact FTN, PIOTR         STUDIES AND REPORTS:  Recent Results (from the past 24 hour(s))   GLUCOSE, POC    Collection Time: 23 12:02 AM   Result Value Ref Range    Glucose (POC) 104 65 - 117 mg/dL    Performed by Joshua Butler ([facility])    SAMPLES BEING HELD    Collection Time: 23 12:11 AM   Result Value Ref Range    SAMPLES BEING HELD 1PST,1RED,1LAV,1BLUE     COMMENT        Add-on orders for these samples will be processed based on acceptable specimen integrity and analyte stability, which may vary by analyte. METABOLIC PANEL, BASIC    Collection Time: 04/01/23 12:11 AM   Result Value Ref Range    Sodium 129 (L) 136 - 145 mmol/L    Potassium 4.6 3.5 - 5.1 mmol/L    Chloride 95 (L) 97 - 108 mmol/L    CO2 27 21 - 32 mmol/L    Anion gap 7 5 - 15 mmol/L    Glucose 116 (H) 65 - 100 mg/dL    BUN 71 (H) 6 - 20 MG/DL    Creatinine 16.30 (H) 0.70 - 1.30 MG/DL    BUN/Creatinine ratio 4 (L) 12 - 20      eGFR 3 (L) >60 ml/min/1.73m2    Calcium 9.1 8.5 - 10.1 MG/DL   CBC WITH AUTOMATED DIFF    Collection Time: 04/01/23 12:11 AM   Result Value Ref Range    WBC 7.4 4.1 - 11.1 K/uL    RBC 3.31 (L) 4.10 - 5.70 M/uL    HGB 11.0 (L) 12.1 - 17.0 g/dL    HCT 35.0 (L) 36.6 - 50.3 %    .7 (H) 80.0 - 99.0 FL    MCH 33.2 26.0 - 34.0 PG    MCHC 31.4 30.0 - 36.5 g/dL    RDW 15.1 (H) 11.5 - 14.5 %    PLATELET 741 151 - 021 K/uL    MPV 11.4 8.9 - 12.9 FL    NRBC 0.0 0  WBC    ABSOLUTE NRBC 0.00 0.00 - 0.01 K/uL    NEUTROPHILS 54 32 - 75 %    LYMPHOCYTES 23 12 - 49 %    MONOCYTES 14 (H) 5 - 13 %    EOSINOPHILS 7 0 - 7 %    BASOPHILS 1 0 - 1 %    IMMATURE GRANULOCYTES 1 (H) 0.0 - 0.5 %    ABS. NEUTROPHILS 4.1 1.8 - 8.0 K/UL    ABS. LYMPHOCYTES 1.7 0.8 - 3.5 K/UL    ABS. MONOCYTES 1.0 0.0 - 1.0 K/UL    ABS. EOSINOPHILS 0.5 (H) 0.0 - 0.4 K/UL    ABS. BASOPHILS 0.1 0.0 - 0.1 K/UL    ABS. IMM.  GRANS. 0.0 0.00 - 0.04 K/UL    DF AUTOMATED     PROTHROMBIN TIME + INR    Collection Time: 04/01/23 12:11 AM   Result Value Ref Range    INR 3.9 (H) 0.9 - 1.1      Prothrombin time 37.7 (H) 9.0 - 11.1 sec   EKG, 12 LEAD, INITIAL    Collection Time: 04/01/23  2:21 AM   Result Value Ref Range    Ventricular Rate 86 BPM    QRS Duration 80 ms    Q-T Interval 406 ms    QTC Calculation (Bezet) 485 ms    Calculated R Axis 77 degrees    Calculated T Axis -64 degrees    Diagnosis       Atrial fibrillation  T wave abnormality, consider inferior ischemia  T wave abnormality, consider anterolateral ischemia  Prolonged QT  When compared with ECG of 17-MAR-2023 13:13,  No significant change was found  Confirmed by Giana Suarez M.D., Gregorio Post ([id number]) on 4/1/2023 11:31:20 AM     GLUCOSE, POC    Collection Time: 04/01/23  8:58 AM   Result Value Ref Range    Glucose (POC) 79 65 - 117 mg/dL    Performed by Hue Kim    GLUCOSE, POC    Collection Time: 04/01/23 12:10 PM   Result Value Ref Range    Glucose (POC) 81 65 - 117 mg/dL    Performed by [address], ACUTE    Collection Time: 04/01/23  3:51 PM   Result Value Ref Range    Hepatitis A, IgM NONREACTIVE NR      __          Hepatitis B surface Ag <0.10 Index    Hep B surface Ag Interp. Negative NEG      __          Hepatitis B core, IgM NONREACTIVE NR      __          Hep C virus Ab Interp. NONREACTIVE NR       MRI Results (most recent):  Results from Hospital Encounter encounter on 03/17/23    MRA BRAIN WO CONT    Narrative  CLINICAL HISTORY: Facial numbness    INDICATION: Facial numbness. COMPARISON: 2/13/2023  TECHNIQUE: MR examination of the brain includes axial and sagittal T1 , axial  T2, axial FLAIR, axial gradient echo, axial DWI, coronal T2. Contrast: No contrast was administered. .  Next, 3-D time-of-flight MRA of the brain was performed. Multiplanar  reconstructions were obtained. FINDINGS:  There is no intracranial mass, hemorrhage or evidence of acute infarction. IACs are symmetric in size. Mucous retention cyst in the left maxillary sinus. There is no Chiari or syrinx. The pituitary and infundibulum are grossly  unremarkable. There is no skull base mass. Cerebellopontine angles are grossly  unremarkable. The major intracranial vascular flow-voids are unremarkable. The cavernous sinuses are symmetric. Optic chiasm and infundibulum grossly  unremarkable. Orbits are grossly symmetric. Dural venous sinuses are grossly  patent.  The brain architecture is normal. There is no evidence of midline shift  or mass-effect. There are no extra-axial fluid collections. The mastoid air  cells and paranasal sinuses are well pneumatized and clear. The vertebral arteries are codominant. The basilar artery and its branches are  normal. The internal carotid, anterior cerebral, and middle cerebral arteries  are patent. There is no flow-limiting intracranial stenosis. 2 mm left M2  aneurysm is unchanged. 1-41 There are no sizable posterior communicating  arteries. Impression  No intracranial mass, hemorrhage or evidence of acute infarction. There is no major vessel occlusion or dissection. Left MCA M2 aneurysm is redemonstrated      CT Results (most recent):  Results from Hospital Encounter encounter on 03/31/23    CT HEAD WO CONT    Narrative  EXAM: CT HEAD WO CONT    INDICATION: facial numbness    COMPARISON: 3.17.2023. CONTRAST: None. TECHNIQUE: Unenhanced CT of the head was performed using 5 mm images. Brain and  bone windows were generated. Coronal and sagittal reformats. CT dose reduction  was achieved through use of a standardized protocol tailored for this  examination and automatic exposure control for dose modulation. FINDINGS:  The ventricles and sulci are normal in size, shape and configuration. There is  no significant white matter disease. There is no intracranial hemorrhage,  extra-axial collection, or mass effect. The basilar cisterns are open. No CT  evidence of acute infarct. The bone windows demonstrate no abnormalities. The visualized portions of the  paranasal sinuses and mastoid air cells are remarkable for mucus retention cyst  in the left maxillary sinus.     Impression  No acute process seen          Assessment and Plan:   Pt is a 54yo LH male with Afib therapeutic on coumadin, HTN, HLD, CVA, prediabetes, CAD, admitted 3/31/23 with c/o right facial/arm N/T that started in face and moves down, eye feels like it is going to close, then had bilateral R>L feeling of jitteriness in his hands, and vibrations through his body. Also noticed right hearing felt blocked. He had these same right sided sensory changes in Feb and March. CTH neg. Exam with BMI 43.4, increased PP in right face, o/w non-focal.  Low suspicion for TIA/CVA. DDx of sensory changes with Jacksonian March includes migraine and seizure. MRI brain pending, if neg consider EEG and/or empiric trial of ASD. Continue Coumadin and Lipitor 40mg daily. Signed: Mary Payan MD

## 2023-04-01 NOTE — PROGRESS NOTES
Admission Medication Reconciliation:    Information obtained from:  patient  RxQuery data available¹:  YES    Comments/Recommendations: Updated PTA meds/reviewed patient's allergies. 1)  Medication review done with patient and rx refill history  Removed calcium acetate,dulaglutide, nystatin. Removed pregabalin - patient states he was switched to gabapentin  Added amitriptyline, gabapentin. Patient verifies taking amlodipine and diltiazem. Warfarin dose (5mg daily) confirmed. Last dose 3/31/23       ¹RxQuery pharmacy benefit data reflects medications filled and processed through the patient's insurance, however   this data does NOT capture whether the medication was picked up or is currently being taken by the patient. Allergies:  Lisinopril, Sulfa (sulfonamide antibiotics), Beef containing products, Pork derived (porcine), and Sulfa (sulfonamide antibiotics)    Significant PMH/Disease States:   Past Medical History:   Diagnosis Date    Anxiety     Arrhythmia     A-FIB    Arthritis     CAD (coronary artery disease)     MI 09    CHF (congestive heart failure) (HCC)     Chronic kidney disease     END STAGE KIDNEY DISEASE;  DIALYSIS M-W-F    Chronic pain     NEUROPATHY; BACK PAIN    Fatigue     Hypertension     Lymph edema     LEFT LEG    Needle phobia     Neuropathy     Other ill-defined conditions(799.89)     Rash     Skipped beats     Sleep apnea     WEARS CPAP    Snoring     SOB (shortness of breath)     Stroke ([facility] [address] 01/2020    NUMBNESS IN RIGHT LEG AND FOOT    Thromboembolus ([facility] [address] 2018    PE     Chief Complaint for this Admission:    Chief Complaint   Patient presents with    Numbness     Prior to Admission Medications:   Prior to Admission Medications   Prescriptions Last Dose Informant Taking? ERGOCALCIFEROL, VITAMIN D2, PO 3/25/2023 Self Yes   Sig: Take  by mouth.  Takes at dialysis   albuterol (PROVENTIL HFA, VENTOLIN HFA, PROAIR HFA) 90 mcg/actuation inhaler   No   Sig: ProAir HFA 90 mcg/actuation aerosol inhaler   INHALE TWO PUFFS BY MOUTH EVERY 4 HOURS AS NEEDED FOR FOR SHORTNESS OF BREATH AND WHEEZING   allopurinoL (ZYLOPRIM) 100 mg tablet  Self Yes   Sig: Take 100 mg by mouth. TAKES ON Monday AND Thursday   amLODIPine (NORVASC) 10 mg tablet 3/31/2023  Yes   Sig: Take 1 Tab by mouth daily. amitriptyline (ELAVIL) 25 mg tablet   Yes   Sig: Take 25 mg by mouth nightly. Indications: for sleep   atorvastatin (LIPITOR) 40 mg tablet 3/31/2023 Self Yes   Sig: Take 40 mg by mouth daily. bumetanide (BUMEX) 2 mg tablet Unknown Self No   Sig: Take 2 mg by mouth daily. cinacalcet (SENSIPAR) 30 mg tablet 3/31/2023 Self Yes   Sig: Take 30 mg by mouth DIALYSIS MON, WED & FRI. Takes at dialysis   cyanocobalamin 1,000 mcg tablet 3/31/2023 Self Yes   Sig: Take 1,000 mcg by mouth daily. dilTIAZem ER (CARDIZEM CD) 240 mg capsule 3/31/2023 Self Yes   Sig: Take 240 mg by mouth daily. ferrous sulfate 325 mg (65 mg iron) tablet 3/31/2023 Self Yes   Sig: Take 325 mg by mouth Daily (before breakfast). gabapentin (NEURONTIN) 300 mg capsule   Yes   Sig: Take 300 mg by mouth nightly. Indications: neuropathic pain   hydrOXYzine HCL (ATARAX) 10 mg tablet  Self Yes   Sig: Take 10 mg by mouth every eight (8) hours as needed. montelukast (SINGULAIR) 10 mg tablet 3/31/2023 Self Yes   Sig: Take 10 mg by mouth daily. predniSONE (DELTASONE) 5 mg tablet 3/31/2023  Yes   Sig: Take 5 mg by mouth daily. semaglutide (Ozempic) 1 mg/dose (2 mg/1.5 mL) sub-q pen   Yes   Si mg by SubCUTAneous route. sevelamer carbonate (RENVELA) 800 mg tab tab 3/31/2023 Self Yes   Sig: Take 1,600 mg by mouth Before breakfast, lunch, and dinner. warfarin (Jantoven) 5 mg tablet 3/31/2023 Self Yes   Sig: Take 1 Tablet by mouth daily.       Facility-Administered Medications: None     Please contact the main inpatient pharmacy with any questions or concerns at [phone] and we will direct you to the clinical pharmacist covering this patient's care while in-house.    Ck Patel, [facility]

## 2023-04-01 NOTE — CONSULTS
Patient was seen and examined in the ER room 23. Needs HD today  [facility] acute team was notified.

## 2023-04-01 NOTE — PROGRESS NOTES
Admission Medication Reconciliation:    Information obtained from:  patient  RxQuery data available¹:  YES    Comments/Recommendations: Updated PTA meds/reviewed patient's allergies. 1)  Medication review done with patient and rx refill history  Removed calcium acetate,dulaglutide, nystatin. Removed pregabalin - patient states he was switched to gabapentin  Added amitriptyline, gabapentin. Patient verifies taking amlodipine and diltiazem. Warfarin dose (5mg daily) confirmed. Last dose 3/31/23       ¹RxQuery pharmacy benefit data reflects medications filled and processed through the patient's insurance, however   this data does NOT capture whether the medication was picked up or is currently being taken by the patient. Allergies:  Lisinopril, Sulfa (sulfonamide antibiotics), Beef containing products, Pork derived (porcine), and Sulfa (sulfonamide antibiotics)    Significant PMH/Disease States:   Past Medical History:   Diagnosis Date    Anxiety     Arrhythmia     A-FIB    Arthritis     CAD (coronary artery disease)     MI 09    CHF (congestive heart failure) (HCC)     Chronic kidney disease     END STAGE KIDNEY DISEASE;  DIALYSIS M-W-F    Chronic pain     NEUROPATHY; BACK PAIN    Fatigue     Hypertension     Lymph edema     LEFT LEG    Needle phobia     Neuropathy     Other ill-defined conditions(799.89)     Rash     Skipped beats     Sleep apnea     WEARS CPAP    Snoring     SOB (shortness of breath)     Stroke ([facility] [address] 01/2020    NUMBNESS IN RIGHT LEG AND FOOT    Thromboembolus ([facility] [address] 2018    PE     Chief Complaint for this Admission:    Chief Complaint   Patient presents with    Numbness     Prior to Admission Medications:   Prior to Admission Medications   Prescriptions Last Dose Informant Taking? ERGOCALCIFEROL, VITAMIN D2, PO 3/25/2023 Self Yes   Sig: Take  by mouth.  Takes at dialysis   albuterol (PROVENTIL HFA, VENTOLIN HFA, PROAIR HFA) 90 mcg/actuation inhaler   No   Sig: ProAir HFA 90 mcg/actuation aerosol inhaler   INHALE TWO PUFFS BY MOUTH EVERY 4 HOURS AS NEEDED FOR FOR SHORTNESS OF BREATH AND WHEEZING   allopurinoL (ZYLOPRIM) 100 mg tablet  Self Yes   Sig: Take 100 mg by mouth. TAKES ON Monday AND Thursday   amLODIPine (NORVASC) 10 mg tablet 3/31/2023  Yes   Sig: Take 1 Tab by mouth daily. amitriptyline (ELAVIL) 25 mg tablet   Yes   Sig: Take 25 mg by mouth nightly. Indications: for sleep   atorvastatin (LIPITOR) 40 mg tablet 3/31/2023 Self Yes   Sig: Take 40 mg by mouth daily. bumetanide (BUMEX) 2 mg tablet Unknown Self No   Sig: Take 2 mg by mouth daily. cinacalcet (SENSIPAR) 30 mg tablet 3/31/2023 Self Yes   Sig: Take 30 mg by mouth DIALYSIS MON, WED & FRI. Takes at dialysis   cyanocobalamin 1,000 mcg tablet 3/31/2023 Self Yes   Sig: Take 1,000 mcg by mouth daily. dilTIAZem ER (CARDIZEM CD) 240 mg capsule 3/31/2023 Self Yes   Sig: Take 240 mg by mouth daily. ferrous sulfate 325 mg (65 mg iron) tablet 3/31/2023 Self Yes   Sig: Take 325 mg by mouth Daily (before breakfast). gabapentin (NEURONTIN) 300 mg capsule   Yes   Sig: Take 300 mg by mouth nightly. Indications: neuropathic pain   hydrOXYzine HCL (ATARAX) 10 mg tablet  Self Yes   Sig: Take 10 mg by mouth every eight (8) hours as needed. montelukast (SINGULAIR) 10 mg tablet 3/31/2023 Self Yes   Sig: Take 10 mg by mouth daily. predniSONE (DELTASONE) 5 mg tablet 3/31/2023  Yes   Sig: Take 5 mg by mouth daily. semaglutide (Ozempic) 1 mg/dose (2 mg/1.5 mL) sub-q pen   Yes   Si mg by SubCUTAneous route. sevelamer carbonate (RENVELA) 800 mg tab tab 3/31/2023 Self Yes   Sig: Take 1,600 mg by mouth Before breakfast, lunch, and dinner. warfarin (Jantoven) 5 mg tablet 3/31/2023 Self Yes   Sig: Take 1 Tablet by mouth daily.       Facility-Administered Medications: None     Please contact the main inpatient pharmacy with any questions or concerns at [phone] and we will direct you to the clinical pharmacist covering this patient's care while in-house.    Shalonda Gonzalez, [facility]

## 2023-04-02 ENCOUNTER — APPOINTMENT (OUTPATIENT)
Dept: MRI IMAGING | Age: 53
DRG: 100 | End: 2023-04-02
Attending: HOSPITALIST
Payer: MEDICARE

## 2023-04-02 LAB
GLUCOSE BLD STRIP.AUTO-MCNC: 93 MG/DL (ref 65–117)
GLUCOSE BLD STRIP.AUTO-MCNC: 96 MG/DL (ref 65–117)
INR PPP: 3.6 (ref 0.9–1.1)
PROTHROMBIN TIME: 34.5 SEC (ref 9–11.1)
SERVICE CMNT-IMP: NORMAL
SERVICE CMNT-IMP: NORMAL

## 2023-04-02 PROCEDURE — 85610 PROTHROMBIN TIME: CPT

## 2023-04-02 PROCEDURE — G0378 HOSPITAL OBSERVATION PER HR: HCPCS

## 2023-04-02 PROCEDURE — 82962 GLUCOSE BLOOD TEST: CPT

## 2023-04-02 PROCEDURE — 92610 EVALUATE SWALLOWING FUNCTION: CPT

## 2023-04-02 PROCEDURE — 74011250636 HC RX REV CODE- 250/636: Performed by: HOSPITALIST

## 2023-04-02 PROCEDURE — 70551 MRI BRAIN STEM W/O DYE: CPT

## 2023-04-02 PROCEDURE — 96374 THER/PROPH/DIAG INJ IV PUSH: CPT

## 2023-04-02 PROCEDURE — 74011250637 HC RX REV CODE- 250/637: Performed by: FAMILY MEDICINE

## 2023-04-02 PROCEDURE — 36415 COLL VENOUS BLD VENIPUNCTURE: CPT

## 2023-04-02 PROCEDURE — 74011250637 HC RX REV CODE- 250/637: Performed by: HOSPITALIST

## 2023-04-02 PROCEDURE — 97161 PT EVAL LOW COMPLEX 20 MIN: CPT

## 2023-04-02 PROCEDURE — 65270000046 HC RM TELEMETRY

## 2023-04-02 RX ORDER — WARFARIN 2 MG/1
2 TABLET ORAL ONCE
Status: COMPLETED | OUTPATIENT
Start: 2023-04-02 | End: 2023-04-02

## 2023-04-02 RX ORDER — DIAZEPAM 10 MG/2ML
5 INJECTION INTRAMUSCULAR ONCE
Status: COMPLETED | OUTPATIENT
Start: 2023-04-02 | End: 2023-04-02

## 2023-04-02 RX ADMIN — MONTELUKAST 10 MG: 10 TABLET, FILM COATED ORAL at 09:50

## 2023-04-02 RX ADMIN — WARFARIN SODIUM 2 MG: 2 TABLET ORAL at 17:33

## 2023-04-02 RX ADMIN — FERROUS SULFATE TAB 325 MG (65 MG ELEMENTAL FE) 325 MG: 325 (65 FE) TAB at 07:31

## 2023-04-02 RX ADMIN — CYANOCOBALAMIN TAB 500 MCG 1000 MCG: 500 TAB at 09:50

## 2023-04-02 RX ADMIN — PREGABALIN 75 MG: 75 CAPSULE ORAL at 09:48

## 2023-04-02 RX ADMIN — DILTIAZEM HYDROCHLORIDE 240 MG: 120 CAPSULE, EXTENDED RELEASE ORAL at 09:51

## 2023-04-02 RX ADMIN — SEVELAMER CARBONATE 1600 MG: 800 TABLET, FILM COATED ORAL at 17:33

## 2023-04-02 RX ADMIN — ATORVASTATIN CALCIUM 40 MG: 40 TABLET, FILM COATED ORAL at 09:49

## 2023-04-02 RX ADMIN — DIAZEPAM 5 MG: 5 INJECTION, SOLUTION INTRAMUSCULAR; INTRAVENOUS at 18:23

## 2023-04-02 RX ADMIN — SEVELAMER CARBONATE 1600 MG: 800 TABLET, FILM COATED ORAL at 11:32

## 2023-04-02 RX ADMIN — SEVELAMER CARBONATE 1600 MG: 800 TABLET, FILM COATED ORAL at 07:28

## 2023-04-02 NOTE — PROGRESS NOTES
Problem: Falls - Risk of  Goal: *Absence of Falls  Description: Document Jaimee Mend Fall Risk and appropriate interventions in the flowsheet. Outcome: Progressing Towards Goal  Note: Fall Risk Interventions:                                Problem: Patient Education: Go to Patient Education Activity  Goal: Patient/Family Education  Outcome: Progressing Towards Goal     Problem: Falls - Risk of  Goal: *Absence of Falls  Description: Document Jaimee Mend Fall Risk and appropriate interventions in the flowsheet. Outcome: Progressing Towards Goal  Note: Fall Risk Interventions:                                Problem: Patient Education: Go to Patient Education Activity  Goal: Patient/Family Education  Outcome: Progressing Towards Goal     Problem: Risk for Spread of Infection  Goal: Prevent transmission of infectious organism to others  Description: Prevent the transmission of infectious organisms to other patients, staff members, and visitors.   Outcome: Progressing Towards Goal     Problem: Patient Education:  Go to Education Activity  Goal: Patient/Family Education  Outcome: Progressing Towards Goal

## 2023-04-02 NOTE — PROGRESS NOTES
Hospitalist Progress Note  Jean Carlos Fuller MD  Answering service: [phone] from in house phone        Date of Service:  2023  NAME:  Hanh Castillo  :  1970  MRN:  [id number]      Admission Summary:   Hanh Castillo is a 50 y.o. male with past medical history of end-stage renal disease on hemodialysis, CAD, MI, CHF, atrial fibrillation, hypertension, type 2 diabetes mellitus, CVA, PE, lymphedema left leg, peripheral neuropathy, sleep apnea, arthritis, asthma, class III obesity presented to the emergency department chief complaint of facial numbness. Symptoms onset reported began approximate 1600 hrs. yesterday with right facial and right arm and finger numbness and tingling, initially constant, moderate severity, without specific precipitating factors, reportedly resolved after taking aspirin. Symptoms reportedly occurred approximate 22:45 hours yesterday. On arrival emergency department, initial vital signs temperature 98.4 °F, /80, heart rate 88, respiratory rate 20, saturation 99% on room air. Abnormal labs include hemoglobin 11.0, .7, INR 3.9, sodium 129, blood glucose 116, BUN 71, creatinine 16.30, GFR 3.  CT head without IV contrast showed no acute intracranial normalities. 12 EKG showed atrial fibrillation, T wave changes anterior anterior lateral leads, prolonged QTc 485 ms at 86 bpm.  Patient is now seen for admission to the hospital service. Interval history / Subjective:    Face numbness resolved now       Assessment & Plan:         Facial numbness        Numbness and tingling right upper extremity  -recent hospitalization with possible TIA/ saccular MCA aneurysm on 2023 had full stroke work up , on chronic coumadin for chroni afib  INR> 3   -symptoms recurrent  -CT head without IV contrast:  no acute process  -Neuro consulted: ddx simple partial seizure vs complex migraine vs  CVA  -pending [facility] MRI: if MRI neg, consider EEG and/or empiric trial of ASD. -continue coumadin [facility]  continue lipitor   Had Echo done on 2/14   -LDL 59, A1c 5.8      2. ESRD needing dialysis  -consult nephrologist for hemodialysis       3. Acute hyponatremia  -Na 129  -sodium adjustment per renal dialysis, follow      4. Supratherapeutic international normalized ratio (INR)  -on long term anticoagulation with Warfarin  -INR = 3.9  -resume coumadin per pharmacy dose adjustment     5. Chronic afib:   Rate controlled. Cardizem 240mg daily   Coumadin as above      6. Macrocytic anemia  -anemia of chronic disease  -Hgb 11.0, .7  -repeat H&H  -check G80 and folic acid levels        7. Essential hypertension  -Monitor BP and provide hypertensive medications as needed     8. Hyperlipidemia  -Continue atorvastatin 40 mg daily  -Check lipid panel     9. Class III obesity  -BMI 43.37 kg/M2  -Would encourage weight loss, reduce calorie diet, lifestyle modifications     10. Type 2 diabetes mellitus  -pt denied he has diabetes  BG normal   A1c < 6  Dc achs checking        Code status: full   Prophylaxis: coumadin   Care Plan discussed with: pt, rn, renal.   Anticipated Disposition: TBD. Hospital Problems  Date Reviewed: 9/29/2022            Codes Class Noted POA    Acute hyponatremia ICD-10-CM: E87.1  ICD-9-CM: 276.1  4/1/2023 Unknown        Facial numbness ICD-10-CM: R20.0  ICD-9-CM: 782.0  4/1/2023 Unknown        ESRD needing dialysis [facility]) ICD-10-CM: N18.6, Z99.2  ICD-9-CM: 585.6  4/1/2023 Unknown        Supratherapeutic international normalized ratio (INR) ICD-10-CM: R79.1  ICD-9-CM: 790.92  4/1/2023 Unknown               Review of Systems:   A comprehensive review of systems was negative except for that written in the HPI. Vital Signs:    Last 24hrs VS reviewed since prior progress note.  Most recent are:  Visit Vitals  /72   Pulse 60   Temp 98 °F (36.7 °C) Resp 12   Ht 6' 3\" (1.905 m)   Wt (!) 158.9 kg (350 lb 5 oz)   SpO2 92%   BMI 43.79 kg/m²         Intake/Output Summary (Last 24 hours) at 4/2/2023 1642  Last data filed at 4/1/2023 1916  Gross per 24 hour   Intake --   Output 3000 ml   Net -3000 ml        Physical Examination:     I had a face to face encounter with this patient and independently examined them on 4/2/2023 as outlined below:          General : alert x 3, awake, no acute distress,   HEENT: PEERL, EOMI, moist mucus membrane, TM clear  Neck: supple, no JVD, no meningeal signs  Chest: Clear to auscultation bilaterally   CVS: S1 S2 heard, Capillary refill less than 2 seconds  Abd: soft/ non tender, non distended, BS physiological,   Ext: no clubbing, no cyanosis, no edema, brisk 2+ DP pulses  Neuro/Psych: pleasant mood and affect, CN 2-12 grossly intact, sensory grossly within normal limit, Strength 5/5 in all extremities, DTR 1+ x 4  Skin: warm            Data Review:    Review and/or order of clinical lab test    I have independently reviewed and interpreted patient's lab and all other diagnostic data    Notes reviewed from all clinical/nonclinical/nursing services involved in patient's clinical care. Care coordination discussions were held with appropriate clinical/nonclinical/ nursing providers based on care coordination needs. Labs:     Recent Labs     04/01/23 0011   WBC 7.4   HGB 11.0*   HCT 35.0*        Recent Labs     04/01/23 0011   *   K 4.6   CL 95*   CO2 27   BUN 71*   CREA 16.30*   *   CA 9.1     No results for input(s): ALT, AP, TBIL, TBILI, TP, ALB, GLOB, GGT, AML, LPSE in the last 72 hours. No lab exists for component: SGOT, GPT, AMYP, HLPSE  Recent Labs     04/02/23 0522 04/01/23 0011   INR 3.6* 3.9*   PTP 34.5* 37.7*      No results for input(s): FE, TIBC, PSAT, FERR in the last 72 hours. No results found for: FOL, RBCF   No results for input(s): PH, PCO2, PO2 in the last 72 hours.   No results for input(s): CPK, CKNDX, TROIQ in the last 72 hours.     No lab exists for component: CPKMB  Lab Results   Component Value Date/Time    Cholesterol, total 149 04/01/2023 12:11 AM    HDL Cholesterol 30 04/01/2023 12:11 AM    LDL, calculated 59.4 04/01/2023 12:11 AM    Triglyceride 298 (H) 04/01/2023 12:11 AM    CHOL/HDL Ratio 5.0 04/01/2023 12:11 AM     Lab Results   Component Value Date/Time    Glucose (POC) 93 04/02/2023 12:38 PM    Glucose (POC) 131 (H) 04/01/2023 09:34 PM    Glucose (POC) 81 04/01/2023 12:10 PM    Glucose (POC) 79 04/01/2023 08:58 AM    Glucose (POC) 104 04/01/2023 12:02 AM     Lab Results   Component Value Date/Time    Color YELLOW 07/05/2011 05:00 PM    Appearance CLEAR 07/05/2011 05:00 PM    Specific gravity 1.022 07/05/2011 05:00 PM    pH (UA) 5.0 07/05/2011 05:00 PM    Protein 100 (A) 07/05/2011 05:00 PM    Glucose NEGATIVE 07/05/2011 05:00 PM    Ketone NEGATIVE 07/05/2011 05:00 PM    Bilirubin NEGATIVE 07/05/2011 05:00 PM    Urobilinogen 0.2 07/05/2011 05:00 PM    Nitrites NEGATIVE 07/05/2011 05:00 PM    Leukocyte Esterase NEGATIVE 07/05/2011 05:00 PM    Epithelial cells 0-5 07/05/2011 05:00 PM    Bacteria NEGATIVE 07/05/2011 05:00 PM    WBC 0-4 07/05/2011 05:00 PM    RBC 0-3 07/05/2011 05:00 PM         Medications Reviewed:     Current Facility-Administered Medications   Medication Dose Route Frequency    warfarin (COUMADIN) tablet 2 mg  2 mg Oral ONCE    atorvastatin (LIPITOR) tablet 40 mg  40 mg Oral DAILY    [START ON 4/3/2023] cinacalcet (SENSIPAR) tablet 30 mg  30 mg Oral DIALYSIS MON, WED & FRI    cyanocobalamin (VITAMIN B12) tablet 1,000 mcg  1,000 mcg Oral DAILY    dilTIAZem ER (CARDIZEM CD) capsule 240 mg  240 mg Oral DAILY    ferrous sulfate tablet 325 mg  325 mg Oral ACB    montelukast (SINGULAIR) tablet 10 mg  10 mg Oral DAILY    pregabalin (LYRICA) capsule 75 mg  75 mg Oral DAILY    sevelamer carbonate (RENVELA) tab 1,600 mg  1,600 mg Oral TIDAC    acetaminophen (TYLENOL) tablet 650 mg  650 mg Oral Q4H PRN    Or    acetaminophen (TYLENOL) solution 650 mg  650 mg Per NG tube Q4H PRN    Or    acetaminophen (TYLENOL) suppository 650 mg  650 mg Rectal Q4H PRN    glucose chewable tablet 16 g  4 Tablet Oral PRN    glucagon (GLUCAGEN) injection 1 mg  1 mg IntraMUSCular PRN    dextrose 10 % infusion 0-250 mL  0-250 mL IntraVENous PRN    insulin lispro (HUMALOG) injection   SubCUTAneous AC&HS    Warfarin Pharmacy Dosing (COUMADIN)   Other Rx Dosing/Monitoring     ______________________________________________________________________  EXPECTED LENGTH OF STAY: - - -  ACTUAL LENGTH OF STAY:          1                 Joseluis Valerio MD

## 2023-04-02 NOTE — PROGRESS NOTES
Pharmacist Note - Warfarin Dosing  Consult provided for this 52 y.o.male to manage warfarin for Atrial Fibrillation    INR Goal: 2 - 3    Home regimen/ tablet size: 5 mg daily except 7.5 mg on Thus    Drugs that may increase INR: None  Drugs that may decrease INR: None  Other current anticoagulants/ drugs that may increase bleeding risk: None  Risk factors: None  Daily INR ordered: YES    Recent Labs     04/02/23  0522 04/01/23  0011   HGB  --  11.0*   INR 3.6* 3.9*     Date               INR                  Dose  4/2              3.6              2mg                                                                                Assessment/ Plan: Will order warfarin 2 mg PO x 1 dose as a dose reduction from home regimen given elevated INR    Pharmacy will continue to monitor daily and adjust therapy as indicated. Please contact the pharmacist at x [phone] for outpatient recommendations if needed.

## 2023-04-02 NOTE — PROGRESS NOTES
PHYSICAL THERAPY EVALUATION/DISCHARGE  Patient: Jesús Mann (15 y.o. male)  Date: 4/2/2023  Primary Diagnosis: Facial numbness [R20.0]  ESRD needing dialysis ([facility] [address] [N18.6, Z99.2]  Supratherapeutic international normalized ratio (INR) [R79.1]  Acute hyponatremia [E87.1]       Precautions:          ASSESSMENT  Based on the objective data described below, the patient presents with episode of R side facial tingling and weakness that has since resolved. He reports that he has had several of these episodes recently with no clear pattern. He does have complex medical history including CVA in 2020 with mild residual R side weakness and bilateral LE neuopathy. He is otherwise independent, driving and working. On exam, he demonstrates very subtle R side weakness, which he reports feels at his baseline; note he is also L hand dominant. His mobility and balance are intact and VS stable with activity. He has no acute PT needs at this time, as he is at his baseline. We will complete orders at this time. Functional Outcome Measure: The patient scored 24 on the Tinetti outcome measure which is indicative of low fall risk. Other factors to consider for discharge: none     Further skilled acute physical therapy is not indicated at this time. PLAN :  Recommendation for discharge: (in order for the patient to meet his/her long term goals)  No skilled physical therapy/ follow up rehabilitation needs identified at this time. This discharge recommendation:  Has not yet been discussed the attending provider and/or case management    IF patient discharges home will need the following DME: none       SUBJECTIVE:   Patient stated I just hope they figure out why this keeps happening.     OBJECTIVE DATA SUMMARY:   HISTORY:    Past Medical History:   Diagnosis Date    Anxiety     Arrhythmia     A-FIB    Arthritis     CAD (coronary artery disease)     MI 09    CHF (congestive heart failure) (HCC)     Chronic kidney disease     END STAGE KIDNEY DISEASE;  DIALYSIS M-W-Sat    Chronic pain     NEUROPATHY; BACK PAIN    Fatigue     Hypertension     Lymph edema     LEFT LEG    Needle phobia     Neuropathy     Other ill-defined conditions(799.89)     Rash     Skipped beats     Sleep apnea     WEARS CPAP    Snoring     SOB (shortness of breath)     Stroke ([facility] [address] 01/2020    NUMBNESS IN RIGHT LEG AND FOOT    Thromboembolus ([facility] [address] 2018    PE     Past Surgical History:   Procedure Laterality Date    HX ORTHOPAEDIC      LEFT HAND, RIGHT ANKLE, AND RIGHT SHOULDER    HX TONSILLECTOMY      HX VASCULAR ACCESS      UPPER RIGHT CHEST    IR FLUORO GUIDED NEEDLE PLACEMENT  10/08/2021    IR INSERT TUNL CVC W/O PORT OVER 5 YR  10/08/2021    IR INSERT TUNL CVC W/O PORT OVER 5 YR  10/10/2022    [place] UNLISTED PROCEDURE CARDIAC SURGERY      CARDIAC CATHX2-NO STENTS    [place] UNLISTED PROCEDURE VASCULAR SURGERY      PERITONEAL DIALYSIS CATH       Prior level of function: independent  Personal factors and/or comorbidities impacting plan of care: as noted above    Home Situation  # Steps to Enter: 4  Rails to Enter: Yes  Hand Rails : Bilateral  One/Two Story Residence: One story  Living Alone: No  Support Systems: Spouse/Significant Other  Patient Expects to be Discharged to[de-identified] Home  Current DME Used/Available at Home: None    EXAMINATION/PRESENTATION/DECISION MAKING:   Critical Behavior:  Neurologic State: Alert  Orientation Level: Oriented X4  Cognition: Follows commands     Hearing:     Skin:  intact  Edema: none  Range Of Motion:  AROM: Within functional limits                       Strength:    Strength:  Within functional limits (mild R side weakness from previous CVA, very subtle and only with higher level activities, pt feels it is at his baseline)                    Tone & Sensation:   Tone: Normal              Sensation: Impaired (neuropathy in bilateral feet)               Coordination:  Coordination: Within functional limits  Vision:      Functional Mobility:  Bed Mobility:  Rolling: Independent  Supine to Sit: Independent  Sit to Supine: Independent     Transfers:  Sit to Stand: Independent  Stand to Sit: Independent        Bed to Chair: Independent              Balance:   Sitting: Intact; Without support  Standing: Intact; Without support  Ambulation/Gait Training:  Distance (ft): 200 Feet (ft)  Assistive Device: Gait belt  Ambulation - Level of Assistance: Modified independent        Gait Abnormalities: Trunk sway increased (mild increase in trunk sway, but no loss of balance)        Base of Support: Widened     Speed/Lucero: Pace decreased (<100 feet/min)  Step Length: Right shortened;Left shortened                     Stairs: Therapeutic Exercises:       Functional Measure:  Tinetti test:    Sitting Balance: 1  Arises: 1  Attempts to Rise: 2  Immediate Standing Balance: 2  Standing Balance: 2  Nudged: 2  Eyes Closed: 1  Turn 360 Degrees - Continuous/Discontinuous: 1  Turn 360 Degrees - Steady/Unsteady: 1  Sitting Down: 1  Balance Score: 14 Balance total score  Indication of Gait: 1  R Step Length/Height: 1  L Step Length/Height: 1  R Foot Clearance: 1  L Foot Clearance: 1  Step Symmetry: 1  Step Continuity: 1  Path: 2  Trunk: 1  Walking Time: 0  Gait Score: 10 Gait total score  Total Score: 24/28 Overall total score         Tinetti Tool Score Risk of Falls  <19 = High Fall Risk  19-24 = Moderate Fall Risk  25-28 = Low Fall Risk  Tinetti ME. Performance-Oriented Assessment of Mobility Problems in Elderly Patients. Lopez 68; [id number].  (Scoring Description: PT Bulletin Feb. 10, 1993)    Older adults: Moira Costa et al, 2009; n = 1000 [facility] elderly evaluated with ABC, PHAM, ADL, and IADL)  · Mean PHAM score for males aged 69-68 years = 26.21(3.40)  · Mean PHAM score for females age 69-68 years = 25.16(4.30)  · Mean PHAM score for males over 80 years = 23.29(6.02)  · Mean PHAM score for females over 80 years = 17.20(8.32)           Physical Therapy Evaluation Charge Determination   History Examination Presentation Decision-Making   HIGH Complexity :3+ comorbidities / personal factors will impact the outcome/ POC  MEDIUM Complexity : 3 Standardized tests and measures addressing body structure, function, activity limitation and / or participation in recreation  LOW Complexity : Stable, uncomplicated  LOW Complexity : FOTO score of       Based on the above components, the patient evaluation is determined to be of the following complexity level: LOW     Pain Rating:  none    Activity Tolerance:   Good      After treatment patient left in no apparent distress:   Sitting in chair and Call bell within reach    COMMUNICATION/EDUCATION:   The patients plan of care was discussed with: Occupational therapist.     Fall prevention education was provided and the patient/caregiver indicated understanding., Patient/family have participated as able in goal setting and plan of care. , and Patient/family agree to work toward stated goals and plan of care.     Thank you for this referral.  Jacklyn Gomez, PT   Time Calculation: 26 mins

## 2023-04-02 NOTE — PROGRESS NOTES
Problem: Falls - Risk of  Goal: *Absence of Falls  Description: Document Lossie Generous Fall Risk and appropriate interventions in the flowsheet. Outcome: Progressing Towards Goal  Note: Fall Risk Interventions:                                Problem: Patient Education: Go to Patient Education Activity  Goal: Patient/Family Education  Outcome: Progressing Towards Goal     Problem: Falls - Risk of  Goal: *Absence of Falls  Description: Document Lossie Generous Fall Risk and appropriate interventions in the flowsheet. Outcome: Progressing Towards Goal  Note: Fall Risk Interventions:                                Problem: Patient Education: Go to Patient Education Activity  Goal: Patient/Family Education  Outcome: Progressing Towards Goal     Problem: Risk for Spread of Infection  Goal: Prevent transmission of infectious organism to others  Description: Prevent the transmission of infectious organisms to other patients, staff members, and visitors.   Outcome: Progressing Towards Goal     Problem: Patient Education:  Go to Education Activity  Goal: Patient/Family Education  Outcome: Progressing Towards Goal

## 2023-04-02 NOTE — PROGRESS NOTES
SPEECH PATHOLOGY BEDSIDE SWALLOW EVALUATION/DISCHARGE  Patient: Abi Mccoy (5 y.o. male)  Date: 4/2/2023  Primary Diagnosis: Facial numbness [R20.0]  ESRD needing dialysis ([facility] [address] [N18.6, Z99.2]  Supratherapeutic international normalized ratio (INR) [R79.1]  Acute hyponatremia [E87.1]       Precautions:        ASSESSMENT :  Patient admitted for R facial numbness/tingling. Hx of CVA w/ R sided deficits. Passed swallow screen and initiated on regular diet/thin liquids. Head CT negative. Based on the objective data described below, the patient presents with functional oropharyngeal swallow with no difficulties or s/s of aspiration appreciated at bedside with any consistencies. Patient denies any speech or swallowing changes or concerns. Patient reports now intact facial sensation. Recommend continue regular diet/thin liquids with general aspiration precautions. Suspect pt is at baseline swallow function and therefore, skilled acute therapy provided by a speech-language pathologist is not indicated at this time. SLP will sign off. Please reconsult with any changes or if SLP can be of any further assistance. .  Skilled acute therapy provided by a speech-language pathologist is not indicated at this time. PLAN :  Recommendations:  -- regular diet/ thin liquids  -- general aspiration precautions including completely upright for all PO   -- SLP will sign off     Discharge Recommendations: None     SUBJECTIVE:   Patient stated All feels normal to me.     OBJECTIVE:     Past Medical History:   Diagnosis Date    Anxiety     Arrhythmia     A-FIB    Arthritis     CAD (coronary artery disease)     MI 09    CHF (congestive heart failure) (HCC)     Chronic kidney disease     END STAGE KIDNEY DISEASE;  DIALYSIS M-W-Sat    Chronic pain     NEUROPATHY; BACK PAIN    Fatigue     Hypertension     Lymph edema     LEFT LEG    Needle phobia     Neuropathy     Other ill-defined conditions(418.89)     Rash     Skipped beats Sleep apnea     WEARS CPAP    Snoring     SOB (shortness of breath)     Stroke ([facility] [address] 01/2020    NUMBNESS IN RIGHT LEG AND FOOT    Thromboembolus ([facility] [address] 2018    PE     Past Surgical History:   Procedure Laterality Date    HX ORTHOPAEDIC      LEFT HAND, RIGHT ANKLE, AND RIGHT SHOULDER    HX TONSILLECTOMY      HX VASCULAR ACCESS      UPPER RIGHT CHEST    IR FLUORO GUIDED NEEDLE PLACEMENT  10/08/2021    IR INSERT TUNL CVC W/O PORT OVER 5 YR  10/08/2021    IR INSERT TUNL CVC W/O PORT OVER 5 YR  10/10/2022    [place] UNLISTED PROCEDURE CARDIAC SURGERY      CARDIAC CATHX2-NO STENTS    FL UNLISTED PROCEDURE VASCULAR SURGERY      PERITONEAL DIALYSIS CATH     Prior Level of Function/Home Situation:   Home Situation  # Steps to Enter: 4  Rails to Enter: Yes  Hand Rails : Bilateral  One/Two Story Residence: One story  Living Alone: No  Support Systems: Spouse/Significant Other  Patient Expects to be Discharged to[de-identified] Home  Current DME Used/Available at Home: None  Diet prior to admission: regular/thin  Current Diet:  regular/thin   Cognitive and Communication Status:  Neurologic State: Alert  Orientation Level: Oriented X4  Cognition: Follows commands  Perception: Appears intact  Perseveration: No perseveration noted     Oral Assessment:  Oral Assessment  Labial: No impairment  Dentition: Intact; Natural  Oral Hygiene: moist oral mucosa  Lingual: No impairment  Velum: No impairment  Mandible: No impairment  P.O. Trials:  Patient Position: upright in chair  Vocal quality prior to P.O.: No impairment  Consistency Presented: Thin liquid; Solid  How Presented: Self-fed/presented     Bolus Acceptance: No impairment  Bolus Formation/Control: No impairment     Propulsion: No impairment  Oral Residue: None  Initiation of Swallow: No impairment  Laryngeal Elevation: Functional  Aspiration Signs/Symptoms: None  Pharyngeal Phase Characteristics: No impairment, issues, or problems              Oral Phase Severity: No impairment  Pharyngeal Phase Severity : No impairment  NOMS:   The NOMS functional outcome measure was used to quantify this patient's level of swallowing impairment. Based on the NOMS, the patient was determined to be at level 7 for swallow function     NOMS Swallowing Levels:  Level 1 (CN): NPO  Level 2 (CM): NPO but takes consistency in therapy  Level 3 (CL): Takes less than 50% of nutrition p.o. and continues with nonoral feedings; and/or safe with mod cues; and/or max diet restriction  Level 4 (CK): Safe swallow but needs mod cues; and/or mod diet restriction; and/or still requires some nonoral feeding/supplements  Level 5 (CJ): Safe swallow with min diet restriction; and/or needs min cues  Level 6 (CI): Independent with p.o.; rare cues; usually self cues; may need to avoid some foods or needs extra time  Level 7 ([address] [place] [address]): Independent for all p.o.  SARA. (2003). National Outcomes Measurement System (NOMS): Adult Speech-Language Pathology User's Guide. Pain:           After treatment:   Patient left in no apparent distress sitting up in chair, Call bell within reach, and Nursing notified    COMMUNICATION/EDUCATION:     The patient's plan of care including recommendations, planned interventions, and recommended diet changes were discussed with: Registered nurse.      Thank you for this referral.  Liberty Schneider M.S. Keyonna Hernandez Pathologist     Time Calculation: 12 mins

## 2023-04-02 NOTE — PROGRESS NOTES
PHYSICAL THERAPY EVALUATION/DISCHARGE  Patient: Dasia Dee (2 y.o. male)  Date: 4/2/2023  Primary Diagnosis: Facial numbness [R20.0]  ESRD needing dialysis ([facility] [address] [N18.6, Z99.2]  Supratherapeutic international normalized ratio (INR) [R79.1]  Acute hyponatremia [E87.1]       Precautions:          ASSESSMENT  Based on the objective data described below, the patient presents with episode of R side facial tingling and weakness that has since resolved. He reports that he has had several of these episodes recently with no clear pattern. He does have complex medical history including CVA in 2020 with mild residual R side weakness and bilateral LE neuopathy. He is otherwise independent, driving and working. On exam, he demonstrates very subtle R side weakness, which he reports feels at his baseline; note he is also L hand dominant. His mobility and balance are intact and VS stable with activity. He has no acute PT needs at this time, as he is at his baseline. We will complete orders at this time. Functional Outcome Measure: The patient scored 24 on the Tinetti outcome measure which is indicative of low fall risk. Other factors to consider for discharge: none     Further skilled acute physical therapy is not indicated at this time. PLAN :  Recommendation for discharge: (in order for the patient to meet his/her long term goals)  No skilled physical therapy/ follow up rehabilitation needs identified at this time. This discharge recommendation:  Has not yet been discussed the attending provider and/or case management    IF patient discharges home will need the following DME: none       SUBJECTIVE:   Patient stated I just hope they figure out why this keeps happening.     OBJECTIVE DATA SUMMARY:   HISTORY:    Past Medical History:   Diagnosis Date    Anxiety     Arrhythmia     A-FIB    Arthritis     CAD (coronary artery disease)     MI 09    CHF (congestive heart failure) (HCC)     Chronic kidney disease     END STAGE KIDNEY DISEASE;  DIALYSIS M-W-Sat    Chronic pain     NEUROPATHY; BACK PAIN    Fatigue     Hypertension     Lymph edema     LEFT LEG    Needle phobia     Neuropathy     Other ill-defined conditions(799.89)     Rash     Skipped beats     Sleep apnea     WEARS CPAP    Snoring     SOB (shortness of breath)     Stroke ([facility] [place] [address] 01/2020    NUMBNESS IN RIGHT LEG AND FOOT    Thromboembolus ([facility] [place] [address] 2018    PE     Past Surgical History:   Procedure Laterality Date    HX ORTHOPAEDIC      LEFT HAND, RIGHT ANKLE, AND RIGHT SHOULDER    HX TONSILLECTOMY      HX VASCULAR ACCESS      UPPER RIGHT CHEST    IR FLUORO GUIDED NEEDLE PLACEMENT  10/08/2021    IR INSERT TUNL CVC W/O PORT OVER 5 YR  10/08/2021    IR INSERT TUNL CVC W/O PORT OVER 5 YR  10/10/2022    IL UNLISTED PROCEDURE CARDIAC SURGERY      CARDIAC CATHX2-NO STENTS    IL UNLISTED PROCEDURE VASCULAR SURGERY      PERITONEAL DIALYSIS CATH       Prior level of function: independent  Personal factors and/or comorbidities impacting plan of care: as noted above    Home Situation  # Steps to Enter: 4  Rails to Enter: Yes  Hand Rails : Bilateral  One/Two Story Residence: One story  Living Alone: No  Support Systems: Spouse/Significant Other  Patient Expects to be Discharged to[de-identified] Home  Current DME Used/Available at Home: None    EXAMINATION/PRESENTATION/DECISION MAKING:   Critical Behavior:  Neurologic State: Alert  Orientation Level: Oriented X4  Cognition: Follows commands     Hearing:     Skin:  intact  Edema: none  Range Of Motion:  AROM: Within functional limits                       Strength:    Strength:  Within functional limits (mild R side weakness from previous CVA, very subtle and only with higher level activities, pt feels it is at his baseline)                    Tone & Sensation:   Tone: Normal              Sensation: Impaired (neuropathy in bilateral feet)               Coordination:  Coordination: Within functional limits  Vision:      Functional Mobility:  Bed Mobility:  Rolling: Independent  Supine to Sit: Independent  Sit to Supine: Independent     Transfers:  Sit to Stand: Independent  Stand to Sit: Independent        Bed to Chair: Independent              Balance:   Sitting: Intact; Without support  Standing: Intact; Without support  Ambulation/Gait Training:  Distance (ft): 200 Feet (ft)  Assistive Device: Gait belt  Ambulation - Level of Assistance: Modified independent        Gait Abnormalities: Trunk sway increased (mild increase in trunk sway, but no loss of balance)        Base of Support: Widened     Speed/Lucero: Pace decreased (<100 feet/min)  Step Length: Right shortened;Left shortened                     Stairs: Therapeutic Exercises:       Functional Measure:  Tinetti test:    Sitting Balance: 1  Arises: 1  Attempts to Rise: 2  Immediate Standing Balance: 2  Standing Balance: 2  Nudged: 2  Eyes Closed: 1  Turn 360 Degrees - Continuous/Discontinuous: 1  Turn 360 Degrees - Steady/Unsteady: 1  Sitting Down: 1  Balance Score: 14 Balance total score  Indication of Gait: 1  R Step Length/Height: 1  L Step Length/Height: 1  R Foot Clearance: 1  L Foot Clearance: 1  Step Symmetry: 1  Step Continuity: 1  Path: 2  Trunk: 1  Walking Time: 0  Gait Score: 10 Gait total score  Total Score: 24/28 Overall total score         Tinetti Tool Score Risk of Falls  <19 = High Fall Risk  19-24 = Moderate Fall Risk  25-28 = Low Fall Risk  Tinetti ME. Performance-Oriented Assessment of Mobility Problems in Elderly Patients. Lopez 68; [id number].  (Scoring Description: PT Bulletin Feb. 10, 1993)    Older adults: Khang Garcia et al, 2009; n = 1000 [facility] elderly evaluated with ABC, PHAM, ADL, and IADL)  · Mean PHAM score for males aged 69-68 years = 26.21(3.40)  · Mean PHAM score for females age 69-68 years = 25.16(4.30)  · Mean PHAM score for males over 80 years = 23.29(6.02)  · Mean PHAM score for females over 80 years = 17.20(8.32)           Physical Therapy Evaluation Charge Determination   History Examination Presentation Decision-Making   HIGH Complexity :3+ comorbidities / personal factors will impact the outcome/ POC  MEDIUM Complexity : 3 Standardized tests and measures addressing body structure, function, activity limitation and / or participation in recreation  LOW Complexity : Stable, uncomplicated  LOW Complexity : FOTO score of       Based on the above components, the patient evaluation is determined to be of the following complexity level: LOW     Pain Rating:  none    Activity Tolerance:   Good      After treatment patient left in no apparent distress:   Sitting in chair and Call bell within reach    COMMUNICATION/EDUCATION:   The patients plan of care was discussed with: Occupational therapist.     Fall prevention education was provided and the patient/caregiver indicated understanding., Patient/family have participated as able in goal setting and plan of care. , and Patient/family agree to work toward stated goals and plan of care.     Thank you for this referral.  Wilder Abdi, PT   Time Calculation: 26 mins

## 2023-04-02 NOTE — PROGRESS NOTES
Occupational Therapy  4/2/23    OT orders received, acknowledged, and chart reviewed. Per PT, patient is independent, up ad jaden, with no OT needs. Please re-consult if status changes. At this time, OT will sign off. Thanks.      Quoc Ryan, OTR/L

## 2023-04-02 NOTE — PROGRESS NOTES
Name: Edgardo Middleton MRN: [id number]   : 1970 Hospital: [facility]   Date: 2023        IMPRESSION:   SRD on HD. Patient is on MWF schedule at his unit, but missed Friday's Tx. He was dialyzed on Saturday. No repeat chemistry   Pre HD hyponatremia      PLAN:   HD again on Monday. Orders are in  Devorah Matheus [address] is notified  Renal diet     Subjective/Interval History:   I have reviewed the flowsheet and previous days notes. ROS:Behavioral/Psych: negative except for behavior problems  Not required    Objective:   Vital Signs:    Visit Vitals  /68 (BP Patient Position: Sitting) Comment (BP Patient Position): after walking   Pulse 85   Temp 98 °F (36.7 °C)   Resp 12   Ht 6' 3\" (1.905 m)   Wt (!) 158.9 kg (350 lb 5 oz)   SpO2 92%   BMI 43.79 kg/m²       O2 Device: None (Room air)       Temp (24hrs), Av °F (36.7 °C), Min:97.5 °F (36.4 °C), Max:98.5 °F (36.9 °C)       Intake/Output:   Last shift:      No intake/output data recorded. Last 3 shifts:  1901 -  0700  In: -   Out: 3000     Intake/Output Summary (Last 24 hours) at 2023 1033  Last data filed at 2023 1916  Gross per 24 hour   Intake --   Output 3000 ml   Net -3000 ml        Physical Exam:  General:    Alert, cooperative, no distress, appears stated age. Head:   Normocephalic, without obvious abnormality, atraumatic. Eyes:   Conjunctivae/corneas clear. Nose:  Nares normal. No drainage or sinus tenderness. Neck:  Supple, symmetrical,  no adenopathy, thyroid: non tender    no carotid bruit and no JVD. Lungs:   Clear to auscultation bilaterally. No Wheezing or Rhonchi. No rales. Chest wall:  No tenderness or deformity. No Accessory muscle use. Heart:   Regular rate and rhythm,  no murmur, rub or gallop. Abdomen:   Soft, non-tender. Not distended. Bowel sounds normal. No masses  Extremities: Extremities normal, atraumatic, No cyanosis. No edema.  No clubbing  Skin:     Texture, turgor normal. No rashes or lesions. Not Jaundiced  Psych:  calm        DATA:  Labs:  Recent Labs     04/01/23 0011   *   K 4.6   CL 95*   CO2 27   BUN 71*   CREA 16.30*   CA 9.1     Recent Labs     04/01/23 0011   WBC 7.4   HGB 11.0*   HCT 35.0*        No results for input(s): LEV, KU, CLU, CREAU in the last 72 hours.     No lab exists for component: PROU    Total time spent with patient:  35 minutes    [] Critical Care Provided    Care Plan discussed with:   Staff, Medical Team    Lety Joseph MD

## 2023-04-02 NOTE — PROGRESS NOTES
Hospitalist Progress Note  Mary Jane King MD  Answering service: [phone] from in house phone        Date of Service:  2023  NAME:  Melissa Bansal  :  1970  MRN:  [id number]      Admission Summary:   Melissa Bansal is a 50 y.o. male with past medical history of end-stage renal disease on hemodialysis, CAD, MI, CHF, atrial fibrillation, hypertension, type 2 diabetes mellitus, CVA, PE, lymphedema left leg, peripheral neuropathy, sleep apnea, arthritis, asthma, class III obesity presented to the emergency department chief complaint of facial numbness. Symptoms onset reported began approximate 1600 hrs. yesterday with right facial and right arm and finger numbness and tingling, initially constant, moderate severity, without specific precipitating factors, reportedly resolved after taking aspirin. Symptoms reportedly occurred approximate 22:45 hours yesterday. On arrival emergency department, initial vital signs temperature 98.4 °F, /80, heart rate 88, respiratory rate 20, saturation 99% on room air. Abnormal labs include hemoglobin 11.0, .7, INR 3.9, sodium 129, blood glucose 116, BUN 71, creatinine 16.30, GFR 3.  CT head without IV contrast showed no acute intracranial normalities. 12 EKG showed atrial fibrillation, T wave changes anterior anterior lateral leads, prolonged QTc 485 ms at 86 bpm.  Patient is now seen for admission to the hospital service. Interval history / Subjective:    Face numbness resolved now       Assessment & Plan:         Facial numbness        Numbness and tingling right upper extremity  -recent hospitalization with possible TIA/ saccular MCA aneurysm on 2023 had full stroke work up , on chronic coumadin for chroni afib  INR> 3   -symptoms recurrent  -CT head without IV contrast:  no acute process  -Neuro consulted: ddx simple partial seizure vs complex migraine vs  CVA  -pending aziza MRI: if MRI neg, consider EEG and/or empiric trial of ASD. -continue coumadin [facility]  continue lipitor   Had Echo done on 2/14   -LDL 59, A1c 5.8      2. ESRD needing dialysis  -consult nephrologist for hemodialysis       3. Acute hyponatremia  -Na 129  -sodium adjustment per renal dialysis, follow      4. Supratherapeutic international normalized ratio (INR)  -on long term anticoagulation with Warfarin  -INR = 3.9  -resume coumadin per pharmacy dose adjustment     5. Chronic afib:   Rate controlled. Cardizem 240mg daily   Coumadin as above      6. Macrocytic anemia  -anemia of chronic disease  -Hgb 11.0, .7  -repeat H&H  -check K70 and folic acid levels        7. Essential hypertension  -Monitor BP and provide hypertensive medications as needed     8. Hyperlipidemia  -Continue atorvastatin 40 mg daily  -Check lipid panel     9. Class III obesity  -BMI 43.37 kg/M2  -Would encourage weight loss, reduce calorie diet, lifestyle modifications     10. Type 2 diabetes mellitus  -pt denied he has diabetes  BG normal   A1c < 6  Dc achs checking        Code status: full   Prophylaxis: coumadin   Care Plan discussed with: pt, rn, renal.   Anticipated Disposition: TBD. Hospital Problems  Date Reviewed: 9/29/2022            Codes Class Noted POA    Acute hyponatremia ICD-10-CM: E87.1  ICD-9-CM: 276.1  4/1/2023 Unknown        Facial numbness ICD-10-CM: R20.0  ICD-9-CM: 782.0  4/1/2023 Unknown        ESRD needing dialysis [facility]) ICD-10-CM: N18.6, Z99.2  ICD-9-CM: 585.6  4/1/2023 Unknown        Supratherapeutic international normalized ratio (INR) ICD-10-CM: R79.1  ICD-9-CM: 790.92  4/1/2023 Unknown               Review of Systems:   A comprehensive review of systems was negative except for that written in the HPI. Vital Signs:    Last 24hrs VS reviewed since prior progress note.  Most recent are:  Visit Vitals  /72   Pulse 60   Temp 98 °F (36.7 °C) Resp 12   Ht 6' 3\" (1.905 m)   Wt (!) 158.9 kg (350 lb 5 oz)   SpO2 92%   BMI 43.79 kg/m²         Intake/Output Summary (Last 24 hours) at 4/2/2023 1642  Last data filed at 4/1/2023 1916  Gross per 24 hour   Intake --   Output 3000 ml   Net -3000 ml        Physical Examination:     I had a face to face encounter with this patient and independently examined them on 4/2/2023 as outlined below:          General : alert x 3, awake, no acute distress,   HEENT: PEERL, EOMI, moist mucus membrane, TM clear  Neck: supple, no JVD, no meningeal signs  Chest: Clear to auscultation bilaterally   CVS: S1 S2 heard, Capillary refill less than 2 seconds  Abd: soft/ non tender, non distended, BS physiological,   Ext: no clubbing, no cyanosis, no edema, brisk 2+ DP pulses  Neuro/Psych: pleasant mood and affect, CN 2-12 grossly intact, sensory grossly within normal limit, Strength 5/5 in all extremities, DTR 1+ x 4  Skin: warm            Data Review:    Review and/or order of clinical lab test    I have independently reviewed and interpreted patient's lab and all other diagnostic data    Notes reviewed from all clinical/nonclinical/nursing services involved in patient's clinical care. Care coordination discussions were held with appropriate clinical/nonclinical/ nursing providers based on care coordination needs. Labs:     Recent Labs     04/01/23 0011   WBC 7.4   HGB 11.0*   HCT 35.0*        Recent Labs     04/01/23 0011   *   K 4.6   CL 95*   CO2 27   BUN 71*   CREA 16.30*   *   CA 9.1     No results for input(s): ALT, AP, TBIL, TBILI, TP, ALB, GLOB, GGT, AML, LPSE in the last 72 hours. No lab exists for component: SGOT, GPT, AMYP, HLPSE  Recent Labs     04/02/23 0522 04/01/23 0011   INR 3.6* 3.9*   PTP 34.5* 37.7*      No results for input(s): FE, TIBC, PSAT, FERR in the last 72 hours. No results found for: FOL, RBCF   No results for input(s): PH, PCO2, PO2 in the last 72 hours.   No results for input(s): CPK, CKNDX, TROIQ in the last 72 hours.     No lab exists for component: CPKMB  Lab Results   Component Value Date/Time    Cholesterol, total 149 04/01/2023 12:11 AM    HDL Cholesterol 30 04/01/2023 12:11 AM    LDL, calculated 59.4 04/01/2023 12:11 AM    Triglyceride 298 (H) 04/01/2023 12:11 AM    CHOL/HDL Ratio 5.0 04/01/2023 12:11 AM     Lab Results   Component Value Date/Time    Glucose (POC) 93 04/02/2023 12:38 PM    Glucose (POC) 131 (H) 04/01/2023 09:34 PM    Glucose (POC) 81 04/01/2023 12:10 PM    Glucose (POC) 79 04/01/2023 08:58 AM    Glucose (POC) 104 04/01/2023 12:02 AM     Lab Results   Component Value Date/Time    Color YELLOW 07/05/2011 05:00 PM    Appearance CLEAR 07/05/2011 05:00 PM    Specific gravity 1.022 07/05/2011 05:00 PM    pH (UA) 5.0 07/05/2011 05:00 PM    Protein 100 (A) 07/05/2011 05:00 PM    Glucose NEGATIVE 07/05/2011 05:00 PM    Ketone NEGATIVE 07/05/2011 05:00 PM    Bilirubin NEGATIVE 07/05/2011 05:00 PM    Urobilinogen 0.2 07/05/2011 05:00 PM    Nitrites NEGATIVE 07/05/2011 05:00 PM    Leukocyte Esterase NEGATIVE 07/05/2011 05:00 PM    Epithelial cells 0-5 07/05/2011 05:00 PM    Bacteria NEGATIVE 07/05/2011 05:00 PM    WBC 0-4 07/05/2011 05:00 PM    RBC 0-3 07/05/2011 05:00 PM         Medications Reviewed:     Current Facility-Administered Medications   Medication Dose Route Frequency    warfarin (COUMADIN) tablet 2 mg  2 mg Oral ONCE    atorvastatin (LIPITOR) tablet 40 mg  40 mg Oral DAILY    [START ON 4/3/2023] cinacalcet (SENSIPAR) tablet 30 mg  30 mg Oral DIALYSIS MON, WED & FRI    cyanocobalamin (VITAMIN B12) tablet 1,000 mcg  1,000 mcg Oral DAILY    dilTIAZem ER (CARDIZEM CD) capsule 240 mg  240 mg Oral DAILY    ferrous sulfate tablet 325 mg  325 mg Oral ACB    montelukast (SINGULAIR) tablet 10 mg  10 mg Oral DAILY    pregabalin (LYRICA) capsule 75 mg  75 mg Oral DAILY    sevelamer carbonate (RENVELA) tab 1,600 mg  1,600 mg Oral TIDAC    acetaminophen (TYLENOL) tablet 650 mg  650 mg Oral Q4H PRN    Or    acetaminophen (TYLENOL) solution 650 mg  650 mg Per NG tube Q4H PRN    Or    acetaminophen (TYLENOL) suppository 650 mg  650 mg Rectal Q4H PRN    glucose chewable tablet 16 g  4 Tablet Oral PRN    glucagon (GLUCAGEN) injection 1 mg  1 mg IntraMUSCular PRN    dextrose 10 % infusion 0-250 mL  0-250 mL IntraVENous PRN    insulin lispro (HUMALOG) injection   SubCUTAneous AC&HS    Warfarin Pharmacy Dosing (COUMADIN)   Other Rx Dosing/Monitoring     ______________________________________________________________________  EXPECTED LENGTH OF STAY: - - -  ACTUAL LENGTH OF STAY:          1                 Jaleesa Schmidt MD

## 2023-04-02 NOTE — PROGRESS NOTES
Pharmacist Note - Warfarin Dosing  Consult provided for this 52 y.o.male to manage warfarin for Atrial Fibrillation    INR Goal: 2 - 3    Home regimen/ tablet size: 5 mg daily except 7.5 mg on Thus    Drugs that may increase INR: None  Drugs that may decrease INR: None  Other current anticoagulants/ drugs that may increase bleeding risk: None  Risk factors: None  Daily INR ordered: YES    Recent Labs     04/02/23  0522 04/01/23  0011   HGB  --  11.0*   INR 3.6* 3.9*     Date               INR                  Dose  4/2              3.6              2mg                                                                                Assessment/ Plan: Will order warfarin 2 mg PO x 1 dose as a dose reduction from home regimen given elevated INR    Pharmacy will continue to monitor daily and adjust therapy as indicated. Please contact the pharmacist at x [phone] for outpatient recommendations if needed.

## 2023-04-02 NOTE — PROGRESS NOTES
Occupational Therapy  4/2/23    OT orders received, acknowledged, and chart reviewed. Per PT, patient is independent, up ad jaden, with no OT needs. Please re-consult if status changes. At this time, OT will sign off. Thanks.      Carlos Benítez, OTR/L

## 2023-04-02 NOTE — PROGRESS NOTES
Name: Amrit Warner MRN: [id number]   : 1970 Hospital: [facility]   Date: 2023        IMPRESSION:   SRD on HD. Patient is on MWF schedule at his unit, but missed Friday's Tx. He was dialyzed on Saturday. No repeat chemistry   Pre HD hyponatremia      PLAN:   HD again on Monday. Orders are in  Devorah Matheusajit [address]4 is notified  Renal diet     Subjective/Interval History:   I have reviewed the flowsheet and previous days notes. ROS:Behavioral/Psych: negative except for behavior problems  Not required    Objective:   Vital Signs:    Visit Vitals  /68 (BP Patient Position: Sitting) Comment (BP Patient Position): after walking   Pulse 85   Temp 98 °F (36.7 °C)   Resp 12   Ht 6' 3\" (1.905 m)   Wt (!) 158.9 kg (350 lb 5 oz)   SpO2 92%   BMI 43.79 kg/m²       O2 Device: None (Room air)       Temp (24hrs), Av °F (36.7 °C), Min:97.5 °F (36.4 °C), Max:98.5 °F (36.9 °C)       Intake/Output:   Last shift:      No intake/output data recorded. Last 3 shifts:  1901 -  0700  In: -   Out: 3000     Intake/Output Summary (Last 24 hours) at 2023 1033  Last data filed at 2023 1916  Gross per 24 hour   Intake --   Output 3000 ml   Net -3000 ml        Physical Exam:  General:    Alert, cooperative, no distress, appears stated age. Head:   Normocephalic, without obvious abnormality, atraumatic. Eyes:   Conjunctivae/corneas clear. Nose:  Nares normal. No drainage or sinus tenderness. Neck:  Supple, symmetrical,  no adenopathy, thyroid: non tender    no carotid bruit and no JVD. Lungs:   Clear to auscultation bilaterally. No Wheezing or Rhonchi. No rales. Chest wall:  No tenderness or deformity. No Accessory muscle use. Heart:   Regular rate and rhythm,  no murmur, rub or gallop. Abdomen:   Soft, non-tender. Not distended. Bowel sounds normal. No masses  Extremities: Extremities normal, atraumatic, No cyanosis. No edema.  No clubbing  Skin:     Texture, turgor normal. No rashes or lesions. Not Jaundiced  Psych:  calm        DATA:  Labs:  Recent Labs     04/01/23 0011   *   K 4.6   CL 95*   CO2 27   BUN 71*   CREA 16.30*   CA 9.1     Recent Labs     04/01/23 0011   WBC 7.4   HGB 11.0*   HCT 35.0*        No results for input(s): LEV, KU, CLU, CREAU in the last 72 hours.     No lab exists for component: PROU    Total time spent with patient:  35 minutes    [] Critical Care Provided    Care Plan discussed with:   Staff, Medical Team    Melissa Castle MD

## 2023-04-02 NOTE — PROGRESS NOTES
SPEECH PATHOLOGY BEDSIDE SWALLOW EVALUATION/DISCHARGE  Patient: Gely Martínez (74 y.o. male)  Date: 4/2/2023  Primary Diagnosis: Facial numbness [R20.0]  ESRD needing dialysis ([facility] [address] [N18.6, Z99.2]  Supratherapeutic international normalized ratio (INR) [R79.1]  Acute hyponatremia [E87.1]       Precautions:        ASSESSMENT :  Patient admitted for R facial numbness/tingling. Hx of CVA w/ R sided deficits. Passed swallow screen and initiated on regular diet/thin liquids. Head CT negative. Based on the objective data described below, the patient presents with functional oropharyngeal swallow with no difficulties or s/s of aspiration appreciated at bedside with any consistencies. Patient denies any speech or swallowing changes or concerns. Patient reports now intact facial sensation. Recommend continue regular diet/thin liquids with general aspiration precautions. Suspect pt is at baseline swallow function and therefore, skilled acute therapy provided by a speech-language pathologist is not indicated at this time. SLP will sign off. Please reconsult with any changes or if SLP can be of any further assistance. .  Skilled acute therapy provided by a speech-language pathologist is not indicated at this time. PLAN :  Recommendations:  -- regular diet/ thin liquids  -- general aspiration precautions including completely upright for all PO   -- SLP will sign off     Discharge Recommendations: None     SUBJECTIVE:   Patient stated All feels normal to me.     OBJECTIVE:     Past Medical History:   Diagnosis Date    Anxiety     Arrhythmia     A-FIB    Arthritis     CAD (coronary artery disease)     MI 09    CHF (congestive heart failure) (HCC)     Chronic kidney disease     END STAGE KIDNEY DISEASE;  DIALYSIS M-W-Sat    Chronic pain     NEUROPATHY; BACK PAIN    Fatigue     Hypertension     Lymph edema     LEFT LEG    Needle phobia     Neuropathy     Other ill-defined conditions(566.89)     Rash     Skipped beats Sleep apnea     WEARS CPAP    Snoring     SOB (shortness of breath)     Stroke ([facility] [address] 01/2020    NUMBNESS IN RIGHT LEG AND FOOT    Thromboembolus ([facility] [address] 2018    PE     Past Surgical History:   Procedure Laterality Date    HX ORTHOPAEDIC      LEFT HAND, RIGHT ANKLE, AND RIGHT SHOULDER    HX TONSILLECTOMY      HX VASCULAR ACCESS      UPPER RIGHT CHEST    IR FLUORO GUIDED NEEDLE PLACEMENT  10/08/2021    IR INSERT TUNL CVC W/O PORT OVER 5 YR  10/08/2021    IR INSERT TUNL CVC W/O PORT OVER 5 YR  10/10/2022    NH UNLISTED PROCEDURE CARDIAC SURGERY      CARDIAC CATHX2-NO STENTS    NH UNLISTED PROCEDURE VASCULAR SURGERY      PERITONEAL DIALYSIS CATH     Prior Level of Function/Home Situation:   Home Situation  # Steps to Enter: 4  Rails to Enter: Yes  Hand Rails : Bilateral  One/Two Story Residence: One story  Living Alone: No  Support Systems: Spouse/Significant Other  Patient Expects to be Discharged to[de-identified] Home  Current DME Used/Available at Home: None  Diet prior to admission: regular/thin  Current Diet:  regular/thin   Cognitive and Communication Status:  Neurologic State: Alert  Orientation Level: Oriented X4  Cognition: Follows commands  Perception: Appears intact  Perseveration: No perseveration noted     Oral Assessment:  Oral Assessment  Labial: No impairment  Dentition: Intact; Natural  Oral Hygiene: moist oral mucosa  Lingual: No impairment  Velum: No impairment  Mandible: No impairment  P.O. Trials:  Patient Position: upright in chair  Vocal quality prior to P.O.: No impairment  Consistency Presented: Thin liquid; Solid  How Presented: Self-fed/presented     Bolus Acceptance: No impairment  Bolus Formation/Control: No impairment     Propulsion: No impairment  Oral Residue: None  Initiation of Swallow: No impairment  Laryngeal Elevation: Functional  Aspiration Signs/Symptoms: None  Pharyngeal Phase Characteristics: No impairment, issues, or problems              Oral Phase Severity: No impairment  Pharyngeal Phase Severity : No impairment  NOMS:   The NOMS functional outcome measure was used to quantify this patient's level of swallowing impairment. Based on the NOMS, the patient was determined to be at level 7 for swallow function     NOMS Swallowing Levels:  Level 1 (CN): NPO  Level 2 (CM): NPO but takes consistency in therapy  Level 3 (CL): Takes less than 50% of nutrition p.o. and continues with nonoral feedings; and/or safe with mod cues; and/or max diet restriction  Level 4 (CK): Safe swallow but needs mod cues; and/or mod diet restriction; and/or still requires some nonoral feeding/supplements  Level 5 (CJ): Safe swallow with min diet restriction; and/or needs min cues  Level 6 (CI): Independent with p.o.; rare cues; usually self cues; may need to avoid some foods or needs extra time  Level 7 ([address] [place] [address]): Independent for all p.o.  SARA. (2003). National Outcomes Measurement System (NOMS): Adult Speech-Language Pathology User's Guide. Pain:           After treatment:   Patient left in no apparent distress sitting up in chair, Call bell within reach, and Nursing notified    COMMUNICATION/EDUCATION:     The patient's plan of care including recommendations, planned interventions, and recommended diet changes were discussed with: Registered nurse.      Thank you for this referral.  Aurelio Hernandez M.S. [place] Grimes Pathologist     Time Calculation: 12 mins

## 2023-04-03 VITALS
BODY MASS INDEX: 39.17 KG/M2 | OXYGEN SATURATION: 98 % | TEMPERATURE: 97.5 F | HEIGHT: 75 IN | HEART RATE: 93 BPM | WEIGHT: 315 LBS | RESPIRATION RATE: 18 BRPM | SYSTOLIC BLOOD PRESSURE: 114 MMHG | DIASTOLIC BLOOD PRESSURE: 75 MMHG

## 2023-04-03 LAB
ALBUMIN SERPL-MCNC: 2.9 G/DL (ref 3.5–5)
ANION GAP SERPL CALC-SCNC: 8 MMOL/L (ref 5–15)
BUN SERPL-MCNC: 68 MG/DL (ref 6–20)
BUN/CREAT SERPL: 4 (ref 12–20)
CALCIUM SERPL-MCNC: 9.3 MG/DL (ref 8.5–10.1)
CHLORIDE SERPL-SCNC: 93 MMOL/L (ref 97–108)
CO2 SERPL-SCNC: 29 MMOL/L (ref 21–32)
CREAT SERPL-MCNC: 16.7 MG/DL (ref 0.7–1.3)
GLUCOSE BLD STRIP.AUTO-MCNC: 120 MG/DL (ref 65–117)
GLUCOSE SERPL-MCNC: 118 MG/DL (ref 65–100)
INR PPP: 2.9 (ref 0.9–1.1)
PHOSPHATE SERPL-MCNC: 6.2 MG/DL (ref 2.6–4.7)
POTASSIUM SERPL-SCNC: 5.2 MMOL/L (ref 3.5–5.1)
PROTHROMBIN TIME: 28.2 SEC (ref 9–11.1)
SERVICE CMNT-IMP: ABNORMAL
SODIUM SERPL-SCNC: 130 MMOL/L (ref 136–145)

## 2023-04-03 PROCEDURE — 99232 SBSQ HOSP IP/OBS MODERATE 35: CPT | Performed by: NURSE PRACTITIONER

## 2023-04-03 PROCEDURE — 80069 RENAL FUNCTION PANEL: CPT

## 2023-04-03 PROCEDURE — 82962 GLUCOSE BLOOD TEST: CPT

## 2023-04-03 PROCEDURE — 90935 HEMODIALYSIS ONE EVALUATION: CPT

## 2023-04-03 PROCEDURE — G0378 HOSPITAL OBSERVATION PER HR: HCPCS

## 2023-04-03 PROCEDURE — 85610 PROTHROMBIN TIME: CPT

## 2023-04-03 PROCEDURE — 36415 COLL VENOUS BLD VENIPUNCTURE: CPT

## 2023-04-03 PROCEDURE — 5A1D70Z PERFORMANCE OF URINARY FILTRATION, INTERMITTENT, LESS THAN 6 HOURS PER DAY: ICD-10-PCS | Performed by: FAMILY MEDICINE

## 2023-04-03 PROCEDURE — 74011250637 HC RX REV CODE- 250/637: Performed by: FAMILY MEDICINE

## 2023-04-03 PROCEDURE — G0257 UNSCHED DIALYSIS ESRD PT HOS: HCPCS

## 2023-04-03 PROCEDURE — 74011250637 HC RX REV CODE- 250/637: Performed by: NURSE PRACTITIONER

## 2023-04-03 RX ORDER — DIVALPROEX SODIUM 125 MG/1
250 TABLET, DELAYED RELEASE ORAL 2 TIMES DAILY
Status: DISCONTINUED | OUTPATIENT
Start: 2023-04-03 | End: 2023-04-03 | Stop reason: HOSPADM

## 2023-04-03 RX ORDER — DIVALPROEX SODIUM 250 MG/1
250 TABLET, DELAYED RELEASE ORAL 2 TIMES DAILY
Qty: 60 TABLET | Refills: 0 | Status: SHIPPED | OUTPATIENT
Start: 2023-04-03

## 2023-04-03 RX ADMIN — ATORVASTATIN CALCIUM 40 MG: 40 TABLET, FILM COATED ORAL at 09:10

## 2023-04-03 RX ADMIN — SEVELAMER CARBONATE 1600 MG: 800 TABLET, FILM COATED ORAL at 13:59

## 2023-04-03 RX ADMIN — ACETAMINOPHEN 650 MG: 325 TABLET ORAL at 06:53

## 2023-04-03 RX ADMIN — DIVALPROEX SODIUM 250 MG: 125 TABLET, DELAYED RELEASE ORAL at 13:58

## 2023-04-03 RX ADMIN — FERROUS SULFATE TAB 325 MG (65 MG ELEMENTAL FE) 325 MG: 325 (65 FE) TAB at 06:52

## 2023-04-03 RX ADMIN — PREGABALIN 75 MG: 75 CAPSULE ORAL at 09:09

## 2023-04-03 RX ADMIN — MONTELUKAST 10 MG: 10 TABLET, FILM COATED ORAL at 09:09

## 2023-04-03 RX ADMIN — SEVELAMER CARBONATE 1600 MG: 800 TABLET, FILM COATED ORAL at 06:52

## 2023-04-03 RX ADMIN — ACETAMINOPHEN 650 MG: 325 TABLET ORAL at 11:43

## 2023-04-03 RX ADMIN — DILTIAZEM HYDROCHLORIDE 240 MG: 120 CAPSULE, EXTENDED RELEASE ORAL at 09:11

## 2023-04-03 RX ADMIN — CYANOCOBALAMIN TAB 500 MCG 1000 MCG: 500 TAB at 09:09

## 2023-04-03 NOTE — PROGRESS NOTES
I have reviewed discharge instructions with the patient. The patient verbalized understanding. If you experience chest pain call 911. Do not drive yourself. Discharge Home to Self care  Current Discharge Medication List        START taking these medications    Details   divalproex DR (DEPAKOTE) 250 mg tablet Take 1 Tablet by mouth two (2) times a day. Qty: 60 Tablet, Refills: 0  Start date: 4/3/2023           CONTINUE these medications which have NOT CHANGED    Details   amitriptyline (ELAVIL) 25 mg tablet Take 25 mg by mouth nightly. Indications: for sleep      gabapentin (NEURONTIN) 300 mg capsule Take 300 mg by mouth nightly. Indications: neuropathic pain      semaglutide (Ozempic) 1 mg/dose (2 mg/1.5 mL) sub-q pen 1 mg by SubCUTAneous route. dilTIAZem ER (CARDIZEM CD) 240 mg capsule Take 240 mg by mouth daily. ferrous sulfate 325 mg (65 mg iron) tablet Take 325 mg by mouth Daily (before breakfast). hydrOXYzine HCL (ATARAX) 10 mg tablet Take 10 mg by mouth every eight (8) hours as needed. cyanocobalamin 1,000 mcg tablet Take 1,000 mcg by mouth daily. atorvastatin (LIPITOR) 40 mg tablet Take 40 mg by mouth daily. cinacalcet (SENSIPAR) 30 mg tablet Take 30 mg by mouth DIALYSIS MON, WED & FRI. Takes at dialysis      ERGOCALCIFEROL, VITAMIN D2, PO Take  by mouth. Takes at dialysis      warfarin (Jantoven) 5 mg tablet Take 1 Tablet by mouth daily. Qty: 30 Tablet, Refills: 0      sevelamer carbonate (RENVELA) 800 mg tab tab Take 1,600 mg by mouth Before breakfast, lunch, and dinner. montelukast (SINGULAIR) 10 mg tablet Take 10 mg by mouth daily. allopurinoL (ZYLOPRIM) 100 mg tablet Take 100 mg by mouth. TAKES ON Monday AND Thursday      predniSONE (DELTASONE) 5 mg tablet Take 5 mg by mouth daily. amLODIPine (NORVASC) 10 mg tablet Take 1 Tab by mouth daily.   Qty: 30 Tab, Refills: 12      albuterol (PROVENTIL HFA, VENTOLIN HFA, PROAIR HFA) 90 mcg/actuation inhaler ProAir HFA 90 mcg/actuation aerosol inhaler   INHALE TWO PUFFS BY MOUTH EVERY 4 HOURS AS NEEDED FOR FOR SHORTNESS OF BREATH AND WHEEZING      bumetanide (BUMEX) 2 mg tablet Take 2 mg by mouth daily.

## 2023-04-03 NOTE — PROGRESS NOTES
Pharmacist Note - Warfarin Dosing  Consult provided for this 52 y.o.male to manage warfarin for Atrial Fibrillation    INR Goal: 2 - 3    Home regimen/ tablet size: 5 mg daily except 7.5 mg on Thurs    Drugs that may increase INR: None  Drugs that may decrease INR: None  Other current anticoagulants/ drugs that may increase bleeding risk: None  Risk factors: None  Daily INR ordered: YES    Recent Labs     04/03/23  0103 04/02/23  0522 04/01/23  0011   HGB  --   --  11.0*   INR 2.9* 3.6* 3.9*     Date               INR                  Dose  4/2              3.6              2 mg   4/3              2.9              3 mg                                                                                Assessment/ Plan: Will order warfarin 3 mg PO x 1 dose as a dose reduction from home regimen given elevated INR. Pharmacy will continue to monitor daily and adjust therapy as indicated. Please contact the pharmacist at [phone] for outpatient recommendations if needed.

## 2023-04-03 NOTE — DISCHARGE INSTRUCTIONS
Neurology: Patient should follow-up in the clinic in 6-8 weeks from discharge. The clinic will call the pt to schedule an appt. [phone]       Discharge Instructions       PATIENT ID: Clarisse Adams  MRN: [id number]   YOB: 1970    DATE OF ADMISSION: [unfilled]    DATE OF DISCHARGE: 4/3/2023    PRIMARY CARE PROVIDER: @PCP@     ATTENDING PHYSICIAN: [unfilled]  DISCHARGING PROVIDER: Dariela Moore MD    To contact this individual call [phone] and ask the  to page. If unavailable ask to be transferred the Adult Hospitalist Department. DISCHARGE DIAGNOSES right face numbness     CONSULTATIONS: [unfilled]    PROCEDURES/SURGERIES: * No surgery found *    PENDING TEST RESULTS:   At the time of discharge the following test results are still pending: none     FOLLOW UP APPOINTMENTS:   [unfilled]     ADDITIONAL CARE RECOMMENDATIONS:     Start taking Depakote 250mg twice a day. The neurology clinic will call you to schedule an appt. [phone]. Should follow up your PCP in one week, you will need a liver function test done in the office since starting Depakote. Should get refill from your PCP   You will go back to your coumadin 5mg tonight 4/3. Should have your INR checked this Thursday.  Further coumadin dose per your coumadin clinic   This is the INR record of your last three days and please present the record to your provider on Thursday 04/03/23  0103 04/02/23  0522 04/01/23  0011   HGB  --   --  11.0*   INR 2.9* 3.6* 3.9*      Date                              INR                  Dose  4/1  no coumadin   4/2                               3.6                               2 mg coumadin     Continue your dialysis as scheduled    Consider ENT doctor to exam your right ear       DIET: Regular Diet      ACTIVITY: Activity as tolerated        Radiology      DISCHARGE MEDICATIONS:   See Medication Reconciliation Form    It is important that you take the medication exactly as they are prescribed. Keep your medication in the bottles provided by the pharmacist and keep a list of the medication names, dosages, and times to be taken in your wallet. Do not take other medications without consulting your doctor. NOTIFY YOUR PHYSICIAN FOR ANY OF THE FOLLOWING:   Fever over 101 degrees for 24 hours. Chest pain, shortness of breath, fever, chills, nausea, vomiting, diarrhea, change in mentation, falling, weakness, bleeding. Severe pain or pain not relieved by medications. Or, any other signs or symptoms that you may have questions about.       DISPOSITION:    Home With:   OT  PT  HH  RN       SNF/Inpatient Rehab/LTAC    Independent/assisted living    Hospice   x Other:     CDMP Checked:   Yes x     PROBLEM LIST Updated:  Yes x       Signed:   Camille Kamara MD  4/3/2023  12:09 PM

## 2023-04-03 NOTE — DISCHARGE SUMMARY
Discharge Summary       PATIENT ID: Corinne Chao  MRN: [id number]   YOB: 1970    DATE OF ADMISSION: 3/31/2023 11:57 PM    DATE OF DISCHARGE: 4/3/2023    PRIMARY CARE PROVIDER: Mary Irby MD     ATTENDING PHYSICIAN: Zak Mayo MD   DISCHARGING PROVIDER: Torres Spivey MD      CONSULTATIONS: IP CONSULT TO NEPHROLOGY  IP CONSULT TO NEUROLOGY  IP CONSULT TO NEPHROLOGY    PROCEDURES/SURGERIES: * No surgery found *    ADMISSION SUMMARY   Corinne Chao is a 50 y.o. male with past medical history of end-stage renal disease on hemodialysis, CAD, MI, CHF, atrial fibrillation, hypertension, type 2 diabetes mellitus, CVA, PE, lymphedema left leg, peripheral neuropathy, sleep apnea, arthritis, asthma, class III obesity presented to the emergency department chief complaint of facial numbness. Symptoms onset reported began approximate 1600 hrs. yesterday with right facial and right arm and finger numbness and tingling, initially constant, moderate severity, without specific precipitating factors, reportedly resolved after taking aspirin. Symptoms reportedly occurred approximate 22:45 hours yesterday. On arrival emergency department, initial vital signs temperature 98.4 °F, /80, heart rate 88, respiratory rate 20, saturation 99% on room air. Abnormal labs include hemoglobin 11.0, .7, INR 3.9, sodium 129, blood glucose 116, BUN 71, creatinine 16.30, GFR 3.  CT head without IV contrast showed no acute intracranial normalities.   12 EKG showed atrial fibrillation, T wave changes anterior anterior lateral leads, prolonged QTc 485 ms at 86 bpm.  Patient is now seen for admission to the hospital service    Terrance Connolly 149:       Facial numbness        Numbness and tingling right upper extremity  -recent hospitalization with possible TIA/ saccular MCA aneurysm on 2/14/2023 had full stroke work up , on chronic coumadin for chroni afib  INR> 3   -symptoms recurrent  -CT head without IV contrast:  no acute process  -Neuro consulted: ddx simple partial seizure vs complex migraine, less likely TIA. -Brain MRI neg. Neurologist recommended empirically depotoke 250mg bid   -continue coumadin [facility] ,  continue lipitor   Had Echo done on 2/14   -LDL 59, A1c 5.8   Neurology clinic will arrange follow up in 6 weeks      2. ESRD needing dialysis  -consult nephrologist for hemodialysis  Last HD today         3. Acute hyponatremia  -Na 129  -sodium adjustment per renal dialysis, follow   Stable      4. Supratherapeutic international normalized ratio (INR)  -on long term anticoagulation with Warfarin  -INR = 3.9 on admission  Resume coumadin now. Pt will follow up his coumadin clinic at [facility] with NP Dana Meek on Thursday : dosage adjustement discussed with Mary Jane Kirkland at discharge. 5. Chronic afib:   Rate controlled. Cardizem 240mg daily   Coumadin as above      6. Macrocytic anemia  -anemia of chronic disease  -Hgb 11.0, .7  -repeat H&H  -check X04 and folic acid levels        7. Essential hypertension  -resume home meds      8. Hyperlipidemia  -Continue atorvastatin 40 mg daily  -Check lipid panel     9. Class III obesity  -BMI 43.37 kg/M2  -Would encourage weight loss, reduce calorie diet, lifestyle modifications     10. Type 2 diabetes mellitus  -pt denied he has diabetes  BG normal   A1c < 6  Dc achs checking      ADDITIONAL CARE RECOMMENDATIONS:       Start taking Depakote 250mg twice a day. The neurology clinic will call you to schedule an appt. [phone]. Should follow up your PCP in one week, you will need a liver function test done in the office since starting Depakote. Should get refill from your PCP   You will go back to your coumadin 5mg tonight 4/3. Should have your INR checked this Thursday.  Further coumadin dose per your coumadin clinic   This is the INR record of your last three days and please present the record to your provider on Thursday 04/03/23  0103 04/02/23  0522 04/01/23  0011   HGB  --   --  11.0*   INR 2.9* 3.6* 3.9*      Date                              INR                  Dose  4/1  no coumadin   4/2                               3.6                               2 mg coumadin     Continue your dialysis as scheduled    Consider ENT doctor to exam your right ear       DIET: Cardiac Diet    ACTIVITY: Activity as tolerated      NOTIFY YOUR PHYSICIAN FOR ANY OF THE FOLLOWING:   Fever over 101 degrees for 24 hours. Chest pain, shortness of breath, fever, chills, nausea, vomiting, diarrhea, change in mentation, falling, weakness, bleeding. Severe pain or pain not relieved by medications, as well as any other signs or symptoms that you may have questions about. FOLLOW UP APPOINTMENTS:    Follow-up Information       Follow up With Specialties Details Why Contact Info    Jack Baig MD Internal Medicine Physician   [address]      Wayne Gupta MD Neurology Follow up the clinic will call your for appointment in 6 weeks [facility]  [phone]               DISCHARGE MEDICATIONS:  Current Discharge Medication List        START taking these medications    Details   divalproex DR (DEPAKOTE) 250 mg tablet Take 1 Tablet by mouth two (2) times a day. Qty: 60 Tablet, Refills: 0  Start date: 4/3/2023           CONTINUE these medications which have NOT CHANGED    Details   amitriptyline (ELAVIL) 25 mg tablet Take 25 mg by mouth nightly. Indications: for sleep      gabapentin (NEURONTIN) 300 mg capsule Take 300 mg by mouth nightly. Indications: neuropathic pain      semaglutide (Ozempic) 1 mg/dose (2 mg/1.5 mL) sub-q pen 1 mg by SubCUTAneous route. dilTIAZem ER (CARDIZEM CD) 240 mg capsule Take 240 mg by mouth daily. ferrous sulfate 325 mg (65 mg iron) tablet Take 325 mg by mouth Daily (before breakfast).       hydrOXYzine HCL (ATARAX) 10 mg tablet Take 10 mg by mouth every eight (8) hours as needed. cyanocobalamin 1,000 mcg tablet Take 1,000 mcg by mouth daily. atorvastatin (LIPITOR) 40 mg tablet Take 40 mg by mouth daily. cinacalcet (SENSIPAR) 30 mg tablet Take 30 mg by mouth DIALYSIS MON, WED & FRI. Takes at dialysis      ERGOCALCIFEROL, VITAMIN D2, PO Take  by mouth. Takes at dialysis      warfarin (Jantoven) 5 mg tablet Take 1 Tablet by mouth daily. Qty: 30 Tablet, Refills: 0      sevelamer carbonate (RENVELA) 800 mg tab tab Take 1,600 mg by mouth Before breakfast, lunch, and dinner. montelukast (SINGULAIR) 10 mg tablet Take 10 mg by mouth daily. allopurinoL (ZYLOPRIM) 100 mg tablet Take 100 mg by mouth. TAKES ON Monday AND Thursday      predniSONE (DELTASONE) 5 mg tablet Take 5 mg by mouth daily. amLODIPine (NORVASC) 10 mg tablet Take 1 Tab by mouth daily. Qty: 30 Tab, Refills: 12      albuterol (PROVENTIL HFA, VENTOLIN HFA, PROAIR HFA) 90 mcg/actuation inhaler ProAir HFA 90 mcg/actuation aerosol inhaler   INHALE TWO PUFFS BY MOUTH EVERY 4 HOURS AS NEEDED FOR FOR SHORTNESS OF BREATH AND WHEEZING      bumetanide (BUMEX) 2 mg tablet Take 2 mg by mouth daily.              DISPOSITION:    Home With:   OT  PT  HH  RN       Long term SNF/Inpatient Rehab    Independent/assisted living    Hospice   x Other:     PATIENT CONDITION AT DISCHARGE:     Functional status    Poor     Deconditioned    x Independent      Cognition   x Lucid     Forgetful     Dementia      Catheters/lines (plus indication)    Shin     PICC     PEG    x None      Code status   x  Full code     DNR            CHRONIC MEDICAL DIAGNOSES:  Problem List as of 4/3/2023 Date Reviewed: 9/29/2022            Codes Class Noted - Resolved    Acute hyponatremia ICD-10-CM: E87.1  ICD-9-CM: 276.1  4/1/2023 - Present        Facial numbness ICD-10-CM: R20.0  ICD-9-CM: 782.0  4/1/2023 - Present        ESRD needing dialysis ([facility] ICD-10-CM: N18.6, Z99.2  ICD-9-CM: 585.6  4/1/2023 - Present        Supratherapeutic international normalized ratio (INR) ICD-10-CM: R79.1  ICD-9-CM: 790.92  4/1/2023 - Present        CVA (cerebral vascular accident) [facility]) ICD-10-CM: I63.9  ICD-9-CM: 434.91  2/13/2023 - Present        Atrial fibrillation with RVR ([address] ICD-10-CM: I48.91  ICD-9-CM: 427.31  10/7/2021 - Present        Onychomycosis ICD-10-CM: B35.1  ICD-9-CM: 110.1  10/14/2020 - Present        CKD (chronic kidney disease), stage III (HCC) ICD-10-CM: N18.30  ICD-9-CM: 585.3  11/16/2011 - Present        Malignant hypertension ICD-10-CM: I10  ICD-9-CM: 401.0  7/8/2010 - Present        Angioedema of lips ICD-10-CM: T78. 3XXA  ICD-9-CM: 995.1  7/8/2010 - Present        Adverse drug reaction-to lisinipril ICD-10-CM: T50.905A  ICD-9-CM: E947.9  7/8/2010 - Present        RESOLVED: HTN (hypertension) ICD-10-CM: I10  ICD-9-CM: 401.9  11/16/2011 - 8/3/2021           Greater than x minutes were spent with the patient on counseling and coordination of care    Signed:   Teena Bose MD  4/3/2023  12:16 PM

## 2023-04-03 NOTE — PROGRESS NOTES
Name: Shwetha Chapin MRN: [id number]   : 1970 Hospital: . [address]   Date: 4/3/2023        IMPRESSION:   ESRD on HD. Patient is on MWF schedule at his unit, but missed Friday's Tx. He was dialyzed on Saturday. No repeat chemistry   Hyponatremia  Facial numbness, neuro following. MRI negative  Chronic afib  HTN      PLAN:   HD today  Renal diet  C/w home meds  Correct hyponatremia with HD     Subjective/Interval History:     4/3-feels ok, no complaints. Wants to go home    I have reviewed the flowsheet and previous days notes. ROS:Behavioral/Psych: negative except for behavior problems  Not required    Objective:   Vital Signs:    Visit Vitals  /77 (BP 1 Location: Right upper arm, BP Patient Position: At rest)   Pulse 86   Temp 97.4 °F (36.3 °C)   Resp 16   Ht 6' 3\" (1.905 m)   Wt (!) 158.9 kg (350 lb 5 oz)   SpO2 98%   BMI 43.79 kg/m²       O2 Device: None (Room air)       Temp (24hrs), Av.9 °F (36.6 °C), Min:97.4 °F (36.3 °C), Max:98.2 °F (36.8 °C)       Intake/Output:   Last shift:      No intake/output data recorded. Last 3 shifts:  1901 -  0700  In: -   Out: 3000   No intake or output data in the 24 hours ending 23 0402       Physical Exam:  General:    Alert, cooperative, no distress, appears stated age. Head:   Normocephalic, without obvious abnormality, atraumatic. Eyes:   Conjunctivae/corneas clear. Neck:  Supple, no JVD. Lungs:   Clear to auscultation bilaterally. No Wheezing or Rhonchi. No rales. Heart:   Regular rate and rhythm,  no murmur, rub or gallop. Abdomen:   Soft, non-tender. Not distended. Bowel sounds normal. No masses  Extremities: Extremities normal, atraumatic, No cyanosis. No edema.  No clubbing  Skin:     dry  Psych:  Calm  Neuro, A/O, nl speech        DATA:  Labs:  Recent Labs     23  0103 23  0011   * 129*   K 5.2* 4.6   CL 93* 95*   CO2 29 27   BUN 68* 71*   CREA 16.70* 16.30*   CA 9.3 9.1   ALB 2.9*  -- PHOS 6.2*  --        Recent Labs     04/01/23  0011   WBC 7.4   HGB 11.0*   HCT 35.0*          No results for input(s): LEV, KU, CLU, CREMALIKA in the last 72 hours.     No lab exists for component: PROU    Total time spent with patient:  35 minutes    [] Critical Care Provided    Care Plan discussed with:   Staff, Pieter Walker MD

## 2023-04-03 NOTE — DISCHARGE INSTRUCTIONS
Neurology: Patient should follow-up in the clinic in 6-8 weeks from discharge. The clinic will call the pt to schedule an appt. [phone]       Discharge Instructions       PATIENT ID: Emmanuelle Alvares  MRN: [id number]   YOB: 1970    DATE OF ADMISSION: [unfilled]    DATE OF DISCHARGE: 4/3/2023    PRIMARY CARE PROVIDER: @PCP@     ATTENDING PHYSICIAN: [unfilled]  DISCHARGING PROVIDER: Severa Shawl, MD    To contact this individual call [phone] and ask the  to page. If unavailable ask to be transferred the Adult Hospitalist Department. DISCHARGE DIAGNOSES right face numbness     CONSULTATIONS: [unfilled]    PROCEDURES/SURGERIES: * No surgery found *    PENDING TEST RESULTS:   At the time of discharge the following test results are still pending: none     FOLLOW UP APPOINTMENTS:   [unfilled]     ADDITIONAL CARE RECOMMENDATIONS:     Start taking Depakote 250mg twice a day. The neurology clinic will call you to schedule an appt. [phone]. Should follow up your PCP in one week, you will need a liver function test done in the office since starting Depakote. Should get refill from your PCP   You will go back to your coumadin 5mg tonight 4/3. Should have your INR checked this Thursday.  Further coumadin dose per your coumadin clinic   This is the INR record of your last three days and please present the record to your provider on Thursday 04/03/23  0103 04/02/23  0522 04/01/23  0011   HGB  --   --  11.0*   INR 2.9* 3.6* 3.9*      Date                              INR                  Dose  4/1  no coumadin   4/2                               3.6                               2 mg coumadin     Continue your dialysis as scheduled    Consider ENT doctor to exam your right ear       DIET: Regular Diet      ACTIVITY: Activity as tolerated        Radiology      DISCHARGE MEDICATIONS:   See Medication Reconciliation Form    It is important that you take the medication exactly as they are prescribed. Keep your medication in the bottles provided by the pharmacist and keep a list of the medication names, dosages, and times to be taken in your wallet. Do not take other medications without consulting your doctor. NOTIFY YOUR PHYSICIAN FOR ANY OF THE FOLLOWING:   Fever over 101 degrees for 24 hours. Chest pain, shortness of breath, fever, chills, nausea, vomiting, diarrhea, change in mentation, falling, weakness, bleeding. Severe pain or pain not relieved by medications. Or, any other signs or symptoms that you may have questions about.       DISPOSITION:    Home With:   OT  PT  HH  RN       SNF/Inpatient Rehab/LTAC    Independent/assisted living    Hospice   x Other:     CDMP Checked:   Yes x     PROBLEM LIST Updated:  Yes x       Signed:   Elliot Gill MD  4/3/2023  12:09 PM

## 2023-04-03 NOTE — PROGRESS NOTES
Name: Genevie Meigs MRN: [id number]   : 1970 Hospital: . [address]   Date: 4/3/2023        IMPRESSION:   ESRD on HD. Patient is on MWF schedule at his unit, but missed Friday's Tx. He was dialyzed on Saturday. No repeat chemistry   Hyponatremia  Facial numbness, neuro following. MRI negative  Chronic afib  HTN      PLAN:   HD today  Renal diet  C/w home meds  Correct hyponatremia with HD     Subjective/Interval History:     4/3-feels ok, no complaints. Wants to go home    I have reviewed the flowsheet and previous days notes. ROS:Behavioral/Psych: negative except for behavior problems  Not required    Objective:   Vital Signs:    Visit Vitals  /77 (BP 1 Location: Right upper arm, BP Patient Position: At rest)   Pulse 86   Temp 97.4 °F (36.3 °C)   Resp 16   Ht 6' 3\" (1.905 m)   Wt (!) 158.9 kg (350 lb 5 oz)   SpO2 98%   BMI 43.79 kg/m²       O2 Device: None (Room air)       Temp (24hrs), Av.9 °F (36.6 °C), Min:97.4 °F (36.3 °C), Max:98.2 °F (36.8 °C)       Intake/Output:   Last shift:      No intake/output data recorded. Last 3 shifts:  1901 -  0700  In: -   Out: 3000   No intake or output data in the 24 hours ending 23 [id number]       Physical Exam:  General:    Alert, cooperative, no distress, appears stated age. Head:   Normocephalic, without obvious abnormality, atraumatic. Eyes:   Conjunctivae/corneas clear. Neck:  Supple, no JVD. Lungs:   Clear to auscultation bilaterally. No Wheezing or Rhonchi. No rales. Heart:   Regular rate and rhythm,  no murmur, rub or gallop. Abdomen:   Soft, non-tender. Not distended. Bowel sounds normal. No masses  Extremities: Extremities normal, atraumatic, No cyanosis. No edema.  No clubbing  Skin:     dry  Psych:  Calm  Neuro, A/O, nl speech        DATA:  Labs:  Recent Labs     23  0103 23  0011   * 129*   K 5.2* 4.6   CL 93* 95*   CO2 29 27   BUN 68* 71*   CREA 16.70* 16.30*   CA 9.3 9.1   ALB 2.9*  -- PHOS 6.2*  --        Recent Labs     04/01/23  0011   WBC 7.4   HGB 11.0*   HCT 35.0*          No results for input(s): LEV, AYAKA, CLU, CREMLAIKA in the last 72 hours.     No lab exists for component: PROU    Total time spent with patient:  35 minutes    [] Critical Care Provided    Care Plan discussed with:   Staff, Dannielle Patel MD

## 2023-04-03 NOTE — DISCHARGE SUMMARY
Discharge Summary       PATIENT ID: Hilda Purcell  MRN: [id number]   YOB: 1970    DATE OF ADMISSION: 3/31/2023 11:57 PM    DATE OF DISCHARGE: 4/3/2023    PRIMARY CARE PROVIDER: Dayday Rendon MD     ATTENDING PHYSICIAN: Peterson Leon MD   DISCHARGING PROVIDER: Francesca Avila MD      CONSULTATIONS: IP CONSULT TO NEPHROLOGY  IP CONSULT TO NEUROLOGY  IP CONSULT TO NEPHROLOGY    PROCEDURES/SURGERIES: * No surgery found *    ADMISSION SUMMARY   Hidla Purcell is a 50 y.o. male with past medical history of end-stage renal disease on hemodialysis, CAD, MI, CHF, atrial fibrillation, hypertension, type 2 diabetes mellitus, CVA, PE, lymphedema left leg, peripheral neuropathy, sleep apnea, arthritis, asthma, class III obesity presented to the emergency department chief complaint of facial numbness. Symptoms onset reported began approximate 1600 hrs. yesterday with right facial and right arm and finger numbness and tingling, initially constant, moderate severity, without specific precipitating factors, reportedly resolved after taking aspirin. Symptoms reportedly occurred approximate 22:45 hours yesterday. On arrival emergency department, initial vital signs temperature 98.4 °F, /80, heart rate 88, respiratory rate 20, saturation 99% on room air. Abnormal labs include hemoglobin 11.0, .7, INR 3.9, sodium 129, blood glucose 116, BUN 71, creatinine 16.30, GFR 3.  CT head without IV contrast showed no acute intracranial normalities.   12 EKG showed atrial fibrillation, T wave changes anterior anterior lateral leads, prolonged QTc 485 ms at 86 bpm.  Patient is now seen for admission to the hospital service    Terrance Connolly 149:       Facial numbness        Numbness and tingling right upper extremity  -recent hospitalization with possible TIA/ saccular MCA aneurysm on 2/14/2023 had full stroke work up , on chronic coumadin for chroni afib  INR> 3   -symptoms recurrent  -CT head without IV contrast:  no acute process  -Neuro consulted: ddx simple partial seizure vs complex migraine, less likely TIA. -Brain MRI neg. Neurologist recommended empirically depotoke 250mg bid   -continue coumadin [facility] ,  continue lipitor   Had Echo done on 2/14   -LDL 59, A1c 5.8   Neurology clinic will arrange follow up in 6 weeks      2. ESRD needing dialysis  -consult nephrologist for hemodialysis  Last HD today         3. Acute hyponatremia  -Na 129  -sodium adjustment per renal dialysis, follow   Stable      4. Supratherapeutic international normalized ratio (INR)  -on long term anticoagulation with Warfarin  -INR = 3.9 on admission  Resume coumadin now. Pt will follow up his coumadin clinic at [address] [place] [address] with NP Mally Reid on Thursday : dosage adjustement discussed with Rajinder Hawkins at discharge. 5. Chronic afib:   Rate controlled. Cardizem 240mg daily   Coumadin as above      6. Macrocytic anemia  -anemia of chronic disease  -Hgb 11.0, .7  -repeat H&H  -check Z22 and folic acid levels        7. Essential hypertension  -resume home meds      8. Hyperlipidemia  -Continue atorvastatin 40 mg daily  -Check lipid panel     9. Class III obesity  -BMI 43.37 kg/M2  -Would encourage weight loss, reduce calorie diet, lifestyle modifications     10. Type 2 diabetes mellitus  -pt denied he has diabetes  BG normal   A1c < 6  Dc achs checking      ADDITIONAL CARE RECOMMENDATIONS:       Start taking Depakote 250mg twice a day. The neurology clinic will call you to schedule an appt. [phone]. Should follow up your PCP in one week, you will need a liver function test done in the office since starting Depakote. Should get refill from your PCP   You will go back to your coumadin 5mg tonight 4/3. Should have your INR checked this Thursday.  Further coumadin dose per your coumadin clinic   This is the INR record of your last three days and please present the record to your provider on Thursday 04/03/23  0103 04/02/23  0522 04/01/23  0011   HGB  --   --  11.0*   INR 2.9* 3.6* 3.9*      Date                              INR                  Dose  4/1  no coumadin   4/2                               3.6                               2 mg coumadin     Continue your dialysis as scheduled    Consider ENT doctor to exam your right ear       DIET: Cardiac Diet    ACTIVITY: Activity as tolerated      NOTIFY YOUR PHYSICIAN FOR ANY OF THE FOLLOWING:   Fever over 101 degrees for 24 hours. Chest pain, shortness of breath, fever, chills, nausea, vomiting, diarrhea, change in mentation, falling, weakness, bleeding. Severe pain or pain not relieved by medications, as well as any other signs or symptoms that you may have questions about. FOLLOW UP APPOINTMENTS:    Follow-up Information       Follow up With Specialties Details Why Contact Info    Janeth Chen MD Internal Medicine Physician   [address]      Byron Keller MD Neurology Follow up the clinic will call your for appointment in 6 weeks [facility]  [phone]               DISCHARGE MEDICATIONS:  Current Discharge Medication List        START taking these medications    Details   divalproex DR (DEPAKOTE) 250 mg tablet Take 1 Tablet by mouth two (2) times a day. Qty: 60 Tablet, Refills: 0  Start date: 4/3/2023           CONTINUE these medications which have NOT CHANGED    Details   amitriptyline (ELAVIL) 25 mg tablet Take 25 mg by mouth nightly. Indications: for sleep      gabapentin (NEURONTIN) 300 mg capsule Take 300 mg by mouth nightly. Indications: neuropathic pain      semaglutide (Ozempic) 1 mg/dose (2 mg/1.5 mL) sub-q pen 1 mg by SubCUTAneous route. dilTIAZem ER (CARDIZEM CD) 240 mg capsule Take 240 mg by mouth daily. ferrous sulfate 325 mg (65 mg iron) tablet Take 325 mg by mouth Daily (before breakfast).       hydrOXYzine HCL (ATARAX) 10 mg tablet Take 10 mg by mouth every eight (8) hours as needed. cyanocobalamin 1,000 mcg tablet Take 1,000 mcg by mouth daily. atorvastatin (LIPITOR) 40 mg tablet Take 40 mg by mouth daily. cinacalcet (SENSIPAR) 30 mg tablet Take 30 mg by mouth DIALYSIS MON, WED & FRI. Takes at dialysis      ERGOCALCIFEROL, VITAMIN D2, PO Take  by mouth. Takes at dialysis      warfarin (Jantoven) 5 mg tablet Take 1 Tablet by mouth daily. Qty: 30 Tablet, Refills: 0      sevelamer carbonate (RENVELA) 800 mg tab tab Take 1,600 mg by mouth Before breakfast, lunch, and dinner. montelukast (SINGULAIR) 10 mg tablet Take 10 mg by mouth daily. allopurinoL (ZYLOPRIM) 100 mg tablet Take 100 mg by mouth. TAKES ON Monday AND Thursday      predniSONE (DELTASONE) 5 mg tablet Take 5 mg by mouth daily. amLODIPine (NORVASC) 10 mg tablet Take 1 Tab by mouth daily. Qty: 30 Tab, Refills: 12      albuterol (PROVENTIL HFA, VENTOLIN HFA, PROAIR HFA) 90 mcg/actuation inhaler ProAir HFA 90 mcg/actuation aerosol inhaler   INHALE TWO PUFFS BY MOUTH EVERY 4 HOURS AS NEEDED FOR FOR SHORTNESS OF BREATH AND WHEEZING      bumetanide (BUMEX) 2 mg tablet Take 2 mg by mouth daily.              DISPOSITION:    Home With:   OT  PT  HH  RN       Long term SNF/Inpatient Rehab    Independent/assisted living    Hospice   x Other:     PATIENT CONDITION AT DISCHARGE:     Functional status    Poor     Deconditioned    x Independent      Cognition   x Lucid     Forgetful     Dementia      Catheters/lines (plus indication)    Shin     PICC     PEG    x None      Code status   x  Full code     DNR            CHRONIC MEDICAL DIAGNOSES:  Problem List as of 4/3/2023 Date Reviewed: 9/29/2022            Codes Class Noted - Resolved    Acute hyponatremia ICD-10-CM: E87.1  ICD-9-CM: 276.1  4/1/2023 - Present        Facial numbness ICD-10-CM: R20.0  ICD-9-CM: 782.0  4/1/2023 - Present        ESRD needing dialysis ([facility] ICD-10-CM: N18.6, Z99.2  ICD-9-CM: 585.6  4/1/2023 - Present        Supratherapeutic international normalized ratio (INR) ICD-10-CM: R79.1  ICD-9-CM: 790.92  4/1/2023 - Present        CVA (cerebral vascular accident) [facility]) ICD-10-CM: I63.9  ICD-9-CM: 434.91  2/13/2023 - Present        Atrial fibrillation with RVR ([address] ICD-10-CM: I48.91  ICD-9-CM: 427.31  10/7/2021 - Present        Onychomycosis ICD-10-CM: B35.1  ICD-9-CM: 110.1  10/14/2020 - Present        CKD (chronic kidney disease), stage III (HCC) ICD-10-CM: N18.30  ICD-9-CM: 585.3  11/16/2011 - Present        Malignant hypertension ICD-10-CM: I10  ICD-9-CM: 401.0  7/8/2010 - Present        Angioedema of lips ICD-10-CM: T78. 3XXA  ICD-9-CM: 995.1  7/8/2010 - Present        Adverse drug reaction-to lisinipril ICD-10-CM: T50.905A  ICD-9-CM: E947.9  7/8/2010 - Present        RESOLVED: HTN (hypertension) ICD-10-CM: I10  ICD-9-CM: 401.9  11/16/2011 - 8/3/2021           Greater than x minutes were spent with the patient on counseling and coordination of care    Signed:   Ephraim Linder MD  4/3/2023  12:16 PM

## 2023-04-03 NOTE — PROGRESS NOTES
Neurology Progress Note     NAME: Shashank Arthur   :  1970   MRN:  [id number]   DATE:  4/3/2023    Assessment:     Active Problems:    Acute hyponatremia (2023)      Facial numbness (2023)      ESRD needing dialysis ([facility] (2023)      Supratherapeutic international normalized ratio (INR) (2023)      Patient is a 50year-old male with history of afib on warfarin, HTN, HLD, CVA (L thalamic with residual RLE weakness), prediabetes, CAD, who was admitted 3/31/23 with complaint of R face and arm numbness/tingling with sensation of moving down and then had bilat R>L feeling of jitteriness in his hands and vibrations through his body. He also felt like his R eye was going to close and that his R hearing was muffled. This is the third time he has presented with same R-sided symptoms. His last admission was 23 but felt at that time symptoms were related to metabolic due to missing 2 HD sessions. CT head negative. MRI negative for acute infarct and shows CIWM. Pt states symptoms have resolved. Again, low suspicion for TIA or CVA. Possible seizure versus migraine. This is the third recent event with same presentation. LDL 59.6, HgbA1C 5.8, INR 3.9 to 2.9 today  Plan:   - Start empiric trial of Depakote  mg BID. Can titrate up if needed at follow-up appointment. - Continue warfarin and atorvastatin  - Patient should have LFTs drawn within next 3-7 days and routinely monitored by PCP    Patient stable for discharge after HD today. Patient will need to follow-up in the clinic in 6-8 weeks from discharge. Subjective:   Patient states no further symptoms. Wants to know side effects of Depakote.     Objective:   Chart reviewed since last seen    Current Facility-Administered Medications   Medication Dose Route Frequency    warfarin (COUMADIN) tablet 3 mg  3 mg Oral ONCE    divalproex DR (DEPAKOTE) tablet 250 mg  250 mg Oral BID    atorvastatin (LIPITOR) tablet 40 mg  40 mg Oral DAILY    cinacalcet (SENSIPAR) tablet 30 mg  30 mg Oral DIALYSIS MON, WED & FRI    cyanocobalamin (VITAMIN B12) tablet 1,000 mcg  1,000 mcg Oral DAILY    dilTIAZem ER (CARDIZEM CD) capsule 240 mg  240 mg Oral DAILY    ferrous sulfate tablet 325 mg  325 mg Oral ACB    montelukast (SINGULAIR) tablet 10 mg  10 mg Oral DAILY    pregabalin (LYRICA) capsule 75 mg  75 mg Oral DAILY    sevelamer carbonate (RENVELA) tab 1,600 mg  1,600 mg Oral TIDAC    acetaminophen (TYLENOL) tablet 650 mg  650 mg Oral Q4H PRN    Or    acetaminophen (TYLENOL) solution 650 mg  650 mg Per NG tube Q4H PRN    Or    acetaminophen (TYLENOL) suppository 650 mg  650 mg Rectal Q4H PRN    glucose chewable tablet 16 g  4 Tablet Oral PRN    glucagon (GLUCAGEN) injection 1 mg  1 mg IntraMUSCular PRN    dextrose 10 % infusion 0-250 mL  0-250 mL IntraVENous PRN    insulin lispro (HUMALOG) injection   SubCUTAneous AC&HS    Warfarin Pharmacy Dosing (COUMADIN)   Other Rx Dosing/Monitoring       Visit Vitals  /75 (BP 1 Location: Right upper arm, BP Patient Position: At rest)   Pulse 83   Temp 97.8 °F (36.6 °C)   Resp 19   Ht 6' 3\" (1.905 m)   Wt (!) 158.9 kg (350 lb 5 oz)   SpO2 98%   BMI 43.79 kg/m²     Temp (24hrs), Av.9 °F (36.6 °C), Min:97.4 °F (36.3 °C), Max:98.2 °F (36.8 °C)      No intake/output data recorded.  1901 -  0700  In: -   Out: 3000       Physical Exam:  General: Well developed well nourished patient in no apparent distress. Obese. Cardiac: Regular rate and rhythm with no murmurs. Extremities: 2+ Radial pulses, no cyanosis or edema    Neurological Exam:  Mental Status: A&O x3. Speech and language intact. Attention and fund of knowledge appropriate. Normal recent and remote memory. Cranial Nerves:   VFF, PERRL, EOMI, no nystagmus, no ptosis.  Facial sensation is normal. Facial movement is symmetric. Palate is midline. Tongue is midline. Hearing is intact bilaterally. Motor:  5/5 strength in upper and lower proximal and distal muscles. Normal bulk and tone. No PD. No tremors   Reflexes: Toes downgoing. Sensory:   Intact to LT and PP   Gait:  Deferred   Cerebellar:  Intact FTN         Lab Review   Recent Results (from the past 24 hour(s))   GLUCOSE, POC    Collection Time: 04/02/23 12:38 PM   Result Value Ref Range    Glucose (POC) 93 65 - 117 mg/dL    Performed by Boogie Pena   PCT    GLUCOSE, POC    Collection Time: 04/02/23  4:42 PM   Result Value Ref Range    Glucose (POC) 96 65 - 117 mg/dL    Performed by [employer] + INR    Collection Time: 04/03/23  1:03 AM   Result Value Ref Range    INR 2.9 (H) 0.9 - 1.1      Prothrombin time 28.2 (H) 9.0 - 11.1 sec   RENAL FUNCTION PANEL    Collection Time: 04/03/23  1:03 AM   Result Value Ref Range    Sodium 130 (L) 136 - 145 mmol/L    Potassium 5.2 (H) 3.5 - 5.1 mmol/L    Chloride 93 (L) 97 - 108 mmol/L    CO2 29 21 - 32 mmol/L    Anion gap 8 5 - 15 mmol/L    Glucose 118 (H) 65 - 100 mg/dL    BUN 68 (H) 6 - 20 MG/DL    Creatinine 16.70 (H) 0.70 - 1.30 MG/DL    BUN/Creatinine ratio 4 (L) 12 - 20      eGFR 3 (L) >60 ml/min/1.73m2    Calcium 9.3 8.5 - 10.1 MG/DL    Phosphorus 6.2 (H) 2.6 - 4.7 MG/DL    Albumin 2.9 (L) 3.5 - 5.0 g/dL   GLUCOSE, POC    Collection Time: 04/03/23  9:02 AM   Result Value Ref Range    Glucose (POC) 120 (H) 65 - 117 mg/dL    Performed by Genesis Jeronimo        Additional comments:  I have reviewed the patient's new clinical lab test results. I have personally reviewed the patient's radiographs. MRI Results (most recent):  Results from [place] encounter on 03/31/23    MRI BRAIN WO CONT    Narrative  EXAM: MRI BRAIN WO CONT    INDICATION: right sided facial numbness    COMPARISON: 4/1/2023. CONTRAST: None.     TECHNIQUE:  Multiplanar multisequence acquisition without contrast of the brain. FINDINGS:  The ventricles are normal in size and position. There is no acute infarct,  hemorrhage, extra-axial fluid collection, or mass effect. Scattered subcortical  deep white matter T2 hyperintense foci. No evidence of acute hemorrhage.,  Hemosiderin deposition in the left thalamus likely related to chronic  microhemorrhage. Expected arterial flow-voids are present. The paranasal sinuses, mastoid air cells, and middle ears are clear. The orbital  contents are within normal limits. No significant osseous or scalp lesions are  identified. Impression  No acute intracranial abnormality. Mild chronic small vessel ischemic disease. Care Plan discussed with:  Patient x   Family    RN    Care Manager    Consultant/Specialist:       FABRIZIO Walter    The patient was discussed with Dr. Byron Estevez and Dr. Goyo Du. I spent >50% of a 35-minute visit counseling/coordination of care with the patient discussing symptoms, follow-up appointment, and treatment plan.

## 2023-04-03 NOTE — PROCEDURES
Hemodialysis / [phone]    Vitals Pre Post Assessment Pre Post   BP BP: 114/74 (04/03/23 1000) 114/75 LOC See flowsheet See flowsheet   HR Pulse (Heart Rate): 86 (04/03/23 1000) 93 Lungs     Resp Resp Rate: 18 (04/03/23 1000) 18 Cardiac     Temp Temp: 97.8 °F (36.6 °C) (04/03/23 0930) 97.5 Skin     Weight    Edema     Tele status bedside bedside Pain Pain Intensity 1: 0 (04/03/23 0450)      Orders   Duration: Start: 1000 End: 1030 Total: 3.5 hrs   Dialyzer: Dialyzer/Set Up Inspection: Samantha Soler (04/03/23 1000)   K Bath: Dialysate K (mEq/L): 2 (04/03/23 1000)   Ca Bath: Dialysate CA (mEq/L): 2.5 (04/03/23 1000)   Na: Dialysate NA (mEq/L): 138 (04/03/23 1000)   Bicarb: Dialysate HCO3 (mEq/L): 35 (04/03/23 1000)   Target Fluid Removal: Goal/Amount of Fluid to Remove (mL): 3000 mL (04/03/23 1000)     Access   Type & Location: L AVF   Comments:                                     Left lower arm AV Fistula without evidence of warmth, redness, or drainage. +thrill/+bruit. Each access site disinfected for 60 seconds per site with alcohol swabs per P&P. Cannulated with 15G needles x2 and secured with paper tape. +aspiration/+flushed.         Labs   HBsAg (Antigen) / date: 4/1/23 negative                                              HBsAb (Antibody) / date: 2/13/23 immune   Source: Epic   Obtained/Reviewed  Critical Results Called HGB   Date Value Ref Range Status   04/01/2023 11.0 (L) 12.1 - 17.0 g/dL Final     Potassium   Date Value Ref Range Status   04/03/2023 5.2 (H) 3.5 - 5.1 mmol/L Final     Calcium   Date Value Ref Range Status   04/03/2023 9.3 8.5 - 10.1 MG/DL Final     BUN   Date Value Ref Range Status   04/03/2023 68 (H) 6 - 20 MG/DL Final     Creatinine   Date Value Ref Range Status   04/03/2023 16.70 (H) 0.70 - 1.30 MG/DL Final        Meds Given   Name Dose Route                    Adequacy / Fluid    Total Liters Process: 74.7 L   Net Fluid Removed: 3000 mL      Comments   Time Out Done:   (Time) 8390 Admitting Diagnosis: Facial Numbness / ESRD / high INR   Consent obtained/signed: Informed Consent Verified:  (OP HD [facility]) (04/01/23 1532)   Machine / RO # Machine Number: [id number] (04/03/23 1000)   Primary Nurse Rpt Pre: Tao Monet RN   Primary Nurse Rpt Post: Tao Monet RN   Pt Education: Procedural / infection control   Care Plan: Continue current HD plan of care   Pts outpatient clinic: [place] [address]     Tx Summary   Comments:                         1000 HD treatment initiated per physicians order. 1300 /71. Complained of lightheadedness,100 ml normal saline administered. Symptoms felt better as verbalized. 1330 HD treatment completed per physician order. All possible blood returned to patient. Hemostasis achieved in <10 minutes. Access site dressings clean, dry, and intact. +thrill remains.

## 2023-04-03 NOTE — PROGRESS NOTES
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 He also felt like his R eye was going to close and that his R hearing was muffled. This is the third time he has presented with same R-sided symptoms. His last admission was 23 but felt at that time symptoms were related to metabolic due to missing 2 HD sessions. CT head negative. MRI negative for acute infarct and shows CIWM. Pt states symptoms have resolved. Again, low suspicion for TIA or CVA. Possible seizure versus migraine. This is the third recent event with same presentation. LDL 59.6, HgbA1C 5.8, INR 3.9 to 2.9 today  Plan:   - Start empiric trial of Depakote  mg BID. Can titrate up if needed at follow-up appointment. - Continue warfarin and atorvastatin  - Patient should have LFTs drawn within next 3-7 days and routinely monitored by PCP    Patient stable for discharge after HD today. Patient will need to follow-up in the clinic in 6-8 weeks from discharge. Subjective:   Patient states no further symptoms. Wants to know side effects of Depakote.     Objective:   Chart reviewed since last seen    Current Facility-Administered Medications   Medication Dose Route Frequency    warfarin (COUMADIN) tablet 3 mg  3 mg Oral ONCE    divalproex DR (DEPAKOTE) tablet 250 mg  250 mg Oral BID    atorvastatin (LIPITOR) tablet 40 mg  40 mg Oral DAILY    cinacalcet (SENSIPAR) tablet 30 mg  30 mg Oral DIALYSIS MON, WED & FRI    cyanocobalamin (VITAMIN B12) tablet 1,000 mcg  1,000 mcg Oral DAILY    dilTIAZem ER (CARDIZEM CD) capsule 240 mg  240 mg Oral DAILY    ferrous sulfate tablet 325 mg  325 mg Oral ACB    montelukast (SINGULAIR) tablet 10 mg  10 mg Oral DAILY    pregabalin (LYRICA) capsule 75 mg  75 mg Oral DAILY    sevelamer carbonate (RENVELA) tab 1,600 mg  1,600 mg Oral TIDAC    acetaminophen (TYLENOL) tablet 650 mg  650 mg Oral Q4H PRN    Or    acetaminophen (TYLENOL) solution 650 mg  650 mg Per NG tube Q4H PRN    Or    acetaminophen (TYLENOL) suppository 650 mg  650 mg Rectal Q4H PRN    glucose chewable tablet 16 g  4 Tablet Oral PRN    glucagon (GLUCAGEN) injection 1 mg  1 mg IntraMUSCular PRN    dextrose 10 % infusion 0-250 mL  0-250 mL IntraVENous PRN    insulin lispro (HUMALOG) injection   SubCUTAneous AC&HS    Warfarin Pharmacy Dosing (COUMADIN)   Other Rx Dosing/Monitoring       Visit Vitals  /75 (BP 1 Location: Right upper arm, BP Patient Position: At rest)   Pulse 83   Temp 97.8 °F (36.6 °C)   Resp 19   Ht 6' 3\" (1.905 m)   Wt (!) 158.9 kg (350 lb 5 oz)   SpO2 98%   BMI 43.79 kg/m²     Temp (24hrs), Av.9 °F (36.6 °C), Min:97.4 °F (36.3 °C), Max:98.2 °F (36.8 °C)      No intake/output data recorded.  1901 -  0700  In: -   Out: 3000       Physical Exam:  General: Well developed well nourished patient in no apparent distress. Obese. Cardiac: Regular rate and rhythm with no murmurs. Extremities: 2+ Radial pulses, no cyanosis or edema    Neurological Exam:  Mental Status: A&O x3. Speech and language intact. Attention and fund of knowledge appropriate. Normal recent and remote memory. Cranial Nerves:   VFF, PERRL, EOMI, no nystagmus, no ptosis.  Facial sensation is normal. Facial movement is symmetric. Palate is midline. Tongue is midline. Hearing is intact bilaterally. Motor:  5/5 strength in upper and lower proximal and distal muscles. Normal bulk and tone. No PD. No tremors   Reflexes: Toes downgoing. Sensory:   Intact to LT and PP   Gait:  Deferred   Cerebellar:  Intact FTN         Lab Review   Recent Results (from the past 24 hour(s))   GLUCOSE, POC    Collection Time: 04/02/23 12:38 PM   Result Value Ref Range    Glucose (POC) 93 65 - 117 mg/dL    Performed by Yoel VASQUEZ    GLUCOSE, POC    Collection Time: 04/02/23  4:42 PM   Result Value Ref Range    Glucose (POC) 96 65 - 117 mg/dL    Performed by Juliana [employer] + INR    Collection Time: 04/03/23  1:03 AM   Result Value Ref Range    INR 2.9 (H) 0.9 - 1.1      Prothrombin time 28.2 (H) 9.0 - 11.1 sec   RENAL FUNCTION PANEL    Collection Time: 04/03/23  1:03 AM   Result Value Ref Range    Sodium 130 (L) 136 - 145 mmol/L    Potassium 5.2 (H) 3.5 - 5.1 mmol/L    Chloride 93 (L) 97 - 108 mmol/L    CO2 29 21 - 32 mmol/L    Anion gap 8 5 - 15 mmol/L    Glucose 118 (H) 65 - 100 mg/dL    BUN 68 (H) 6 - 20 MG/DL    Creatinine 16.70 (H) 0.70 - 1.30 MG/DL    BUN/Creatinine ratio 4 (L) 12 - 20      eGFR 3 (L) >60 ml/min/1.73m2    Calcium 9.3 8.5 - 10.1 MG/DL    Phosphorus 6.2 (H) 2.6 - 4.7 MG/DL    Albumin 2.9 (L) 3.5 - 5.0 g/dL   GLUCOSE, POC    Collection Time: 04/03/23  9:02 AM   Result Value Ref Range    Glucose (POC) 120 (H) 65 - 117 mg/dL    Performed by Chas Wheatley        Additional comments:  I have reviewed the patient's new clinical lab test results. I have personally reviewed the patient's radiographs. MRI Results (most recent):  Results from [place] encounter on 03/31/23    MRI BRAIN WO CONT    Narrative  EXAM: MRI BRAIN WO CONT    INDICATION: right sided facial numbness    COMPARISON: 4/1/2023. CONTRAST: None.     TECHNIQUE:  Multiplanar multisequence acquisition without contrast of the brain. FINDINGS:  The ventricles are normal in size and position. There is no acute infarct,  hemorrhage, extra-axial fluid collection, or mass effect. Scattered subcortical  deep white matter T2 hyperintense foci. No evidence of acute hemorrhage.,  Hemosiderin deposition in the left thalamus likely related to chronic  microhemorrhage. Expected arterial flow-voids are present. The paranasal sinuses, mastoid air cells, and middle ears are clear. The orbital  contents are within normal limits. No significant osseous or scalp lesions are  identified. Impression  No acute intracranial abnormality. Mild chronic small vessel ischemic disease. Care Plan discussed with:  Patient x   Family    RN    Care Manager    Consultant/Specialist:       FABRIZIO Sands    The patient was discussed with Dr. Manoj Estevez and Dr. Akin Barba. I spent >50% of a 35-minute visit counseling/coordination of care with the patient discussing symptoms, follow-up appointment, and treatment plan.

## 2023-04-03 NOTE — PROGRESS NOTES
Reason for Admission:   Facial Numbness                 RUR Score:     23% High Risk      PCP: First and Last name:  Jaja Powell MD     Name of Practice:   Are you a current patient: Yes/No: High   Approximate date of last visit: 3 weeks ago   Can you do a virtual visit with your PCP:            Yes  Resources/supports as identified by patient/family:            Good support       Top Challenges facing patient (as identified by patient/family and CM): Finances/Medication cost?     None expressed, patient is insured by  [facility] Medicaid              Transportation? Patient arranged transport              Support system or lack thereof? No concern voiced                     Living arrangements? One story residence with 4 steps            Self-care/ADLs/Cognition? Independent, works and drives          Current Advanced Directive/Advance Care Plan:  Full Code      Healthcare Decision Maker:   Click here to complete [address] including selection of the Healthcare Decision Maker Relationship (ie \"Primary\")      Primary Decision Maker: [employer] - Life Partner - [phone]    Payor Source Payor: [employer] / Plan: [employer] / Product Type: Managed Care Medicaid /                             Plan for utilizing home health:        None                 Transition of Care Plan:             - Disposition: Home, Independent  - PT/OT , SLP - cleared as patient at baseline  - DME: patient does use a CPaP at home  - PCP, specialist F/U      Upon interview Mr. Adryan Dominique had already been cleared for discharge. Patient admitted due to facial weakness with a PMH significant for  end-stage renal disease on hemodialysis, CAD, MI, CHF, atrial fibrillation, hypertension, type 2 diabetes mellitus, CVA, PE, lymphedema left leg, peripheral neuropathy, sleep apnea, arthritis, asthma, class III obesity. Patient verified demographics, emergency contact and PCP. Patient cleared for discharge home with PCP F/U. Care Management Interventions  PCP Verified by CM:  Yes  Transition of Care Consult (CM Consult): Discharge Planning  Discharge Durable Medical Equipment: No  Health Maintenance Reviewed: Yes  Physical Therapy Consult: Yes  Occupational Therapy Consult: Yes  Speech Therapy Consult: Yes  Support Systems: Spouse/Significant Other  The Patient and/or Patient Representative was Provided with a Choice of Provider and Agrees with the Discharge Plan?: Yes  Name of the Patient Representative Who was Provided with a Choice of Provider and Agrees with the Discharge Plan: Patient  [address] Provided?: No  Discharge Location  Patient Expects to be Discharged to[de-identified] [facility]  Case Management [address]  [phone]

## 2023-04-03 NOTE — PROGRESS NOTES
0800 Assumed care of patient    1200 Galina has been up in chair most the day. 18 Contacted Dr. Ted Antoine for Valium pre- MRI - orders placed    31 75 62 - New iv to Right hand    1930- Patient completed MRI - back in bed    Bedside shift change report given to Indira (oncoming nurse) by Tiesha Zambrano (offgoing nurse). Report included the following information SBAR and Cardiac Rhythm Afib .

## 2023-04-03 NOTE — PROCEDURES
Hemodialysis / [phone]    Vitals Pre Post Assessment Pre Post   BP BP: 114/74 (04/03/23 1000) 114/75 LOC See flowsheet See flowsheet   HR Pulse (Heart Rate): 86 (04/03/23 1000) 93 Lungs     Resp Resp Rate: 18 (04/03/23 1000) 18 Cardiac     Temp Temp: 97.8 °F (36.6 °C) (04/03/23 0930) 97.5 Skin     Weight    Edema     Tele status bedside bedside Pain Pain Intensity 1: 0 (04/03/23 0450)      Orders   Duration: Start: 1000 End: 1030 Total: 3.5 hrs   Dialyzer: Dialyzer/Set Up Inspection: Rojelio Katz (04/03/23 1000)   K Bath: Dialysate K (mEq/L): 2 (04/03/23 1000)   Ca Bath: Dialysate CA (mEq/L): 2.5 (04/03/23 1000)   Na: Dialysate NA (mEq/L): 138 (04/03/23 1000)   Bicarb: Dialysate HCO3 (mEq/L): 35 (04/03/23 1000)   Target Fluid Removal: Goal/Amount of Fluid to Remove (mL): 3000 mL (04/03/23 1000)     Access   Type & Location: L AVF   Comments:                                     Left lower arm AV Fistula without evidence of warmth, redness, or drainage. +thrill/+bruit. Each access site disinfected for 60 seconds per site with alcohol swabs per P&P. Cannulated with 15G needles x2 and secured with paper tape. +aspiration/+flushed.         Labs   HBsAg (Antigen) / date: 4/1/23 negative                                              HBsAb (Antibody) / date: 2/13/23 immune   Source: Epic   Obtained/Reviewed  Critical Results Called HGB   Date Value Ref Range Status   04/01/2023 11.0 (L) 12.1 - 17.0 g/dL Final     Potassium   Date Value Ref Range Status   04/03/2023 5.2 (H) 3.5 - 5.1 mmol/L Final     Calcium   Date Value Ref Range Status   04/03/2023 9.3 8.5 - 10.1 MG/DL Final     BUN   Date Value Ref Range Status   04/03/2023 68 (H) 6 - 20 MG/DL Final     Creatinine   Date Value Ref Range Status   04/03/2023 16.70 (H) 0.70 - 1.30 MG/DL Final        Meds Given   Name Dose Route                    Adequacy / Fluid    Total Liters Process: 74.7 L   Net Fluid Removed: 3000 mL      Comments   Time Out Done:   (Time) 5720 Admitting Diagnosis: Facial Numbness / ESRD / high INR   Consent obtained/signed: Informed Consent Verified:  (OP HD [facility]) (04/01/23 1532)   Machine / RO # Machine Number: [id number] (04/03/23 1000)   Primary Nurse Rpt Pre: Wendy Lynch RN   Primary Nurse Rpt Post: Wendy Lynch RN   Pt Education: Procedural / infection control   Care Plan: Continue current HD plan of care   Pts outpatient clinic: [place] [address]     Tx Summary   Comments:                         1000 HD treatment initiated per physicians order. 1300 /71. Complained of lightheadedness,100 ml normal saline administered. Symptoms felt better as verbalized. 1330 HD treatment completed per physician order. All possible blood returned to patient. Hemostasis achieved in <10 minutes. Access site dressings clean, dry, and intact. +thrill remains.

## 2023-04-03 NOTE — PROGRESS NOTES
EEG tech arrived to perform Routine EEG. Informed the order would be cancelled and would be scheduled as an outpatient.

## 2023-04-03 NOTE — PROGRESS NOTES
0800 Assumed care of patient    1200 Galina has been up in chair most the day. 18 Contacted Dr. Kaela Neal for Valium pre- MRI - orders placed    31 75 62 - New iv to Right hand    1930- Patient completed MRI - back in bed    Bedside shift change report given to Indira (oncoming nurse) by Jm Sutherland (offgoing nurse). Report included the following information SBAR and Cardiac Rhythm Afib .

## 2023-04-03 NOTE — PROGRESS NOTES
Reason for Admission:   Facial Numbness                 RUR Score:     23% High Risk      PCP: First and Last name:  Mar Gaston MD     Name of Practice:   Are you a current patient: Yes/No: High   Approximate date of last visit: 3 weeks ago   Can you do a virtual visit with your PCP:            Yes  Resources/supports as identified by patient/family:            Good support       Top Challenges facing patient (as identified by patient/family and CM): Finances/Medication cost?     None expressed, patient is insured by  [facility] Medicaid              Transportation? Patient arranged transport              Support system or lack thereof? No concern voiced                     Living arrangements? One story residence with 4 steps            Self-care/ADLs/Cognition? Independent, works and drives          Current Advanced Directive/Advance Care Plan:  Full Code      Healthcare Decision Maker:   Click here to complete [address] including selection of the Healthcare Decision Maker Relationship (ie \"Primary\")      Primary Decision Maker: [employer] - Life Partner - [phone]    Payor Source Payor: [employer] / Plan: [employer] / Product Type: Managed Care Medicaid /                             Plan for utilizing home health:        None                 Transition of Care Plan:             - Disposition: Home, Independent  - PT/OT , SLP - cleared as patient at baseline  - DME: patient does use a CPaP at home  - PCP, specialist F/U      Upon interview Mr. Luanne Peña had already been cleared for discharge. Patient admitted due to facial weakness with a PMH significant for  end-stage renal disease on hemodialysis, CAD, MI, CHF, atrial fibrillation, hypertension, type 2 diabetes mellitus, CVA, PE, lymphedema left leg, peripheral neuropathy, sleep apnea, arthritis, asthma, class III obesity. Patient verified demographics, emergency contact and PCP. Patient cleared for discharge home with PCP F/U. Care Management Interventions  PCP Verified by CM:  Yes  Transition of Care Consult (CM Consult): Discharge Planning  Discharge Durable Medical Equipment: No  Health Maintenance Reviewed: Yes  Physical Therapy Consult: Yes  Occupational Therapy Consult: Yes  Speech Therapy Consult: Yes  Support Systems: Spouse/Significant Other  The Patient and/or Patient Representative was Provided with a Choice of Provider and Agrees with the Discharge Plan?: Yes  Name of the Patient Representative Who was Provided with a Choice of Provider and Agrees with the Discharge Plan: Patient  [address] Provided?: No  Discharge Location  Patient Expects to be Discharged to[de-identified] [facility]  Case Management [address]  [phone]

## 2023-04-03 NOTE — PROGRESS NOTES
Pharmacist Note - Warfarin Dosing  Consult provided for this 52 y.o.male to manage warfarin for Atrial Fibrillation    INR Goal: 2 - 3    Home regimen/ tablet size: 5 mg daily except 7.5 mg on Thurs    Drugs that may increase INR: None  Drugs that may decrease INR: None  Other current anticoagulants/ drugs that may increase bleeding risk: None  Risk factors: None  Daily INR ordered: YES    Recent Labs     04/03/23  0103 04/02/23  0522 04/01/23  0011   HGB  --   --  11.0*   INR 2.9* 3.6* 3.9*     Date               INR                  Dose  4/2              3.6              2 mg   4/3              2.9              3 mg                                                                                Assessment/ Plan: Will order warfarin 3 mg PO x 1 dose as a dose reduction from home regimen given elevated INR. Pharmacy will continue to monitor daily and adjust therapy as indicated. Please contact the pharmacist at x [phone] for outpatient recommendations if needed.

## 2023-04-05 ENCOUNTER — HOSPITAL ENCOUNTER (OUTPATIENT)
Dept: NUCLEAR MEDICINE | Age: 53
End: 2023-04-05
Attending: PHYSICIAN ASSISTANT
Payer: MEDICARE

## 2023-04-05 ENCOUNTER — HOSPITAL ENCOUNTER (OUTPATIENT)
Dept: NON INVASIVE DIAGNOSTICS | Age: 53
End: 2023-04-05
Attending: PHYSICIAN ASSISTANT
Payer: MEDICARE

## 2023-04-05 LAB
STRESS BASELINE DIAS BP: 89 MMHG
STRESS BASELINE HR: 100 BPM
STRESS BASELINE ST DEPRESSION: 0 MM
STRESS BASELINE SYS BP: 120 MMHG
STRESS ST DEPRESSION: 0 MM
STRESS STAGE 1 BP: NORMAL MMHG
STRESS STAGE 1 DURATION: 1 MIN:SEC
STRESS STAGE 1 HR: 100 BPM
STRESS STAGE 2 DURATION: 2 MIN:SEC
STRESS STAGE 2 HR: 114 BPM
STRESS STAGE 3 BP: NORMAL MMHG
STRESS STAGE 3 DURATION: 3 MIN:SEC
STRESS STAGE 3 HR: 98 BPM
STRESS STAGE 4 BP: NORMAL MMHG
STRESS STAGE 4 DURATION: 4 MIN:SEC
STRESS STAGE 4 HR: 104 BPM
STRESS STAGE 5 DURATION: 5 MIN:SEC
STRESS STAGE 5 HR: 99 BPM
STRESS TARGET HR: 170 BPM

## 2023-04-05 PROCEDURE — A9500 TC99M SESTAMIBI: HCPCS

## 2023-04-05 PROCEDURE — 74011250636 HC RX REV CODE- 250/636

## 2023-04-05 RX ADMIN — REGADENOSON 0.4 MG: 0.08 INJECTION, SOLUTION INTRAVENOUS at 09:14

## 2023-04-06 ENCOUNTER — HOSPITAL ENCOUNTER (OUTPATIENT)
Dept: NUCLEAR MEDICINE | Age: 53
End: 2023-04-06
Attending: PHYSICIAN ASSISTANT
Payer: MEDICARE

## 2023-04-13 NOTE — PROGRESS NOTES
Physician Progress Note      PATIENT:               KYLEE HUMMLE  CSN #:                  [id number]  :                       1970  ADMIT DATE:       3/31/2023 11:57 PM  DISCH DATE:        4/3/2023 2:46 PM  RESPONDING  PROVIDER #:        DANTE LEMUS MD          QUERY TEXT:    Pt admitted with facial numbness and tingling right upper extremity. Neurology noted possible seizure vs migraine, low suspicion for TIA or CVA. Pt was started on Depakote during admission, which was continued at D/C. If possible, please document the etiology of the facial numbness and tingling right upper extremity.    The medical record reflects the following:    Risk Factors:  52 y.o. male with PMH of previous CVA, ESRD on hemodialysis, CAD, MI, CHF, Afib, HTN, T2DM, PE, peripheral neuropathy, sleep apnea, class III obesity presented with facial numbness and tingling right upper extremity    Clinical Indicators:    -- D/C summary documented: \"Facial numbness - Numbness and tingling right upper extremity -recent hospitalization with possible TIA/ saccular MCA aneurysm on 2023 had full stroke work up, on chronic coumadin for chronic afib  INR> 3 -symptoms recurrent -CT head without IV contrast:  no acute process -Neuro consulted: ddx simple partial seizure vs complex migraine, less likely TIA. -Brain MRI neg.\"    -- Neurology documented: \"Again, low suspicion for TIA or CVA. Possible seizure versus migraine. This is the third recent event with same presentation.\"    Treatment: Neurology consult, Depakote, imaging studies, outpatient follow-up with Neurology clinic    Thank-you,  Dionisio Stover RN, CRCR  Clinical   [email or web address]  [phone]  You can also contact me via [employer].  Options provided:  -- Facial numbness and tingling right upper extremity due to seizure  -- Facial numbness and tingling right upper extremity due to complex migraine  -- Facial numbness and tingling right  upper extremity due to other, please specify, Please specify other etiology for symptoms.  -- Other - I will add my own diagnosis  -- Disagree - Not applicable / Not valid  -- Disagree - Clinically unable to determine / Unknown  -- Refer to Clinical Documentation Reviewer    PROVIDER RESPONSE TEXT:    This patient had facial numbness and tingling right upper extremity due to due to seizure or complex migraine    Query created by: Dionisio Stover on 4/6/2023 7:53 AM      Electronically signed by:  DANTE LEMUS MD 4/13/2023 6:55 AM

## 2023-04-13 NOTE — PROGRESS NOTES
Physician Progress Note      Yamil Junior  CSN #:                  [id number]  :                       1970  ADMIT DATE:       3/31/2023 11:57 PM  [address] [place] DATE:        4/3/2023 2:46 PM  RESPONDING  PROVIDER #:        Sathya LEMUS MD          QUERY TEXT:    Pt admitted with facial numbness and tingling right upper extremity. Neurology noted possible seizure vs migraine, low suspicion for TIA or CVA. Pt was started on Depakote during admission, which was continued at D/C. If possible, please document the etiology of the facial numbness and tingling right upper extremity. The medical record reflects the following:    Risk Factors:  50 y.o. male with PMH of previous CVA, ESRD on hemodialysis, CAD, MI, CHF, Afib, HTN, T2DM, PE, peripheral neuropathy, sleep apnea, class III obesity presented with facial numbness and tingling right upper extremity    Clinical Indicators:    -- D/C summary documented: \"Facial numbness - Numbness and tingling right upper extremity -recent hospitalization with possible TIA/ saccular MCA aneurysm on 2023 had full stroke work up, on chronic coumadin for chronic afib  INR> 3 -symptoms recurrent -CT head without IV contrast:  no acute process -Neuro consulted: ddx simple partial seizure vs complex migraine, less likely TIA. -Brain MRI neg. \"    -- Neurology documented: \"Again, low suspicion for TIA or CVA. Possible seizure versus migraine. This is the third recent event with same presentation. \"    Treatment: Neurology consult, Depakote, imaging studies, outpatient follow-up with Neurology clinic    Thank-you,  Callie Cheadle, RN, [facility]  Clinical   [email or web address]  [phone]  You can also contact me via [address] [place] [address].   Options provided:  -- Facial numbness and tingling right upper extremity due to seizure  -- Facial numbness and tingling right upper extremity due to complex migraine  -- Facial numbness and tingling right upper extremity due to other, please specify, Please specify other etiology for symptoms.   -- Other - I will add my own diagnosis  -- Disagree - Not applicable / Not valid  -- Disagree - Clinically unable to determine / Unknown  -- Refer to Clinical Documentation Reviewer    PROVIDER RESPONSE TEXT:    This patient had facial numbness and tingling right upper extremity due to due to seizure or complex migraine    Query created by: Kathy Manning on 4/6/2023 7:53 AM      Electronically signed by:  Billie LEMUS MD 4/13/2023 6:55 AM

## 2023-04-14 NOTE — PROGRESS NOTES
Physician Progress Note      Roge Kimbrough  CSN #:                  [id number]  :                       1970  ADMIT DATE:       3/31/2023 11:57 PM  [address] DATE:        4/3/2023 2:46 PM  RESPONDING  PROVIDER #:        Linda LEMUS MD          QUERY TEXT:    Pt admitted with facial numbness and tingling right upper extremity. Hospitalist documented that the symptoms were due to a seizure or complex migraine. 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 The D/C summary noted that symptoms were noted to be recurrent, and Neurology noted that this is the third time the pt has presented with same right-sided symptoms. Pt was started on Depakote during admission, which was continued at D/C. If possible, please document if the patient's seizure was: The medical record reflects the following:    Risk Factors:  50 y.o. male with PMH of previous CVA, ESRD on hemodialysis, CAD, MI, CHF, Afib, HTN, T2DM, PE, peripheral neuropathy, sleep apnea, class III obesity presented with facial numbness and tingling right upper extremity found to be due to seizure or complex migraine    Clinical Indicators:    -- Hospitalist documented: \"This patient had facial numbness and tingling right upper extremity due to due to seizure or complex migraine\"    -- Neurology documented: \"Again, low suspicion for TIA or CVA. Possible seizure versus migraine. This is the third recent event with same presentation. \" & h/o \"CVA (L thalamic with residual RLE weakness)\"    Treatment: Neurology consult, Depakote started, outpatient follow-up with Neurology clinic    Thank-you,  Lanre Bhatia RN, [facility]  Clinical   [email or web address]  [phone]  You can also contact me via [address] [place] [address].   Options provided:  -- Seizure was a sequela of prior CVA  -- Seizure was not a sequela of prior CVA  -- Other - I will add my own diagnosis  -- Disagree - Not applicable / Not valid  -- Disagree - Clinically unable to determine / Unknown  -- Refer to Clinical Documentation Reviewer    PROVIDER RESPONSE TEXT:    Seizure was not a sequela of prior CVA.     Query created by: Ni Arora on 4/13/2023 8:50 AM      Electronically signed by:  Sathya LEMUS MD 4/14/2023 8:15 AM

## 2023-04-14 NOTE — PROGRESS NOTES
Physician Progress Note      PATIENT:               KYLEE HUMMEL  CSN #:                  [id number]  :                       1970  ADMIT DATE:       3/31/2023 11:57 PM  [facility] DATE:        4/3/2023 2:46 PM  RESPONDING  PROVIDER #:        DANTE LEMUS MD          QUERY TEXT:    Pt admitted with facial numbness and tingling right upper extremity. Hospitalist documented that the symptoms were due to a seizure or complex migraine. 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 The D/C summary noted that symptoms were noted to be recurrent, and Neurology noted that this is the third time the pt has presented with same right-sided symptoms. Pt was started on Depakote during admission, which was continued at D/C. If possible, please document if the patient's seizure was:    The medical record reflects the following:    Risk Factors:  52 y.o. male with PMH of previous CVA, ESRD on hemodialysis, CAD, MI, CHF, Afib, HTN, T2DM, PE, peripheral neuropathy, sleep apnea, class III obesity presented with facial numbness and tingling right upper extremity found to be due to seizure or complex migraine    Clinical Indicators:    -- Hospitalist documented: \"This patient had facial numbness and tingling right upper extremity due to due to seizure or complex migraine\"    -- Neurology documented: \"Again, low suspicion for TIA or CVA. Possible seizure versus migraine. This is the third recent event with same presentation.\" & h/o \"CVA (L thalamic with residual RLE weakness)\"    Treatment: Neurology consult, Depakote started, outpatient follow-up with Neurology clinic    Thank-you,  Dionisio Stover RN, CRCR  Clinical   [email or web address]  [phone]  You can also contact me via [employer].  Options provided:  -- Seizure was a sequela of prior CVA  -- Seizure was not a sequela of prior CVA  -- Other - I will add my own diagnosis  -- Disagree - Not applicable / Not valid  --  Disagree - Clinically unable to determine / Unknown  -- Refer to Clinical Documentation Reviewer    PROVIDER RESPONSE TEXT:    Seizure was not a sequela of prior CVA.    Query created by: Dionisio Stover on 4/13/2023 8:50 AM      Electronically signed by:  DANTE LEMUS MD 4/14/2023 8:15 AM

## 2023-04-21 DIAGNOSIS — Z99.2 ESRD (END STAGE RENAL DISEASE) ON DIALYSIS (HCC): Primary | ICD-10-CM

## 2023-04-21 DIAGNOSIS — N18.6 ESRD (END STAGE RENAL DISEASE) ON DIALYSIS (HCC): Primary | ICD-10-CM

## 2023-04-23 DIAGNOSIS — N18.6 ESRD (END STAGE RENAL DISEASE) ON DIALYSIS (HCC): Primary | ICD-10-CM

## 2023-04-23 DIAGNOSIS — Z99.2 ESRD (END STAGE RENAL DISEASE) ON DIALYSIS (HCC): Primary | ICD-10-CM

## 2023-04-26 NOTE — PROGRESS NOTES
Chief Complaint   Patient presents with    Hospital Follow Up     Migraines. Possible seizures. Possible TIA       HPI    Mr Emelia Snider is a 50year old male here for hospital follow up. He was admitted on 2/13/23 for slurred speech, word finding difficulties, and weakness. He has a hx of afib on warfarin, CVA with residual RLE weakness. He is on HD and missed his last two sessions PTA. CT head was negative. CTA/CTP showed small area of delayed perfusion in the L temporal lobe, 3mm outpouching in the L M2 branch that [facility] said needed no intervention. MRI brain was negative for stroke. He was started on empiric Depakote  mg BID for possible seizure vs migraine. Patients Na was 129 on arrival. He got HD in the hospital. He was also found to be in a-fib. Today he is doing well. He has had no other s/s of stroke. No facial numbness or weakness. Denies headache. He has been taking his Depakote but wants to know if he can come off of it. He is taking his Warfarin without side effects. He is also complaining of something new which is numbness, tingling, burning pain in bilateral feet and toes. Mostly on the right and the middle toe. He denies DM. He does complain of chronic right hip pain and back pain. He is on dialysis M,W,F. No falls. EMG     Review of Systems   Musculoskeletal:  Positive for back pain. Negative for falls. Neurological:  Positive for tingling. Negative for seizures, weakness and headaches.      Past Medical History:   Diagnosis Date    Anxiety     Arrhythmia     A-FIB    Arthritis     CAD (coronary artery disease)     MI 09    CHF (congestive heart failure) (HCC)     Chronic kidney disease     END STAGE KIDNEY DISEASE;  DIALYSIS M-W-Sat    Chronic pain     NEUROPATHY; BACK PAIN    Fatigue     Hypertension     Lymph edema     LEFT LEG    Needle phobia     Neuropathy     Other ill-defined conditions(029.17)     Rash     Skipped beats     Sleep apnea     WEARS CPAP    Snoring     SOB (shortness of breath)     Stroke ([facility] 01/2020    NUMBNESS IN RIGHT LEG AND FOOT    Thromboembolus ([facility] 2018    PE     Family History   Problem Relation Age of Onset    Stroke Mother     Heart Disease Mother     Diabetes Mother     Kidney Disease Mother     Heart Attack Mother     Thyroid Disease Mother     No Known Problems Father     Heart Disease Sister     No Known Problems Sister     Lung Disease Brother     Deep Vein Thrombosis Brother     Neuropathy Other     Anesth Problems Neg Hx      Social History     Socioeconomic History    Marital status: SINGLE     Spouse name: Not on file    Number of children: Not on file    Years of education: Not on file    Highest education level: Not on file   Occupational History    Not on file   Tobacco Use    Smoking status: Former     Types: Cigars    Smokeless tobacco: Never    Tobacco comments:     CIGARS ONCE PER MONTH-NOTHING SINCE 2018   Vaping Use    Vaping Use: Never used   Substance and Sexual Activity    Alcohol use: Never     Comment: NOT FOR 3 YEARS    Drug use: No    Sexual activity: Not on file   Other Topics Concern    Not on file   Social History Narrative    ** Merged History Encounter **          Social Determinants of Health     Financial Resource Strain: Not on file   Food Insecurity: Not on file   Transportation Needs: Not on file   Physical Activity: Not on file   Stress: Not on file   Social Connections: Not on file   Intimate Partner Violence: Not on file   Housing Stability: Not on file     Allergies   Allergen Reactions    Lisinopril Swelling    Sulfa (Sulfonamide Antibiotics) Other (comments)     Had sores all over body    Beef Containing Products Other (comments)     PT DOES NOT EAT BEEF    Pork Derived (Porcine) Other (comments)     PT DOES NOT EAT PORK    Sulfa (Sulfonamide Antibiotics) Hives         Current Outpatient Medications   Medication Sig    divalproex DR (DEPAKOTE) 250 mg tablet Take 1 Tablet by mouth two (2) times a day.     albuterol (PROVENTIL HFA, VENTOLIN HFA, PROAIR HFA) 90 mcg/actuation inhaler ProAir HFA 90 mcg/actuation aerosol inhaler   INHALE TWO PUFFS BY MOUTH EVERY 4 HOURS AS NEEDED FOR FOR SHORTNESS OF BREATH AND WHEEZING    semaglutide (Ozempic) 1 mg/dose (2 mg/1.5 mL) sub-q pen 1 mg by SubCUTAneous route. dilTIAZem ER (CARDIZEM CD) 240 mg capsule Take 1 Capsule by mouth daily. ferrous sulfate 325 mg (65 mg iron) tablet Take 1 Tablet by mouth Daily (before breakfast). hydrOXYzine HCL (ATARAX) 10 mg tablet Take 1 Tablet by mouth every eight (8) hours as needed. cyanocobalamin 1,000 mcg tablet Take 1 Tablet by mouth daily. atorvastatin (LIPITOR) 40 mg tablet Take 1 Tablet by mouth daily. cinacalcet (SENSIPAR) 30 mg tablet Take 1 Tablet by mouth DIALYSIS MON, WED & FRI. Takes at dialysis    ERGOCALCIFEROL, VITAMIN D2, PO Take  by mouth. Takes at dialysis    warfarin (Jantoven) 5 mg tablet Take 1 Tablet by mouth daily. sevelamer carbonate (RENVELA) 800 mg tab tab Take 2 Tablets by mouth Before breakfast, lunch, and dinner. montelukast (SINGULAIR) 10 mg tablet Take 1 Tablet by mouth daily. bumetanide (BUMEX) 2 mg tablet Take 1 Tablet by mouth daily. allopurinoL (ZYLOPRIM) 100 mg tablet Take 1 Tablet by mouth. TAKES ON Monday AND Thursday    predniSONE (DELTASONE) 5 mg tablet Take 1 Tablet by mouth daily. amLODIPine (NORVASC) 10 mg tablet Take 1 Tab by mouth daily. No current facility-administered medications for this visit. Neurologic Exam     Mental Status   Oriented to person, place, and time. Speech: speech is normal   Level of consciousness: alert    Cranial Nerves   Cranial nerves II through XII intact. Motor Exam   Muscle bulk: normal    Strength   Strength 5/5 throughout.      Sensory Exam   Right leg pinprick: decreased from toes  Left leg pinprick: decreased from toes    Gait, Coordination, and Reflexes     Gait  Gait: normalHas hip pain today and walking slow     Visit Vitals  /74 (BP 1 Location: Left arm, BP Patient Position: Sitting, BP Cuff Size: Large adult)   Pulse (!) 101   Resp 18   Ht 6' 3\" (1.905 m)   Wt 335 lb (152 kg)   SpO2 97%   BMI 41.87 kg/m²         CT Results (maximum last 3): Results from [place] encounter on 03/31/23    CT HEAD WO CONT    Narrative  EXAM: CT HEAD WO CONT    INDICATION: facial numbness    COMPARISON: 3.17.2023. CONTRAST: None. TECHNIQUE: Unenhanced CT of the head was performed using 5 mm images. Brain and  bone windows were generated. Coronal and sagittal reformats. CT dose reduction  was achieved through use of a standardized protocol tailored for this  examination and automatic exposure control for dose modulation. FINDINGS:  The ventricles and sulci are normal in size, shape and configuration. There is  no significant white matter disease. There is no intracranial hemorrhage,  extra-axial collection, or mass effect. The basilar cisterns are open. No CT  evidence of acute infarct. The bone windows demonstrate no abnormalities. The visualized portions of the  paranasal sinuses and mastoid air cells are remarkable for mucus retention cyst  in the left maxillary sinus. Impression  No acute process seen      Results from Hospital Encounter encounter on 03/17/23    CT CODE NEURO PERF W CBF    Narrative  EXAM:  CTA CODE NEURO HEAD AND NECK W CONT, CT CODE NEURO PERF W CBF    INDICATION:   Code Stroke    COMPARISON:  CT head 3/14/2023, CTA head neck 2/13/2023, MRI brain 2/14/2023. CONTRAST:  120 mL of Isovue-370. TECHNIQUE:  Unenhanced  images were obtained to localize the volume for  acquisition. Multislice helical axial CT angiography was performed from the  aortic arch to the top of the head during uneventful rapid bolus intravenous  contrast administration. Coronal and sagittal reformations and 3D post  processing was performed.   CT dose reduction was achieved through use of a  standardized protocol tailored for this examination and automatic exposure  control for dose modulation. CT brain perfusion was performed with generation of hemodynamic maps of multiple  parameters, including cerebral blood flow, cerebral blood volume, and MTT (mean  transit time). CT dose reduction was achieved through use of a standardized  protocol tailored for this examination and automatic exposure control for dose  modulation. This study was analyzed by the [address]  algorithm. FINDINGS:    CTA Head:  There is no evidence of large vessel occlusion or flow-limiting stenosis of the  intracranial internal carotid, anterior cerebral, and middle cerebral arteries. Calcification of the bilateral carotid siphons without significant stenosis. The  anterior communicating artery is diminutive but patent. There is no evidence of large vessel occlusion or flow-limiting stenosis of the  intracranial vertebral arteries, basilar artery, or posterior cerebral arteries. Mild calcification of the intracranial vertebral arteries and basilar artery  without significant stenosis. The left posterior communicating artery is patent,  the right is not seen. Stable 2 mm left M2 segment aneurysm within the sylvian fissure (series 2 image  508). The dural venous sinuses and deep cerebral venous system are patent. No  evidence of abnormal enhancement on delayed phase images. CTA NECK:  NASCET method was utilized for calculating stenosis. The aortic arch is unremarkable. The common carotid arteries demonstrate no  significant stenosis. Mild calcification of the right carotid bifurcation  without significant stenosis. Mild calcification of the left carotid bifurcation  without significant stenosis. There is a codominant vertebrobasilar arterial system. The cervical vertebral  arteries are normal in course, size and contour without significant stenosis. Visualized soft tissues of the neck are unremarkable.  Visualized lung apices are  clear. No acute fracture or aggressive osseous lesion. CT Brain Perfusion:  There are no regional areas of elevated Tmax, decreased cerebral blood flow or  blood volume. rCBF < 30% = 0 cc. Tmax > 6 seconds = 0 cc. Impression  CTA Head:  1. No evidence of significant stenosis. 2. Stable 2 mm left M2 segment aneurysm. CTA Neck:  1. No evidence of significant stenosis. CT Brain Perfusion:  1. No acute abnormality. MRI Results (maximum last 3): Results from [place] encounter on 03/31/23    MRI BRAIN WO CONT    Narrative  EXAM: MRI BRAIN WO CONT    INDICATION: right sided facial numbness    COMPARISON: 4/1/2023. CONTRAST: None. TECHNIQUE:  Multiplanar multisequence acquisition without contrast of the brain. FINDINGS:  The ventricles are normal in size and position. There is no acute infarct,  hemorrhage, extra-axial fluid collection, or mass effect. Scattered subcortical  deep white matter T2 hyperintense foci. No evidence of acute hemorrhage.,  Hemosiderin deposition in the left thalamus likely related to chronic  microhemorrhage. Expected arterial flow-voids are present. The paranasal sinuses, mastoid air cells, and middle ears are clear. The orbital  contents are within normal limits. No significant osseous or scalp lesions are  identified. Impression  No acute intracranial abnormality. Mild chronic small vessel ischemic disease. Results from [place] encounter on 03/17/23    MRA BRAIN WO CONT    Narrative  CLINICAL HISTORY: Facial numbness    INDICATION: Facial numbness. COMPARISON: 2/13/2023  TECHNIQUE: MR examination of the brain includes axial and sagittal T1 , axial  T2, axial FLAIR, axial gradient echo, axial DWI, coronal T2. Contrast: No contrast was administered. .  Next, 3-D time-of-flight MRA of the brain was performed. Multiplanar  reconstructions were obtained.     FINDINGS:  There is no intracranial mass, hemorrhage or evidence of acute infarction. IACs are symmetric in size. Mucous retention cyst in the left maxillary sinus. There is no Chiari or syrinx. The pituitary and infundibulum are grossly  unremarkable. There is no skull base mass. Cerebellopontine angles are grossly  unremarkable. The major intracranial vascular flow-voids are unremarkable. The cavernous sinuses are symmetric. Optic chiasm and infundibulum grossly  unremarkable. Orbits are grossly symmetric. Dural venous sinuses are grossly  patent. The brain architecture is normal. There is no evidence of midline shift  or mass-effect. There are no extra-axial fluid collections. The mastoid air  cells and paranasal sinuses are well pneumatized and clear. The vertebral arteries are codominant. The basilar artery and its branches are  normal. The internal carotid, anterior cerebral, and middle cerebral arteries  are patent. There is no flow-limiting intracranial stenosis. 2 mm left M2  aneurysm is unchanged. 1-41 There are no sizable posterior communicating  arteries. Impression  No intracranial mass, hemorrhage or evidence of acute infarction. There is no major vessel occlusion or dissection. Left MCA M2 aneurysm is redemonstrated      MRI BRAIN WO CONT    Narrative  CLINICAL HISTORY: Facial numbness    INDICATION: Facial numbness. COMPARISON: 2/13/2023  TECHNIQUE: MR examination of the brain includes axial and sagittal T1 , axial  T2, axial FLAIR, axial gradient echo, axial DWI, coronal T2. Contrast: No contrast was administered. .  Next, 3-D time-of-flight MRA of the brain was performed. Multiplanar  reconstructions were obtained. FINDINGS:  There is no intracranial mass, hemorrhage or evidence of acute infarction. IACs are symmetric in size. Mucous retention cyst in the left maxillary sinus. There is no Chiari or syrinx. The pituitary and infundibulum are grossly  unremarkable. There is no skull base mass. Cerebellopontine angles are grossly  unremarkable.  The major intracranial vascular flow-voids are unremarkable. The cavernous sinuses are symmetric. Optic chiasm and infundibulum grossly  unremarkable. Orbits are grossly symmetric. Dural venous sinuses are grossly  patent. The brain architecture is normal. There is no evidence of midline shift  or mass-effect. There are no extra-axial fluid collections. The mastoid air  cells and paranasal sinuses are well pneumatized and clear. The vertebral arteries are codominant. The basilar artery and its branches are  normal. The internal carotid, anterior cerebral, and middle cerebral arteries  are patent. There is no flow-limiting intracranial stenosis. 2 mm left M2  aneurysm is unchanged. 1-41 There are no sizable posterior communicating  arteries. Impression  No intracranial mass, hemorrhage or evidence of acute infarction. There is no major vessel occlusion or dissection. Left MCA M2 aneurysm is redemonstrated    Follow-up and Dispositions    Return in about 6 months (around 10/27/2023). Assessment and Plan   Diagnoses and all orders for this visit:    1. History of TIA (transient ischemic attack)    2. Numbness and tingling of both feet  -     EMG NCV MOTOR WITH F/WAVE PER NERVE; Future    3. 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 History of probably TIA vs complex migraine. Has had a history of stroke and was found to have a small MCA aneurysm that he is being followed by [facility] for. On Warfarin for Afib. His EMG was negative and he has had no other s/s since discharge. I think its reasonable to wean off the Depakote. We can go down to 250 mg daily x 1 week then stop. Stroke education completed and ED precautions discussed. He understands. Continue with the Warfarin, this is also his stroke prevention. I will order a EMG for his numbness in his feet. I did explain we may need imaging based on the findings.  If we do treat with medication we will have to be careful due to his CKD. He understands. I will see him back after his EMG later this summer. I spent 45 minutes of time today reviewing the medical record and notes, imaging, examining the patient, and face-to-face time, patient/family teaching and medication side effects, and time spent completing documentation.       Fidel Zhou, APRN

## 2023-04-27 ENCOUNTER — OFFICE VISIT (OUTPATIENT)
Dept: NEUROLOGY | Age: 53
End: 2023-04-27
Payer: MEDICARE

## 2023-04-27 VITALS
RESPIRATION RATE: 18 BRPM | HEART RATE: 101 BPM | HEIGHT: 75 IN | BODY MASS INDEX: 39.17 KG/M2 | WEIGHT: 315 LBS | OXYGEN SATURATION: 97 % | DIASTOLIC BLOOD PRESSURE: 74 MMHG | SYSTOLIC BLOOD PRESSURE: 126 MMHG

## 2023-04-27 DIAGNOSIS — R20.0 NUMBNESS AND TINGLING OF BOTH FEET: ICD-10-CM

## 2023-04-27 DIAGNOSIS — Z86.73 HISTORY OF TIA (TRANSIENT ISCHEMIC ATTACK): Primary | ICD-10-CM

## 2023-04-27 DIAGNOSIS — I67.1 ANEURYSM OF MIDDLE CEREBRAL ARTERY: ICD-10-CM

## 2023-04-27 DIAGNOSIS — R20.2 NUMBNESS AND TINGLING OF BOTH FEET: ICD-10-CM

## 2023-04-27 PROCEDURE — 3017F COLORECTAL CA SCREEN DOC REV: CPT

## 2023-04-27 PROCEDURE — G8427 DOCREV CUR MEDS BY ELIG CLIN: HCPCS

## 2023-04-27 PROCEDURE — 1111F DSCHRG MED/CURRENT MED MERGE: CPT

## 2023-04-27 PROCEDURE — G8417 CALC BMI ABV UP PARAM F/U: HCPCS

## 2023-04-27 PROCEDURE — 3074F SYST BP LT 130 MM HG: CPT

## 2023-04-27 PROCEDURE — 3078F DIAST BP <80 MM HG: CPT

## 2023-04-27 PROCEDURE — G8432 DEP SCR NOT DOC, RNG: HCPCS

## 2023-04-27 PROCEDURE — 99215 OFFICE O/P EST HI 40 MIN: CPT

## 2023-04-27 NOTE — PROGRESS NOTES
Chief Complaint   Patient presents with    Hospital Follow Up     Migraines. Possible seizures.   Possible TIA    Visit Vitals  /74 (BP 1 Location: Left arm, BP Patient Position: Sitting, BP Cuff Size: Large adult)   Pulse (!) 101   Resp 18   Ht 6' 3\" (1.905 m)   Wt 335 lb (152 kg)   SpO2 97%   BMI 41.87 kg/m²

## 2023-09-13 ENCOUNTER — TELEPHONE (OUTPATIENT)
Age: 53
End: 2023-09-13

## 2023-09-13 NOTE — TELEPHONE ENCOUNTER
[facility] is requesting letter of clearance.     Contact # [phone]

## 2023-09-14 ENCOUNTER — TELEPHONE (OUTPATIENT)
Age: 53
End: 2023-09-14

## 2023-09-14 NOTE — TELEPHONE ENCOUNTER
LVM for patient letting him know that we are checking in about a 6 mo, vs a one year follow-up with Dr. Abhinav Mc, from his initial visit on 3/29/23.    6 mo follow-up would be in September. Asked patient to call us back and let us know if he would like to be scheduled for a 6 mo. Follow-up or would he like to push this out further, or to the one year.

## 2023-09-14 NOTE — TELEPHONE ENCOUNTER
Call placed to Zoe Ashraf RN of [facility]. LVM requesting return call regarding Pt Herminia Kruger.

## 2023-09-14 NOTE — TELEPHONE ENCOUNTER
Call placed to [facility], RN with [facility]. LVM stating our mutual patient Arlene Lorenz has not been on Depakote. Patient last seen in our office on 4/27/23.

## 2023-09-27 ENCOUNTER — TELEPHONE (OUTPATIENT)
Age: 53
End: 2023-09-27

## 2023-09-27 NOTE — TELEPHONE ENCOUNTER
Spoke to patient to follow-up about his pending follow-up appointment with Dr. Karrie Radford, and to let patient know that we are now out of network with his [place]. Patient stated that he was aware of this and he will be seeing his doctor at [facility] and asking him to make recommendations. I let patient know that we are very sorry about this change in status with [facility] and its affect on our patients, and to keep in mind that open enrollment for medicare plans is in October.

## 2023-10-12 ENCOUNTER — TELEPHONE (OUTPATIENT)
Age: 53
End: 2023-10-12

## 2023-10-12 NOTE — TELEPHONE ENCOUNTER
M for patient to call back as we need to reschedule his appt with Dr. Jeovanny Haile on Wednesday 10/25/23 because Dr. Jeovanny Haile has had a schedule change. Left message asking patient if he could do this coming Wednesday 10/18/23 at 9:30am instead?

## 2023-10-18 ENCOUNTER — OFFICE VISIT (OUTPATIENT)
Age: 53
End: 2023-10-18
Payer: MEDICARE

## 2023-10-18 VITALS
HEART RATE: 95 BPM | SYSTOLIC BLOOD PRESSURE: 110 MMHG | HEIGHT: 75 IN | OXYGEN SATURATION: 98 % | BODY MASS INDEX: 41.87 KG/M2 | DIASTOLIC BLOOD PRESSURE: 62 MMHG | TEMPERATURE: 97.6 F

## 2023-10-18 DIAGNOSIS — I67.1 CEREBRAL ANEURYSM, NONRUPTURED: Primary | ICD-10-CM

## 2023-10-18 PROCEDURE — 3074F SYST BP LT 130 MM HG: CPT | Performed by: RADIOLOGY

## 2023-10-18 PROCEDURE — 99213 OFFICE O/P EST LOW 20 MIN: CPT | Performed by: RADIOLOGY

## 2023-10-18 PROCEDURE — 3078F DIAST BP <80 MM HG: CPT | Performed by: RADIOLOGY

## 2023-10-18 RX ORDER — GABAPENTIN 300 MG/1
CAPSULE ORAL DAILY
COMMUNITY
Start: 2023-08-29

## 2023-10-18 RX ORDER — OXYCODONE HYDROCHLORIDE AND ACETAMINOPHEN 5; 325 MG/1; MG/1
1 TABLET ORAL DAILY
Qty: 30 TABLET | Refills: 0 | COMMUNITY
Start: 2023-09-28 | End: 2023-10-28

## 2024-01-04 ENCOUNTER — HOSPITAL ENCOUNTER (OUTPATIENT)
Age: 54
Discharge: HOME | End: 2024-01-04
Payer: MEDICARE

## 2024-01-04 DIAGNOSIS — M54.50 LOW BACK PAIN WITHOUT SCIATICA, UNSPECIFIED BACK PAIN LATERALITY, UNSPECIFIED CHRONICITY: ICD-10-CM

## 2024-01-04 PROCEDURE — 72148 MRI LUMBAR SPINE W/O DYE: CPT

## 2024-02-23 NOTE — PROGRESS NOTES
Vein Thrombosis Brother     Neuropathy Other     Anesth Problems Neg Hx     No Known Problems Sister     Heart Disease Mother     Diabetes Mother     Kidney Disease Mother     No Known Problems Father     Thyroid Disease Mother     Heart Attack Mother      Social History     Tobacco Use    Smoking status: Former    Smokeless tobacco: Never   Substance Use Topics    Alcohol use: Never      Current Outpatient Medications   Medication Sig Dispense Refill    gabapentin (NEURONTIN) 300 MG capsule daily.      allopurinol (ZYLOPRIM) 100 MG tablet Take 1 tablet by mouth      amLODIPine (NORVASC) 10 MG tablet Take 1 tablet by mouth daily      dilTIAZem (TIAZAC) 180 MG extended release capsule Take 1 capsule by mouth daily      montelukast (SINGULAIR) 10 MG tablet Take 1 tablet by mouth daily      predniSONE (DELTASONE) 5 MG tablet Take by mouth every other day      sevelamer (RENVELA) 800 MG tablet Take 1 tablet by mouth 3 times daily (before meals)      warfarin (COUMADIN) 5 MG tablet Take 1 tablet by mouth daily      bumetanide (BUMEX) 2 MG tablet Take 2 mg by mouth 3 times daily (Patient not taking: Reported on 10/18/2023)      pregabalin (LYRICA) 75 MG capsule Take 75 mg by mouth 2 times daily. (Patient not taking: Reported on 10/18/2023)       No current facility-administered medications for this visit.        Allergies   Allergen Reactions    Lisinopril Swelling    Sulfa Antibiotics Hives and Other (See Comments)     Had sores all over body         Review of Systems:  Pertinent items are noted in the History of Present Illness.    Objective:     Vitals:    10/18/23 0913   BP: 110/62   Site: Right Upper Arm   Position: Sitting   Cuff Size: Large Adult   Pulse: 95   Temp: 97.6 °F (36.4 °C)   TempSrc: Temporal   SpO2: 98%   Height: 1.905 m (6' 3\")        Physical Exam:  GENERAL: alert, cooperative, no distress, appears stated age  LUNG: clear to auscultation bilaterally  HEART: regular rate and rhythm  EXTREMITIES:

## 2024-03-14 ENCOUNTER — HOSPITAL ENCOUNTER (OUTPATIENT)
Facility: HOSPITAL | Age: 54
Discharge: HOME | End: 2024-03-14
Payer: COMMERCIAL

## 2024-03-14 ENCOUNTER — HOSPITAL ENCOUNTER (OUTPATIENT)
Facility: HOSPITAL | Age: 54
End: 2024-03-14
Payer: COMMERCIAL

## 2024-03-14 ENCOUNTER — HOSPITAL ENCOUNTER (OUTPATIENT)
Facility: HOSPITAL | Age: 54
Discharge: HOME | End: 2024-03-16
Payer: COMMERCIAL

## 2024-03-14 VITALS
HEIGHT: 75 IN | HEART RATE: 82 BPM | BODY MASS INDEX: 39.17 KG/M2 | WEIGHT: 315 LBS | DIASTOLIC BLOOD PRESSURE: 89 MMHG | SYSTOLIC BLOOD PRESSURE: 141 MMHG

## 2024-03-14 DIAGNOSIS — N18.9 CHRONIC KIDNEY DISEASE, UNSPECIFIED CKD STAGE: ICD-10-CM

## 2024-03-14 DIAGNOSIS — Z76.82 AWAITING ORGAN TRANSPLANT STATUS: ICD-10-CM

## 2024-03-14 PROCEDURE — A9500 TC99M SESTAMIBI: HCPCS | Performed by: INTERNAL MEDICINE

## 2024-03-14 PROCEDURE — 3430000000 HC RX DIAGNOSTIC RADIOPHARMACEUTICAL: Performed by: INTERNAL MEDICINE

## 2024-03-14 PROCEDURE — 6360000002 HC RX W HCPCS

## 2024-03-14 PROCEDURE — 93017 CV STRESS TEST TRACING ONLY: CPT

## 2024-03-14 RX ORDER — TETRAKIS(2-METHOXYISOBUTYLISOCYANIDE)COPPER(I) TETRAFLUOROBORATE 1 MG/ML
33 INJECTION, POWDER, LYOPHILIZED, FOR SOLUTION INTRAVENOUS
Status: COMPLETED | OUTPATIENT
Start: 2024-03-14 | End: 2024-03-14

## 2024-03-14 RX ORDER — REGADENOSON 0.08 MG/ML
INJECTION, SOLUTION INTRAVENOUS
Status: COMPLETED
Start: 2024-03-14 | End: 2024-03-14

## 2024-03-14 RX ADMIN — REGADENOSON 0.4 MG: 0.08 INJECTION, SOLUTION INTRAVENOUS at 09:23

## 2024-03-14 RX ADMIN — TETRAKIS(2-METHOXYISOBUTYLISOCYANIDE)COPPER(I) TETRAFLUOROBORATE 33 MILLICURIE: 1 INJECTION, POWDER, LYOPHILIZED, FOR SOLUTION INTRAVENOUS at 09:20

## 2024-03-15 ENCOUNTER — HOSPITAL ENCOUNTER (OUTPATIENT)
Facility: HOSPITAL | Age: 54
End: 2024-03-15
Payer: COMMERCIAL

## 2024-03-15 LAB
ECHO BSA: 2.84 M2
NUC STRESS EJECTION FRACTION: 55 %
STRESS BASELINE DIAS BP: 89 MMHG
STRESS BASELINE HR: 86 BPM
STRESS BASELINE SYS BP: 141 MMHG
STRESS PERCENT HR ACHIEVED: 70 %
STRESS POST PEAK HR: 118 BPM
STRESS STAGE 1 BP: NORMAL MMHG
STRESS STAGE 1 DURATION: 1 MIN:SEC
STRESS STAGE 1 HR: 114 BPM
STRESS STAGE 2 DURATION: 2 MIN:SEC
STRESS STAGE 2 HR: 111 BPM
STRESS STAGE 3 BP: NORMAL MMHG
STRESS STAGE 3 DURATION: 3 MIN:SEC
STRESS STAGE 3 HR: 99 BPM
STRESS STAGE 4 DURATION: 4 MIN:SEC
STRESS STAGE 4 HR: 99 BPM
STRESS TARGET HR: 169 BPM

## 2024-03-15 PROCEDURE — A9500 TC99M SESTAMIBI: HCPCS | Performed by: INTERNAL MEDICINE

## 2024-03-15 PROCEDURE — 3430000000 HC RX DIAGNOSTIC RADIOPHARMACEUTICAL: Performed by: INTERNAL MEDICINE

## 2024-03-15 RX ORDER — TETRAKIS(2-METHOXYISOBUTYLISOCYANIDE)COPPER(I) TETRAFLUOROBORATE 1 MG/ML
28 INJECTION, POWDER, LYOPHILIZED, FOR SOLUTION INTRAVENOUS
Status: COMPLETED | OUTPATIENT
Start: 2024-03-15 | End: 2024-03-15

## 2024-03-15 RX ADMIN — TETRAKIS(2-METHOXYISOBUTYLISOCYANIDE)COPPER(I) TETRAFLUOROBORATE 28 MILLICURIE: 1 INJECTION, POWDER, LYOPHILIZED, FOR SOLUTION INTRAVENOUS at 09:05

## 2024-06-18 ENCOUNTER — TELEPHONE (OUTPATIENT)
Age: 54
End: 2024-06-18

## 2024-06-18 NOTE — TELEPHONE ENCOUNTER
Spoke to patient to remind him that his imaging and follow-up are coming due in July 2024.  Patient was driving, and requested that I send him a [employer] message with details, which I did.

## 2024-07-18 ENCOUNTER — HOSPITAL ENCOUNTER (OUTPATIENT)
Facility: HOSPITAL | Age: 54
Discharge: HOME | End: 2024-07-21
Attending: RADIOLOGY

## 2024-07-18 ENCOUNTER — TELEPHONE (OUTPATIENT)
Age: 54
End: 2024-07-18

## 2024-07-18 DIAGNOSIS — I67.1 CEREBRAL ANEURYSM, NONRUPTURED: ICD-10-CM

## 2024-07-18 NOTE — TELEPHONE ENCOUNTER
MRI tech at [facility] called stating that patient was unable to have his MRA done at that facility.  Patient has Pace Maker.     I will reach out to the patient to make sure he reschedules at [facility].

## 2024-07-19 ENCOUNTER — TELEPHONE (OUTPATIENT)
Age: 54
End: 2024-07-19

## 2024-07-19 NOTE — TELEPHONE ENCOUNTER
I spoke to the patient about needing to reschedule his MRA.  I let patient know that it will have to be done at [facility] and gave him the phone number to Central Scheduling.

## 2024-08-27 ENCOUNTER — TELEPHONE (OUTPATIENT)
Age: 54
End: 2024-08-27

## 2024-08-27 NOTE — TELEPHONE ENCOUNTER
Spoke to patient to remind him that imaging needs to be rescheduled at [facility] because [facility] could not do his imaging.    Gave patient number for Central Scheduling and [facility] and requested a call back after imaging has been scheduled.    Patient acknowledged understanding.

## 2024-11-07 ENCOUNTER — APPOINTMENT (OUTPATIENT)
Facility: HOSPITAL | Age: 54
DRG: 064 | End: 2024-11-07
Payer: MEDICARE

## 2024-11-07 ENCOUNTER — HOSPITAL ENCOUNTER (INPATIENT)
Facility: HOSPITAL | Age: 54
LOS: 6 days | Discharge: NURSING, REHAB OR LONG-TERM CARE | DRG: 064 | End: 2024-11-13
Attending: STUDENT IN AN ORGANIZED HEALTH CARE EDUCATION/TRAINING PROGRAM | Admitting: FAMILY MEDICINE
Payer: MEDICARE

## 2024-11-07 DIAGNOSIS — R55 NEAR SYNCOPE: ICD-10-CM

## 2024-11-07 DIAGNOSIS — I48.91 ATRIAL FIBRILLATION, UNSPECIFIED TYPE (HCC): ICD-10-CM

## 2024-11-07 DIAGNOSIS — I48.91 ATRIAL FIBRILLATION WITH RAPID VENTRICULAR RESPONSE (HCC): Primary | ICD-10-CM

## 2024-11-07 DIAGNOSIS — S82.891A CLOSED FRACTURE OF RIGHT ANKLE, INITIAL ENCOUNTER: ICD-10-CM

## 2024-11-07 LAB
ALBUMIN SERPL-MCNC: 3.3 G/DL (ref 3.5–5.2)
ALBUMIN/GLOB SERPL: 0.7 (ref 1.1–2.2)
ALP SERPL-CCNC: ABNORMAL U/L (ref 40–129)
ALT SERPL-CCNC: ABNORMAL U/L (ref 10–50)
ANION GAP SERPL CALC-SCNC: 19 MMOL/L (ref 2–12)
AST SERPL-CCNC: ABNORMAL U/L (ref 10–50)
BASOPHILS # BLD: 0.1 K/UL (ref 0–1)
BASOPHILS NFR BLD: 1 % (ref 0–1)
BILIRUB SERPL-MCNC: 0.3 MG/DL (ref 0.2–1)
BUN SERPL-MCNC: 36 MG/DL (ref 6–20)
BUN/CREAT SERPL: 3 (ref 12–20)
CALCIUM SERPL-MCNC: 8.3 MG/DL (ref 8.6–10)
CHLORIDE SERPL-SCNC: 92 MMOL/L (ref 98–107)
CO2 SERPL-SCNC: 24 MMOL/L (ref 22–29)
COMMENT:: NORMAL
CREAT SERPL-MCNC: 10.97 MG/DL (ref 0.7–1.2)
DIFFERENTIAL METHOD BLD: ABNORMAL
EKG DIAGNOSIS: NORMAL
EKG Q-T INTERVAL: 332 MS
EKG QRS DURATION: 78 MS
EKG QTC CALCULATION (BAZETT): 469 MS
EKG R AXIS: 43 DEGREES
EKG T AXIS: 188 DEGREES
EKG VENTRICULAR RATE: 120 BPM
EOSINOPHIL # BLD: 0.2 K/UL (ref 0–0.4)
EOSINOPHIL NFR BLD: 4 % (ref 0–7)
ERYTHROCYTE [DISTWIDTH] IN BLOOD BY AUTOMATED COUNT: 16.9 % (ref 11.5–14.5)
GLOBULIN SER CALC-MCNC: 4.7 G/DL (ref 2–4)
GLUCOSE SERPL-MCNC: 129 MG/DL (ref 65–100)
HCT VFR BLD AUTO: 36.5 % (ref 36.6–50.3)
HGB BLD-MCNC: 11.9 G/DL (ref 12.1–17)
IMM GRANULOCYTES # BLD AUTO: 0 K/UL (ref 0–0.04)
IMM GRANULOCYTES NFR BLD AUTO: 1 % (ref 0–0.5)
LACTATE SERPL-SCNC: 1.5 MMOL/L (ref 0.4–2)
LACTATE SERPL-SCNC: 2.5 MMOL/L (ref 0.4–2)
LYMPHOCYTES # BLD: 1.2 K/UL (ref 0.8–3.5)
LYMPHOCYTES NFR BLD: 21 % (ref 12–49)
MCH RBC QN AUTO: 33.7 PG (ref 26–34)
MCHC RBC AUTO-ENTMCNC: 32.6 G/DL (ref 30–36.5)
MCV RBC AUTO: 103.4 FL (ref 80–99)
MONOCYTES # BLD: 0.9 K/UL (ref 0–1)
MONOCYTES NFR BLD: 16 % (ref 5–13)
NEUTS SEG # BLD: 3.3 K/UL (ref 1.8–8)
NEUTS SEG NFR BLD: 57 % (ref 32–75)
NRBC # BLD: 0.04 K/UL (ref 0–0.01)
NRBC BLD-RTO: 0.7 PER 100 WBC
PLATELET # BLD AUTO: 214 K/UL (ref 150–400)
PMV BLD AUTO: 12.2 FL (ref 8.9–12.9)
POTASSIUM SERPL-SCNC: ABNORMAL MMOL/L (ref 3.5–5.1)
PROT SERPL-MCNC: 8 G/DL (ref 6.4–8.3)
RBC # BLD AUTO: 3.53 M/UL (ref 4.1–5.7)
SODIUM SERPL-SCNC: 135 MMOL/L (ref 136–145)
SPECIMEN HOLD: NORMAL
TROPONIN T SERPL HS-MCNC: 77.3 NG/L (ref 0–22)
TROPONIN T SERPL HS-MCNC: 80.2 NG/L (ref 0–22)
WBC # BLD AUTO: 5.8 K/UL (ref 4.1–11.1)

## 2024-11-07 PROCEDURE — 6360000002 HC RX W HCPCS: Performed by: STUDENT IN AN ORGANIZED HEALTH CARE EDUCATION/TRAINING PROGRAM

## 2024-11-07 PROCEDURE — 6370000000 HC RX 637 (ALT 250 FOR IP): Performed by: STUDENT IN AN ORGANIZED HEALTH CARE EDUCATION/TRAINING PROGRAM

## 2024-11-07 PROCEDURE — 73610 X-RAY EXAM OF ANKLE: CPT

## 2024-11-07 PROCEDURE — 93005 ELECTROCARDIOGRAM TRACING: CPT | Performed by: STUDENT IN AN ORGANIZED HEALTH CARE EDUCATION/TRAINING PROGRAM

## 2024-11-07 PROCEDURE — 2580000003 HC RX 258: Performed by: FAMILY MEDICINE

## 2024-11-07 PROCEDURE — 2060000000 HC ICU INTERMEDIATE R&B

## 2024-11-07 PROCEDURE — 93010 ELECTROCARDIOGRAM REPORT: CPT | Performed by: SPECIALIST

## 2024-11-07 PROCEDURE — 6360000002 HC RX W HCPCS: Performed by: FAMILY MEDICINE

## 2024-11-07 PROCEDURE — 71045 X-RAY EXAM CHEST 1 VIEW: CPT

## 2024-11-07 PROCEDURE — 36415 COLL VENOUS BLD VENIPUNCTURE: CPT

## 2024-11-07 PROCEDURE — 6370000000 HC RX 637 (ALT 250 FOR IP): Performed by: FAMILY MEDICINE

## 2024-11-07 PROCEDURE — 29515 APPLICATION SHORT LEG SPLINT: CPT

## 2024-11-07 PROCEDURE — 84484 ASSAY OF TROPONIN QUANT: CPT

## 2024-11-07 PROCEDURE — 87040 BLOOD CULTURE FOR BACTERIA: CPT

## 2024-11-07 PROCEDURE — 2580000003 HC RX 258: Performed by: STUDENT IN AN ORGANIZED HEALTH CARE EDUCATION/TRAINING PROGRAM

## 2024-11-07 PROCEDURE — 85025 COMPLETE CBC W/AUTO DIFF WBC: CPT

## 2024-11-07 PROCEDURE — 70450 CT HEAD/BRAIN W/O DYE: CPT

## 2024-11-07 PROCEDURE — 80053 COMPREHEN METABOLIC PANEL: CPT

## 2024-11-07 PROCEDURE — APPNB15 APP NON BILLABLE TIME 0-15 MINS: Performed by: NURSE PRACTITIONER

## 2024-11-07 PROCEDURE — 99285 EMERGENCY DEPT VISIT HI MDM: CPT

## 2024-11-07 PROCEDURE — 83605 ASSAY OF LACTIC ACID: CPT

## 2024-11-07 RX ORDER — HYDROMORPHONE HYDROCHLORIDE 1 MG/ML
1 INJECTION, SOLUTION INTRAMUSCULAR; INTRAVENOUS; SUBCUTANEOUS EVERY 4 HOURS PRN
Status: DISCONTINUED | OUTPATIENT
Start: 2024-11-07 | End: 2024-11-08

## 2024-11-07 RX ORDER — ACETAMINOPHEN 500 MG
1000 TABLET ORAL
Status: COMPLETED | OUTPATIENT
Start: 2024-11-07 | End: 2024-11-07

## 2024-11-07 RX ORDER — ACETAMINOPHEN 500 MG
1000 TABLET ORAL EVERY 8 HOURS SCHEDULED
Status: DISCONTINUED | OUTPATIENT
Start: 2024-11-07 | End: 2024-11-13 | Stop reason: HOSPADM

## 2024-11-07 RX ORDER — SODIUM CHLORIDE 9 MG/ML
INJECTION, SOLUTION INTRAVENOUS PRN
Status: DISCONTINUED | OUTPATIENT
Start: 2024-11-07 | End: 2024-11-13 | Stop reason: HOSPADM

## 2024-11-07 RX ORDER — 0.9 % SODIUM CHLORIDE 0.9 %
500 INTRAVENOUS SOLUTION INTRAVENOUS ONCE
Status: DISCONTINUED | OUTPATIENT
Start: 2024-11-07 | End: 2024-11-07

## 2024-11-07 RX ORDER — GABAPENTIN 300 MG/1
300 CAPSULE ORAL NIGHTLY
Status: DISCONTINUED | OUTPATIENT
Start: 2024-11-07 | End: 2024-11-13 | Stop reason: HOSPADM

## 2024-11-07 RX ORDER — ONDANSETRON 4 MG/1
4 TABLET, ORALLY DISINTEGRATING ORAL EVERY 8 HOURS PRN
Status: DISCONTINUED | OUTPATIENT
Start: 2024-11-07 | End: 2024-11-13 | Stop reason: HOSPADM

## 2024-11-07 RX ORDER — ACETAMINOPHEN 650 MG/1
650 SUPPOSITORY RECTAL EVERY 8 HOURS SCHEDULED
Status: DISCONTINUED | OUTPATIENT
Start: 2024-11-07 | End: 2024-11-09

## 2024-11-07 RX ORDER — MORPHINE SULFATE 2 MG/ML
2 INJECTION, SOLUTION INTRAMUSCULAR; INTRAVENOUS
Status: COMPLETED | OUTPATIENT
Start: 2024-11-07 | End: 2024-11-07

## 2024-11-07 RX ORDER — NALOXONE HYDROCHLORIDE 0.4 MG/ML
0.4 INJECTION, SOLUTION INTRAMUSCULAR; INTRAVENOUS; SUBCUTANEOUS PRN
Status: DISCONTINUED | OUTPATIENT
Start: 2024-11-07 | End: 2024-11-13 | Stop reason: HOSPADM

## 2024-11-07 RX ORDER — SEVELAMER CARBONATE 800 MG/1
800 TABLET, FILM COATED ORAL
Status: DISCONTINUED | OUTPATIENT
Start: 2024-11-07 | End: 2024-11-13 | Stop reason: HOSPADM

## 2024-11-07 RX ORDER — ACETAMINOPHEN 325 MG/1
650 TABLET ORAL EVERY 6 HOURS PRN
Status: DISCONTINUED | OUTPATIENT
Start: 2024-11-07 | End: 2024-11-07

## 2024-11-07 RX ORDER — ONDANSETRON 2 MG/ML
4 INJECTION INTRAMUSCULAR; INTRAVENOUS EVERY 6 HOURS PRN
Status: DISCONTINUED | OUTPATIENT
Start: 2024-11-07 | End: 2024-11-13 | Stop reason: HOSPADM

## 2024-11-07 RX ORDER — DILTIAZEM HYDROCHLORIDE 120 MG/1
120 CAPSULE, COATED, EXTENDED RELEASE ORAL DAILY
COMMUNITY

## 2024-11-07 RX ORDER — ACETAMINOPHEN 650 MG/1
650 SUPPOSITORY RECTAL EVERY 6 HOURS PRN
Status: DISCONTINUED | OUTPATIENT
Start: 2024-11-07 | End: 2024-11-07

## 2024-11-07 RX ORDER — 0.9 % SODIUM CHLORIDE 0.9 %
500 INTRAVENOUS SOLUTION INTRAVENOUS ONCE
Status: COMPLETED | OUTPATIENT
Start: 2024-11-07 | End: 2024-11-07

## 2024-11-07 RX ORDER — POLYETHYLENE GLYCOL 3350 17 G/17G
17 POWDER, FOR SOLUTION ORAL DAILY PRN
Status: DISCONTINUED | OUTPATIENT
Start: 2024-11-07 | End: 2024-11-13 | Stop reason: HOSPADM

## 2024-11-07 RX ORDER — MONTELUKAST SODIUM 10 MG/1
10 TABLET ORAL DAILY
Status: DISCONTINUED | OUTPATIENT
Start: 2024-11-08 | End: 2024-11-13 | Stop reason: HOSPADM

## 2024-11-07 RX ORDER — ALLOPURINOL 100 MG/1
100 TABLET ORAL DAILY
Status: DISCONTINUED | OUTPATIENT
Start: 2024-11-07 | End: 2024-11-07

## 2024-11-07 RX ORDER — SODIUM CHLORIDE 0.9 % (FLUSH) 0.9 %
5-40 SYRINGE (ML) INJECTION EVERY 12 HOURS SCHEDULED
Status: DISCONTINUED | OUTPATIENT
Start: 2024-11-07 | End: 2024-11-13 | Stop reason: HOSPADM

## 2024-11-07 RX ORDER — ALLOPURINOL 100 MG/1
100 TABLET ORAL EVERY OTHER DAY
Status: DISCONTINUED | OUTPATIENT
Start: 2024-11-07 | End: 2024-11-13 | Stop reason: HOSPADM

## 2024-11-07 RX ORDER — WARFARIN SODIUM 1 MG/1
4 TABLET ORAL DAILY
Status: DISCONTINUED | OUTPATIENT
Start: 2024-11-07 | End: 2024-11-07

## 2024-11-07 RX ORDER — AMLODIPINE BESYLATE 5 MG/1
10 TABLET ORAL
Status: DISCONTINUED | OUTPATIENT
Start: 2024-11-07 | End: 2024-11-07

## 2024-11-07 RX ORDER — OXYCODONE HYDROCHLORIDE 5 MG/1
10 TABLET ORAL EVERY 6 HOURS PRN
Status: DISCONTINUED | OUTPATIENT
Start: 2024-11-07 | End: 2024-11-09

## 2024-11-07 RX ORDER — SODIUM CHLORIDE 0.9 % (FLUSH) 0.9 %
5-40 SYRINGE (ML) INJECTION PRN
Status: DISCONTINUED | OUTPATIENT
Start: 2024-11-07 | End: 2024-11-13 | Stop reason: HOSPADM

## 2024-11-07 RX ORDER — PREDNISONE 5 MG/1
5 TABLET ORAL EVERY OTHER DAY
Status: DISCONTINUED | OUTPATIENT
Start: 2024-11-09 | End: 2024-11-13 | Stop reason: HOSPADM

## 2024-11-07 RX ORDER — DILTIAZEM HYDROCHLORIDE 120 MG/1
120 CAPSULE, COATED, EXTENDED RELEASE ORAL DAILY
Status: DISCONTINUED | OUTPATIENT
Start: 2024-11-08 | End: 2024-11-13 | Stop reason: HOSPADM

## 2024-11-07 RX ADMIN — HYDROMORPHONE HYDROCHLORIDE 0.5 MG: 1 INJECTION, SOLUTION INTRAMUSCULAR; INTRAVENOUS; SUBCUTANEOUS at 16:50

## 2024-11-07 RX ADMIN — SODIUM CHLORIDE, PRESERVATIVE FREE 10 ML: 5 INJECTION INTRAVENOUS at 21:51

## 2024-11-07 RX ADMIN — GABAPENTIN 300 MG: 300 CAPSULE ORAL at 21:50

## 2024-11-07 RX ADMIN — HYDROMORPHONE HYDROCHLORIDE 0.5 MG: 1 INJECTION, SOLUTION INTRAMUSCULAR; INTRAVENOUS; SUBCUTANEOUS at 19:05

## 2024-11-07 RX ADMIN — SODIUM CHLORIDE 500 ML: 9 INJECTION, SOLUTION INTRAVENOUS at 13:22

## 2024-11-07 RX ADMIN — ACETAMINOPHEN 1000 MG: 500 TABLET ORAL at 21:49

## 2024-11-07 RX ADMIN — ALLOPURINOL 100 MG: 100 TABLET ORAL at 23:39

## 2024-11-07 RX ADMIN — OXYCODONE 10 MG: 5 TABLET ORAL at 21:51

## 2024-11-07 RX ADMIN — SEVELAMER CARBONATE 800 MG: 800 TABLET, FILM COATED ORAL at 21:52

## 2024-11-07 RX ADMIN — MORPHINE SULFATE 2 MG: 2 INJECTION, SOLUTION INTRAMUSCULAR; INTRAVENOUS at 14:32

## 2024-11-07 RX ADMIN — DEXTROSE MONOHYDRATE 150 MG: 5 INJECTION, SOLUTION INTRAVENOUS at 14:43

## 2024-11-07 RX ADMIN — HYDROMORPHONE HYDROCHLORIDE 1 MG: 1 INJECTION, SOLUTION INTRAMUSCULAR; INTRAVENOUS; SUBCUTANEOUS at 23:39

## 2024-11-07 RX ADMIN — SODIUM CHLORIDE 500 ML: 9 INJECTION, SOLUTION INTRAVENOUS at 13:58

## 2024-11-07 RX ADMIN — AMIODARONE HYDROCHLORIDE 1 MG/MIN: 50 INJECTION, SOLUTION INTRAVENOUS at 14:57

## 2024-11-07 RX ADMIN — ACETAMINOPHEN 1000 MG: 500 TABLET ORAL at 13:08

## 2024-11-07 ASSESSMENT — PAIN - FUNCTIONAL ASSESSMENT
PAIN_FUNCTIONAL_ASSESSMENT: ACTIVITIES ARE NOT PREVENTED
PAIN_FUNCTIONAL_ASSESSMENT: 0-10
PAIN_FUNCTIONAL_ASSESSMENT: PREVENTS OR INTERFERES SOME ACTIVE ACTIVITIES AND ADLS

## 2024-11-07 ASSESSMENT — PAIN DESCRIPTION - ORIENTATION
ORIENTATION: RIGHT

## 2024-11-07 ASSESSMENT — PAIN DESCRIPTION - DESCRIPTORS
DESCRIPTORS: ACHING
DESCRIPTORS: ACHING;THROBBING
DESCRIPTORS: THROBBING
DESCRIPTORS: ACHING;THROBBING
DESCRIPTORS: THROBBING

## 2024-11-07 ASSESSMENT — PAIN DESCRIPTION - LOCATION
LOCATION: LEG;ANKLE;FOOT
LOCATION: ANKLE
LOCATION: ANKLE;KNEE
LOCATION: ANKLE
LOCATION: KNEE;ANKLE

## 2024-11-07 ASSESSMENT — PAIN SCALES - GENERAL
PAINLEVEL_OUTOF10: 8
PAINLEVEL_OUTOF10: 9
PAINLEVEL_OUTOF10: 6
PAINLEVEL_OUTOF10: 8
PAINLEVEL_OUTOF10: 7
PAINLEVEL_OUTOF10: 5
PAINLEVEL_OUTOF10: 7
PAINLEVEL_OUTOF10: 6
PAINLEVEL_OUTOF10: 5
PAINLEVEL_OUTOF10: 8

## 2024-11-07 NOTE — ED NOTES
Bedside shift change report given to YESSY Rodriguez (oncoming nurse) by YESSY Patterson (offgoing nurse). Report included the following information Nurse Handoff Report, ED SBAR, MAR, Recent Results, and Cardiac Rhythm Afib .

## 2024-11-07 NOTE — ED NOTES
Patient meeting SIRs criteria for severe sepsis with hypotension, HR >90 and lactic acid >2. MD Darden made aware, no source of infection identified. Blood cultures obtained and 500mL fluid bolus infusing. No code sepsis called at this time.

## 2024-11-07 NOTE — ED NOTES
Patient to CT, xrays completed at bedside. Fluids completed, patient c/o more ankle pain and \"tylenol ineffective\" MD made aware, awaiting orders.

## 2024-11-07 NOTE — ED TRIAGE NOTES
Pt arrives to ER do  c/o blurred vision, dizziness, extremity weakness which caused him to fall down and hurt his right ankle and both hips, denies hitting head. EMS reports hypotension and tachycardia en route, ecg showed A-FIB RVR.     Got dialysis yesterday full session had 2.5 L pulled off.

## 2024-11-07 NOTE — ED NOTES
TRANSFER - OUT REPORT:    Verbal report given to YESSY Chaves on Joshua Shaikh  being transferred to [place] [address] for routine progression of patient care       Report consisted of patient's Situation, Background, Assessment and   Recommendations(SBAR).     Information from the following report(s) Nurse Handoff Report, ED SBAR, Intake/Output, MAR, Recent Results, and Cardiac Rhythm A-fib  was reviewed with the receiving nurse.    Butler Fall Assessment:    Presents to emergency department  because of falls (Syncope, seizure, or loss of consciousness): No  Age > 70: No  Altered Mental Status, Intoxication with alcohol or substance confusion (Disorientation, impaired judgment, poor safety awaremess, or inability to follow instructions): No  Impaired Mobility: Ambulates or transfers with assistive devices or assistance; Unable to ambulate or transer.: No  Nursing Judgement: No          Lines:   Peripheral IV 11/07/24 Distal;Right;Anterior Cephalic (Active)        Opportunity for questions and clarification was provided.      Patient transported with:  Monitor

## 2024-11-07 NOTE — ED PROVIDER NOTES
[facility]  EMERGENCY DEPARTMENT ENCOUNTER      Pt Name: Joshua Shaikh  MRN: [id number]  Birthdate 1970  Date of evaluation: 11/7/2024  Provider: Vanessa Darden DO    CHIEF COMPLAINT       Chief Complaint   Patient presents with    Dizziness    Fatigue    Eye Problem         HISTORY OF PRESENT ILLNESS    HPI    Joshua Shaikh is a 53 y.o. male with a history of ESRD on dialysis Monday Wednesday Friday last dialyzed yesterday, atrial fibrillation on Coumadin, CAD, CHF who presents to the emergency department for evaluation of near syncope.  Patient reports he was driving, started to have pain in his bilateral legs which is typical, when he got a car he began to feel lightheaded had blurred vision and nearly passed out.  States he did fall to the ground and twisted his right ankle.  Denies hitting his head or having loss of consciousness.  Does endorse associated chest tightness.  No shortness of breath.  Complains of right ankle pain and bilateral hip pain.  Per EMS patient was in atrial fibrillation with RVR and hypotensive.  He denies any recent illness including fevers, cough, vomiting or diarrhea.  Notes full dialysis session yesterday to, 2.5 L taken off.    Nursing Notes were reviewed.    REVIEW OF SYSTEMS       Review of Systems   Constitutional:  Positive for fatigue. Negative for chills and fever.   HENT:  Negative for congestion and rhinorrhea.    Eyes:  Positive for visual disturbance. Negative for discharge and redness.   Respiratory:  Negative for cough and shortness of breath.    Cardiovascular:  Positive for chest pain.   Gastrointestinal:  Negative for abdominal pain, diarrhea, nausea and vomiting.   Musculoskeletal:  Positive for arthralgias.   Neurological:  Positive for weakness and light-headedness. Negative for speech difficulty.   Psychiatric/Behavioral:  Negative for agitation.            PAST MEDICAL HISTORY     Past Medical History:   Diagnosis Date    Anxiety

## 2024-11-07 NOTE — ED NOTES
Report given to [facility]. Amiodarone drip transferred and 2 RN verified to [facility] pump.

## 2024-11-08 ENCOUNTER — APPOINTMENT (OUTPATIENT)
Facility: HOSPITAL | Age: 54
DRG: 064 | End: 2024-11-08
Payer: MEDICARE

## 2024-11-08 ENCOUNTER — APPOINTMENT (OUTPATIENT)
Facility: HOSPITAL | Age: 54
DRG: 064 | End: 2024-11-08
Attending: FAMILY MEDICINE
Payer: MEDICARE

## 2024-11-08 PROBLEM — I48.11 LONGSTANDING PERSISTENT ATRIAL FIBRILLATION (HCC): Status: ACTIVE | Noted: 2024-11-08

## 2024-11-08 PROBLEM — Z86.79 HISTORY OF COMPLETE HEART BLOCK: Status: ACTIVE | Noted: 2024-11-08

## 2024-11-08 PROBLEM — H53.8 BLURRED VISION: Status: ACTIVE | Noted: 2024-11-08

## 2024-11-08 PROBLEM — N18.6 ESRD ON HEMODIALYSIS (HCC): Status: ACTIVE | Noted: 2023-04-01

## 2024-11-08 PROBLEM — I63.9 ACUTE CVA (CEREBROVASCULAR ACCIDENT) (HCC): Status: ACTIVE | Noted: 2024-11-08

## 2024-11-08 PROBLEM — Z99.2 ESRD ON HEMODIALYSIS (HCC): Status: ACTIVE | Noted: 2023-04-01

## 2024-11-08 PROBLEM — M32.9 SYSTEMIC LUPUS ERYTHEMATOSUS (HCC): Status: ACTIVE | Noted: 2024-11-08

## 2024-11-08 PROBLEM — S82.831D CLOSED FRACTURE OF DISTAL END OF RIGHT FIBULA WITH ROUTINE HEALING: Status: ACTIVE | Noted: 2024-11-08

## 2024-11-08 PROBLEM — S82.891A CLOSED FRACTURE OF RIGHT ANKLE: Status: ACTIVE | Noted: 2024-11-08

## 2024-11-08 PROBLEM — Z86.73 HISTORY OF CVA (CEREBROVASCULAR ACCIDENT): Status: ACTIVE | Noted: 2024-11-08

## 2024-11-08 PROBLEM — Z95.0 CARDIAC PACEMAKER IN SITU: Status: ACTIVE | Noted: 2024-11-08

## 2024-11-08 PROBLEM — D68.61 ANTIPHOSPHOLIPID ANTIBODY SYNDROME (HCC): Status: ACTIVE | Noted: 2024-11-08

## 2024-11-08 LAB
ALBUMIN SERPL-MCNC: 2.7 G/DL (ref 3.5–5)
ALBUMIN/GLOB SERPL: 0.6 (ref 1.1–2.2)
ALP SERPL-CCNC: 59 U/L (ref 45–117)
ALT SERPL-CCNC: 15 U/L (ref 12–78)
ANION GAP SERPL CALC-SCNC: 11 MMOL/L (ref 2–12)
AST SERPL-CCNC: 16 U/L (ref 15–37)
BASOPHILS # BLD: 0.1 K/UL (ref 0–0.1)
BASOPHILS NFR BLD: 1 % (ref 0–1)
BILIRUB SERPL-MCNC: 0.3 MG/DL (ref 0.2–1)
BUN SERPL-MCNC: 44 MG/DL (ref 6–20)
BUN/CREAT SERPL: 3 (ref 12–20)
CALCIUM SERPL-MCNC: 8.2 MG/DL (ref 8.5–10.1)
CHLORIDE SERPL-SCNC: 95 MMOL/L (ref 97–108)
CHOLEST SERPL-MCNC: 154 MG/DL
CO2 SERPL-SCNC: 26 MMOL/L (ref 21–32)
CREAT SERPL-MCNC: 13.1 MG/DL (ref 0.7–1.3)
DIFFERENTIAL METHOD BLD: ABNORMAL
ECHO AO ASC DIAM: 3.1 CM
ECHO AO ASCENDING AORTA INDEX: 1.15 CM/M2
ECHO AO ROOT DIAM: 3.7 CM
ECHO AO ROOT INDEX: 1.38 CM/M2
ECHO AR MAX VEL PISA: 3.2 M/S
ECHO AV AREA PEAK VELOCITY: 1.9 CM2
ECHO AV AREA VTI: 2.4 CM2
ECHO AV AREA/BSA PEAK VELOCITY: 0.7 CM2/M2
ECHO AV AREA/BSA VTI: 0.9 CM2/M2
ECHO AV MEAN GRADIENT: 8 MMHG
ECHO AV MEAN VELOCITY: 1.3 M/S
ECHO AV PEAK GRADIENT: 14 MMHG
ECHO AV PEAK VELOCITY: 1.9 M/S
ECHO AV REGURGITANT PHT: 358.2 MILLISECOND
ECHO AV VELOCITY RATIO: 0.37
ECHO AV VTI: 31.8 CM
ECHO BSA: 2.79 M2
ECHO LA DIAMETER INDEX: 1.49 CM/M2
ECHO LA DIAMETER: 4 CM
ECHO LA TO AORTIC ROOT RATIO: 1.08
ECHO LA VOL A-L A2C: 29 ML (ref 18–58)
ECHO LA VOL A-L A4C: 47 ML (ref 18–58)
ECHO LA VOL BP: 40 ML (ref 18–58)
ECHO LA VOL MOD A2C: 27 ML (ref 18–58)
ECHO LA VOL MOD A4C: 40 ML (ref 18–58)
ECHO LA VOL/BSA BIPLANE: 15 ML/M2 (ref 16–34)
ECHO LA VOLUME AREA LENGTH: 45 ML
ECHO LA VOLUME INDEX A-L A2C: 11 ML/M2 (ref 16–34)
ECHO LA VOLUME INDEX A-L A4C: 17 ML/M2 (ref 16–34)
ECHO LA VOLUME INDEX AREA LENGTH: 17 ML/M2 (ref 16–34)
ECHO LA VOLUME INDEX MOD A2C: 10 ML/M2 (ref 16–34)
ECHO LA VOLUME INDEX MOD A4C: 15 ML/M2 (ref 16–34)
ECHO LV E' LATERAL VELOCITY: 7.6 CM/S
ECHO LV E' SEPTAL VELOCITY: 7.3 CM/S
ECHO LV EF PHYSICIAN: 55 %
ECHO LV FRACTIONAL SHORTENING: 27 % (ref 28–44)
ECHO LV INTERNAL DIMENSION DIASTOLE INDEX: 1.52 CM/M2
ECHO LV INTERNAL DIMENSION DIASTOLIC: 4.1 CM (ref 4.2–5.9)
ECHO LV INTERNAL DIMENSION SYSTOLIC INDEX: 1.12 CM/M2
ECHO LV INTERNAL DIMENSION SYSTOLIC: 3 CM
ECHO LV IVSD: 1.2 CM (ref 0.6–1)
ECHO LV MASS 2D: 171.7 G (ref 88–224)
ECHO LV MASS INDEX 2D: 63.8 G/M2 (ref 49–115)
ECHO LV POSTERIOR WALL DIASTOLIC: 1.2 CM (ref 0.6–1)
ECHO LV RELATIVE WALL THICKNESS RATIO: 0.59
ECHO LVOT AREA: 5.3 CM2
ECHO LVOT AV VTI INDEX: 0.47
ECHO LVOT DIAM: 2.6 CM
ECHO LVOT MEAN GRADIENT: 1 MMHG
ECHO LVOT PEAK GRADIENT: 2 MMHG
ECHO LVOT PEAK VELOCITY: 0.7 M/S
ECHO LVOT STROKE VOLUME INDEX: 29.6 ML/M2
ECHO LVOT SV: 79.6 ML
ECHO LVOT VTI: 15 CM
ECHO MV E DECELERATION TIME (DT): 109.5 MS
ECHO MV E VELOCITY: 0.86 M/S
ECHO MV E/E' LATERAL: 11.32
ECHO MV E/E' RATIO (AVERAGED): 11.55
ECHO MV E/E' SEPTAL: 11.78
ECHO PV MAX VELOCITY: 1 M/S
ECHO PV PEAK GRADIENT: 4 MMHG
ECHO RV FREE WALL PEAK S': 11 CM/S
ECHO RV INTERNAL DIMENSION: 3.8 CM
ECHO TV REGURGITANT MAX VELOCITY: 2.17 M/S
ECHO TV REGURGITANT PEAK GRADIENT: 19 MMHG
EOSINOPHIL # BLD: 0.5 K/UL (ref 0–0.4)
EOSINOPHIL NFR BLD: 8 % (ref 0–7)
ERYTHROCYTE [DISTWIDTH] IN BLOOD BY AUTOMATED COUNT: 16.8 % (ref 11.5–14.5)
EST. AVERAGE GLUCOSE BLD GHB EST-MCNC: 105 MG/DL
GLOBULIN SER CALC-MCNC: 4.5 G/DL (ref 2–4)
GLUCOSE SERPL-MCNC: 109 MG/DL (ref 65–100)
HBA1C MFR BLD: 5.3 % (ref 4–5.6)
HBV SURFACE AB SER QL: REACTIVE
HBV SURFACE AB SER-ACNC: 295.41 MIU/ML
HBV SURFACE AG SER QL: <0.1 INDEX
HBV SURFACE AG SER QL: NEGATIVE
HCT VFR BLD AUTO: 33.3 % (ref 36.6–50.3)
HDLC SERPL-MCNC: 28 MG/DL
HDLC SERPL: 5.5 (ref 0–5)
HGB BLD-MCNC: 10.6 G/DL (ref 12.1–17)
IMM GRANULOCYTES # BLD AUTO: 0 K/UL (ref 0–0.04)
IMM GRANULOCYTES NFR BLD AUTO: 1 % (ref 0–0.5)
INR PPP: 4.9 (ref 0.9–1.1)
LDLC SERPL CALC-MCNC: 82.8 MG/DL (ref 0–100)
LYMPHOCYTES # BLD: 1.4 K/UL (ref 0.8–3.5)
LYMPHOCYTES NFR BLD: 21 % (ref 12–49)
MCH RBC QN AUTO: 33 PG (ref 26–34)
MCHC RBC AUTO-ENTMCNC: 31.8 G/DL (ref 30–36.5)
MCV RBC AUTO: 103.7 FL (ref 80–99)
MONOCYTES # BLD: 1.1 K/UL (ref 0–1)
MONOCYTES NFR BLD: 16 % (ref 5–13)
NEUTS SEG # BLD: 3.6 K/UL (ref 1.8–8)
NEUTS SEG NFR BLD: 53 % (ref 32–75)
NRBC # BLD: 0 K/UL (ref 0–0.01)
NRBC BLD-RTO: 0 PER 100 WBC
PLATELET # BLD AUTO: 143 K/UL (ref 150–400)
PMV BLD AUTO: 11.3 FL (ref 8.9–12.9)
POTASSIUM SERPL-SCNC: 4.6 MMOL/L (ref 3.5–5.1)
PROT SERPL-MCNC: 7.2 G/DL (ref 6.4–8.2)
PROTHROMBIN TIME: 46.1 SEC (ref 9–11.1)
RBC # BLD AUTO: 3.21 M/UL (ref 4.1–5.7)
SODIUM SERPL-SCNC: 132 MMOL/L (ref 136–145)
T4 FREE SERPL-MCNC: 0.9 NG/DL (ref 0.8–1.5)
TRIGL SERPL-MCNC: 216 MG/DL
TROPONIN I SERPL HS-MCNC: 39 NG/L (ref 0–76)
TSH SERPL DL<=0.05 MIU/L-ACNC: 1.27 UIU/ML (ref 0.36–3.74)
VLDLC SERPL CALC-MCNC: 43.2 MG/DL
WBC # BLD AUTO: 6.7 K/UL (ref 4.1–11.1)

## 2024-11-08 PROCEDURE — 6360000004 HC RX CONTRAST MEDICATION: Performed by: FAMILY MEDICINE

## 2024-11-08 PROCEDURE — 6370000000 HC RX 637 (ALT 250 FOR IP): Performed by: STUDENT IN AN ORGANIZED HEALTH CARE EDUCATION/TRAINING PROGRAM

## 2024-11-08 PROCEDURE — 86706 HEP B SURFACE ANTIBODY: CPT

## 2024-11-08 PROCEDURE — 99223 1ST HOSP IP/OBS HIGH 75: CPT | Performed by: HOSPITALIST

## 2024-11-08 PROCEDURE — 93306 TTE W/DOPPLER COMPLETE: CPT

## 2024-11-08 PROCEDURE — 6370000000 HC RX 637 (ALT 250 FOR IP): Performed by: FAMILY MEDICINE

## 2024-11-08 PROCEDURE — 6370000000 HC RX 637 (ALT 250 FOR IP): Performed by: INTERNAL MEDICINE

## 2024-11-08 PROCEDURE — 85610 PROTHROMBIN TIME: CPT

## 2024-11-08 PROCEDURE — 6360000002 HC RX W HCPCS: Performed by: FAMILY MEDICINE

## 2024-11-08 PROCEDURE — 85025 COMPLETE CBC W/AUTO DIFF WBC: CPT

## 2024-11-08 PROCEDURE — 2060000000 HC ICU INTERMEDIATE R&B

## 2024-11-08 PROCEDURE — 6360000002 HC RX W HCPCS: Performed by: STUDENT IN AN ORGANIZED HEALTH CARE EDUCATION/TRAINING PROGRAM

## 2024-11-08 PROCEDURE — 5A1D70Z PERFORMANCE OF URINARY FILTRATION, INTERMITTENT, LESS THAN 6 HOURS PER DAY: ICD-10-PCS | Performed by: INTERNAL MEDICINE

## 2024-11-08 PROCEDURE — 84484 ASSAY OF TROPONIN QUANT: CPT

## 2024-11-08 PROCEDURE — 2580000003 HC RX 258: Performed by: FAMILY MEDICINE

## 2024-11-08 PROCEDURE — 84439 ASSAY OF FREE THYROXINE: CPT

## 2024-11-08 PROCEDURE — 70551 MRI BRAIN STEM W/O DYE: CPT

## 2024-11-08 PROCEDURE — 80061 LIPID PANEL: CPT

## 2024-11-08 PROCEDURE — 99223 1ST HOSP IP/OBS HIGH 75: CPT | Performed by: INTERNAL MEDICINE

## 2024-11-08 PROCEDURE — 87340 HEPATITIS B SURFACE AG IA: CPT

## 2024-11-08 PROCEDURE — 94761 N-INVAS EAR/PLS OXIMETRY MLT: CPT

## 2024-11-08 PROCEDURE — 6370000000 HC RX 637 (ALT 250 FOR IP): Performed by: NURSE PRACTITIONER

## 2024-11-08 PROCEDURE — 99223 1ST HOSP IP/OBS HIGH 75: CPT | Performed by: PHYSICIAN ASSISTANT

## 2024-11-08 PROCEDURE — 84443 ASSAY THYROID STIM HORMONE: CPT

## 2024-11-08 PROCEDURE — 70498 CT ANGIOGRAPHY NECK: CPT

## 2024-11-08 PROCEDURE — 83036 HEMOGLOBIN GLYCOSYLATED A1C: CPT

## 2024-11-08 PROCEDURE — 80053 COMPREHEN METABOLIC PANEL: CPT

## 2024-11-08 PROCEDURE — 93306 TTE W/DOPPLER COMPLETE: CPT | Performed by: SPECIALIST

## 2024-11-08 PROCEDURE — 73562 X-RAY EXAM OF KNEE 3: CPT

## 2024-11-08 PROCEDURE — APPSS30 APP SPLIT SHARED TIME 16-30 MINUTES: Performed by: NURSE PRACTITIONER

## 2024-11-08 PROCEDURE — 90935 HEMODIALYSIS ONE EVALUATION: CPT

## 2024-11-08 PROCEDURE — 36415 COLL VENOUS BLD VENIPUNCTURE: CPT

## 2024-11-08 RX ORDER — AMIODARONE HYDROCHLORIDE 200 MG/1
400 TABLET ORAL 2 TIMES DAILY
Status: DISCONTINUED | OUTPATIENT
Start: 2024-11-08 | End: 2024-11-11

## 2024-11-08 RX ORDER — ATORVASTATIN CALCIUM 20 MG/1
80 TABLET, FILM COATED ORAL NIGHTLY
Status: DISCONTINUED | OUTPATIENT
Start: 2024-11-08 | End: 2024-11-13 | Stop reason: HOSPADM

## 2024-11-08 RX ORDER — DIPHENHYDRAMINE HCL 25 MG
25 CAPSULE ORAL EVERY 6 HOURS PRN
Status: COMPLETED | OUTPATIENT
Start: 2024-11-08 | End: 2024-11-10

## 2024-11-08 RX ORDER — IOPAMIDOL 755 MG/ML
100 INJECTION, SOLUTION INTRAVASCULAR
Status: COMPLETED | OUTPATIENT
Start: 2024-11-08 | End: 2024-11-08

## 2024-11-08 RX ORDER — OXYCODONE HYDROCHLORIDE 5 MG/1
10 TABLET ORAL ONCE
Status: COMPLETED | OUTPATIENT
Start: 2024-11-08 | End: 2024-11-08

## 2024-11-08 RX ADMIN — HYDROMORPHONE HYDROCHLORIDE 1 MG: 1 INJECTION, SOLUTION INTRAMUSCULAR; INTRAVENOUS; SUBCUTANEOUS at 03:09

## 2024-11-08 RX ADMIN — MONTELUKAST 10 MG: 10 TABLET, FILM COATED ORAL at 07:58

## 2024-11-08 RX ADMIN — SEVELAMER CARBONATE 800 MG: 800 TABLET, FILM COATED ORAL at 16:34

## 2024-11-08 RX ADMIN — ATORVASTATIN CALCIUM 80 MG: 20 TABLET, FILM COATED ORAL at 20:39

## 2024-11-08 RX ADMIN — OXYCODONE 10 MG: 5 TABLET ORAL at 15:40

## 2024-11-08 RX ADMIN — DIPHENHYDRAMINE HYDROCHLORIDE 25 MG: 25 CAPSULE ORAL at 20:39

## 2024-11-08 RX ADMIN — ACETAMINOPHEN 1000 MG: 500 TABLET ORAL at 15:36

## 2024-11-08 RX ADMIN — AMIODARONE HYDROCHLORIDE 0.5 MG/MIN: 50 INJECTION, SOLUTION INTRAVENOUS at 01:00

## 2024-11-08 RX ADMIN — IOPAMIDOL 100 ML: 755 INJECTION, SOLUTION INTRAVENOUS at 10:49

## 2024-11-08 RX ADMIN — OXYCODONE 10 MG: 5 TABLET ORAL at 06:11

## 2024-11-08 RX ADMIN — AMIODARONE HYDROCHLORIDE 400 MG: 200 TABLET ORAL at 11:22

## 2024-11-08 RX ADMIN — HYDROMORPHONE HYDROCHLORIDE 1 MG: 1 INJECTION, SOLUTION INTRAMUSCULAR; INTRAVENOUS; SUBCUTANEOUS at 08:00

## 2024-11-08 RX ADMIN — SODIUM CHLORIDE, PRESERVATIVE FREE 10 ML: 5 INJECTION INTRAVENOUS at 07:58

## 2024-11-08 RX ADMIN — OXYCODONE 10 MG: 5 TABLET ORAL at 17:55

## 2024-11-08 RX ADMIN — AMIODARONE HYDROCHLORIDE 400 MG: 200 TABLET ORAL at 20:39

## 2024-11-08 RX ADMIN — GABAPENTIN 300 MG: 300 CAPSULE ORAL at 20:39

## 2024-11-08 RX ADMIN — SEVELAMER CARBONATE 800 MG: 800 TABLET, FILM COATED ORAL at 12:50

## 2024-11-08 RX ADMIN — OXYCODONE 10 MG: 5 TABLET ORAL at 11:36

## 2024-11-08 RX ADMIN — ACETAMINOPHEN 1000 MG: 500 TABLET ORAL at 06:10

## 2024-11-08 ASSESSMENT — PAIN DESCRIPTION - DESCRIPTORS
DESCRIPTORS: ACHING;SORE
DESCRIPTORS: ACHING
DESCRIPTORS: ACHING;SORE
DESCRIPTORS: ACHING;SORE
DESCRIPTORS: ACHING;THROBBING
DESCRIPTORS: ACHING;SORE
DESCRIPTORS: ACHING;THROBBING

## 2024-11-08 ASSESSMENT — PAIN - FUNCTIONAL ASSESSMENT
PAIN_FUNCTIONAL_ASSESSMENT: ACTIVITIES ARE NOT PREVENTED
PAIN_FUNCTIONAL_ASSESSMENT: ACTIVITIES ARE NOT PREVENTED

## 2024-11-08 ASSESSMENT — PAIN SCALES - GENERAL
PAINLEVEL_OUTOF10: 10
PAINLEVEL_OUTOF10: 10
PAINLEVEL_OUTOF10: 8
PAINLEVEL_OUTOF10: 7
PAINLEVEL_OUTOF10: 8
PAINLEVEL_OUTOF10: 9
PAINLEVEL_OUTOF10: 7
PAINLEVEL_OUTOF10: 5
PAINLEVEL_OUTOF10: 7
PAINLEVEL_OUTOF10: 5

## 2024-11-08 ASSESSMENT — PAIN DESCRIPTION - LOCATION
LOCATION: ANKLE
LOCATION: ANKLE;KNEE
LOCATION: ANKLE
LOCATION: ANKLE;KNEE

## 2024-11-08 ASSESSMENT — PAIN DESCRIPTION - ORIENTATION
ORIENTATION: RIGHT

## 2024-11-08 NOTE — CONSULTS
CONSULT - Neurology      Name:  Joshua Shaikh       MRN: [id number]  Location: B305/01    Date: 11/8/2024  Time:  11:12 AM        Chief Complaint:   Chief Complaint   Patient presents with    Dizziness    Fatigue    Eye Problem       HPI:  It is a great pleasure to see Joshua Shaikh, a 53 y.o. male today in the hospital . Briefly these are the events happened as per the chart and taken from the chart. The patient experienced a sudden onset of blurry vision while driving. He temporarily alleviated the symptoms by exiting the vehicle and taking a short walk. However, upon resuming driving, the blurry vision returned. He was brought to the emergency room for evaluation. Notably, he had a history of similar episodes three weeks prior.    PAST MEDICAL HISTORY:  Past Medical History:   Diagnosis Date    Anxiety     Arrhythmia     A-FIB    Arthritis     CAD (coronary artery disease)     MI 09    CHF (congestive heart failure) ([facility])     Chronic kidney disease     END STAGE KIDNEY DISEASE;  DIALYSIS M-W-Sat    Chronic pain     NEUROPATHY; BACK PAIN    Fatigue     Hypertension     Lymph edema     LEFT LEG    Needle phobia     Neuropathy     Other ill-defined conditions(799.89)     Rash     Skipped beats     Sleep apnea     WEARS CPAP    Snoring     SOB (shortness of breath)     Stroke ([facility]) 01/2020    NUMBNESS IN RIGHT LEG AND FOOT    Thromboembolus ([facility]) 2018    PE     PAST SURGICAL HISTORY:    Past Surgical History:   Procedure Laterality Date    IR FLUORO GUIDED NEEDLE PLACEMENT  10/08/2021    IR TUNNELED CATHETER PLACEMENT GREATER THAN 5 YEARS  10/8/2021    IR TUNNELED CATHETER PLACEMENT GREATER THAN 5 YEARS 10/8/2021 [facility] IR    IR TUNNELED CATHETER PLACEMENT GREATER THAN 5 YEARS  10/10/2022    IR TUNNELED CATHETER PLACEMENT GREATER THAN 5 YEARS 10/10/2022 [facility] ANGIO IR    IR TUNNELED CATHETER PLACEMENT GREATER THAN 5 YEARS  10/10/2022    IR TUNNELED CATHETER PLACEMENT GREATER THAN 5 YEARS  10/08/2021

## 2024-11-08 NOTE — CONSULTS
Can[facility] Carpenter at [facility]  [address], [address] [place] [address]  W: [phone]  F: [phone]      Reason for Visit:   Joshua Shaikh is a 53 y.o. male who is seen today for evaluation of Antiphospholipid syndrome ; on warfarin; ? should change anticoagulation.     Hematology/Oncology Treatment History:     Note from [facility] / Nick Mccord, NP / Geriatric medicine; dated 10/15/24     History of Present Illness: 53 y.o. male with PMH ESRD on HD, HTN, HFpEF (EF 55% in 3/2024, 55-60% in 7/2022), atrial fibrillation on AC, complete heart block (4/19/24 in [place]) now s/p single-chamber Medtronic pacemaker, h/o DVT (12/2018), h/o PE, positive lupus anticoagulant/ antiphospholipid syndrome, h/o thalamic hemorrhagic stroke (1/2020), probable SLE, VIRGEN, obesity, gout, chronic low back pain, HSV2, h/o shingles infection, h/o Covid (12/2021, 3/2022). [facility] 4-month routine f/u for chronic medical management     Lupus anticoagulant positive   Has been subtherapeutic on INR checks since 4/2024. Last INR was 1.5 on 9/12/24. INR goal 2-3. Due for INR recheck today   Coumadin now managed by [facility]     Lupus (CMS/[facility])  Stable, no changes to plan of care   Continue Prednisone 7.5 mg daily (per external fill history)  Followed by Dr. De Leon in [facility] - cancelled appt for 10/3/24 and rescheduled for 2/6/25     INR [facility] labs  10/28/24    3.8  10/15/24    2.2  9/12/14      1..5  6/10/24       1.9   5/1/24         1.7  4/2/24         1.3     History of Present Illness:   Joshua Shaikh is a pleasant 53 y.o. male who was admitted on 11/7/24 when he presented via EMS  for dizziness, extremity weakness, fall following dialysis/ syncope. Twisted rt ankle. EMS noted Afib with RVR and hypotensive. Started on amiodarone; BP responded to IV fluid bolus.   Follows with [facility] physicians  PMHx : ESRD (MWF- HD) ; Afib ( coumadin) , CVA / rt sided weakness; APLS, hx

## 2024-11-08 NOTE — ED NOTES
Continued Stay / Assessment/Plan of Care Note       Active Substitute Decision Maker (SDM)    There are no active Substitute Decision Maker (SDM) on file.           Progress note:  S/P egd. Tx 2 rbcs for h+h 6.6/19.9. Now H+H 8,o.24.5. Kyra gen diet. Poss dc home,no needs.    See SW/CM flowsheets for other objective data.    Disposition Recommendations:  Preliminary discharge destination:    SW/ recommendation for discharge: Home    Destination Pharmacy:        Discharge Plan/Needs:     Continued Care and Services - Admitted Since 11/6/2024    No active coordination exists for this encounter.               Prior To Hospitalization:    Living Situation: Alone and residing at Supported independent    .  Support Systems: /   Home Devices/Equipment: None            Mobility Assist Devices: Cane   Type of Service Prior to Hospitalization:  (Thresholds)               Patient/Family discharge goal (s):  Home     Resources provided:           Prior Function                Current Function  Last Filed Values       None            Therapy Recommendations for Discharge:   PT:      Last Filed Values       None          OT:       Last Filed Values       None          SLP:    Last Filed Values       None            Mobility Equipment Recommended for Discharge:        Barriers to Discharge  Identified Barriers to Discharge/Transition Planning:                     After PO dose of Cardizem patient heart rate still has not gotten out of AFIB, gave IV push Cardizem 20mg to help stabilize HR.  If HR stabilizes to be discharge and get dialysis access placed tomorrow if not to be admitted

## 2024-11-08 NOTE — H&P
Hospitalist Admission Note    NAME:  Joshua Shaikh   :  1970   MRN:  [id number]     Date/Time:  2024 7:30 PM    Patient PCP: Lisa Fu MD  ________________________________________________________________________    Given the patient's current clinical presentation, I have a high level of concern for decompensation if discharged from the emergency department.  Complex decision making was performed, which includes reviewing the patient's available past medical records, laboratory results, and x-ray films.       My assessment of this patient's clinical condition and my plan of care is as follows.    Assessment / Plan:    Joshua is a 53 y.o.  male with PMHx ESRD, CHF, CVA (chronic weakness of right side), VIRGEN who presents with blurry vision found to have afib with RVR.     # A-fib with RVR: Acute, present on admission.  Unclear what triggered RVR.   - Continue amiodarone drip  - TSH  - Troponin elevated but symptoms not consistent with ACS  - Restart diltiazem tomorrow morning  - Hold home amlodipine due to lower Bps  --cardiology consulted  --continue warfarin  - Echo  -- infectious work up with blood cultures, urine study  -- trend troponin.  If significant change, would contact cards    Elevated lactate: Likely secondary to hypotension.  Now resolved.  - Chest x-ray negative  - Blood cultures pending  - straight cath urine    Blurry vision: Acute, present on admission.  Now resolved.  Likely from lower blood pressures, though blurry vision was not present with lower BPs in ER.  Need to rule out TIA.  - Neurology consult  - MRI brain  - CT head without contrast neg  - CTA head and neck  - A1c and lipid panel in a.m.  - Telemetry  -- neuro checks q 4 hours  - Bedside swallow, if passes can have diet  -- MRI brain without contrast  - TTE with bubble study  -- consider statin    ESRD: Chronic on dialysis.  Nephrology consult.  Patient is on kidney transplant list.  Continue sevelamer.    Right

## 2024-11-08 NOTE — CONSULTS
[facility]    Joshua Shaikh  YOB: 1970          Assessment & Plan:     ESRD on HD MWF in [place] (Pt of Dr. Venegas)  Afib RVR  GLF c/b R ankle fracture  CAD  CHF  VIRGEN  H/o PE    Rec:  Seen on HD. Edward well.  Continue HD MWF  Watch BP. Has been lower recently  Will check back Monday        Subjective:   CC: ESRD  HPI: Patient seen on HD and edward well. He has ESRD on HD MWF in [facility] f/b Dr. Venegas. Came in with dizziness and a fall at home and rapid afib. Reports Bps have been running lower for a while. He is on the transplant list.  ROS: edward HD well  Current Facility-Administered Medications   Medication Dose Route Frequency    amiodarone (CORDARONE) tablet 400 mg  400 mg Oral BID    atorvastatin (LIPITOR) tablet 80 mg  80 mg Oral Nightly    amiodarone (CORDARONE) 450 mg in dextrose 5 % 250 mL infusion ([id number])  0.5 mg/min IntraVENous Continuous    gabapentin (NEURONTIN) capsule 300 mg  300 mg Oral Nightly    montelukast (SINGULAIR) tablet 10 mg  10 mg Oral Daily    [START ON 11/9/2024] predniSONE (DELTASONE) tablet 5 mg  5 mg Oral Every Other Day    sevelamer (RENVELA) tablet 800 mg  800 mg Oral TID AC    dilTIAZem (CARDIZEM CD) extended release capsule 120 mg  120 mg Oral Daily    sodium chloride flush 0.9 % injection 5-40 mL  5-40 mL IntraVENous 2 times per day    sodium chloride flush 0.9 % injection 5-40 mL  5-40 mL IntraVENous PRN    0.9 % sodium chloride infusion   IntraVENous PRN    ondansetron (ZOFRAN-ODT) disintegrating tablet 4 mg  4 mg Oral Q8H PRN    Or    ondansetron (ZOFRAN) injection 4 mg  4 mg IntraVENous Q6H PRN    polyethylene glycol (GLYCOLAX) packet 17 g  17 g Oral Daily PRN    Warfarin pharmacy to dose daily   Other RX Placeholder    Warfarin: No addtional dose today. Patient took his dose at home today already   Other RX Placeholder    oxyCODONE (ROXICODONE) immediate release tablet 10 mg  10 mg Oral Q6H PRN

## 2024-11-08 NOTE — CONSULTS
[facility] CARDIOLOGY                    Cardiology Care Note     [x]Initial Encounter     []Follow-up    Patient Name: Joshua Shaikh - :1970 - MRN:[id number]  Primary Cardiologist: U  Consulting Cardiologist: Yessi Baum MD     Reason for encounter: A-fib w/ RVR    HPI:       Joshua Shaikh is a 53 y.o. male with PMH significant for persistent AF s/p ablation, CHB s/p Medtronic PPM, MR, CHF, non obstructive CAD, HTN, antiphospholipid antibody syndrome, hx of PE/DVT, SLE, hemorrhagic stroke, and ESRD on HD.      Pt presented to the ED due to complaints of blurry vision, fatigue and near syncope. Pt was driving when he felt lightheaded with pain in his legs and blurry vision, stopped and got out of the car and nearly passed out. Fell the ground and hurt his ankle but did not lose consciousness. He has had 2 of these episodes over the last couple of months, was planning to undergo further workup. He denies any SOB. On admission, found to be in a-fib w/ RVR and hypotensive. Reports his BP has been running low at HD lately.     Subjective:      Joshua Shaikh reports none currently.      Assessment and Plan     A-fib w/ RVR, hx of persistent AF s/p ablation, CHB s/p Medtronic PPM  - elevated lactate and hypotensive on admission, started on IV amio  - transition to PO amio, if BP improves can increase dilt further and stop amio. Pt prefers not to stay on this medication long term   - PO dilt has been resumed, add hold parameters  - cont coumadin  - check echo   - interrogate device     2. Chronic HFpEF, non obstructive CAD  - HD for volume management  - check echo  - GDMT on hold d/t hypotension  - abnormal nuc stress in  however cath in 2023 without significant CAD     3. Mod MR  - echo as above    4. Antiphospholipid syndrome, hx PE/DVT  - on coumadin     5. ESRD on HD   - per nephrology     6. Hx of CVA   - having blurry vision episodes and weakness  - neuro consult     7. R ankle

## 2024-11-09 ENCOUNTER — TELEPHONE (OUTPATIENT)
Facility: HOSPITAL | Age: 54
End: 2024-11-09

## 2024-11-09 LAB
ALBUMIN SERPL-MCNC: 2.8 G/DL (ref 3.5–5)
ALBUMIN/GLOB SERPL: 0.5 (ref 1.1–2.2)
ALP SERPL-CCNC: 61 U/L (ref 45–117)
ALT SERPL-CCNC: 17 U/L (ref 12–78)
ANION GAP SERPL CALC-SCNC: 5 MMOL/L (ref 2–12)
AST SERPL-CCNC: 20 U/L (ref 15–37)
BASOPHILS # BLD: 0.1 K/UL (ref 0–0.1)
BASOPHILS NFR BLD: 1 % (ref 0–1)
BILIRUB SERPL-MCNC: 0.4 MG/DL (ref 0.2–1)
BUN SERPL-MCNC: 28 MG/DL (ref 6–20)
BUN/CREAT SERPL: 3 (ref 12–20)
CALCIUM SERPL-MCNC: 9.2 MG/DL (ref 8.5–10.1)
CHLORIDE SERPL-SCNC: 97 MMOL/L (ref 97–108)
CO2 SERPL-SCNC: 30 MMOL/L (ref 21–32)
CREAT SERPL-MCNC: 9.42 MG/DL (ref 0.7–1.3)
DIFFERENTIAL METHOD BLD: ABNORMAL
EOSINOPHIL # BLD: 0.5 K/UL (ref 0–0.4)
EOSINOPHIL NFR BLD: 6 % (ref 0–7)
ERYTHROCYTE [DISTWIDTH] IN BLOOD BY AUTOMATED COUNT: 17 % (ref 11.5–14.5)
GLOBULIN SER CALC-MCNC: 5.1 G/DL (ref 2–4)
GLUCOSE SERPL-MCNC: 95 MG/DL (ref 65–100)
HCT VFR BLD AUTO: 35.4 % (ref 36.6–50.3)
HGB BLD-MCNC: 11.1 G/DL (ref 12.1–17)
IMM GRANULOCYTES # BLD AUTO: 0 K/UL (ref 0–0.04)
IMM GRANULOCYTES NFR BLD AUTO: 1 % (ref 0–0.5)
INR PPP: 3.9 (ref 0.9–1.1)
LYMPHOCYTES # BLD: 1.1 K/UL (ref 0.8–3.5)
LYMPHOCYTES NFR BLD: 15 % (ref 12–49)
MCH RBC QN AUTO: 32.7 PG (ref 26–34)
MCHC RBC AUTO-ENTMCNC: 31.4 G/DL (ref 30–36.5)
MCV RBC AUTO: 104.4 FL (ref 80–99)
MONOCYTES # BLD: 1.2 K/UL (ref 0–1)
MONOCYTES NFR BLD: 15 % (ref 5–13)
NEUTS SEG # BLD: 4.9 K/UL (ref 1.8–8)
NEUTS SEG NFR BLD: 62 % (ref 32–75)
NRBC # BLD: 0 K/UL (ref 0–0.01)
NRBC BLD-RTO: 0 PER 100 WBC
PLATELET # BLD AUTO: 146 K/UL (ref 150–400)
PMV BLD AUTO: 11.3 FL (ref 8.9–12.9)
POTASSIUM SERPL-SCNC: 5.2 MMOL/L (ref 3.5–5.1)
PROT SERPL-MCNC: 7.9 G/DL (ref 6.4–8.2)
PROTHROMBIN TIME: 37.3 SEC (ref 9–11.1)
RBC # BLD AUTO: 3.39 M/UL (ref 4.1–5.7)
SODIUM SERPL-SCNC: 132 MMOL/L (ref 136–145)
WBC # BLD AUTO: 7.7 K/UL (ref 4.1–11.1)

## 2024-11-09 PROCEDURE — APPNB15 APP NON BILLABLE TIME 0-15 MINS: Performed by: PHYSICIAN ASSISTANT

## 2024-11-09 PROCEDURE — 97530 THERAPEUTIC ACTIVITIES: CPT

## 2024-11-09 PROCEDURE — 2580000003 HC RX 258: Performed by: FAMILY MEDICINE

## 2024-11-09 PROCEDURE — 85610 PROTHROMBIN TIME: CPT

## 2024-11-09 PROCEDURE — 97162 PT EVAL MOD COMPLEX 30 MIN: CPT

## 2024-11-09 PROCEDURE — 6370000000 HC RX 637 (ALT 250 FOR IP): Performed by: STUDENT IN AN ORGANIZED HEALTH CARE EDUCATION/TRAINING PROGRAM

## 2024-11-09 PROCEDURE — 80053 COMPREHEN METABOLIC PANEL: CPT

## 2024-11-09 PROCEDURE — 97165 OT EVAL LOW COMPLEX 30 MIN: CPT

## 2024-11-09 PROCEDURE — 2060000000 HC ICU INTERMEDIATE R&B

## 2024-11-09 PROCEDURE — 6370000000 HC RX 637 (ALT 250 FOR IP): Performed by: INTERNAL MEDICINE

## 2024-11-09 PROCEDURE — 6360000002 HC RX W HCPCS

## 2024-11-09 PROCEDURE — 2580000003 HC RX 258

## 2024-11-09 PROCEDURE — L4360 PNEUMAT WALKING BOOT PRE CST: HCPCS

## 2024-11-09 PROCEDURE — 97535 SELF CARE MNGMENT TRAINING: CPT

## 2024-11-09 PROCEDURE — 94761 N-INVAS EAR/PLS OXIMETRY MLT: CPT

## 2024-11-09 PROCEDURE — 6370000000 HC RX 637 (ALT 250 FOR IP): Performed by: NURSE PRACTITIONER

## 2024-11-09 PROCEDURE — 85025 COMPLETE CBC W/AUTO DIFF WBC: CPT

## 2024-11-09 PROCEDURE — 6370000000 HC RX 637 (ALT 250 FOR IP): Performed by: FAMILY MEDICINE

## 2024-11-09 RX ORDER — WARFARIN SODIUM 1 MG/1
3 TABLET ORAL
Status: COMPLETED | OUTPATIENT
Start: 2024-11-09 | End: 2024-11-09

## 2024-11-09 RX ORDER — OXYCODONE HYDROCHLORIDE 5 MG/1
15 TABLET ORAL EVERY 6 HOURS PRN
Status: DISCONTINUED | OUTPATIENT
Start: 2024-11-09 | End: 2024-11-13 | Stop reason: HOSPADM

## 2024-11-09 RX ORDER — ASPIRIN 81 MG/1
81 TABLET, CHEWABLE ORAL DAILY
Status: DISCONTINUED | OUTPATIENT
Start: 2024-11-09 | End: 2024-11-09

## 2024-11-09 RX ADMIN — ALLOPURINOL 100 MG: 100 TABLET ORAL at 09:35

## 2024-11-09 RX ADMIN — SEVELAMER CARBONATE 800 MG: 800 TABLET, FILM COATED ORAL at 17:27

## 2024-11-09 RX ADMIN — MONTELUKAST 10 MG: 10 TABLET, FILM COATED ORAL at 08:22

## 2024-11-09 RX ADMIN — OXYCODONE 15 MG: 5 TABLET ORAL at 09:57

## 2024-11-09 RX ADMIN — ACETAMINOPHEN 1000 MG: 500 TABLET ORAL at 00:32

## 2024-11-09 RX ADMIN — AMIODARONE HYDROCHLORIDE 400 MG: 200 TABLET ORAL at 08:22

## 2024-11-09 RX ADMIN — OXYCODONE 10 MG: 5 TABLET ORAL at 00:30

## 2024-11-09 RX ADMIN — SODIUM CHLORIDE, PRESERVATIVE FREE 10 ML: 5 INJECTION INTRAVENOUS at 08:26

## 2024-11-09 RX ADMIN — PREDNISONE 5 MG: 5 TABLET ORAL at 08:22

## 2024-11-09 RX ADMIN — AMIODARONE HYDROCHLORIDE 150 MG: 50 INJECTION, SOLUTION INTRAVENOUS at 11:50

## 2024-11-09 RX ADMIN — SEVELAMER CARBONATE 800 MG: 800 TABLET, FILM COATED ORAL at 09:35

## 2024-11-09 RX ADMIN — OXYCODONE 10 MG: 5 TABLET ORAL at 06:11

## 2024-11-09 RX ADMIN — ACETAMINOPHEN 1000 MG: 500 TABLET ORAL at 16:52

## 2024-11-09 RX ADMIN — OXYCODONE 15 MG: 5 TABLET ORAL at 16:53

## 2024-11-09 RX ADMIN — AMIODARONE HYDROCHLORIDE 0.5 MG/MIN: 50 INJECTION, SOLUTION INTRAVENOUS at 17:34

## 2024-11-09 RX ADMIN — AMIODARONE HYDROCHLORIDE 1 MG/MIN: 50 INJECTION, SOLUTION INTRAVENOUS at 12:40

## 2024-11-09 RX ADMIN — GABAPENTIN 300 MG: 300 CAPSULE ORAL at 20:35

## 2024-11-09 RX ADMIN — DIPHENHYDRAMINE HYDROCHLORIDE 25 MG: 25 CAPSULE ORAL at 02:06

## 2024-11-09 RX ADMIN — WARFARIN SODIUM 3 MG: 1 TABLET ORAL at 17:27

## 2024-11-09 RX ADMIN — DIPHENHYDRAMINE HYDROCHLORIDE 25 MG: 25 CAPSULE ORAL at 13:11

## 2024-11-09 RX ADMIN — SEVELAMER CARBONATE 800 MG: 800 TABLET, FILM COATED ORAL at 12:41

## 2024-11-09 RX ADMIN — ASPIRIN 81 MG: 81 TABLET, CHEWABLE ORAL at 08:22

## 2024-11-09 RX ADMIN — ATORVASTATIN CALCIUM 80 MG: 20 TABLET, FILM COATED ORAL at 20:35

## 2024-11-09 RX ADMIN — ACETAMINOPHEN 1000 MG: 500 TABLET ORAL at 08:22

## 2024-11-09 ASSESSMENT — PAIN SCALES - GENERAL
PAINLEVEL_OUTOF10: 7
PAINLEVEL_OUTOF10: 4
PAINLEVEL_OUTOF10: 6
PAINLEVEL_OUTOF10: 6
PAINLEVEL_OUTOF10: 8
PAINLEVEL_OUTOF10: 7
PAINLEVEL_OUTOF10: 6
PAINLEVEL_OUTOF10: 8

## 2024-11-09 ASSESSMENT — PAIN DESCRIPTION - LOCATION
LOCATION: LEG
LOCATION: LEG

## 2024-11-09 ASSESSMENT — PAIN - FUNCTIONAL ASSESSMENT
PAIN_FUNCTIONAL_ASSESSMENT: INTOLERABLE, UNABLE TO DO ANY ACTIVE OR PASSIVE ACTIVITIES
PAIN_FUNCTIONAL_ASSESSMENT: INTOLERABLE, UNABLE TO DO ANY ACTIVE OR PASSIVE ACTIVITIES

## 2024-11-09 ASSESSMENT — PAIN DESCRIPTION - DESCRIPTORS
DESCRIPTORS: ACHING
DESCRIPTORS: ACHING

## 2024-11-09 ASSESSMENT — PAIN DESCRIPTION - ORIENTATION
ORIENTATION: RIGHT
ORIENTATION: RIGHT

## 2024-11-09 NOTE — CONSULTS
ORTHOPAEDIC SURGERY CONSULT NOTE    Subjective:     Date of Consultation:  November 8, 2024      Joshua Shaikh is a 53 y.o. male who is being seen for a nondisplaced right distal fibula Fernandez B fracture without syndesmotic disruption.  He has a past medical history of ESRD on HD, CHF, CVA (chronic weakness of right side), and antiphospholipid syndrome on coumadin.  He presented to the ER yesterday with blurred vision and right sided weakness.  He was found on the ground by family.  He complained of right ankle pain after the fall.  He was brought to [facility] where he was found to have the above fracture.  He also had a MRI that confirmed an acute/subacute CVA.  Denies other injuries or pain.       Patient Active Problem List    Diagnosis Date Noted    Closed fracture of distal end of right fibula with routine healing 11/08/2024    Systemic lupus erythematosus ([facility]) 11/08/2024    Antiphospholipid antibody syndrome ([facility]) 11/08/2024    History of complete heart block 11/08/2024    History of CVA (cerebrovascular accident) 11/08/2024    Blurred vision 11/08/2024    Longstanding persistent atrial fibrillation ([facility]) 11/08/2024    Cardiac pacemaker in situ 11/08/2024    Closed fracture of right ankle 11/08/2024    Acute CVA (cerebrovascular accident) ([facility]) 11/08/2024    Atrial fibrillation with rapid ventricular response ([facility]) 11/07/2024    Acute hyponatremia 04/01/2023    Facial numbness 04/01/2023    ESRD (end stage renal disease) on dialysis ([facility]) 04/01/2023    Supratherapeutic international normalized ratio (INR) 04/01/2023    CVA (cerebral vascular accident) ([facility]) 02/13/2023    Atrial fibrillation with RVR ([facility]) 10/07/2021    Onychomycosis 10/14/2020    CKD (chronic kidney disease), stage III ([facility]) 11/16/2011    Adverse drug reaction 07/08/2010    Malignant hypertension 07/08/2010    Angioedema of lips 07/08/2010     Family History   Problem Relation Age of Onset    Heart Disease Sister     Stroke Mother     Lung

## 2024-11-09 NOTE — TELEPHONE ENCOUNTER
Pt needs a hospital follow up appointment  Provider: Laurel Hadley NP  When: 8 to 12 weeks  Diagnosis/reason for follow up: Stroke

## 2024-11-09 NOTE — TELEPHONE ENCOUNTER
Pt needs a hospital follow up appointment  Provider:  Michael  When: next available  Diagnosis/reason for follow up:  MCA stenosis on max medical management

## 2024-11-10 LAB
ALBUMIN SERPL-MCNC: 2.5 G/DL (ref 3.5–5)
ALBUMIN/GLOB SERPL: 0.6 (ref 1.1–2.2)
ALP SERPL-CCNC: 47 U/L (ref 45–117)
ALT SERPL-CCNC: 12 U/L (ref 12–78)
ANION GAP SERPL CALC-SCNC: 10 MMOL/L (ref 2–12)
AST SERPL-CCNC: 20 U/L (ref 15–37)
BASOPHILS # BLD: 0.1 K/UL (ref 0–0.1)
BASOPHILS NFR BLD: 1 % (ref 0–1)
BILIRUB SERPL-MCNC: 0.3 MG/DL (ref 0.2–1)
BUN SERPL-MCNC: 44 MG/DL (ref 6–20)
BUN/CREAT SERPL: 4 (ref 12–20)
CALCIUM SERPL-MCNC: 8.3 MG/DL (ref 8.5–10.1)
CHLORIDE SERPL-SCNC: 92 MMOL/L (ref 97–108)
CO2 SERPL-SCNC: 27 MMOL/L (ref 21–32)
CREAT SERPL-MCNC: 12 MG/DL (ref 0.7–1.3)
DIFFERENTIAL METHOD BLD: ABNORMAL
EOSINOPHIL # BLD: 0.4 K/UL (ref 0–0.4)
EOSINOPHIL NFR BLD: 6 % (ref 0–7)
ERYTHROCYTE [DISTWIDTH] IN BLOOD BY AUTOMATED COUNT: 16.8 % (ref 11.5–14.5)
GLOBULIN SER CALC-MCNC: 4.5 G/DL (ref 2–4)
GLUCOSE SERPL-MCNC: 170 MG/DL (ref 65–100)
HCT VFR BLD AUTO: 29.8 % (ref 36.6–50.3)
HGB BLD-MCNC: 9.5 G/DL (ref 12.1–17)
IMM GRANULOCYTES # BLD AUTO: 0 K/UL (ref 0–0.04)
IMM GRANULOCYTES NFR BLD AUTO: 0 % (ref 0–0.5)
INR PPP: 3.1 (ref 0.9–1.1)
LYMPHOCYTES # BLD: 1.5 K/UL (ref 0.8–3.5)
LYMPHOCYTES NFR BLD: 20 % (ref 12–49)
MCH RBC QN AUTO: 33 PG (ref 26–34)
MCHC RBC AUTO-ENTMCNC: 31.9 G/DL (ref 30–36.5)
MCV RBC AUTO: 103.5 FL (ref 80–99)
MONOCYTES # BLD: 1.1 K/UL (ref 0–1)
MONOCYTES NFR BLD: 14 % (ref 5–13)
NEUTS SEG # BLD: 4.4 K/UL (ref 1.8–8)
NEUTS SEG NFR BLD: 59 % (ref 32–75)
NRBC # BLD: 0 K/UL (ref 0–0.01)
NRBC BLD-RTO: 0 PER 100 WBC
PLATELET # BLD AUTO: 145 K/UL (ref 150–400)
PMV BLD AUTO: 11.1 FL (ref 8.9–12.9)
POTASSIUM SERPL-SCNC: 5.3 MMOL/L (ref 3.5–5.1)
PROT SERPL-MCNC: 7 G/DL (ref 6.4–8.2)
PROTHROMBIN TIME: 29.8 SEC (ref 9–11.1)
RBC # BLD AUTO: 2.88 M/UL (ref 4.1–5.7)
SODIUM SERPL-SCNC: 129 MMOL/L (ref 136–145)
WBC # BLD AUTO: 7.5 K/UL (ref 4.1–11.1)

## 2024-11-10 PROCEDURE — 2060000000 HC ICU INTERMEDIATE R&B

## 2024-11-10 PROCEDURE — 94761 N-INVAS EAR/PLS OXIMETRY MLT: CPT

## 2024-11-10 PROCEDURE — 6370000000 HC RX 637 (ALT 250 FOR IP): Performed by: STUDENT IN AN ORGANIZED HEALTH CARE EDUCATION/TRAINING PROGRAM

## 2024-11-10 PROCEDURE — 6370000000 HC RX 637 (ALT 250 FOR IP): Performed by: INTERNAL MEDICINE

## 2024-11-10 PROCEDURE — 2580000003 HC RX 258

## 2024-11-10 PROCEDURE — 85025 COMPLETE CBC W/AUTO DIFF WBC: CPT

## 2024-11-10 PROCEDURE — 6370000000 HC RX 637 (ALT 250 FOR IP): Performed by: FAMILY MEDICINE

## 2024-11-10 PROCEDURE — APPSS30 APP SPLIT SHARED TIME 16-30 MINUTES

## 2024-11-10 PROCEDURE — 97530 THERAPEUTIC ACTIVITIES: CPT

## 2024-11-10 PROCEDURE — 80053 COMPREHEN METABOLIC PANEL: CPT

## 2024-11-10 PROCEDURE — 2580000003 HC RX 258: Performed by: FAMILY MEDICINE

## 2024-11-10 PROCEDURE — 6360000002 HC RX W HCPCS

## 2024-11-10 PROCEDURE — 36415 COLL VENOUS BLD VENIPUNCTURE: CPT

## 2024-11-10 PROCEDURE — 85610 PROTHROMBIN TIME: CPT

## 2024-11-10 RX ORDER — WARFARIN SODIUM 1 MG/1
3 TABLET ORAL
Status: COMPLETED | OUTPATIENT
Start: 2024-11-10 | End: 2024-11-10

## 2024-11-10 RX ORDER — SIMETHICONE 80 MG
80 TABLET,CHEWABLE ORAL 4 TIMES DAILY PRN
Status: DISCONTINUED | OUTPATIENT
Start: 2024-11-10 | End: 2024-11-13 | Stop reason: HOSPADM

## 2024-11-10 RX ADMIN — SODIUM CHLORIDE, PRESERVATIVE FREE 10 ML: 5 INJECTION INTRAVENOUS at 10:23

## 2024-11-10 RX ADMIN — MONTELUKAST 10 MG: 10 TABLET, FILM COATED ORAL at 07:47

## 2024-11-10 RX ADMIN — SODIUM CHLORIDE, PRESERVATIVE FREE 10 ML: 5 INJECTION INTRAVENOUS at 20:48

## 2024-11-10 RX ADMIN — OXYCODONE 15 MG: 5 TABLET ORAL at 22:05

## 2024-11-10 RX ADMIN — AMIODARONE HYDROCHLORIDE 0.5 MG/MIN: 50 INJECTION, SOLUTION INTRAVENOUS at 09:58

## 2024-11-10 RX ADMIN — OXYCODONE 15 MG: 5 TABLET ORAL at 00:31

## 2024-11-10 RX ADMIN — SIMETHICONE 80 MG: 80 TABLET, CHEWABLE ORAL at 18:52

## 2024-11-10 RX ADMIN — ATORVASTATIN CALCIUM 80 MG: 20 TABLET, FILM COATED ORAL at 20:47

## 2024-11-10 RX ADMIN — ACETAMINOPHEN 1000 MG: 500 TABLET ORAL at 07:47

## 2024-11-10 RX ADMIN — OXYCODONE 15 MG: 5 TABLET ORAL at 15:26

## 2024-11-10 RX ADMIN — ACETAMINOPHEN 1000 MG: 500 TABLET ORAL at 17:28

## 2024-11-10 RX ADMIN — DIPHENHYDRAMINE HYDROCHLORIDE 25 MG: 25 CAPSULE ORAL at 07:52

## 2024-11-10 RX ADMIN — WARFARIN SODIUM 3 MG: 1 TABLET ORAL at 17:28

## 2024-11-10 RX ADMIN — SEVELAMER CARBONATE 800 MG: 800 TABLET, FILM COATED ORAL at 17:28

## 2024-11-10 RX ADMIN — SEVELAMER CARBONATE 800 MG: 800 TABLET, FILM COATED ORAL at 07:47

## 2024-11-10 RX ADMIN — GABAPENTIN 300 MG: 300 CAPSULE ORAL at 20:48

## 2024-11-10 RX ADMIN — OXYCODONE 15 MG: 5 TABLET ORAL at 07:52

## 2024-11-10 RX ADMIN — SEVELAMER CARBONATE 800 MG: 800 TABLET, FILM COATED ORAL at 12:54

## 2024-11-10 ASSESSMENT — PAIN SCALES - GENERAL
PAINLEVEL_OUTOF10: 5
PAINLEVEL_OUTOF10: 7
PAINLEVEL_OUTOF10: 7
PAINLEVEL_OUTOF10: 8
PAINLEVEL_OUTOF10: 3
PAINLEVEL_OUTOF10: 8
PAINLEVEL_OUTOF10: 7
PAINLEVEL_OUTOF10: 5

## 2024-11-10 ASSESSMENT — PAIN DESCRIPTION - ORIENTATION
ORIENTATION: RIGHT

## 2024-11-10 ASSESSMENT — PAIN DESCRIPTION - DESCRIPTORS
DESCRIPTORS: ACHING
DESCRIPTORS: ACHING

## 2024-11-10 ASSESSMENT — PAIN DESCRIPTION - LOCATION
LOCATION: HEAD;LEG
LOCATION: ANKLE
LOCATION: ANKLE
LOCATION: HEAD;LEG

## 2024-11-11 LAB
ANION GAP SERPL CALC-SCNC: 5 MMOL/L (ref 2–12)
BUN SERPL-MCNC: 38 MG/DL (ref 6–20)
BUN/CREAT SERPL: 4 (ref 12–20)
CALCIUM SERPL-MCNC: 8.2 MG/DL (ref 8.5–10.1)
CHLORIDE SERPL-SCNC: 96 MMOL/L (ref 97–108)
CO2 SERPL-SCNC: 29 MMOL/L (ref 21–32)
CREAT SERPL-MCNC: 8.85 MG/DL (ref 0.7–1.3)
ERYTHROCYTE [DISTWIDTH] IN BLOOD BY AUTOMATED COUNT: 16.4 % (ref 11.5–14.5)
GLUCOSE SERPL-MCNC: 119 MG/DL (ref 65–100)
HCT VFR BLD AUTO: 29.6 % (ref 36.6–50.3)
HGB BLD-MCNC: 9.6 G/DL (ref 12.1–17)
INR PPP: 3 (ref 0.9–1.1)
MCH RBC QN AUTO: 32.5 PG (ref 26–34)
MCHC RBC AUTO-ENTMCNC: 32.4 G/DL (ref 30–36.5)
MCV RBC AUTO: 100.3 FL (ref 80–99)
NRBC # BLD: 0 K/UL (ref 0–0.01)
NRBC BLD-RTO: 0 PER 100 WBC
PLATELET # BLD AUTO: 167 K/UL (ref 150–400)
PMV BLD AUTO: 10.9 FL (ref 8.9–12.9)
POTASSIUM SERPL-SCNC: 4.8 MMOL/L (ref 3.5–5.1)
PROTHROMBIN TIME: 29.4 SEC (ref 9–11.1)
RBC # BLD AUTO: 2.95 M/UL (ref 4.1–5.7)
SODIUM SERPL-SCNC: 130 MMOL/L (ref 136–145)
WBC # BLD AUTO: 7.1 K/UL (ref 4.1–11.1)

## 2024-11-11 PROCEDURE — 6370000000 HC RX 637 (ALT 250 FOR IP): Performed by: NURSE PRACTITIONER

## 2024-11-11 PROCEDURE — 6370000000 HC RX 637 (ALT 250 FOR IP): Performed by: INTERNAL MEDICINE

## 2024-11-11 PROCEDURE — 6370000000 HC RX 637 (ALT 250 FOR IP): Performed by: STUDENT IN AN ORGANIZED HEALTH CARE EDUCATION/TRAINING PROGRAM

## 2024-11-11 PROCEDURE — 6360000002 HC RX W HCPCS

## 2024-11-11 PROCEDURE — 6370000000 HC RX 637 (ALT 250 FOR IP): Performed by: FAMILY MEDICINE

## 2024-11-11 PROCEDURE — 90935 HEMODIALYSIS ONE EVALUATION: CPT

## 2024-11-11 PROCEDURE — 2060000000 HC ICU INTERMEDIATE R&B

## 2024-11-11 PROCEDURE — 99232 SBSQ HOSP IP/OBS MODERATE 35: CPT | Performed by: INTERNAL MEDICINE

## 2024-11-11 PROCEDURE — 85610 PROTHROMBIN TIME: CPT

## 2024-11-11 PROCEDURE — 85027 COMPLETE CBC AUTOMATED: CPT

## 2024-11-11 PROCEDURE — 80048 BASIC METABOLIC PNL TOTAL CA: CPT

## 2024-11-11 PROCEDURE — 36415 COLL VENOUS BLD VENIPUNCTURE: CPT

## 2024-11-11 PROCEDURE — 6360000002 HC RX W HCPCS: Performed by: STUDENT IN AN ORGANIZED HEALTH CARE EDUCATION/TRAINING PROGRAM

## 2024-11-11 PROCEDURE — P9047 ALBUMIN (HUMAN), 25%, 50ML: HCPCS | Performed by: STUDENT IN AN ORGANIZED HEALTH CARE EDUCATION/TRAINING PROGRAM

## 2024-11-11 PROCEDURE — 2580000003 HC RX 258: Performed by: FAMILY MEDICINE

## 2024-11-11 PROCEDURE — APPSS30 APP SPLIT SHARED TIME 16-30 MINUTES: Performed by: NURSE PRACTITIONER

## 2024-11-11 PROCEDURE — 2580000003 HC RX 258

## 2024-11-11 RX ORDER — MIDODRINE HYDROCHLORIDE 5 MG/1
5 TABLET ORAL
Status: DISCONTINUED | OUTPATIENT
Start: 2024-11-11 | End: 2024-11-13 | Stop reason: HOSPADM

## 2024-11-11 RX ORDER — AMIODARONE HYDROCHLORIDE 200 MG/1
400 TABLET ORAL 2 TIMES DAILY
Status: DISCONTINUED | OUTPATIENT
Start: 2024-11-11 | End: 2024-11-13 | Stop reason: HOSPADM

## 2024-11-11 RX ORDER — WARFARIN SODIUM 1 MG/1
3 TABLET ORAL
Status: COMPLETED | OUTPATIENT
Start: 2024-11-11 | End: 2024-11-11

## 2024-11-11 RX ORDER — DIPHENHYDRAMINE HCL 25 MG
25 CAPSULE ORAL EVERY 6 HOURS PRN
Status: DISCONTINUED | OUTPATIENT
Start: 2024-11-11 | End: 2024-11-13 | Stop reason: HOSPADM

## 2024-11-11 RX ORDER — ALBUMIN (HUMAN) 12.5 G/50ML
25 SOLUTION INTRAVENOUS ONCE
Status: COMPLETED | OUTPATIENT
Start: 2024-11-11 | End: 2024-11-11

## 2024-11-11 RX ADMIN — AMIODARONE HYDROCHLORIDE 0.5 MG/MIN: 50 INJECTION, SOLUTION INTRAVENOUS at 01:04

## 2024-11-11 RX ADMIN — ACETAMINOPHEN 1000 MG: 500 TABLET ORAL at 22:28

## 2024-11-11 RX ADMIN — MIDODRINE HYDROCHLORIDE 5 MG: 5 TABLET ORAL at 16:45

## 2024-11-11 RX ADMIN — MONTELUKAST 10 MG: 10 TABLET, FILM COATED ORAL at 08:05

## 2024-11-11 RX ADMIN — ACETAMINOPHEN 1000 MG: 500 TABLET ORAL at 08:04

## 2024-11-11 RX ADMIN — PREDNISONE 5 MG: 5 TABLET ORAL at 08:05

## 2024-11-11 RX ADMIN — SODIUM CHLORIDE, PRESERVATIVE FREE 10 ML: 5 INJECTION INTRAVENOUS at 22:29

## 2024-11-11 RX ADMIN — DILTIAZEM HYDROCHLORIDE 120 MG: 120 CAPSULE, COATED, EXTENDED RELEASE ORAL at 08:05

## 2024-11-11 RX ADMIN — AMIODARONE HYDROCHLORIDE 0.5 MG/MIN: 50 INJECTION, SOLUTION INTRAVENOUS at 16:55

## 2024-11-11 RX ADMIN — WARFARIN SODIUM 3 MG: 1 TABLET ORAL at 16:45

## 2024-11-11 RX ADMIN — AMIODARONE HYDROCHLORIDE 0.5 MG/MIN: 50 INJECTION, SOLUTION INTRAVENOUS at 22:05

## 2024-11-11 RX ADMIN — SODIUM CHLORIDE, PRESERVATIVE FREE 10 ML: 5 INJECTION INTRAVENOUS at 08:06

## 2024-11-11 RX ADMIN — OXYCODONE 15 MG: 5 TABLET ORAL at 16:01

## 2024-11-11 RX ADMIN — MIDODRINE HYDROCHLORIDE 5 MG: 5 TABLET ORAL at 14:46

## 2024-11-11 RX ADMIN — ALLOPURINOL 100 MG: 100 TABLET ORAL at 08:12

## 2024-11-11 RX ADMIN — SIMETHICONE 80 MG: 80 TABLET, CHEWABLE ORAL at 08:12

## 2024-11-11 RX ADMIN — ACETAMINOPHEN 1000 MG: 500 TABLET ORAL at 01:10

## 2024-11-11 RX ADMIN — SEVELAMER CARBONATE 800 MG: 800 TABLET, FILM COATED ORAL at 16:45

## 2024-11-11 RX ADMIN — AMIODARONE HYDROCHLORIDE 400 MG: 200 TABLET ORAL at 11:14

## 2024-11-11 RX ADMIN — GABAPENTIN 300 MG: 300 CAPSULE ORAL at 22:27

## 2024-11-11 RX ADMIN — OXYCODONE 15 MG: 5 TABLET ORAL at 22:27

## 2024-11-11 RX ADMIN — ACETAMINOPHEN 1000 MG: 500 TABLET ORAL at 16:01

## 2024-11-11 RX ADMIN — OXYCODONE 15 MG: 5 TABLET ORAL at 04:12

## 2024-11-11 RX ADMIN — AMIODARONE HYDROCHLORIDE 400 MG: 200 TABLET ORAL at 22:27

## 2024-11-11 RX ADMIN — SEVELAMER CARBONATE 800 MG: 800 TABLET, FILM COATED ORAL at 08:04

## 2024-11-11 RX ADMIN — ATORVASTATIN CALCIUM 80 MG: 20 TABLET, FILM COATED ORAL at 22:27

## 2024-11-11 RX ADMIN — ALBUMIN (HUMAN) 25 G: 0.25 INJECTION, SOLUTION INTRAVENOUS at 12:30

## 2024-11-11 RX ADMIN — DIPHENHYDRAMINE HYDROCHLORIDE 25 MG: 25 CAPSULE ORAL at 12:22

## 2024-11-11 RX ADMIN — SEVELAMER CARBONATE 800 MG: 800 TABLET, FILM COATED ORAL at 14:18

## 2024-11-11 ASSESSMENT — PAIN DESCRIPTION - DESCRIPTORS
DESCRIPTORS: THROBBING;ACHING
DESCRIPTORS: ACHING
DESCRIPTORS: ACHING;THROBBING
DESCRIPTORS: ACHING
DESCRIPTORS: ACHING

## 2024-11-11 ASSESSMENT — PAIN SCALES - GENERAL
PAINLEVEL_OUTOF10: 6
PAINLEVEL_OUTOF10: 10
PAINLEVEL_OUTOF10: 6
PAINLEVEL_OUTOF10: 6
PAINLEVEL_OUTOF10: 7
PAINLEVEL_OUTOF10: 5
PAINLEVEL_OUTOF10: 8
PAINLEVEL_OUTOF10: 2
PAINLEVEL_OUTOF10: 7
PAINLEVEL_OUTOF10: 6
PAINLEVEL_OUTOF10: 6
PAINLEVEL_OUTOF10: 7

## 2024-11-11 ASSESSMENT — PAIN DESCRIPTION - LOCATION
LOCATION: LEG
LOCATION: FOOT
LOCATION: LEG
LOCATION: ANKLE;FOOT

## 2024-11-11 ASSESSMENT — PAIN DESCRIPTION - ORIENTATION
ORIENTATION: RIGHT

## 2024-11-11 NOTE — DIALYSIS
6  (discomfort and shooting pain continue, intermittently, primary nurse present and patient states awareness of medication available for right leg)   During Hemodialysis Assessment   Ultrafiltration Removed (ml) 2300 ml   Hemodialysis Fistula/Graft Arteriovenous fistula Left Forearm   No placement date or time found.   Present on Admission/Arrival: Yes  Access Type: Arteriovenous fistula  Orientation: Left  Access Location: Forearm   Site Assessment Clean, dry & intact   Thrill Present   Bruit Present   Status Deaccessed   Post-Hemodialysis Assessment   Post-Treatment Procedures Blood returned;Access bleeding time < 10 minutes   Machine Disinfection Process Acid/Vinegar Clean;Heat Disinfect;Exterior Machine Disinfection   Rinseback Volume (ml) 300 ml   Blood Volume Processed (Liters) 87 L   Dialyzer Clearance Lightly streaked   Duration of Treatment (minutes) 255 minutes   Hemodialysis Intake (ml) 300 ml   Hemodialysis Output (ml) 2300 ml   NET Removed (ml) 2000   Tolerated Treatment Good   Patient Response to Treatment tolerated treatment without incident   Patient Disposition Return to room         Primary RN SBAR: SERVANDO Moreno RN  Comments: Dr Kwong rounded during treatment and aware of a fib / tachycardia, which is baseline with patient / also rounding:  cardiologist and . Primary nurse present often to check in on patient, hang albumin, give benedryl when patient stated itch r/t pain medication, which he stated occurs often.

## 2024-11-12 PROBLEM — R55 NEAR SYNCOPE: Status: ACTIVE | Noted: 2024-11-12

## 2024-11-12 LAB
ANION GAP SERPL CALC-SCNC: 4 MMOL/L (ref 2–12)
BACTERIA SPEC CULT: NORMAL
BACTERIA SPEC CULT: NORMAL
BUN SERPL-MCNC: 44 MG/DL (ref 6–20)
BUN/CREAT SERPL: 4 (ref 12–20)
CALCIUM SERPL-MCNC: 8.8 MG/DL (ref 8.5–10.1)
CHLORIDE SERPL-SCNC: 93 MMOL/L (ref 97–108)
CO2 SERPL-SCNC: 30 MMOL/L (ref 21–32)
COMMENT:: NORMAL
CREAT SERPL-MCNC: 11.1 MG/DL (ref 0.7–1.3)
ERYTHROCYTE [DISTWIDTH] IN BLOOD BY AUTOMATED COUNT: 16.2 % (ref 11.5–14.5)
GLUCOSE BLD STRIP.AUTO-MCNC: 91 MG/DL (ref 65–117)
GLUCOSE SERPL-MCNC: 100 MG/DL (ref 65–100)
HCT VFR BLD AUTO: 27.2 % (ref 36.6–50.3)
HGB BLD-MCNC: 8.7 G/DL (ref 12.1–17)
INR PPP: 2.6 (ref 0.9–1.1)
MCH RBC QN AUTO: 33.2 PG (ref 26–34)
MCHC RBC AUTO-ENTMCNC: 32 G/DL (ref 30–36.5)
MCV RBC AUTO: 103.8 FL (ref 80–99)
NRBC # BLD: 0 K/UL (ref 0–0.01)
NRBC BLD-RTO: 0 PER 100 WBC
PLATELET # BLD AUTO: 179 K/UL (ref 150–400)
PMV BLD AUTO: 11 FL (ref 8.9–12.9)
POTASSIUM SERPL-SCNC: 5.8 MMOL/L (ref 3.5–5.1)
PROTHROMBIN TIME: 25 SEC (ref 9–11.1)
RBC # BLD AUTO: 2.62 M/UL (ref 4.1–5.7)
SERVICE CMNT-IMP: NORMAL
SODIUM SERPL-SCNC: 127 MMOL/L (ref 136–145)
SPECIMEN HOLD: NORMAL
WBC # BLD AUTO: 7.4 K/UL (ref 4.1–11.1)

## 2024-11-12 PROCEDURE — 85027 COMPLETE CBC AUTOMATED: CPT

## 2024-11-12 PROCEDURE — 6370000000 HC RX 637 (ALT 250 FOR IP): Performed by: INTERNAL MEDICINE

## 2024-11-12 PROCEDURE — 2060000000 HC ICU INTERMEDIATE R&B

## 2024-11-12 PROCEDURE — 2580000003 HC RX 258: Performed by: FAMILY MEDICINE

## 2024-11-12 PROCEDURE — 80048 BASIC METABOLIC PNL TOTAL CA: CPT

## 2024-11-12 PROCEDURE — 6370000000 HC RX 637 (ALT 250 FOR IP): Performed by: NURSE PRACTITIONER

## 2024-11-12 PROCEDURE — APPSS30 APP SPLIT SHARED TIME 16-30 MINUTES: Performed by: NURSE PRACTITIONER

## 2024-11-12 PROCEDURE — 97530 THERAPEUTIC ACTIVITIES: CPT

## 2024-11-12 PROCEDURE — 6370000000 HC RX 637 (ALT 250 FOR IP): Performed by: STUDENT IN AN ORGANIZED HEALTH CARE EDUCATION/TRAINING PROGRAM

## 2024-11-12 PROCEDURE — 85610 PROTHROMBIN TIME: CPT

## 2024-11-12 PROCEDURE — 94761 N-INVAS EAR/PLS OXIMETRY MLT: CPT

## 2024-11-12 PROCEDURE — 6360000002 HC RX W HCPCS: Performed by: FAMILY MEDICINE

## 2024-11-12 PROCEDURE — 6370000000 HC RX 637 (ALT 250 FOR IP): Performed by: FAMILY MEDICINE

## 2024-11-12 PROCEDURE — 82962 GLUCOSE BLOOD TEST: CPT

## 2024-11-12 PROCEDURE — 36415 COLL VENOUS BLD VENIPUNCTURE: CPT

## 2024-11-12 RX ORDER — WARFARIN SODIUM 1 MG/1
3 TABLET ORAL
Status: COMPLETED | OUTPATIENT
Start: 2024-11-12 | End: 2024-11-12

## 2024-11-12 RX ORDER — OXYCODONE HYDROCHLORIDE 5 MG/1
5 TABLET ORAL ONCE
Status: COMPLETED | OUTPATIENT
Start: 2024-11-12 | End: 2024-11-12

## 2024-11-12 RX ORDER — ZOLPIDEM TARTRATE 5 MG/1
5 TABLET ORAL NIGHTLY PRN
Status: DISCONTINUED | OUTPATIENT
Start: 2024-11-12 | End: 2024-11-13 | Stop reason: HOSPADM

## 2024-11-12 RX ORDER — AMIODARONE HYDROCHLORIDE 200 MG/1
200 TABLET ORAL DAILY
Status: DISCONTINUED | OUTPATIENT
Start: 2024-11-25 | End: 2024-11-13 | Stop reason: HOSPADM

## 2024-11-12 RX ADMIN — MIDODRINE HYDROCHLORIDE 5 MG: 5 TABLET ORAL at 17:45

## 2024-11-12 RX ADMIN — GABAPENTIN 300 MG: 300 CAPSULE ORAL at 21:02

## 2024-11-12 RX ADMIN — AMIODARONE HYDROCHLORIDE 400 MG: 200 TABLET ORAL at 09:00

## 2024-11-12 RX ADMIN — POLYETHYLENE GLYCOL 3350 17 G: 17 POWDER, FOR SOLUTION ORAL at 11:01

## 2024-11-12 RX ADMIN — AMIODARONE HYDROCHLORIDE 400 MG: 200 TABLET ORAL at 21:02

## 2024-11-12 RX ADMIN — ONDANSETRON 4 MG: 2 INJECTION INTRAMUSCULAR; INTRAVENOUS at 09:00

## 2024-11-12 RX ADMIN — MIDODRINE HYDROCHLORIDE 5 MG: 5 TABLET ORAL at 09:01

## 2024-11-12 RX ADMIN — ACETAMINOPHEN 1000 MG: 500 TABLET ORAL at 23:55

## 2024-11-12 RX ADMIN — MONTELUKAST 10 MG: 10 TABLET, FILM COATED ORAL at 09:00

## 2024-11-12 RX ADMIN — DILTIAZEM HYDROCHLORIDE 120 MG: 120 CAPSULE, COATED, EXTENDED RELEASE ORAL at 09:00

## 2024-11-12 RX ADMIN — ACETAMINOPHEN 1000 MG: 500 TABLET ORAL at 09:00

## 2024-11-12 RX ADMIN — SIMETHICONE 80 MG: 80 TABLET, CHEWABLE ORAL at 16:27

## 2024-11-12 RX ADMIN — DIPHENHYDRAMINE HYDROCHLORIDE 25 MG: 25 CAPSULE ORAL at 02:38

## 2024-11-12 RX ADMIN — SEVELAMER CARBONATE 800 MG: 800 TABLET, FILM COATED ORAL at 08:59

## 2024-11-12 RX ADMIN — WARFARIN SODIUM 3 MG: 1 TABLET ORAL at 17:44

## 2024-11-12 RX ADMIN — OXYCODONE 15 MG: 5 TABLET ORAL at 09:00

## 2024-11-12 RX ADMIN — ZOLPIDEM TARTRATE 5 MG: 5 TABLET, COATED ORAL at 21:02

## 2024-11-12 RX ADMIN — ATORVASTATIN CALCIUM 80 MG: 20 TABLET, FILM COATED ORAL at 21:02

## 2024-11-12 RX ADMIN — SEVELAMER CARBONATE 800 MG: 800 TABLET, FILM COATED ORAL at 12:34

## 2024-11-12 RX ADMIN — SEVELAMER CARBONATE 800 MG: 800 TABLET, FILM COATED ORAL at 17:44

## 2024-11-12 RX ADMIN — OXYCODONE 5 MG: 5 TABLET ORAL at 14:34

## 2024-11-12 RX ADMIN — SODIUM CHLORIDE, PRESERVATIVE FREE 10 ML: 5 INJECTION INTRAVENOUS at 09:01

## 2024-11-12 RX ADMIN — SIMETHICONE 80 MG: 80 TABLET, CHEWABLE ORAL at 19:52

## 2024-11-12 RX ADMIN — ACETAMINOPHEN 1000 MG: 500 TABLET ORAL at 14:34

## 2024-11-12 RX ADMIN — SODIUM CHLORIDE, PRESERVATIVE FREE 10 ML: 5 INJECTION INTRAVENOUS at 21:02

## 2024-11-12 RX ADMIN — MIDODRINE HYDROCHLORIDE 5 MG: 5 TABLET ORAL at 12:34

## 2024-11-12 ASSESSMENT — PAIN SCALES - GENERAL
PAINLEVEL_OUTOF10: 8
PAINLEVEL_OUTOF10: 8

## 2024-11-12 ASSESSMENT — PAIN DESCRIPTION - LOCATION
LOCATION: ANKLE;FOOT
LOCATION: ANKLE;FOOT

## 2024-11-12 ASSESSMENT — PAIN DESCRIPTION - ORIENTATION
ORIENTATION: RIGHT
ORIENTATION: RIGHT

## 2024-11-12 ASSESSMENT — PAIN DESCRIPTION - DESCRIPTORS
DESCRIPTORS: THROBBING;ACHING
DESCRIPTORS: ACHING;THROBBING

## 2024-11-13 VITALS
WEIGHT: 315 LBS | SYSTOLIC BLOOD PRESSURE: 119 MMHG | RESPIRATION RATE: 14 BRPM | DIASTOLIC BLOOD PRESSURE: 56 MMHG | HEART RATE: 81 BPM | BODY MASS INDEX: 39.17 KG/M2 | OXYGEN SATURATION: 93 % | TEMPERATURE: 98.4 F | HEIGHT: 75 IN

## 2024-11-13 LAB
ANION GAP SERPL CALC-SCNC: 10 MMOL/L (ref 2–12)
ANION GAP SERPL CALC-SCNC: 6 MMOL/L (ref 2–12)
BUN SERPL-MCNC: 31 MG/DL (ref 6–20)
BUN SERPL-MCNC: 55 MG/DL (ref 6–20)
BUN/CREAT SERPL: 4 (ref 12–20)
BUN/CREAT SERPL: 4 (ref 12–20)
CALCIUM SERPL-MCNC: 8.6 MG/DL (ref 8.5–10.1)
CALCIUM SERPL-MCNC: 8.8 MG/DL (ref 8.5–10.1)
CHLORIDE SERPL-SCNC: 90 MMOL/L (ref 97–108)
CHLORIDE SERPL-SCNC: 96 MMOL/L (ref 97–108)
CO2 SERPL-SCNC: 25 MMOL/L (ref 21–32)
CO2 SERPL-SCNC: 30 MMOL/L (ref 21–32)
CREAT SERPL-MCNC: 13.2 MG/DL (ref 0.7–1.3)
CREAT SERPL-MCNC: 7.87 MG/DL (ref 0.7–1.3)
EKG ATRIAL RATE: 77 BPM
EKG DIAGNOSIS: NORMAL
EKG P AXIS: 69 DEGREES
EKG P-R INTERVAL: 400 MS
EKG Q-T INTERVAL: 402 MS
EKG QRS DURATION: 128 MS
EKG QTC CALCULATION (BAZETT): 454 MS
EKG R AXIS: 86 DEGREES
EKG T AXIS: -76 DEGREES
EKG VENTRICULAR RATE: 77 BPM
ERYTHROCYTE [DISTWIDTH] IN BLOOD BY AUTOMATED COUNT: 16.6 % (ref 11.5–14.5)
GLUCOSE SERPL-MCNC: 102 MG/DL (ref 65–100)
GLUCOSE SERPL-MCNC: 96 MG/DL (ref 65–100)
HCT VFR BLD AUTO: 28.5 % (ref 36.6–50.3)
HGB BLD-MCNC: 9.1 G/DL (ref 12.1–17)
INR PPP: 2.5 (ref 0.9–1.1)
MCH RBC QN AUTO: 33 PG (ref 26–34)
MCHC RBC AUTO-ENTMCNC: 31.9 G/DL (ref 30–36.5)
MCV RBC AUTO: 103.3 FL (ref 80–99)
NRBC # BLD: 0 K/UL (ref 0–0.01)
NRBC BLD-RTO: 0 PER 100 WBC
PLATELET # BLD AUTO: 192 K/UL (ref 150–400)
PMV BLD AUTO: 10.9 FL (ref 8.9–12.9)
POTASSIUM SERPL-SCNC: 5.5 MMOL/L (ref 3.5–5.1)
POTASSIUM SERPL-SCNC: 7.7 MMOL/L (ref 3.5–5.1)
POTASSIUM SERPL-SCNC: 7.8 MMOL/L (ref 3.5–5.1)
PROTHROMBIN TIME: 24.5 SEC (ref 9–11.1)
RBC # BLD AUTO: 2.76 M/UL (ref 4.1–5.7)
SODIUM SERPL-SCNC: 125 MMOL/L (ref 136–145)
SODIUM SERPL-SCNC: 132 MMOL/L (ref 136–145)
WBC # BLD AUTO: 9.2 K/UL (ref 4.1–11.1)

## 2024-11-13 PROCEDURE — 6360000002 HC RX W HCPCS: Performed by: STUDENT IN AN ORGANIZED HEALTH CARE EDUCATION/TRAINING PROGRAM

## 2024-11-13 PROCEDURE — 90935 HEMODIALYSIS ONE EVALUATION: CPT

## 2024-11-13 PROCEDURE — 6370000000 HC RX 637 (ALT 250 FOR IP): Performed by: NURSE PRACTITIONER

## 2024-11-13 PROCEDURE — 93005 ELECTROCARDIOGRAM TRACING: CPT | Performed by: STUDENT IN AN ORGANIZED HEALTH CARE EDUCATION/TRAINING PROGRAM

## 2024-11-13 PROCEDURE — 6370000000 HC RX 637 (ALT 250 FOR IP): Performed by: INTERNAL MEDICINE

## 2024-11-13 PROCEDURE — 84132 ASSAY OF SERUM POTASSIUM: CPT

## 2024-11-13 PROCEDURE — 85610 PROTHROMBIN TIME: CPT

## 2024-11-13 PROCEDURE — 85027 COMPLETE CBC AUTOMATED: CPT

## 2024-11-13 PROCEDURE — 36415 COLL VENOUS BLD VENIPUNCTURE: CPT

## 2024-11-13 PROCEDURE — 6370000000 HC RX 637 (ALT 250 FOR IP): Performed by: STUDENT IN AN ORGANIZED HEALTH CARE EDUCATION/TRAINING PROGRAM

## 2024-11-13 PROCEDURE — 6370000000 HC RX 637 (ALT 250 FOR IP): Performed by: FAMILY MEDICINE

## 2024-11-13 PROCEDURE — 94761 N-INVAS EAR/PLS OXIMETRY MLT: CPT

## 2024-11-13 PROCEDURE — 2580000003 HC RX 258: Performed by: FAMILY MEDICINE

## 2024-11-13 PROCEDURE — 80048 BASIC METABOLIC PNL TOTAL CA: CPT

## 2024-11-13 RX ORDER — CALCIUM GLUCONATE 20 MG/ML
1000 INJECTION, SOLUTION INTRAVENOUS ONCE
Status: COMPLETED | OUTPATIENT
Start: 2024-11-13 | End: 2024-11-13

## 2024-11-13 RX ORDER — WARFARIN SODIUM 1 MG/1
3 TABLET ORAL
Status: DISCONTINUED | OUTPATIENT
Start: 2024-11-13 | End: 2024-11-13 | Stop reason: HOSPADM

## 2024-11-13 RX ORDER — AMIODARONE HYDROCHLORIDE 400 MG/1
400 TABLET ORAL 2 TIMES DAILY
Qty: 23 TABLET | Refills: 0 | Status: SHIPPED | OUTPATIENT
Start: 2024-11-13 | End: 2024-11-25

## 2024-11-13 RX ORDER — ONDANSETRON 4 MG/1
4 TABLET, ORALLY DISINTEGRATING ORAL EVERY 8 HOURS PRN
Qty: 30 TABLET | Refills: 0 | Status: SHIPPED | OUTPATIENT
Start: 2024-11-13 | End: 2024-11-28

## 2024-11-13 RX ORDER — ATORVASTATIN CALCIUM 80 MG/1
80 TABLET, FILM COATED ORAL NIGHTLY
Qty: 30 TABLET | Refills: 3 | Status: SHIPPED | OUTPATIENT
Start: 2024-11-13

## 2024-11-13 RX ORDER — AMIODARONE HYDROCHLORIDE 200 MG/1
200 TABLET ORAL DAILY
Qty: 30 TABLET | Refills: 1 | Status: SHIPPED | OUTPATIENT
Start: 2024-11-25

## 2024-11-13 RX ORDER — MIDODRINE HYDROCHLORIDE 5 MG/1
5 TABLET ORAL
Qty: 90 TABLET | Refills: 3 | Status: SHIPPED | OUTPATIENT
Start: 2024-11-13

## 2024-11-13 RX ADMIN — MIDODRINE HYDROCHLORIDE 5 MG: 5 TABLET ORAL at 08:17

## 2024-11-13 RX ADMIN — SEVELAMER CARBONATE 800 MG: 800 TABLET, FILM COATED ORAL at 08:17

## 2024-11-13 RX ADMIN — DILTIAZEM HYDROCHLORIDE 120 MG: 120 CAPSULE, COATED, EXTENDED RELEASE ORAL at 14:29

## 2024-11-13 RX ADMIN — SIMETHICONE 80 MG: 80 TABLET, CHEWABLE ORAL at 16:58

## 2024-11-13 RX ADMIN — AMIODARONE HYDROCHLORIDE 400 MG: 200 TABLET ORAL at 08:08

## 2024-11-13 RX ADMIN — SEVELAMER CARBONATE 800 MG: 800 TABLET, FILM COATED ORAL at 14:29

## 2024-11-13 RX ADMIN — ACETAMINOPHEN 1000 MG: 500 TABLET ORAL at 14:41

## 2024-11-13 RX ADMIN — PREDNISONE 5 MG: 5 TABLET ORAL at 08:19

## 2024-11-13 RX ADMIN — MIDODRINE HYDROCHLORIDE 5 MG: 5 TABLET ORAL at 12:39

## 2024-11-13 RX ADMIN — MONTELUKAST 10 MG: 10 TABLET, FILM COATED ORAL at 08:18

## 2024-11-13 RX ADMIN — SODIUM CHLORIDE, PRESERVATIVE FREE 10 ML: 5 INJECTION INTRAVENOUS at 08:18

## 2024-11-13 RX ADMIN — ACETAMINOPHEN 1000 MG: 500 TABLET ORAL at 08:08

## 2024-11-13 RX ADMIN — CALCIUM GLUCONATE 1000 MG: 20 INJECTION, SOLUTION INTRAVENOUS at 07:30

## 2024-11-13 RX ADMIN — OXYCODONE 15 MG: 5 TABLET ORAL at 16:20

## 2024-11-13 ASSESSMENT — PAIN DESCRIPTION - DESCRIPTORS
DESCRIPTORS: ACHING
DESCRIPTORS: ACHING

## 2024-11-13 ASSESSMENT — PAIN SCALES - GENERAL
PAINLEVEL_OUTOF10: 8
PAINLEVEL_OUTOF10: 7
PAINLEVEL_OUTOF10: 0

## 2024-11-13 ASSESSMENT — PAIN DESCRIPTION - LOCATION
LOCATION: BACK
LOCATION: BACK

## 2024-11-13 ASSESSMENT — PAIN DESCRIPTION - ORIENTATION
ORIENTATION: LOWER
ORIENTATION: LOWER

## 2024-11-13 NOTE — PLAN OF CARE
Problem: Chronic Conditions and Co-morbidities  Goal: Patient's chronic conditions and co-morbidity symptoms are monitored and maintained or improved  Outcome: Progressing     Problem: Discharge Planning  Goal: Discharge to home or other facility with appropriate resources  Outcome: Progressing     Problem: Pain  Goal: Verbalizes/displays adequate comfort level or baseline comfort level  Outcome: Progressing     Problem: Safety - Adult  Goal: Free from fall injury  Outcome: Progressing     
  Problem: Chronic Conditions and Co-morbidities  Goal: Patient's chronic conditions and co-morbidity symptoms are monitored and maintained or improved  Outcome: Progressing     Problem: Discharge Planning  Goal: Discharge to home or other facility with appropriate resources  Outcome: Progressing     Problem: Pain  Goal: Verbalizes/displays adequate comfort level or baseline comfort level  Outcome: Progressing     Problem: Safety - Adult  Goal: Free from fall injury  Outcome: Progressing     Problem: ABCDS Injury Assessment  Goal: Absence of physical injury  Outcome: Progressing     Problem: Physical Therapy - Adult  Goal: By Discharge: Performs mobility at highest level of function for planned discharge setting.  See evaluation for individualized goals.  Description: FUNCTIONAL STATUS PRIOR TO ADMISSION: Patient was independent and active without use of DME.    HOME SUPPORT PRIOR TO ADMISSION: The patient lived with his fiance but did not require assistance. He was working a modified  work schedule due to HD MWF, and actively driving.    Physical Therapy Goals  Initiated 11/9/2024  1.  Patient will move from supine to sit and sit to supine in bed with minimal assistance within 7 day(s).    2.  Patient will perform sit to stand with moderate assistance within 7 day(s).  3.  Patient will transfer from bed to chair and chair to bed with moderate assistance using the least restrictive device within 7 day(s).  4.  Patient will ambulate with moderate assistance for 5 feet with the least restrictive device within 7 day(s).   5.  Patient will ascend/descend 2 stairs with bilateral handrail(s) with maximal assistance within 7 day(s).    11/10/2024 1305 by Teo Yao, PT  Outcome: Progressing  11/10/2024 1127 by Teo Yao, PT  Outcome: Progressing     Problem: Skin/Tissue Integrity  Goal: Absence of new skin breakdown  Description: 1.  Monitor for areas of redness and/or skin breakdown  2.  Assess vascular access 
  Problem: Chronic Conditions and Co-morbidities  Goal: Patient's chronic conditions and co-morbidity symptoms are monitored and maintained or improved  Outcome: Progressing  Flowsheets (Taken 11/8/2024 2040)  Care Plan - Patient's Chronic Conditions and Co-Morbidity Symptoms are Monitored and Maintained or Improved: Monitor and assess patient's chronic conditions and comorbid symptoms for stability, deterioration, or improvement     Problem: Discharge Planning  Goal: Discharge to home or other facility with appropriate resources  Outcome: Progressing  Flowsheets (Taken 11/8/2024 2040)  Discharge to home or other facility with appropriate resources: Identify barriers to discharge with patient and caregiver     Problem: Pain  Goal: Verbalizes/displays adequate comfort level or baseline comfort level  Outcome: Progressing     Problem: Safety - Adult  Goal: Free from fall injury  Outcome: Progressing  Flowsheets (Taken 11/8/2024 2040)  Free From Fall Injury: Instruct family/caregiver on patient safety     Problem: ABCDS Injury Assessment  Goal: Absence of physical injury  Outcome: Progressing  Flowsheets (Taken 11/8/2024 2040)  Absence of Physical Injury: Implement safety measures based on patient assessment     
  Problem: Occupational Therapy - Adult  Goal: By Discharge: Performs self-care activities at highest level of function for planned discharge setting.  See evaluation for individualized goals.  Description: FUNCTIONAL STATUS PRIOR TO ADMISSION:  The patient was independent with ADLs and functional mobility at baseline. He was working a modified schedule due to hemodialysis, and driving.  HOME SUPPORT: Patient lived with his fiance but did not require assistance.    Occupational Therapy Goals:  Initiated 11/9/2024  1.  Patient will perform grooming with Supervision sitting EOB within 7 day(s).  2.  Patient will perform upper body dressing with Set-up within 7 day(s).  3.  Patient will perform lower body dressing with Moderate Assist within 7 day(s).  4.  Patient will perform toilet transfers with Moderate Assist  within 7 day(s).  5.  Patient will perform all aspects of toileting with Moderate Assist within 7 day(s).  6.  Patient will participate in upper extremity therapeutic exercise/activities with [place] for 5 minutes within 7 day(s).      Outcome: Progressing     OCCUPATIONAL THERAPY TREATMENT  Patient: Joshua Shaikh (53 y.o. male)  Date: 11/12/2024  Primary Diagnosis: Near syncope [R55]  Atrial fibrillation with rapid ventricular response (HCC) [I48.91]  Closed fracture of right ankle, initial encounter [S82.890H]       Precautions: General Precautions, Fall Risk, Weight Bearing Right Lower Extremity Weight Bearing: Non Weight Bearing (TDWB for transfers,  NWB gait in CAM  boot)              Chart, occupational therapy assessment, plan of care, and goals were reviewed.    ASSESSMENT  Patient is progressing in OT goals but remains limited by impaired standing balance, RLE NWB restriction (TTWB for transfers & NWB for further distances per ortho), lower RLE pain with any movement, and impaired distal LB reach. Neuro screen intact.  He has been using BSC for toileting.  He may benefit from LB AE training. 
  Problem: Occupational Therapy - Adult  Goal: By Discharge: Performs self-care activities at highest level of function for planned discharge setting.  See evaluation for individualized goals.  Description: FUNCTIONAL STATUS PRIOR TO ADMISSION:  The patient was independent with ADLs and functional mobility at baseline. He was working a modified schedule due to hemodialysis, and driving.  HOME SUPPORT: Patient lived with his fiance but did not require assistance.    Occupational Therapy Goals:  Initiated 11/9/2024  1.  Patient will perform grooming with Supervision sitting EOB within 7 day(s).  2.  Patient will perform upper body dressing with Set-up within 7 day(s).  3.  Patient will perform lower body dressing with Moderate Assist within 7 day(s).  4.  Patient will perform toilet transfers with Moderate Assist  within 7 day(s).  5.  Patient will perform all aspects of toileting with Moderate Assist within 7 day(s).  6.  Patient will participate in upper extremity therapeutic exercise/activities with [place] for 5 minutes within 7 day(s).      Outcome: Progressing  OCCUPATIONAL THERAPY EVALUATION    Patient: Joshua Shaikh (53 y.o. male)  Date: 11/9/2024  Primary Diagnosis: Near syncope [R55]  Atrial fibrillation with rapid ventricular response (HCC) [I48.91]  Closed fracture of right ankle, initial encounter [S82.890C]         Precautions: General Precautions, Fall Risk, Weight Bearing Right Lower Extremity Weight Bearing: Non Weight Bearing (TDWB for transfers,  NWB gait in CAM  boot)                ASSESSMENT :  The patient is limited from independent baseline by R ankle pain, impaired functional mobility and balance, decreased activity tolerance, and TDWB for transfers only status of RLE. Pt presented to [facility] and found to have R fibula fracture as well as acute infarct. Pt reports symptom of blurred vision now resolved, visual acuity intact during session and no deficits noted in UE AROM, strength or 
  Problem: Physical Therapy - Adult  Goal: By Discharge: Performs mobility at highest level of function for planned discharge setting.  See evaluation for individualized goals.  Description: FUNCTIONAL STATUS PRIOR TO ADMISSION: Patient was independent and active without use of DME.    HOME SUPPORT PRIOR TO ADMISSION: The patient lived with his fiance but did not require assistance. He was working a modified  work schedule due to HD MWF, and actively driving.    Physical Therapy Goals  Initiated 11/9/2024  1.  Patient will move from supine to sit and sit to supine in bed with minimal assistance within 7 day(s).    2.  Patient will perform sit to stand with moderate assistance within 7 day(s).  3.  Patient will transfer from bed to chair and chair to bed with moderate assistance using the least restrictive device within 7 day(s).  4.  Patient will ambulate with moderate assistance for 5 feet with the least restrictive device within 7 day(s).   5.  Patient will ascend/descend 2 stairs with bilateral handrail(s) with maximal assistance within 7 day(s).    11/10/2024 1305 by Teo Yao, PT  Outcome: Progressing   PHYSICAL THERAPY TREATMENT    Patient: Joshua Shaikh (53 y.o. male)  Date: 11/10/2024  Diagnosis: Near syncope [R55]  Atrial fibrillation with rapid ventricular response (HCC) [I48.91]  Closed fracture of right ankle, initial encounter [S82.891A] CVA (cerebral vascular accident) ([facility])      Precautions: General Precautions, Fall Risk, Weight Bearing Right Lower Extremity Weight Bearing: Non Weight Bearing (TDWB for transfers,  NWB gait in CAM  boot)                    ASSESSMENT:  Patient continues to benefit from skilled PT services and is progressing towards goals. Message received from support staff notifying that patient requests PT for return to bed. He was mobilized as described below, displaying good recall from earlier treatment session with reduced physical assistance needed, though continues to 
  Problem: Physical Therapy - Adult  Goal: By Discharge: Performs mobility at highest level of function for planned discharge setting.  See evaluation for individualized goals.  Description: FUNCTIONAL STATUS PRIOR TO ADMISSION: Patient was independent and active without use of DME.    HOME SUPPORT PRIOR TO ADMISSION: The patient lived with his fiance but did not require assistance. He was working a modified  work schedule due to HD MWF, and actively driving.    Physical Therapy Goals  Initiated 11/9/2024  1.  Patient will move from supine to sit and sit to supine in bed with minimal assistance within 7 day(s).    2.  Patient will perform sit to stand with moderate assistance within 7 day(s).  3.  Patient will transfer from bed to chair and chair to bed with moderate assistance using the least restrictive device within 7 day(s).  4.  Patient will ambulate with moderate assistance for 5 feet with the least restrictive device within 7 day(s).   5.  Patient will ascend/descend 2 stairs with bilateral handrail(s) with maximal assistance within 7 day(s).    Outcome: Progressing   PHYSICAL THERAPY EVALUATION    Patient: Joshua Shaikh (53 y.o. male)  Date: 11/9/2024  Primary Diagnosis: Near syncope [R55]  Atrial fibrillation with rapid ventricular response (HCC) [I48.91]  Closed fracture of right ankle, initial encounter [C08.473J]       Precautions: Restrictions/Precautions: General Precautions, Fall Risk, Weight Bearing   Lower Extremity Weight Bearing Restrictions  Right Lower Extremity Weight Bearing: Non Weight Bearing (TDWB for transfers,  NWB gait in CAM  boot)                  ASSESSMENT :   DEFICITS/IMPAIRMENTS:   The patient is limited by decreased functional mobility, independence in ADLs, activity tolerance, balance. Pt with acute CVA and GLF resulting in R fibular fx s/p episode of hypotension after receiving HD. Current comorbidities impacting mobility include ESRD on HD, afib, and CVA prior to this 
MIN A throughout mobility transitions for distal LE support.  He was able to come to sitting and transfer to chair today with improved activity tolerance.  He demonstrates adherence to NWB with stand pivot transfer to chair.  Patient overall CGA-MIN A.  Patient blood stable- see below and asymptomatic.  Patient still with multiple factors impacting his overall functional status and would benefit from rehab including, ankle fracture with new weight bearing status and +CVA (left posterior corpus callosum)         PLAN:  Patient continues to benefit from skilled intervention to address the above impairments.  Continue treatment per established plan of care.    Recommend with staff: therapy recommendations for staff: Recommend mobility with staff assist x2 using gait belt and rolling walker.      Recommendation for discharge: (in order for the patient to meet his/her long term goals):   High intensity/comprehensive skilled physical therapy in a multidisciplinary setting as patient is working towards tolerating up to 3 hours of therapy/day 5-7x/week    Other factors to consider for discharge:    IF patient discharges home will need the following DME: continuing to assess with progress       SUBJECTIVE:   Patient stated, \".\"    OBJECTIVE DATA SUMMARY:   Critical Behavior:      Patient Vitals for the past 6 hrs:   Pulse BP Patient Position   11/12/24 1527 69 (!) 97/43 Sitting   11/12/24 1502 82 (!) 101/56 Sitting   11/12/24 1457 81 (!) 119/59 Sitting   11/12/24 1153 70 (!) 101/58 Other (Comment)         Functional Mobility Training:  Bed Mobility:  Bed Mobility Training  Rolling: Minimum assistance  Supine to Sit: Minimum assistance  Scooting: Minimum assistance  Transfers:  Transfer Training  Transfer Training: Yes  Sit to Stand: Minimum assistance;Assist X2  Stand to Sit: Minimum assistance;Assist X1;Additional time  Stand Pivot Transfers: Contact-guard assistance;Assist X1;Additional time  Balance:  Balance  Sitting: 
Behavior:  Orientation  Overall Orientation Status: Within Normal Limits  Orientation Level: Oriented X4       Functional Mobility Training:  Bed Mobility:  Bed Mobility Training  Bed Mobility Training: Yes  Rolling: Minimum assistance;Assist X2  Supine to Sit: Minimum assistance;Assist X2  Scooting: Additional time;Adaptive equipment;Stand-by assistance  Transfers:  Transfer Training  Transfer Training: Yes  Overall Level of Assistance: Moderate assistance;Assist X2;Adaptive equipment  Interventions: Demonstration;Verbal cues;Tactile cues;Safety awareness training;Manual cues  Sit to Stand: Moderate assistance;Assist X2  Stand to Sit: Moderate assistance;Assist X2  Stand Pivot Transfers: Moderate assistance;Assist X2;Additional time;Adaptive equipment  Balance:  Balance  Sitting: Intact  Standing: Impaired  Standing - Static: Fair  Standing - Dynamic: Fair   Ambulation/Gait Training:     Gait  Gait Training: No (pivot)       Pain Rating:  Severe with mobility/10   Pain Intervention(s):   nursing notified, patient medicated for pain prior to session, elevation, and repositioning    Activity Tolerance:   Fair     After treatment:   Patient left in no apparent distress sitting up in chair, Call bell within reach, Bed/ chair alarm activated, and pillows placed under R LE elevated in chair      COMMUNICATION/EDUCATION:   The patient's plan of care was discussed with: registered nurse and rehabilitation technician    Patient Education  Education Given To: Patient  Education Provided: Role of Therapy;Transfer Training;Plan of Care;Equipment;Precautions  Education Method: Demonstration;Verbal;Teach Back  Barriers to Learning: None  Education Outcome: Verbalized understanding;Demonstrated understanding      Teo Yao PT  Minutes: 27

## 2024-11-13 NOTE — DISCHARGE INSTRUCTIONS
HOSPITALIST DISCHARGE INSTRUCTIONS  NAME:  Joshua Shaikh   :  1970   MRN:  [id number]     Date/Time:  2024 11:08 AM    ADMIT DATE: 2024     DISCHARGE DATE: 2024     DISCHARGE DIAGNOSIS:  CVA  ESRD  AFIB     DISCHARGE INSTRUCTIONS:  Thank you for allowing us to participate in your care. Your discharging Hospitalist is You Taylor MD. You were admitted for evaluation and treatment of the above.     CVA (cerebral vascular accident) / History of CVA (cerebrovascular accident) POA: likely acute. MRI showed likely embolic CVA as noted above.   CTA head and neck showed a suspected focal high-grade stenosis versus near complete occlusion of proximal left M2.   Echocardiogram showed an EF of 55%. No thrombus or shunt. LDL 82 and A1c 5.3.   Seen by neurology. Continue coumadin, Lipitor.   Seen by PT, OT and needs rehab given he also has an ankle fracture.   Outpatient follow up with neuro intervention clinic at [facility] after rehab  No driving [place] 6 months [place]      Antiphospholipid syndrome (APS) / SLE:   Follows with [facility] as well as PCP. On warfarin with regular INR checks.   Pt reports some erratic INR values in recent months.   In light of recent fracture as well as A-fib and APS, pt is at risk of VTE. Pt has now restarted on Warfarin. Current INR 3.0. Pt has been advised by his previous physicians to avoid Eliquis (and DOACs in general.) This is likely due to renal dysfunction, drug interactions with immunosuppressive meds that are used after organ transplant as well as the need for surgery without advance warning.   Continue Warfarin and INR checks.      Atrial fibrillation with rapid ventricular response / Hx Longstanding persistent atrial fibrillation / Hx History of complete heart block POA: rate now better controlled. Previously failed Cardioversions. Had an ablation 2024 by Dr. Nagel (U) which was unsuccessful.   Previously on Amiodarone but discontinued

## 2024-11-13 NOTE — DISCHARGE SUMMARY
Hospitalist Discharge Summary     Patient ID:  Joshua Shaikh  [id number]  53 y.o.  1970    Admit date: 11/7/2024    Discharge date and time: 11/13/2024    Admission Diagnoses: Near syncope [R55]  Atrial fibrillation with rapid ventricular response (HCC) [I48.91]  Closed fracture of right ankle, initial encounter [L05.146B]    Discharge Diagnoses:    Principal Problem:    CVA (cerebral vascular accident) ([facility])  Active Problems:    ESRD (end stage renal disease) ([facility])    Atrial fibrillation with rapid ventricular response ([facility])    Closed fracture of distal end of right fibula with routine healing    Systemic lupus erythematosus (HCC)    Antiphospholipid antibody syndrome ([facility])    History of complete heart block    History of CVA (cerebrovascular accident)    Blurred vision    Longstanding persistent atrial fibrillation ([facility])    Cardiac pacemaker in situ    Closed fracture of right ankle    Acute CVA (cerebrovascular accident) ([facility])    Near syncope  Resolved Problems:    * No resolved hospital problems. *         Hospital Course:     CVA (cerebral vascular accident) / History of CVA (cerebrovascular accident) POA: likely acute. MRI showed likely embolic CVA as noted above.   CTA head and neck showed a suspected focal high-grade stenosis versus near complete occlusion of proximal left M2.   Echocardiogram showed an EF of 55%. No thrombus or shunt. LDL 82 and A1c 5.3.   Seen by neurology. Continue coumadin, Lipitor.   Seen by PT, OT and needs rehab given he also has an ankle fracture.   Outpatient follow up with neuro intervention clinic at [facility] after rehab  No driving for 6 months [place]      Antiphospholipid syndrome (APS) / SLE:   Follows with [facility] as well as PCP. On warfarin with regular INR checks.   Pt reports some erratic INR values in recent months.   In light of recent fracture as well as A-fib and APS, pt is at risk of VTE. Pt has now restarted on Warfarin. Current INR 3.0. Pt

## 2024-11-13 NOTE — CARE COORDINATION
Care Management Discharge Note:      11/13/24 1336   Discharge Planning   Patient expects to be discharged to: Acute rehab   Services At/After Discharge   Transition of Care Consult ( Consult) Acute Rehab   Services At/After Discharge Inpatient rehab  ([facility])   Mode of Transport at Discharge [facility] Transport Time of Discharge 1630   Confirm Follow Up Transport Self     Patient with dc order. Patient will dc to [facility] in [place]. Patient set up with [facility] for stretcher transport at 1630, [facility] has electronic PCS form for transportation.  Primary RN Danny to call report to [phone].   ______________________  Edith LAWLER, RN  Care Management  11/13/2024    
Care Management Progress Note    Reason for Admission:   Near syncope [R55]  Atrial fibrillation with rapid ventricular response (HCC) [I48.91]  Closed fracture of right ankle, initial encounter [I04.264L]         Patient Admission Status: Inpatient  RUR:   Hospitalization in the last 30 days (Readmission):  No        Transition of care plan:     11/11/24 1110   Condition of Participation: Discharge Planning   The Patient and/or Patient Representative was provided with a Choice of Provider? Patient   The Patient and/Or Patient Representative agree with the Discharge Plan? Yes   Freedom of Choice list was provided with basic dialogue that supports the patient's individualized plan of care/goals, treatment preferences, and shares the quality data associated with the providers?  Yes     Recs for [facility] per therapy. CM met with patient (and sister via phone) to discuss options. Patient would like [facility]. CM sent referral based on patient preference. CM following for auth and bed availability.   ______________________  Edith LAWLER, RN  Care Management  11/11/2024    
Care Management Progress Note    Reason for Admission:   Near syncope [R55]  Atrial fibrillation with rapid ventricular response (HCC) [I48.91]  Closed fracture of right ankle, initial encounter [R15.546M]         Patient Admission Status: Inpatient  RUR:   Hospitalization in the last 30 days (Readmission):  No        Transition of care plan:  FLORES spoke with Leela, liaison with [facility] in [place]. [facility] has started auth with patient's insurance, still pending. Dispo pending auth and bed availability. CM updated patient at bedside. Will likely need transportation at time of dc.  ______________________  Edith LAWLER, RN  Care Management  11/12/2024    
Other (comment)  (independent)   ADL Assistance Independent   Homemaking Assistance Independent   Ambulation Assistance Independent   Transfer Assistance Independent   Active  Yes   Mode of Transportation Car   Occupation Full time employment   Discharge Planning   Type of Residence House   Living Arrangements Spouse/Significant Other   Current Services Prior To Admission None   Potential Assistance Needed N/A   Potential Assistance Purchasing Medications No   Type of Home Care Services None   Patient expects to be discharged to: House   Services At/After Discharge   Mode of Transport at Discharge Other (see comment)  (SO)   Confirm Follow Up Transport Self         Comments: Independent at baseline, driving working. Dialysis @ [facility] schedule.  Lives with SO who will transport home at time of dc. Recs pending, CM following for needs.    Discharge Concerns: []Yes []No []Unknown   Describe:    Financial concerns/barriers: []Yes, explain: []No [x]Unknown/Not discussed  __________________________________________________________________________    Insurer:   Active Insurance as of 11/7/2024       Primary Coverage       Payor Plan Insurance Group Employer/Plan Group    [facility] MEDICARE ANTHEM [facility] [employer] MEDIBLUE PLUS [id number]       Payor Plan Address Payor Plan Phone Number Payor Plan Fax Number Effective Dates    [address] [address]   1/1/2024 - None Entered    [facility] [phone]         Subscriber Name Subscriber Birth Date Member ID       MARV VUONG 1970 [id number]               Secondary Coverage       Payor Plan Insurance Group Employer/Plan Group    [facility] [place] [facility] HEALTH[employer]EEPERS PLUS        Payor Plan Address Payor Plan Phone Number Payor Plan Fax Number Effective Dates    [address] [address]   10/1/2022 - None Entered    [facility] [address]         Subscriber Name Subscriber Birth Date Member ID       MARV VUONG 1970 [id number]

## 2024-11-13 NOTE — PROGRESS NOTES
LEEANN RICHTER [facility]  [address] [place] [address]  [phone]      Hospitalist  Progress Note      NAME:       Joshua Shaikh   :        1970  MRM:        [id number]    Date of service: 2024      Subjective: Patient seen and examined by me. Patient admitted with A fib with RVR, CVA. Has presented with blurred vision which has resolved. No new focal symptoms other than his uncontrolled pain. No fever or chills.       Objective:    Vital Signs:    BP (!) 103/54   Pulse (!) 128   Temp 98.1 °F (36.7 °C) (Oral)   Resp 19   Ht 1.905 m (6' 3\")   Wt (!) 145.2 kg (320 lb 1.6 oz)   SpO2 96%   BMI 40.01 kg/m²        Intake/Output Summary (Last 24 hours) at 2024 0803  Last data filed at 2024 1530  Gross per 24 hour   Intake 740 ml   Output 2000 ml   Net -1260 ml        Current inpatient medications reviewed:  Current Facility-Administered Medications   Medication Dose Route Frequency    amiodarone (CORDARONE) tablet 400 mg  400 mg Oral BID    atorvastatin (LIPITOR) tablet 80 mg  80 mg Oral Nightly    Warfarin. Hold tonight. INR = 4.9   Other RX Placeholder    diphenhydrAMINE (BENADRYL) capsule 25 mg  25 mg Oral Q6H PRN    gabapentin (NEURONTIN) capsule 300 mg  300 mg Oral Nightly    montelukast (SINGULAIR) tablet 10 mg  10 mg Oral Daily    predniSONE (DELTASONE) tablet 5 mg  5 mg Oral Every Other Day    sevelamer (RENVELA) tablet 800 mg  800 mg Oral TID AC    dilTIAZem (CARDIZEM CD) extended release capsule 120 mg  120 mg Oral Daily    sodium chloride flush 0.9 % injection 5-40 mL  5-40 mL IntraVENous 2 times per day    sodium chloride flush 0.9 % injection 5-40 mL  5-40 mL IntraVENous PRN    0.9 % sodium chloride infusion   IntraVENous PRN    ondansetron (ZOFRAN-ODT) disintegrating tablet 4 mg  4 mg Oral Q8H PRN    Or    ondansetron (ZOFRAN) injection 4 mg  4 mg IntraVENous Q6H PRN    polyethylene 
      LEEANN [facility] CARDIOLOGY                    Cardiology Care Note     []Initial Encounter     [x]Follow-up    Patient Name: Joshua Shaikh - :1970 - MRN:[id number]  Primary Cardiologist: ABY  Consulting Cardiologist: Yessi Baum MD     Reason for encounter: A-fib w/ RVR    HPI:       Joshua Shaikh is a 53 y.o. male with PMH significant for persistent AF s/p ablation, CHB s/p Medtronic PPM, MR, CHF, non obstructive CAD, HTN, antiphospholipid antibody syndrome, hx of PE/DVT, SLE, hemorrhagic stroke, and ESRD on HD.      Pt presented to the ED due to complaints of blurry vision, fatigue and near syncope. Pt was driving when he felt lightheaded with pain in his legs and blurry vision, stopped and got out of the car and nearly passed out. Fell the ground and hurt his ankle but did not lose consciousness. He has had 2 of these episodes over the last couple of months, was planning to undergo further workup. He denies any SOB. On admission, found to be in a-fib w/ RVR and hypotensive. Reports his BP has been running low at HD lately.     Subjective:      Joshua Shaikh reports none currently.      Assessment and Plan     A-fib w/ RVR, hx of persistent AF s/p ablation, CHB s/p Medtronic PPM  - elevated lactate and hypotensive on admission, started on IV amio  - transition to PO amio, if BP improves can increase dilt further and stop amio. Pt prefers not to stay on this medication long term   - PO dilt has been resumed, add hold parameters  - cont coumadin  - Echo 24 with LVEF 55%, LA size normal.   - Device interrogated: Device lead impedance, sensing and threshold reviewed are stable.  Device is functioning normally. Some episodes of AF RVR noted otherwise no significant events on device interrogation  - Continues to have episodes of RVR, PO amiodarone switched to IV yesterday for better rate control. Could not increase diltiazem due to hypotension. HR improved today on IV amiodarone 70-80's in 
    Hospitalist Progress Note      NAME:  Joshua Shaikh   :  1970  MRM:  [id number]    Date/Time: 2024  6:51 AM           Assessment / Plan:       CVA (cerebral vascular accident) / History of CVA (cerebrovascular accident) POA: likely acute. MRI showed likely embolic CVA as noted above.   CTA head and neck showed a suspected focal high-grade stenosis versus near complete occlusion of proximal left M2.   Echocardiogram showed an EF of 55%. No thrombus or shunt. LDL 82 and A1c 5.3.   Seen by neurology. Continue coumadin, Lipitor.   Seen by PT, OT and needs rehab given he also has an ankle fracture.   Outpatient follow up with neuro intervention clinic at [facility] after rehab     Antiphospholipid syndrome (APS) / SLE:   Follows with [facility] as well as PCP. On warfarin with regular INR checks.   Pt reports some erratic INR values in recent months.   In light of recent fracture as well as A-fib and APS, pt is at risk of VTE. Pt has now restarted on Warfarin. Current INR 3.0. Pt has been advised by his previous physicians to avoid Eliquis (and DOACs in general.) This is likely due to renal dysfunction, drug interactions with immunosuppressive meds that are used after organ transplant as well as the need for surgery without advance warning.   Continue Warfarin and INR checks.      Atrial fibrillation with rapid ventricular response / Hx Longstanding persistent atrial fibrillation / Hx History of complete heart block POA: rate now better controlled. Previously failed Cardioversions. Had an ablation 2024 by Dr. Nagel ([facility]) which was unsuccessful.   Previously on Amiodarone but discontinued after pacemaker placement. He has a Medtronic Angélica XT SR MRI SureScan Pacemaker.   plan  Cardiology following. Continue Amiodarone PO, Diltiazem and Coumadin   Cardiology recommends to continue amio 400mg bid for a few days, then decrease to 200mg qd.  On coumadin for CVA prophylaxis.  Given CVA consistent 
0700: Bedside and Verbal shift change report given to Manuela RN (oncoming nurse) by Juan Carlos RN (offgoing nurse). Report included the following information Nurse Handoff Report.     0818: MD made aware of patients BP and MAP. Patient asymptomatic. Per MD- continue to watch MAP and do not hold morning medications.     1900: Bedside and Verbal shift change report given to Nikolai RN (oncoming nurse) by Manuela RN (offgoing nurse). Report included the following information Nurse Handoff Report.     
0700: Bedside and Verbal shift change report given to Manuela RN (oncoming nurse) by Julia RN (offgoing nurse). Report included the following information Nurse Handoff Report.     1900: Verbal shift change report given to Juan Carlos KRISHNAMURTHY (oncoming nurse) by Manuela RN (offgoing nurse). Report included the following information Nurse Handoff Report.     
0700: Bedside and Verbal shift change report given to YESSY Diaz (oncoming nurse) by YESSY Joseph (offgoing nurse). Report included the following information Nurse Handoff Report, Adult Overview, Recent Results, Cardiac Rhythm afib, and Neuro Assessment.       
0835: Patient off floor for MRI and CT at this time. Charge nurse accompanying patient as patient remains on amio gtt.    1122: PO amio given per order.     1130: Patient lost IV access at this time. Notified Dr. Singletary of patient's situation. Vascular Access RN attempted an IV while patient in CT this am. RRT attempted multiple IV/Lab draw sticks overnight unsuccessfully. MD to come assess patient.     1258: Patient continues to have no IV access. MD in to talk with patient about situation. All medications switched over to PO and patient will remain without IV access per attending MD.  
Acknowledging order for Midline for poor IV access.  Patient is not a candidate for any line that would be placed in the upper arm due to his GFR 5 and dialysis.  Patient has been assessed by our team and we've made unsuccessful attempts.  We recommend patient for a triple lumen catheter for access.       Pierre KRISHNAMURTHY  
[facility] Pharmacy Dosing Services: 11/13/24  Consult to resume Warfarin Dosing by Pharmacy by Dr. Singletary, cleared by neurology  Consult provided for this 54 yo male for indication of Afib/CVA  Day of Therapy: resumed from home. (PTA Warfarin 4mg daily)  Dose to achieve an INR goal of 2-3    Order entered for Warfarin 3 mg ordered to be given today at 18:00  Would consider 3 mg daily regimen if discharging today with follow up outpatient with warfarin INR lab testing to assess for warfarin doing adjustments    Significant drug interactions: Amiodarone    PT/INR Lab Results   Component Value Date/Time    INR 2.5 11/13/2024 06:21 AM      Platelets Lab Results   Component Value Date/Time     11/13/2024 05:22 AM      H/H Lab Results   Component Value Date/Time    HGB 9.1 11/13/2024 05:22 AM        Pharmacy to follow daily and will provide subsequent Warfarin dosing based on clinical status    Juan Neri, [facility]  [phone]      
[facility] Pharmacy Dosing Services: 11/11/24  Consult to resume Warfarin Dosing by Pharmacy by Dr. Singletary, cleared by neurology  Consult provided for this 54 yo male for indication of Afib/CVA  Day of Therapy: resumed from home. (PTA Warfarin 4mg daily)  Dose to achieve an INR goal of 2-3    Order entered for Warfarin 3 mg ordered to be given today at 18:00.     Significant drug interactions: Amiodarone drip weaning to po  Previous dose    PT/INR Lab Results   Component Value Date/Time    INR 3.0 11/11/2024 02:49 AM      Platelets Lab Results   Component Value Date/Time     11/10/2024 05:18 AM      H/H Lab Results   Component Value Date/Time    HGB 9.5 11/10/2024 05:18 AM        Pharmacy to follow daily and will provide subsequent Warfarin dosing based on clinical status.  DAVID KELLY [facility])   Contact information [phone]      
Modified diet order to better fit patient needs.    Do You Have Any Cultural, Scientology, or Ethnic Food Preferences? Yes (Comment)   Cultural/Scientology/Ethnic Food Preferences Addressed --  [No pork, no beef]     Mirela Del Rio MS, RD, [facility]  Ext: [phone], or via Rapid Mobileve    
Next HD tomorrow  
Notified by lab of critical K of 7.8 @ 0601 with morning labs. Provider notified. Redraw done and sent to lab. No additional orders at this time.     0632: Reached out to [facility] dialysis to check what time his dialysis was scheduled for today. Call back number given    0652: Repeat K result of 7.7. Provider notified.    0707 EKG completed per STAT order.     0730 Calcium gluconate infusion initiated.   
Occupational Therapy Note  11/8/2024    OT eval order received and acknowledged and attempted at 1400 however patient receiving dialysis at this time thus will defer OT eval.    Thank you,  Judy Aguilar OTR/BALTA    
PT and OT Note    Chart reviewed and spoke with RN.  RN reports patient ready to start HD at this time.  Will defer and follow this afternoon as time permits and as appropriate.    Katie Del Cid MS, PT  
Patient seen and examined by Dr. Mehta, CTA reviewed without significant carotid stenosis, does show intracranial stenosis.  Case discussed with Dr. Lora who recommends restarting patient's Coumadin now as strokes were very small acute ischemic strokes and not hemorrhagic in nature.    No further inpatient neurologic workup at this time.  Will send message to [place] neurology clinic to schedule patient for follow-up in 8 to 12 weeks.  Will ask N[facility] clinic at [facility] to schedule pt to see Dr. Rodriguez to discuss left MCA stenosis.    [place] in [place] cannot drive for 6 months  
Perfect serve consult received:    52 yo male being admitted for Afib with RVR, elevated lactate, blurry vision. He suffered a near syncopal episode and twisted his right ankle. Radiographs show  fx Acute, mildly displaced oblique fracture of the distal fibula. Pt was splinted in the ER, to keep NWB. To follow up with Dr. Hussein as outpatient. OT/PT consult.   To follow up in the am.     Marjorie Montero NP  
Physical Therapy    Clarification from ortho PA received that CAM is to be on at all times for positioning. Collaborated with RW for splint removal, CAM now in place. PT in room with RN and patient [phone].     Thank you,   Nicole Curiel PT, DPT    
Physical Therapy    Order received, chart reviewed. Pt unavailable for PT evaluation due to receiving dialysis.   Annalisa Acuna, PT, MPT  
Right knee xrays are without fracture.  Continue TDWB RLE for transfers in CAM walker and NWB for long distances in CAM walker.  Repeat xrays in 1-2 weeks and will progress WB if medial clear space remains closed.  We will sign off.      Please perfectserve with questions.       BJORN Chinchilla-MACO  Orthopaedic Surgery PA  [facility] [place]  [facility]      
[facility] Pharmacy Dosing Services: 11/09/24  Consult for Warfarin Dosing by Pharmacy by Dr. Mistry  Consult provided for this 52 yo male for indication of Afib  Day of Therapy: resumed from home. (PTA Warfarin 4mg daily)  Dose to achieve an INR goal of 2-3    Last dose PTA 11/7/24    Plan to resume warfarin per Dr. Singletary (cleared by neurology)- will give 75% of normal home dose 3 mg today. INR on higher side (3.9) but missed dose 11/8 so will likely trend down.    Significant drug interactions: Amiodarone IV  Previous dose 4 mg    PT/INR Lab Results   Component Value Date/Time    INR 3.9 11/09/2024 03:26 AM      Platelets Lab Results   Component Value Date/Time     11/09/2024 03:26 AM      H/H Lab Results   Component Value Date/Time    HGB 11.1 11/09/2024 03:26 AM        Thanks,   Oh Gallardo, PharmD        
Spiritual Health History and Assessment/Progress Note  [facility] [place]    Initial Encounter,  ,  ,      Name: Joshua Shaikh MRN: [id number]    Age: 53 y.o.     Sex: male   Language: English   Methodist: Y[employer]anism   CVA (cerebral vascular accident) ([facility])     Date: 11/13/2024            Total Time Calculated: 20 min              Spiritual Assessment began in [facility] B3 INTERMEDIATE CARE UNIT            Encounter Overview/Reason: Initial Encounter  Service Provided For: Patient    Marta, Belief, Meaning:   Patient identifies as spiritual  Family/Friends No family/friends present      Importance and Influence:  Patient has spiritual/personal beliefs that influence decisions regarding their health  Family/Friends No family/friends present    Community:  Patient feels well-supported. Support system includes: Spouse/Partner and Children  Family/Friends No family/friends present    Assessment and Plan of Care:     Patient Interventions include: Facilitated expression of thoughts and feelings, Explored spiritual coping/struggle/distress, Engaged in theological reflection, Affirmed coping skills/support systems, and Facilitated life review and/ or legacy  Family/Friends Interventions include: No family/friends present    Patient Plan of Care: Spiritual Care available upon further referral  Family/Friends Plan of Care: No family/friends present    Narrative: Reviewed chart and met Pt bedside; he is receiving dialysis and does so 3 days a week. He greets , shares he has his girl friend and a daughter that helps encourage him. He is spiritual but not practicing his marta anywhere at this time, he said. He expressed that he doesn't mind talking to a  but not today, he is going to a rehab after this and thanked  for coming by and maybe some other time.     Electronically signed by MARISA Tanner on 11/13/2024 at 1:12 PM   For  support, please call Spiritual Health Team @ [phone] 
[facility] Pharmacy Dosing Services: 11/07/24  Consult for Warfarin Dosing by Pharmacy by Dr. Mistry  Consult provided for this 52 yo male for indication of Afib  Day of Therapy: resumed from home. (PTA Warfarin 4mg daily)  Dose to achieve an INR goal of 2-3    No additional Warfarin tonight. Patient took his dose at home today    Significant drug interactions: Amiodarone drip  Previous dose 4 mg    PT/INR Lab Results   Component Value Date/Time    INR 2.9 04/03/2023 01:03 AM      Platelets Lab Results   Component Value Date/Time     11/07/2024 12:46 PM      H/H Lab Results   Component Value Date/Time    HGB 11.9 11/07/2024 12:46 PM        Pharmacy to follow daily and will provide subsequent Warfarin dosing based on clinical status.  DAVID KELLY [facility])   Contact information [phone]    
Stroke Education provided to patient and the following topics were discussed    1. Patients personal risk factors for stroke are atrial fibrillation, hypercoagulable state, hyperlipidemia, and hypertension    2. Warning signs of Stroke:        * Sudden numbness or weakness of the face, arm or leg, especially on one side of          The body            * Sudden confusion, trouble speaking or understanding        * Sudden trouble seeing in one or both eyes        * Sudden trouble walking, dizziness, loss of balance or coordination        * Sudden severe headache with no known cause      3. Importance of activation Emergency Medical Services ( 9-1-1 ) immediately if experience any warning signs of stroke.    4. Be sure and schedule a follow-up appointment with your primary care doctor or any specialists as instructed.     5. You must take medicine every day to treat your risk factors for stroke.  Be sure to take your medicines exactly as your doctor tells you: no more, no less.  Know what your medicines are for , what they do.  Anti-thrombotics /anticoagulants can help prevent strokes.  You are taking the following medicine(s)  Coumadin     6.  Smoking and second-hand smoke greatly increase your risk of stroke, cardiovascular disease and death. Smoking history former smoker.    7. Information provided was [facility] Stroke Education Binder or Stroke Handouts    8. Documentation of teaching completed in Patient Education Activity and on Care Plan with teaching response noted?  yes    
[facility] Pharmacy Dosing Services: 11/12/24  Consult to resume Warfarin Dosing by Pharmacy by Dr. Singletary, cleared by neurology  Consult provided for this 52 yo male for indication of Afib/CVA  Day of Therapy: resumed from home. (PTA Warfarin 4mg daily)  Dose to achieve an INR goal of 2-3    Order entered for Warfarin 3 mg ordered to be given today at 18:00.     Significant drug interactions: Amiodarone drip weaning to po  Previous dose    PT/INR Lab Results   Component Value Date/Time    INR 2.6 11/12/2024 09:10 AM      Platelets Lab Results   Component Value Date/Time     11/12/2024 09:02 AM      H/H Lab Results   Component Value Date/Time    HGB 8.7 11/12/2024 09:02 AM        Pharmacy to follow daily and will provide subsequent Warfarin dosing based on clinical status.  Leo Street, PharmD  [phone]          
[facility] Pharmacy Dosing Services: 11/10/24  Consult to resume Warfarin Dosing by Pharmacy by Dr. Singletary, cleared by neurology  Consult provided for this 54 yo male for indication of Afib/CVA  Day of Therapy: resumed from home. (PTA Warfarin 4mg daily)  Dose to achieve an INR goal of 2-3    Order entered for Warfarin  3 (mg) ordered to be given today at 18:00.     Significant drug interactions: Amiodarone drip  Previous dose    PT/INR Lab Results   Component Value Date/Time    INR 3.1 11/10/2024 05:18 AM      Platelets Lab Results   Component Value Date/Time     11/10/2024 05:18 AM      H/H Lab Results   Component Value Date/Time    HGB 9.5 11/10/2024 05:18 AM        Pharmacy to follow daily and will provide subsequent Warfarin dosing based on clinical status.  DAVID KELLY [facility])   Contact information [phone]    
    3. Mod MR  - echo as above    4. Antiphospholipid syndrome, hx PE/DVT  - on coumadin     5. ESRD on HD   - per nephrology     6. Hx of CVA, new acute CVA this admission    - having blurry vision episodes and weakness  - neuro consult   - MRI showed Acute versus subacute infarct along posterior corpus callosum body with small left gangliocapsular involvement, suggestive of embolic infarcts.  - Follow-up outpatient with neurology    7. R ankle fx  - per ortho   - Planning for rehab at discharge         ____________________________________________________________    Cardiac testing  Cardiac cath at [facility] 12/2023:   Cardiac Cath performed on 12/3/2023 which revealed:  1. No significant coronary disease, advice medical management of risk factors. No critical lesions.  2. Right dominant circulation.  3. Normal left heart filling pressure.  4. Normal systemic arterial pressure.     02/13/23    TRANSTHORACIC ECHOCARDIOGRAM (TTE) COMPLETE (CONTRAST/BUBBLE/3D PRN) 02/14/2023  6:01 PM, 02/14/2023 12:00 AM (Final)    Narrative  This is a summary report. The complete report is available in the patient's medical record. If you cannot access the medical record, please contact the sending organization for a detailed fax or copy.      Left Ventricle: Normal left ventricular systolic function with a visually estimated EF of 55 - 60%. Left ventricle size is normal. Increased wall thickness. Findings consistent with mild concentric hypertrophy. Normal wall motion. Normal diastolic function.    Aortic Valve: Not assessed due to poor image quality.    Mitral Valve: Not fully assessed due to poor image quality. Mild to moderate regurgitation.    Tricuspid Valve: Not assessed due to poor image quality.    Left Atrium: Left atrium is severely dilated.    Technical qualifiers: Echo study was technically difficult.    Contrast used: Definity.    Signed by: Yessi Baum MD on 2/14/2023  6:01 PM, Signed by: Unknown Provider Result on 
(TYLENOL) tablet 1,000 mg  1,000 mg Oral 3 times per day    allopurinol (ZYLOPRIM) tablet 100 mg  100 mg Oral Every Other Day          Objective:     Vitals:  Blood pressure (!) 91/44, pulse 73, temperature 97.5 °F (36.4 °C), resp. rate 20, height 1.905 m (6' 3\"), weight (!) 145.2 kg (320 lb 1.6 oz), SpO2 100%.  Temp (24hrs), Av.9 °F (36.6 °C), Min:97.5 °F (36.4 °C), Max:98.2 °F (36.8 °C)      Intake and Output:  No intake/output data recorded.   1901 -  0700  In: 1131.8 [P.O.:240; I.V.:891.8]  Out: -     Physical Exam:               GENERAL ASSESSMENT: NAD  HEENT: Nontraumatic   CHEST: Unlabored  EXTREMITY: +EDEMA; LUE AVF accessed  NEURO: Grossly non focal          ECG/rhythm:    Data Review        Recent Labs     24  0326 11/10/24  0518   * 129*   K 5.2* 5.3*   CL 97 92*   CO2 30 27   BUN 28* 44*   CREATININE 9.42* 12.00*   GLUCOSE 95 170*   CALCIUM 9.2 8.3*           : Derek Kwong MD  2024        [facility]:  [email or web address]  [email or web address]    [place] office:  [address], [address]  [place] [address]  Phone: [phone]  Fax :     [phone]    [place] office:  [address] [place] [address]  Phone - [phone]  Fax - [phone]                                                                                        
0.4 mg IntraVENous PRN    acetaminophen (TYLENOL) tablet 1,000 mg  1,000 mg Oral 3 times per day    allopurinol (ZYLOPRIM) tablet 100 mg  100 mg Oral Every Other Day          Objective:     Vitals:  Blood pressure 93/60, pulse 71, temperature 98.6 °F (37 °C), resp. rate 18, height 1.905 m (6' 3\"), weight (!) 145.2 kg (320 lb 1.6 oz), SpO2 95%.  Temp (24hrs), Av.2 °F (36.8 °C), Min:98.1 °F (36.7 °C), Max:98.6 °F (37 °C)      Intake and Output:  No intake/output data recorded.  No intake/output data recorded.    Physical Exam:               GENERAL ASSESSMENT: NAD  HEENT: Nontraumatic   CHEST: Unlabored  EXTREMITY: +EDEMA; LUE AVF accessed  NEURO: Grossly non focal          ECG/rhythm:    Data Review        Recent Labs     24  1230 24  0902 24  0522 24  0621   * 127* 125*  --    K 4.8 5.8* 7.8* 7.7*   CL 96* 93* 90*  --    CO2 29 30 25  --    BUN 38* 44* 55*  --    CREATININE 8.85* 11.10* 13.20*  --    GLUCOSE 119* 100 96  --    CALCIUM 8.2* 8.8 8.8  --            : Derek Kwong MD  2024        [facility]:  [email or web address]  [email or web address]    [place] office:  [address]  [place] [address]  Phone: [phone]  Fax :     [phone]    [place] office:  [address] [place] [address]  Phone - [phone]  Fax - [phone]                                                                                        
Other RX Placeholder    oxyCODONE (ROXICODONE) immediate release tablet 10 mg  10 mg Oral Q6H PRN    HYDROmorphone HCl PF (DILAUDID) injection 1 mg  1 mg IntraVENous Q4H PRN    naloxone (NARCAN) injection 0.4 mg  0.4 mg IntraVENous PRN    acetaminophen (TYLENOL) tablet 1,000 mg  1,000 mg Oral 3 times per day    Or    acetaminophen (TYLENOL) suppository 650 mg  650 mg Rectal 3 times per day    allopurinol (ZYLOPRIM) tablet 100 mg  100 mg Oral Every Other Day       Physical Examination:    General:   Weak and ill looking patient in no acute distress  Eyes:   pink conjunctivae, PERRLA with no discharge.  ENT:   no ottorrhea or rhinorrhea with dry mucous membranes  Neck: no masses, thyroid non-tender and trachea central.  Pulm:  no accessory muscle use, clear breath sounds without crackles or wheezes  Card:  no JVD or murmurs, has atrial fibrillation, without thrills, bruits or peripheral edema  Abd:  Soft, non-tender, non-distended, normoactive bowel sounds.   Musc:  No cyanosis, clubbing, atrophy or deformities. Splinted right leg  Skin:  No rashes, bruising or ulcers.   Neuro: Awake and alert. Generally a non focal exam. Follows commands appropriately  Psych:  Has a good insight and is oriented x 3    Diagnostic testing:    Laboratory data reviewed and independently interpreted:    Recent Labs     11/07/24  1246   WBC 5.8   HGB 11.9*   HCT 36.5*   RBC 3.53*   .4*   MCH 33.7        No results found for: \"LACTA\"  Recent Labs     11/07/24  1246   *   K Hemolyzed, Recollection Recommended   CL 92*   CO2 24   GLUCOSE 129*   BUN 36*   CREATININE 10.97*   CALCIUM 8.3*   BILITOT 0.3   ALKPHOS Hemolyzed, Recollection Recommended   AST Hemolyzed, Recollection Recommended   ALT Hemolyzed, Recollection Recommended     No components found for: \"GLUCOSEPOC\"  Lab Results   Component Value Date/Time    CHOL 149 04/01/2023 12:11 AM    TRIG 298 04/01/2023 12:11 AM    HDL 30 04/01/2023 12:11 AM       Imaging data 
months. In light of recent fracture as well as A-fib and APS, pt is at risk of VTE. Pt has now restarted on Warfarin. Current INR 3.0. Pt has been advised by his previous physicians to avoid Eliquis (and DOACs in general.) This is likely due to renal dysfunction, drug interactions with immunosuppressive meds that are used after organ transplant as well as the need for surgery without advance warning.  -- Continue Warfarin and INR checks.   -- Will sign off, but please do not hesitate to call with questions/concerns.     A-fib with RVF:  Prior ablation and cardioversions. Has pacemaker and is currently on Amiodarone, Diltiazem. Cardiology following.     ESRD on HD:  Had HD 11/8/24 and is receiving again today, 11/11/24. Nephrology following.     R ankle fracture:  Resulted after a fall. Orthopedics evaluated and recommends conservative management for now. Pt is on pain medications for pain.       Signed By: Patricia Castañeda MD            
To assist coordination of care and communication with nursing and staff, this note may be preliminary early in the day, but finalized by end of the day.         ___________________________________________________    Attending Physician:   Rancho Singletary MD         
mildly displaced oblique fracture of the distal fibula.   Seen by orthopedic surgery. Non operative management. Continue adjusted Oxycodone PRN.   Outpatient follow up with ortho - Dr Hussein     ESRD on hemodialysis POA: on a transplant list [facility]. Hyperkalemia and hyponatremic today. On HD on MWFs. Continue Sevelamer. Nephrology following.    Care Plan discussed with: Patient, Nursing, CM     Prophylaxis:  therapeutic INR                 Expected Disposition:  Likely rehab    PCP:      Lisa Fu MD     I have personally examined and treated the patient at bedside during this period. To assist coordination of care and communication with nursing and staff, this note may be preliminary early in the day, but finalized by end of the day.         ___________________________________________________    Attending Physician:   You Taylor MD         
mg, 800 mg, Oral, TID AC, You Mistry MD, 800 mg at 11/08/24 1634    dilTIAZem (CARDIZEM CD) extended release capsule 120 mg, 120 mg, Oral, Daily, Annalisa James, APRN - NP    sodium chloride flush 0.9 % injection 5-40 mL, 5-40 mL, IntraVENous, 2 times per day, You Mistry MD, 10 mL at 11/09/24 0826    sodium chloride flush 0.9 % injection 5-40 mL, 5-40 mL, IntraVENous, PRN, You Mistry MD    0.9 % sodium chloride infusion, , IntraVENous, PRN, You Mistry MD    ondansetron (ZOFRAN-ODT) disintegrating tablet 4 mg, 4 mg, Oral, Q8H PRN **OR** ondansetron (ZOFRAN) injection 4 mg, 4 mg, IntraVENous, Q6H PRN, You Mistry MD    polyethylene glycol (GLYCOLAX) packet 17 g, 17 g, Oral, Daily PRN, You Mistry MD    Warfarin pharmacy to dose daily, , Other, RX Placeholder, You Mistry MD    oxyCODONE (ROXICODONE) immediate release tablet 10 mg, 10 mg, Oral, Q6H PRN, Mariza Brennan MD, 10 mg at 11/09/24 0611    naloxone (NARCAN) injection 0.4 mg, 0.4 mg, IntraVENous, PRN, Mariza Brennan MD    acetaminophen (TYLENOL) tablet 1,000 mg, 1,000 mg, Oral, 3 times per day, 1,000 mg at 11/09/24 0822 **OR** acetaminophen (TYLENOL) suppository 650 mg, 650 mg, Rectal, 3 times per day, Mariza Brennan MD    allopurinol (ZYLOPRIM) tablet 100 mg, 100 mg, Oral, Every Other Day, You Mistry MD, 100 mg at 11/07/24 1023    Tonia Ornelas, APRN - CNP    [facility]  Call center: (P) [phone]  (F) [phone]      CC:Lisa Fu MD     
Facility-Administered Medications:     amiodarone (CORDARONE) tablet 400 mg, 400 mg, Oral, BID, Annalisa James APRN - NP, 400 mg at 24 0900    diphenhydrAMINE (BENADRYL) capsule 25 mg, 25 mg, Oral, Q6H PRN, You Taylor MD, 25 mg at 24 0238    midodrine (PROAMATINE) tablet 5 mg, 5 mg, Oral, TID MYLES, Derek Kwong MD, 5 mg at 24 0901    Warfarin pharmacy to dose daily, , Other, RX Placeholder, Rancho Singletary MD    simethicone (MYLICON) chewable tablet 80 mg, 80 mg, Oral, 4x Daily PRN, Rancho Singletary MD, 80 mg at 24 0812    oxyCODONE (ROXICODONE) immediate release tablet 15 mg, 15 mg, Oral, Q6H PRN, Rancho Singletary MD, 15 mg at 24 0900    [COMPLETED] amiodarone (CORDARONE) 150 mg in dextrose 5 % 100 mL bolus, 150 mg, IntraVENous, Once, Stopped at 24 1205 **FOLLOWED BY** [] amiodarone (CORDARONE) 450 mg in dextrose 5 % 250 mL infusion ([id number]), 1 mg/min, IntraVENous, Continuous, Stopped at 24 1732 **FOLLOWED BY** amiodarone (CORDARONE) 450 mg in dextrose 5 % 250 mL infusion ([id number]), 0.5 mg/min, IntraVENous, Continuous, Tonia Ornelas APRN - CNP, Last Rate: 16.7 mL/hr at 24, 0.5 mg/min at 24    atorvastatin (LIPITOR) tablet 80 mg, 80 mg, Oral, Nightly, Rancho Singletary MD, 80 mg at 24    gabapentin (NEURONTIN) capsule 300 mg, 300 mg, Oral, Nightly, You Mistry MD, 300 mg at 24    montelukast (SINGULAIR) tablet 10 mg, 10 mg, Oral, Daily, You Mistry MD, 10 mg at 24 0900    predniSONE (DELTASONE) tablet 5 mg, 5 mg, Oral, Every Other Day, You Mistry MD, 5 mg at 24 0805    sevelamer (RENVELA) tablet 800 mg, 800 mg, Oral, TID AC, You Mistry MD, 800 mg at 24 0859    dilTIAZem (CARDIZEM CD) extended release capsule 120 mg, 120 mg, Oral, Daily, Annalisa James, AGUSTINA - NP, 120 mg at 24 0900    sodium chloride flush 0.9 % injection 5-40 mL, 5-40 mL, IntraVENous, 2 times per

## 2024-11-13 NOTE — FLOWSHEET NOTE
11/08/24 1110   Treatment   Treatment Goal 2000   Observations & Evaluations   Level of Consciousness 0   Oriented X 4   Heart Rhythm Regular   Respiratory Quality/Effort Unlabored   O2 Device None (Room air)   Bilateral Breath Sounds Clear   Skin Condition/Temp Dry   Abdomen Inspection Obese   Edema Generalized   Edema Generalized Trace   RLE Edema Trace   Vital Signs   BP (!) 103/55   Temp 97.3 °F (36.3 °C)   Pulse (!) 109   Respirations 20   SpO2 99 %   Weight - Scale (!) 146.2 kg (322 lb 5 oz)   Weight Method Bed scale   Percent Weight Change -0.61   Pain Assessment   Pain Assessment 0-10   Pain Level 9   Patient's Stated Pain Goal 3   Pain Location Ankle   Pain Orientation Right   Pain Descriptors Aching   Technical Checks   Dialysis Machine No. 05   RO Machine Number [id number]   Dialyzer Lot No. [id number]   Tubing Lot Number [id number]   All Connections Secure Yes   NS Bag Yes   Saline Line Double Clamped Yes   Dialyzer Nipro ELISIO   Prime Volume (mL) 200 mL   ICEBOAT C;I;E;B;O;A;T   RO Machine Log Sheet Completed Yes   Machine Alarm Self Test Completed;Passed   Air Foam Detector Tested;Proper Function;pH Reading   Extracorporeal Circuit Tested for Integrity Yes   Machine Conductivity 13.9   Manual Ph 7.3   Bleach Test (Neg) Yes   Bath Temperature 97.7 °F (36.5 °C)   Treatment Initiation   Dialyze Hours 4.25   Treatment  Initiation Universal Precautions maintained;Lines secured to patient;Connections secured;Prime given;Venous Parameters set;Arterial Parameters set;Air foam detector engaged   Hemodialysis Fistula/Graft Arteriovenous fistula Left Forearm   No placement date or time found.   Present on Admission/Arrival: Yes  Access Type: Arteriovenous fistula  Orientation: Left  Access Location: Forearm   Site Assessment Clean, dry & intact   Thrill Present   Bruit Present   Status Accessed   Venous Needle Size 15 G   Arterial Needle Size 15 G   Accessed By: adalberto jones   Access Attempts  1   Dialysis Bath   K+ 
  Oriented X 4   Respiratory Quality/Effort Unlabored   O2 Device Nasal cannula   Skin Color   (appropriate)   Skin Condition/Temp Warm;Dry   Edema Generalized   Vital Signs   BP (!) 119/56   Temp 98.4 °F (36.9 °C)   Pulse 78   Respirations 18   SpO2 93 %   Hemodialysis Fistula/Graft Arteriovenous fistula Left Forearm   No placement date or time found.   Present on Admission/Arrival: Yes  Access Type: Arteriovenous fistula  Orientation: Left  Access Location: Forearm   Site Assessment Clean, dry & intact   Thrill Present   Bruit Present   Status Deaccessed   Post-Hemodialysis Assessment   Post-Treatment Procedures Blood returned;Access bleeding time < 10 minutes   Machine Disinfection Process Acid/Vinegar Clean;Heat Disinfect;Exterior Machine Disinfection   Rinseback Volume (ml) 200 ml   Blood Volume Processed (Liters) 85.2 L   Dialyzer Clearance Lightly streaked   Duration of Treatment (minutes) 255 minutes   Heparin Amount Administered During Treatment (mL) 0 mL   Hemodialysis Intake (ml) 800 ml   Hemodialysis Output (ml) 2800 ml   NET Removed (ml) 2000   Tolerated Treatment Fair   Interventions Taken Fluid bolus;Ultrafiltration stopped;Ultrafiltration goal decreased   Patient Response to Treatment Fair   Physician Notified Yes   Patient Disposition   (remains in room)     Primary RN SBAR: HERB Sharma RN  Comments: HD treatment completed per physician order. All possible blood returned to patient. Hemostasis achieved in <10 minutes. Access site dressings clean, dry, and intact. +thrill.

## 2024-11-14 NOTE — TELEPHONE ENCOUNTER
Reached out to the patient to schedule a hospital follow up with Laurel [facility]ley for Stroke within 8 to 12 weeks. LVM to call and schedule.

## 2024-11-18 ENCOUNTER — HOSPITAL ENCOUNTER (OUTPATIENT)
Facility: HOSPITAL | Age: 54
Setting detail: SPECIMEN
Discharge: HOME | End: 2024-11-21

## 2024-11-18 LAB
ANION GAP SERPL CALC-SCNC: 9 MMOL/L (ref 2–12)
BUN SERPL-MCNC: 48 MG/DL (ref 6–20)
BUN/CREAT SERPL: 5 (ref 12–20)
CA-I BLD-MCNC: 8.1 MG/DL (ref 8.5–10.1)
CHLORIDE SERPL-SCNC: 96 MMOL/L (ref 97–108)
CO2 SERPL-SCNC: 27 MMOL/L (ref 21–32)
CREAT SERPL-MCNC: 10.5 MG/DL (ref 0.7–1.3)
GLUCOSE SERPL-MCNC: 122 MG/DL (ref 65–100)
POTASSIUM SERPL-SCNC: 4.6 MMOL/L (ref 3.5–5.1)
SODIUM SERPL-SCNC: 132 MMOL/L (ref 136–145)

## 2024-11-18 PROCEDURE — 36415 COLL VENOUS BLD VENIPUNCTURE: CPT

## 2024-11-18 PROCEDURE — 80048 BASIC METABOLIC PNL TOTAL CA: CPT

## 2024-12-11 ENCOUNTER — OFFICE VISIT (OUTPATIENT)
Age: 54
End: 2024-12-11

## 2024-12-11 DIAGNOSIS — I67.1 CEREBRAL ANEURYSM, NONRUPTURED: Primary | ICD-10-CM

## 2025-01-27 ENCOUNTER — TELEMEDICINE (OUTPATIENT)
Age: 55
End: 2025-01-27
Payer: MEDICARE

## 2025-01-27 DIAGNOSIS — R93.0 ABNORMAL COMPUTED TOMOGRAPHY ANGIOGRAPHY OF HEAD: ICD-10-CM

## 2025-01-27 DIAGNOSIS — Z86.73 HISTORY OF CVA (CEREBROVASCULAR ACCIDENT): Primary | ICD-10-CM

## 2025-01-27 PROBLEM — I63.9 CVA (CEREBRAL VASCULAR ACCIDENT) (HCC): Status: RESOLVED | Noted: 2023-02-13 | Resolved: 2025-01-27

## 2025-01-27 PROBLEM — I63.9 ACUTE CVA (CEREBROVASCULAR ACCIDENT) (HCC): Status: RESOLVED | Noted: 2024-11-08 | Resolved: 2025-01-27

## 2025-01-27 PROCEDURE — 99215 OFFICE O/P EST HI 40 MIN: CPT | Performed by: NURSE PRACTITIONER

## 2025-01-27 RX ORDER — ASPIRIN 81 MG/1
81 TABLET ORAL
COMMUNITY
Start: 2024-11-01

## 2025-01-27 NOTE — ASSESSMENT & PLAN NOTE
-CTA head and neck: Question of focal high-grade stenosis versus near complete occlusion of the proximal left M2.  Moderate bilateral intracranial cervical ICA stenosis, mild to moderate stenosis the bilateral intracranial vertebral arteries    Mr. Shaikh has an MRA of the head scheduled for February 7 with outpatient follow-up with Dr. Cortez.    Continue warfarin 4 mg daily, aspirin 81 mg, Lipitor 80 mg

## 2025-01-27 NOTE — PROGRESS NOTES
with admission ranging 125-132, total cholesterol 154, HDL 28, LDL 82.8, hemoglobin A1c 5.3    01/27/2025: Mr. Shaikh presents for virtual follow-up today.  Overall he is doing well back to his neurological baseline with some chronic residual right-sided weakness related to the stroke he had back in 2020.  He has an MRA of the head scheduled for February 7 with outpatient follow-up with Dr. Cortez.  He is compliant with his Coumadin, he is also taking aspirin 81 mg (on dialysis days) and Lipitor 80 mg nightly.    Objective:     Allergies   Allergen Reactions    Lisinopril Swelling    Sulfa Antibiotics Hives and Other (See Comments)     Had sores all over body        Current Outpatient Medications   Medication Instructions    allopurinol (ZYLOPRIM) 100 mg, Oral, EVERY OTHER DAY    amiodarone (CORDARONE) 200 mg, Oral, DAILY    amLODIPine (NORVASC) 10 mg, Oral, DAILY    aspirin 81 mg, Oral, THREE TIMES WEEKLY (MONDAY, WEDNESDAY, FRIDAY)    atorvastatin (LIPITOR) 80 mg, Oral, NIGHTLY    gabapentin (NEURONTIN) 300 MG capsule DAILY    midodrine (PROAMATINE) 5 mg, Oral, 3 TIMES DAILY WITH MEALS    montelukast (SINGULAIR) 10 mg, Oral, DAILY    predniSONE (DELTASONE) 5 MG tablet Oral, EVERY OTHER DAY    sevelamer (RENVELA) 800 mg, Oral, 3 TIMES DAILY BEFORE MEALS    warfarin (COUMADIN) 4 mg, Oral, DAILY        Pertinent Diagnostic and Laboratory Data  MRI Result (most recent):  MRI BRAIN WO CONTRAST 11/08/2024    Narrative  EXAM:  MRI BRAIN WO CONTRAST    INDICATION: 53-year-old male with blurry vision    COMPARISON: 11/7/2024 CT head and 4/2/2023 MRI.    CONTRAST: None.    TECHNIQUE:  Multiplanar multisequence acquisition of the brain without IV contrast  administration.    FINDINGS:  Patchy acute versus subacute infarct along left paramedian posterior body corpus  callosum with small foci of involvement along the left putamen/internal capsule.  No acute intracranial hemorrhage, extra-axial fluid collection, or mass

## 2025-01-27 NOTE — ASSESSMENT & PLAN NOTE
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  Patient was subsequently diagnosed with stroke and was referred to the neurointerventional team due to left MCA stenosis.    -CTA head and neck: Question of focal high-grade stenosis versus near complete occlusion of the proximal left M2.  Moderate bilateral intracranial cervical ICA stenosis, mild to moderate stenosis the bilateral intracranial vertebral arteries  -MRI brain: Acute versus subacute infarct along the posterior corpus callosum body wall with left gangliocapsular involvement suggestive of embolic infarcts.    Mr. Shaikh presents for virtual follow-up today.  Overall he is doing well back to his neurological baseline with some chronic residual right-sided weakness related to the stroke he had back in 2020.    Continue warfarin 4 mg, goal INR 2-3  Aspirin 81 mg has been added to patient's medication regimen  Continue Lipitor 80 mg nightly

## 2025-02-07 ENCOUNTER — HOSPITAL ENCOUNTER (OUTPATIENT)
Facility: HOSPITAL | Age: 55
Discharge: HOME | End: 2025-02-10
Attending: RADIOLOGY
Payer: MEDICARE

## 2025-02-07 DIAGNOSIS — I67.1 CEREBRAL ANEURYSM, NONRUPTURED: ICD-10-CM

## 2025-02-07 PROCEDURE — 70544 MR ANGIOGRAPHY HEAD W/O DYE: CPT

## 2025-02-19 ENCOUNTER — TELEMEDICINE (OUTPATIENT)
Age: 55
End: 2025-02-19
Payer: MEDICARE

## 2025-02-19 DIAGNOSIS — I67.1 CEREBRAL ANEURYSM, NONRUPTURED: Primary | ICD-10-CM

## 2025-02-19 PROCEDURE — 99421 OL DIG E/M SVC 5-10 MIN: CPT | Performed by: RADIOLOGY

## 2025-02-19 NOTE — PROGRESS NOTES
Phone call to patient in preparation for Virtual/phone visit with provider.  Name and  verified.  Patient unable to obtain vital signs at home.  Patient reports no symptoms.  Denies headaches, dizziness, blurred or double vision, numbness or tingling.  No new problems reported.

## 2025-02-25 ENCOUNTER — TRANSCRIBE ORDERS (OUTPATIENT)
Facility: HOSPITAL | Age: 55
End: 2025-02-25

## 2025-02-25 ENCOUNTER — APPOINTMENT (OUTPATIENT)
Facility: HOSPITAL | Age: 55
End: 2025-02-25
Payer: MEDICARE

## 2025-02-25 ENCOUNTER — HOSPITAL ENCOUNTER (EMERGENCY)
Facility: HOSPITAL | Age: 55
Discharge: HOME | End: 2025-02-25
Attending: EMERGENCY MEDICINE
Payer: MEDICARE

## 2025-02-25 VITALS
BODY MASS INDEX: 36.93 KG/M2 | OXYGEN SATURATION: 99 % | HEART RATE: 77 BPM | WEIGHT: 297 LBS | DIASTOLIC BLOOD PRESSURE: 101 MMHG | HEIGHT: 75 IN | TEMPERATURE: 97.9 F | RESPIRATION RATE: 17 BRPM | SYSTOLIC BLOOD PRESSURE: 179 MMHG

## 2025-02-25 DIAGNOSIS — N18.9 CHRONIC KIDNEY DISEASE, UNSPECIFIED CKD STAGE: ICD-10-CM

## 2025-02-25 DIAGNOSIS — Z76.82 AWAITING ORGAN TRANSPLANT STATUS: Primary | ICD-10-CM

## 2025-02-25 DIAGNOSIS — N18.6 END STAGE RENAL DISEASE (HCC): ICD-10-CM

## 2025-02-25 DIAGNOSIS — R06.02 SHORTNESS OF BREATH: Primary | ICD-10-CM

## 2025-02-25 LAB
ALBUMIN SERPL-MCNC: 3.5 G/DL (ref 3.5–5.2)
ALBUMIN/GLOB SERPL: 0.7 (ref 1.1–2.2)
ALP SERPL-CCNC: 68 U/L (ref 40–129)
ALT SERPL-CCNC: 15 U/L (ref 10–50)
ANION GAP SERPL CALC-SCNC: 14 MMOL/L (ref 2–12)
AST SERPL-CCNC: 42 U/L (ref 10–50)
BASOPHILS # BLD: 0.06 K/UL (ref 0–0.1)
BASOPHILS NFR BLD: 0.9 % (ref 0–1)
BILIRUB SERPL-MCNC: 0.6 MG/DL (ref 0.2–1)
BUN SERPL-MCNC: 33 MG/DL (ref 6–20)
BUN/CREAT SERPL: 3 (ref 12–20)
CALCIUM SERPL-MCNC: 8.7 MG/DL (ref 8.6–10)
CHLORIDE SERPL-SCNC: 93 MMOL/L (ref 98–107)
CO2 SERPL-SCNC: 29 MMOL/L (ref 22–29)
CREAT SERPL-MCNC: 12.13 MG/DL (ref 0.7–1.2)
DIFFERENTIAL METHOD BLD: ABNORMAL
EOSINOPHIL # BLD: 0.18 K/UL (ref 0–0.4)
EOSINOPHIL NFR BLD: 2.7 % (ref 0–7)
ERYTHROCYTE [DISTWIDTH] IN BLOOD BY AUTOMATED COUNT: 18.1 % (ref 11.5–14.5)
GLOBULIN SER CALC-MCNC: 4.9 G/DL (ref 2–4)
GLUCOSE SERPL-MCNC: 96 MG/DL (ref 65–100)
HCT VFR BLD AUTO: 32.6 % (ref 36.6–50.3)
HGB BLD-MCNC: 10.2 G/DL (ref 12.1–17)
IMM GRANULOCYTES # BLD AUTO: 0.05 K/UL (ref 0–0.04)
IMM GRANULOCYTES NFR BLD AUTO: 0.8 % (ref 0–0.5)
INR PPP: 2.3 (ref 0.9–1.1)
LYMPHOCYTES # BLD: 1.18 K/UL (ref 0.8–3.5)
LYMPHOCYTES NFR BLD: 18 % (ref 12–49)
MAGNESIUM SERPL-MCNC: 2 MG/DL (ref 1.6–2.6)
MCH RBC QN AUTO: 28.4 PG (ref 26–34)
MCHC RBC AUTO-ENTMCNC: 31.3 G/DL (ref 30–36.5)
MCV RBC AUTO: 90.8 FL (ref 80–99)
MONOCYTES # BLD: 0.88 K/UL (ref 0–1)
MONOCYTES NFR BLD: 13.4 % (ref 5–13)
NEUTS SEG # BLD: 4.2 K/UL (ref 1.8–8)
NEUTS SEG NFR BLD: 64.2 % (ref 32–75)
NRBC # BLD: 0 K/UL (ref 0–0.01)
NRBC BLD-RTO: 0 PER 100 WBC
NT PRO BNP: ABNORMAL PG/ML
PLATELET # BLD AUTO: 199 K/UL (ref 150–400)
PMV BLD AUTO: 12.7 FL (ref 8.9–12.9)
POTASSIUM SERPL-SCNC: 5.4 MMOL/L (ref 3.5–5.1)
PROT SERPL-MCNC: 8.4 G/DL (ref 6.4–8.3)
PROTHROMBIN TIME: 26 SEC (ref 11.9–14.6)
RBC # BLD AUTO: 3.59 M/UL (ref 4.1–5.7)
SODIUM SERPL-SCNC: 136 MMOL/L (ref 136–145)
TROPONIN T SERPL HS-MCNC: 68.9 NG/L (ref 0–22)
WBC # BLD AUTO: 6.6 K/UL (ref 4.1–11.1)

## 2025-02-25 PROCEDURE — 80053 COMPREHEN METABOLIC PANEL: CPT

## 2025-02-25 PROCEDURE — 99285 EMERGENCY DEPT VISIT HI MDM: CPT

## 2025-02-25 PROCEDURE — 84484 ASSAY OF TROPONIN QUANT: CPT

## 2025-02-25 PROCEDURE — 71046 X-RAY EXAM CHEST 2 VIEWS: CPT

## 2025-02-25 PROCEDURE — 83735 ASSAY OF MAGNESIUM: CPT

## 2025-02-25 PROCEDURE — 85610 PROTHROMBIN TIME: CPT

## 2025-02-25 PROCEDURE — 93005 ELECTROCARDIOGRAM TRACING: CPT | Performed by: EMERGENCY MEDICINE

## 2025-02-25 PROCEDURE — 85025 COMPLETE CBC W/AUTO DIFF WBC: CPT

## 2025-02-25 PROCEDURE — 83880 ASSAY OF NATRIURETIC PEPTIDE: CPT

## 2025-02-25 PROCEDURE — 36415 COLL VENOUS BLD VENIPUNCTURE: CPT

## 2025-02-25 ASSESSMENT — PAIN - FUNCTIONAL ASSESSMENT: PAIN_FUNCTIONAL_ASSESSMENT: 0-10

## 2025-02-25 ASSESSMENT — PAIN SCALES - GENERAL: PAINLEVEL_OUTOF10: 7

## 2025-02-26 NOTE — ED PROVIDER NOTES
[facility] EMERGENCY DEPARTMENT  EMERGENCY DEPARTMENT ENCOUNTER      Pt Name: Joshua Shaikh  MRN: [id number]  Birthdate 1970  Date of evaluation: 2/25/2025  Provider: Jethro Toro MD      HISTORY OF PRESENT ILLNESS      54-year-old male with history of A-fib, coronary disease, CHF, end-stage renal disease currently pending transplant [facility] presents to the emergency department chief plaint of worsening shortness of breath for the last 4 days, particularly exertion.  Reports intermittent atrial fibrillation.  No fever or cough or new swelling.    The history is provided by the patient and medical records.           Nursing Notes were reviewed.    REVIEW OF SYSTEMS         Review of Systems        PAST MEDICAL HISTORY     Past Medical History:   Diagnosis Date    Anxiety     Arrhythmia     A-FIB    Arthritis     CAD (coronary artery disease)     MI 09    CHF (congestive heart failure) (HCC)     Chronic kidney disease     END STAGE KIDNEY DISEASE;  DIALYSIS M-W-Sat    Chronic pain     NEUROPATHY; BACK PAIN    Fatigue     Hypertension     Lymph edema     LEFT LEG    Needle phobia     Neuropathy     Other ill-defined conditions(799.89)     Rash     Skipped beats     Sleep apnea     WEARS CPAP    Snoring     SOB (shortness of breath)     Stroke ([facility]) 01/2020    NUMBNESS IN RIGHT LEG AND FOOT    Thromboembolus ([facility]) 2018    PE         SURGICAL HISTORY       Past Surgical History:   Procedure Laterality Date    IR FLUORO GUIDED NEEDLE PLACEMENT  10/08/2021    IR TUNNELED CVC PLACE WO SQ PORT/PUMP > 5 YEARS  10/8/2021    IR TUNNELED CATHETER PLACEMENT GREATER THAN 5 YEARS 10/8/2021 [facility]GIO IR    IR TUNNELED CVC PLACE WO SQ PORT/PUMP > 5 YEARS  10/10/2022    IR TUNNELED CATHETER PLACEMENT GREATER THAN 5 YEARS 10/10/2022 [facility] RAD ANGIO IR    IR TUNNELED CVC PLACE WO SQ PORT/PUMP > 5 YEARS  10/10/2022    IR TUNNELED CVC PLACE WO SQ PORT/PUMP > 5 YEARS  10/08/2021    ORTHOPEDIC SURGERY      LEFT HAND, RIGHT

## 2025-02-27 LAB
EKG ATRIAL RATE: 79 BPM
EKG DIAGNOSIS: NORMAL
EKG P AXIS: 63 DEGREES
EKG P-R INTERVAL: 226 MS
EKG Q-T INTERVAL: 398 MS
EKG QRS DURATION: 76 MS
EKG QTC CALCULATION (BAZETT): 456 MS
EKG R AXIS: 30 DEGREES
EKG T AXIS: -57 DEGREES
EKG VENTRICULAR RATE: 79 BPM

## 2025-02-27 PROCEDURE — 93010 ELECTROCARDIOGRAM REPORT: CPT | Performed by: SPECIALIST

## 2025-03-28 ENCOUNTER — TRANSCRIBE ORDERS (OUTPATIENT)
Facility: HOSPITAL | Age: 55
End: 2025-03-28

## 2025-03-28 DIAGNOSIS — Z76.82 AWAITING ORGAN TRANSPLANT STATUS: Primary | ICD-10-CM

## 2025-03-28 DIAGNOSIS — N18.9 CHRONIC KIDNEY DISEASE, UNSPECIFIED CKD STAGE: ICD-10-CM

## 2025-04-15 NOTE — PROGRESS NOTES
Neurointerventional Surgery Clinic Note    Joshua Shaikh is a 54 y.o. male established patient who was seen by telephone on 2/19/2025.      Consent: Joshua Shaikh, who was seen by telephone, and/or his healthcare decision maker, is aware that this patient-initiated, Telehealth encounter on 2/19/2025 is a billable service, with coverage as determined by his insurance carrier. He is aware that he may receive a bill and has provided verbal consent to proceed: yes.      Assessment & Plan:     Given patient's multiple medical issues and small size of the left MCA aneurysm, no neurovascular intervention is recommended at this time.  As per ISUIA/PHASES scoring the risk of aneurysm rupture is extremely small.      Recommendation is to follow-up the aneurysm with the right noninvasive imaging, preferably noncontrast MRA in 1 year.     I have spent at least 5 minutes on the telephone in discussion with the patient.  In addition, I have reviewed previous notes and test results as well as performed documentation on the day of the visit.     Subjective:   Joshua Shaikh is a 54 y.o. male with a small incidental left MCA bifurcation aneurysm.  Patient is following up after 1 year with a interval new MRA of the head.    Chief Complaint: Cerebral aneurysm and Follow-up (Imaging results.)      Prior to Admission medications    Medication Sig Start Date End Date Taking? Authorizing Provider   aspirin 81 MG EC tablet Take 1 tablet by mouth three times a week 11/1/24  Yes Raul Krause MD   amiodarone (CORDARONE) 200 MG tablet Take 1 tablet by mouth daily 11/25/24  Yes You Taylor MD   atorvastatin (LIPITOR) 80 MG tablet Take 1 tablet by mouth nightly 11/13/24  Yes You Taylor MD   midodrine (PROAMATINE) 5 MG tablet Take 1 tablet by mouth 3 times daily (with meals) 11/13/24  Yes You Taylor MD   gabapentin (NEURONTIN) 300 MG capsule daily. 8/29/23  Yes Raul Krause MD   allopurinol (ZYLOPRIM)

## 2025-08-16 NOTE — ED PROVIDER NOTES
52M w/ hx CVA/TIA, AF, CAD, CHF, ESRD on HD, VTE p/w facial numbness x1d. Pt reports 3-4hrs of right sided facial numbness that has since self resolved. No facial droop, speech/vision changes or ext weakness or sensory changes. Prior CVA w/ residual RLE numbness. No head/neck pain, dizziness, syncope. No CP/SOB, N/V/D. Last completed HD 2d ago. On warfarin for AF.  Pt took full dose asa at home prior to ED arrival.       Past Medical History:   Diagnosis Date    Anxiety     Arrhythmia     A-FIB    Arthritis     CAD (coronary artery disease)     MI 09    CHF (congestive heart failure) (HCC)     Chronic kidney disease     END STAGE KIDNEY DISEASE;  DIALYSIS M-W-Sat    Chronic pain     NEUROPATHY; BACK PAIN    Fatigue     Hypertension     Lymph edema     LEFT LEG    Needle phobia     Neuropathy     Other ill-defined conditions(799.89)     Rash     Skipped beats     Sleep apnea     WEARS CPAP    Snoring     SOB (shortness of breath)     Stroke ([facility] [address] 01/2020    NUMBNESS IN RIGHT LEG AND FOOT    Thromboembolus ([address] 2018    PE       Past Surgical History:   Procedure Laterality Date    HX ORTHOPAEDIC      LEFT HAND, RIGHT ANKLE, AND RIGHT SHOULDER    HX TONSILLECTOMY      HX VASCULAR ACCESS      UPPER RIGHT CHEST    IR FLUORO GUIDED NEEDLE PLACEMENT  10/08/2021    IR INSERT TUNL CVC W/O PORT OVER 5 YR  10/08/2021    IR INSERT TUNL CVC W/O PORT OVER 5 YR  10/10/2022    TX UNLISTED PROCEDURE CARDIAC SURGERY      CARDIAC CATHX2-NO STENTS    TX UNLISTED PROCEDURE VASCULAR SURGERY      PERITONEAL DIALYSIS CATH         Family History:   Problem Relation Age of Onset    Stroke Mother     Heart Disease Mother     Diabetes Mother     Kidney Disease Mother     Heart Attack Mother     Thyroid Disease Mother     No Known Problems Father     Heart Disease Sister     No Known Problems Sister     Lung Disease Brother     Deep Vein Thrombosis Brother     Neuropathy Other     Anesth Problems Neg Hx        Social History     Socioeconomic History    Marital status: SINGLE     Spouse name: Not on file    Number of children: Not on file    Years of education: Not on file    Highest education level: Not on file   Occupational History    Not on file   Tobacco Use    Smoking status: Former     Types: Cigars    Smokeless tobacco: Never    Tobacco comments:     CIGARS ONCE PER MONTH-NOTHING SINCE 2018   Vaping Use    Vaping Use: Never used   Substance and Sexual Activity    Alcohol use: Never     Comment: NOT FOR 3 YEARS    Drug use: No    Sexual activity: Not on file   Other Topics Concern    Not on file   Social History Narrative    ** Merged History Encounter **          Social Determinants of Health     Financial Resource Strain: Not on file   Food Insecurity: Not on file   Transportation Needs: Not on file   Physical Activity: Not on file   Stress: Not on file   Social Connections: Not on file   Intimate Partner Violence: Not on file   Housing Stability: Not on file         ALLERGIES: Lisinopril, Sulfa (sulfonamide antibiotics), Beef containing products, Pork derived (porcine), and Sulfa (sulfonamide antibiotics)    Review of Systems   Constitutional:  Negative for chills, diaphoresis and fever. HENT:  Negative for facial swelling, mouth sores, nosebleeds, trouble swallowing and voice change. Eyes:  Negative for pain and visual disturbance. Respiratory:  Negative for apnea, cough, shortness of breath, wheezing and stridor. Cardiovascular:  Negative for chest pain, palpitations and leg swelling. Gastrointestinal:  Negative for abdominal distention, abdominal pain, blood in stool, diarrhea, nausea and vomiting. Genitourinary:  Negative for difficulty urinating, dysuria, flank pain, hematuria, scrotal swelling and testicular pain. Musculoskeletal:  Negative for joint swelling. Skin:  Negative for color change and rash. Allergic/Immunologic: Negative for immunocompromised state. Neurological:  Positive for numbness.  Negative for dizziness, syncope and light-headedness. Hematological:  Does not bruise/bleed easily. Psychiatric/Behavioral:  Negative for agitation and behavioral problems. Vitals:    04/01/23 0445 04/01/23 0447 04/01/23 1200 04/01/23 1449   BP: (!) 139/59  (!) 135/97 114/62   Pulse: 94 80 88 (!) 50   Resp: 13 12 13 16   Temp:    97.5 °F (36.4 °C)   SpO2:       Weight:       Height:                Physical Exam  Vitals and nursing note reviewed. Constitutional:       General: He is not in acute distress. Appearance: Normal appearance. He is not ill-appearing or toxic-appearing. HENT:      Head: Normocephalic and atraumatic. Right Ear: External ear normal.      Left Ear: External ear normal.      Nose: Nose normal.      Mouth/Throat:      Mouth: Mucous membranes are moist.      Pharynx: Oropharynx is clear. No oropharyngeal exudate or posterior oropharyngeal erythema. Eyes:      General: No scleral icterus. Extraocular Movements: Extraocular movements intact. Conjunctiva/sclera: Conjunctivae normal.      Pupils: Pupils are equal, round, and reactive to light. Cardiovascular:      Rate and Rhythm: Normal rate and regular rhythm. Pulses: Normal pulses. Heart sounds: No murmur heard. No friction rub. No gallop. Comments: LUE AV fistula +bruit/thrill  Pulmonary:      Effort: Pulmonary effort is normal. No respiratory distress. Breath sounds: Normal breath sounds. No stridor. No wheezing, rhonchi or rales. Abdominal:      General: Bowel sounds are normal. There is no distension. Palpations: Abdomen is soft. Tenderness: There is no abdominal tenderness. There is no guarding or rebound. Musculoskeletal:         General: No deformity. Normal range of motion. Cervical back: Normal range of motion and neck supple. No rigidity. Right lower leg: No edema. Left lower leg: No edema. Skin:     General: Skin is warm.       Capillary Refill: Capillary refill takes less than 2 seconds. Coloration: Skin is not jaundiced. Neurological:      General: No focal deficit present. Mental Status: He is alert. Cranial Nerves: No cranial nerve deficit. Sensory: No sensory deficit. Motor: No weakness. Coordination: Coordination normal.      Comments: -GCS15, AAox3  -Normal b/l UE/LE motor/sensory exam  -CN2-12 intact including able to smile/frown, elevate eyebrows symmetrically, close eyes tightly against force, sensation to light touch intact V1-V3 distribution, hearing intact to finger rub b/l, palate/uvula elevate midline/symmetrically, able to shrug shoulders and move head left/right against force, tongue protrudes midline  -EOMI, PERRL, no nystagmus, normal visual fields, nl speech w/o dysarthria/aphasia  -no pronator drift  -cerebellar testing intact including rapid/alternating movements, finger-to-nose/shin-to-heel   Psychiatric:         Mood and Affect: Mood normal.         Behavior: Behavior normal.         Thought Content: Thought content normal.         Judgment: Judgment normal.      I personally reviewed and independently interpreted EKG, labs and imaging results. EKG Interpretation   Narrow complex AF w/ inferior-lateral TWI, no ST changes    LABORATORY TESTS:  Admission on 03/31/2023   Component Date Value Ref Range Status    Glucose (POC) 04/01/2023 104  65 - 117 mg/dL Final    Comment: (NOTE)  The FDA has indicated that no capillary point of care blood glucose  monitoring systems are approved for use in \"critically ill\" patients,  however they have not defined this population. The College of  American Pathologists has recommended that these devices should not  be used in cases such as severe hypotension, dehydration, shock, and  hyperglycemic-hyperosmolar state, amongst others. Venous or arterial  collection is the recommended specimen for testing these patients.       Performed by 04/01/2023 Joshua Butler (PCT)   Final SAMPLES BEING HELD 04/01/2023 1PST,1RED,1LAV,1BLUE   Final    COMMENT 04/01/2023 Add-on orders for these samples will be processed based on acceptable specimen integrity and analyte stability, which may vary by analyte. Final    Ventricular Rate 04/01/2023 86  BPM Final    QRS Duration 04/01/2023 80  ms Final    Q-T Interval 04/01/2023 406  ms Final    QTC Calculation (Bezet) 04/01/2023 485  ms Final    Calculated R Axis 04/01/2023 77  degrees Final    Calculated T Axis 04/01/2023 -64  degrees Final    Diagnosis 04/01/2023    Final                    Value:Atrial fibrillation  T wave abnormality, consider inferior ischemia  T wave abnormality, consider anterolateral ischemia  Prolonged QT  When compared with ECG of 17-MAR-2023 13:13,  No significant change was found  Confirmed by Alessandra Blackwell M.D., Albino Manis ([id number]) on 4/1/2023 11:31:20 AM      Sodium 04/01/2023 129 (A)  136 - 145 mmol/L Final    Potassium 04/01/2023 4.6  3.5 - 5.1 mmol/L Final    Chloride 04/01/2023 95 (A)  97 - 108 mmol/L Final    CO2 04/01/2023 27  21 - 32 mmol/L Final    Anion gap 04/01/2023 7  5 - 15 mmol/L Final    Glucose 04/01/2023 116 (A)  65 - 100 mg/dL Final    BUN 04/01/2023 71 (A)  6 - 20 MG/DL Final    Creatinine 04/01/2023 16.30 (A)  0.70 - 1.30 MG/DL Final    BUN/Creatinine ratio 04/01/2023 4 (A)  12 - 20   Final    eGFR 04/01/2023 3 (A)  >60 ml/min/1.73m2 Final    Comment:      Pediatric calculator link: [employer].at. org/professionals/kdoqi/gfr_calculatorped       These results are not intended for use in patients <25years of age. eGFR results are calculated without a race factor using  the 2021 CKD-EPI equation. Careful clinical correlation is recommended, particularly when comparing to results calculated using previous equations.   The CKD-EPI equation is less accurate in patients with extremes of muscle mass, extra-renal metabolism of creatinine, excessive creatine ingestion, or following therapy that affects renal tubular secretion. Calcium 04/01/2023 9.1  8.5 - 10.1 MG/DL Final    WBC 04/01/2023 7.4  4.1 - 11.1 K/uL Final    RBC 04/01/2023 3.31 (A)  4.10 - 5.70 M/uL Final    HGB 04/01/2023 11.0 (A)  12.1 - 17.0 g/dL Final    HCT 04/01/2023 35.0 (A)  36.6 - 50.3 % Final    MCV 04/01/2023 105.7 (A)  80.0 - 99.0 FL Final    MCH 04/01/2023 33.2  26.0 - 34.0 PG Final    MCHC 04/01/2023 31.4  30.0 - 36.5 g/dL Final    RDW 04/01/2023 15.1 (A)  11.5 - 14.5 % Final    PLATELET 74/73/9565 572  150 - 400 K/uL Final    MPV 04/01/2023 11.4  8.9 - 12.9 FL Final    NRBC 04/01/2023 0.0  0  WBC Final    ABSOLUTE NRBC 04/01/2023 0.00  0.00 - 0.01 K/uL Final    NEUTROPHILS 04/01/2023 54  32 - 75 % Final    LYMPHOCYTES 04/01/2023 23  12 - 49 % Final    MONOCYTES 04/01/2023 14 (A)  5 - 13 % Final    EOSINOPHILS 04/01/2023 7  0 - 7 % Final    BASOPHILS 04/01/2023 1  0 - 1 % Final    IMMATURE GRANULOCYTES 04/01/2023 1 (A)  0.0 - 0.5 % Final    ABS. NEUTROPHILS 04/01/2023 4.1  1.8 - 8.0 K/UL Final    ABS. LYMPHOCYTES 04/01/2023 1.7  0.8 - 3.5 K/UL Final    ABS. MONOCYTES 04/01/2023 1.0  0.0 - 1.0 K/UL Final    ABS. EOSINOPHILS 04/01/2023 0.5 (A)  0.0 - 0.4 K/UL Final    ABS. BASOPHILS 04/01/2023 0.1  0.0 - 0.1 K/UL Final    ABS. IMM. GRANS. 04/01/2023 0.0  0.00 - 0.04 K/UL Final    DF 04/01/2023 AUTOMATED    Final    INR 04/01/2023 3.9 (A)  0.9 - 1.1   Final    A single therapeutic range for Vit K antagonists may not be optimal for all indications - see June, 2008 issue of Chest, American College of Chest Physicians Evidence-Based Clinical Practice Guidelines, 8th Edition. Prothrombin time 04/01/2023 37.7 (A)  9.0 - 11.1 sec Final    Glucose (POC) 04/01/2023 79  65 - 117 mg/dL Final    Comment: (NOTE)  The FDA has indicated that no capillary point of care blood glucose  monitoring systems are approved for use in \"critically ill\" patients,  however they have not defined this population.  The College of  American Pathologists has recommended that these devices should not  be used in cases such as severe hypotension, dehydration, shock, and  hyperglycemic-hyperosmolar state, amongst others. Venous or arterial  collection is the recommended specimen for testing these patients. Performed by 04/01/2023 Darrell Cardona   Final    Glucose (POC) 04/01/2023 81  65 - 117 mg/dL Final    Comment: (NOTE)  The FDA has indicated that no capillary point of care blood glucose  monitoring systems are approved for use in \"critically ill\" patients,  however they have not defined this population. The College of  American Pathologists has recommended that these devices should not  be used in cases such as severe hypotension, dehydration, shock, and  hyperglycemic-hyperosmolar state, amongst others. Venous or arterial  collection is the recommended specimen for testing these patients.       Performed by 04/01/2023 Darrell Cardona   Final       IMAGING RESULTS:  CT HEAD WO CONT   Final Result   No acute process seen      MRI BRAIN WO CONT    (Results Pending)       MEDICATIONS GIVEN:  Medications   atorvastatin (LIPITOR) tablet 40 mg (40 mg Oral Given 4/1/23 0936)   cinacalcet (SENSIPAR) tablet 30 mg (has no administration in time range)   cyanocobalamin (VITAMIN B12) tablet 1,000 mcg (1,000 mcg Oral Given 4/1/23 0936)   dilTIAZem ER (CARDIZEM CD) capsule 240 mg (240 mg Oral Given 4/1/23 0936)   ferrous sulfate tablet 325 mg (325 mg Oral Missed 4/1/23 0730)   montelukast (SINGULAIR) tablet 10 mg (10 mg Oral Given 4/1/23 0936)   pregabalin (LYRICA) capsule 75 mg (75 mg Oral Given 4/1/23 0936)   sevelamer carbonate (RENVELA) tab 1,600 mg (1,600 mg Oral Missed 4/1/23 0730)   acetaminophen (TYLENOL) tablet 650 mg (has no administration in time range)     Or   acetaminophen (TYLENOL) solution 650 mg (has no administration in time range)     Or   acetaminophen (TYLENOL) suppository 650 mg (has no administration in time range)   glucose chewable tablet 16 g (has no administration in time range)   glucagon (GLUCAGEN) injection 1 mg (has no administration in time range)   dextrose 10 % infusion 0-250 mL (has no administration in time range)   insulin lispro (HUMALOG) injection (0 Units SubCUTAneous Held 4/1/23 1130)   diazePAM (VALIUM) injection 5 mg (has no administration in time range)       IMPRESSION:  1. ESRD needing dialysis ([address]    2. Hyponatremia    3. Facial numbness    4. Supratherapeutic INR        PLAN:  - Admit to hospitalist    David Shepard MD      Medical Decision Making  52M w/ hx CVA/TIA, AF, CAD, CHF, ESRD on HD, VTE p/w facial numbness x1d. Afebrile, hemodynamically stable w/o resp distress or hypoxia. BS is WNL. Has no focal neuro deficits on my exam w/ NIHSS=0 so CVA alert not called. Ddx includes TIA vs CVA (ischemic vs hemorrhagic) vs cerebellar/brainstem stroke vs SAH vs ICH/IPH vs SDH/epidural vs HTN emergency/urgency vs PRES vs obstructive hydrocephalus vs intracranial mass vs cerebral edema vs neuromuscular/neurodegenerative disease vs partial/focal seizure vs complex migraine vs syncope vs electrolyte/metabolic vs CNS infection. Ordered CTH, EKG, labs. Monitor and reassess. 0500 Pt remains stable w/o change in neuro exam. Labs show ESRD w/ hyponatremia and BUN 70. INR supratherapeutic 3.9 (no active bleeding and Hgb stable 11). EKG showing narrow complex AF w/o ST changes but inferior-lateral TWI. CTH neg for acute findings. Admitting for MRI to r/o stroke and also needs his dialysis. Nephrology consulted. Pt already took full dose asa prior to ED arrival.    Amount and/or Complexity of Data Reviewed  Labs: ordered. Decision-making details documented in ED Course. Radiology: ordered and independent interpretation performed. Decision-making details documented in ED Course. ECG/medicine tests: ordered and independent interpretation performed. Decision-making details documented in ED Course. Risk  Decision regarding hospitalization. Procedures        Perfect Serve Consult for Admission  5:04 AM    ED Room Number: 653/01  Patient Name and age:  Geri Reyes 50 y.o.  male  Working Diagnosis:   1. ESRD needing dialysis ([facility] [address]    2. Hyponatremia    3. Facial numbness    4.  Supratherapeutic INR        COVID-19 Suspicion:  no  Sepsis present:  no  Reassessment needed: no  Code Status:  Full Code  Readmission: no  Isolation Requirements:  no  Recommended Level of Care:  telemetry  Department:[facility] Adult ED - 21  [Negative] : Heme/Lymph

## (undated) DEVICE — HANDLE LT SNAP ON ULT DURABLE LENS FOR TRUMPF ALC DISPOSABLE

## (undated) DEVICE — REM POLYHESIVE ADULT PATIENT RETURN ELECTRODE: Brand: VALLEYLAB

## (undated) DEVICE — STRAP,POSITIONING,KNEE/BODY,FOAM,4X60": Brand: MEDLINE

## (undated) DEVICE — INFECTION CONTROL KIT SYS

## (undated) DEVICE — SPONGE GZ W4XL4IN COT 12 PLY TYP VII WVN C FLD DSGN

## (undated) DEVICE — Device

## (undated) DEVICE — SUT PROL 2-0 30IN CT2 BLU --

## (undated) DEVICE — THIS ADAPTER IS A DOUBLE SEALING FEMALE LUER LOCK ADAPTER WITH A 2-PIECE, COMBINATION COMPRESSION FIT/BARBED CATHETER CONNECTOR. THE ADAPTER IS USED TO CONNECT THE PD CATHETER TO A SOLUTION TRANSFER SET WITH LOCKING CONNECTOR.: Brand: LOCKING TITANIUM ADAPTER FOR PERITONEAL DIALYSIS CATHETER

## (undated) DEVICE — SUTURE VCRL SZ 2-0 L36IN ABSRB UD L36MM CT-1 1/2 CIR J945H

## (undated) DEVICE — SUTURE VCRL SZ 3-0 L27IN ABSRB UD L26MM SH 1/2 CIR J416H

## (undated) DEVICE — NEEDLE HYPO 25GA L1.5IN BVL ORIENTED ECLIPSE

## (undated) DEVICE — GLOVE ORANGE PI 7   MSG9070

## (undated) DEVICE — PREP SKN CHLRAPRP APL 26ML STR --

## (undated) DEVICE — GARMENT,MEDLINE,DVT,INT,CALF,MED, GEN2: Brand: MEDLINE

## (undated) DEVICE — PENCIL SMK EVAC 10 FT BLADE ELECTRD ROCKER FOR TELSCP

## (undated) DEVICE — SOLUTION IRRIG 1000ML STRL H2O USP PLAS POUR BTL

## (undated) DEVICE — STERILE POLYISOPRENE POWDER-FREE SURGICAL GLOVES WITH EMOLLIENT COATING: Brand: PROTEXIS

## (undated) DEVICE — DERMABOND SKIN ADH 0.7ML -- DERMABOND ADVANCED 12/BX

## (undated) DEVICE — SUT PROL 6-0 18IN BV1 DA BLU --

## (undated) DEVICE — SOLUTION IV 1000ML 0.9% SOD CHL

## (undated) DEVICE — SURGICAL PROCEDURE KIT GEN LAPAROSCOPY LF

## (undated) DEVICE — SUTURE PROL SZ 0 L30IN NONABSORBABLE BLU L26MM CT-2 1/2 CIR 8412H

## (undated) DEVICE — INSUFFLATION NEEDLE TO ESTABLISH PNEUMOPERITONEUM.: Brand: INSUFFLATION NEEDLE

## (undated) DEVICE — SOL INJ SOD CL 0.9% 500ML BG --

## (undated) DEVICE — SUTURE VCRL SZ 3-0 L27IN ABSRB UD FS-2 L19MM 1/2 CIR J423H